# Patient Record
Sex: MALE | Race: WHITE | NOT HISPANIC OR LATINO | Employment: OTHER | URBAN - METROPOLITAN AREA
[De-identification: names, ages, dates, MRNs, and addresses within clinical notes are randomized per-mention and may not be internally consistent; named-entity substitution may affect disease eponyms.]

---

## 2017-08-08 ENCOUNTER — PRE VISIT (OUTPATIENT)
Dept: NEUROLOGY | Facility: CLINIC | Age: 62
End: 2017-08-08

## 2017-08-08 NOTE — TELEPHONE ENCOUNTER
1.  Date/reason for appt:8/10/17, Parkinson's   2.  Referring provider: ISABEL POTTS  3.  Call to patient (Yes / No - short description): No, referred   4.  Previous care at / records requested from:   [facility]- faxed cover sheet.

## 2017-08-09 ASSESSMENT — ENCOUNTER SYMPTOMS
NERVOUS/ANXIOUS: 1
EYE IRRITATION: 0
EYE PAIN: 0
INSOMNIA: 1
MYALGIAS: 1
NUMBNESS: 0
WEIGHT GAIN: 0
FEVER: 0
DIZZINESS: 0
ALTERED TEMPERATURE REGULATION: 1
MUSCLE WEAKNESS: 0
CHILLS: 0
DISTURBANCES IN COORDINATION: 0
DECREASED CONCENTRATION: 1
TINGLING: 0
NECK PAIN: 0
FATIGUE: 1
BACK PAIN: 0
SPEECH CHANGE: 0
LOSS OF CONSCIOUSNESS: 0
HEADACHES: 0
POLYPHAGIA: 0
EYE WATERING: 0
WEIGHT LOSS: 0
TREMORS: 1
POLYDIPSIA: 0
DECREASED APPETITE: 0
ARTHRALGIAS: 1
NIGHT SWEATS: 1
INCREASED ENERGY: 1
DEPRESSION: 0
MUSCLE CRAMPS: 1
PANIC: 1
SEIZURES: 0
STIFFNESS: 1
HALLUCINATIONS: 0
PARALYSIS: 0
MEMORY LOSS: 0
WEAKNESS: 0
JOINT SWELLING: 0
DOUBLE VISION: 0

## 2017-08-09 NOTE — TELEPHONE ENCOUNTER
Additional records received from [facility].   Included  Office notes: 6/22/17, 1/9/17, 6/29/16, 12/28/15  Radiology reports: MRI brain 12/21/15- [facility]

## 2017-08-09 NOTE — TELEPHONE ENCOUNTER
Records received from [facility].   Included  Office notes: 7/28/17  Other: referral     Missing/needed records: additional records from [facility]- request was faxed

## 2017-08-10 ENCOUNTER — OFFICE VISIT (OUTPATIENT)
Dept: NEUROLOGY | Facility: CLINIC | Age: 62
End: 2017-08-10

## 2017-08-10 VITALS
DIASTOLIC BLOOD PRESSURE: 75 MMHG | HEART RATE: 66 BPM | WEIGHT: 190.4 LBS | HEIGHT: 69 IN | BODY MASS INDEX: 28.2 KG/M2 | SYSTOLIC BLOOD PRESSURE: 123 MMHG

## 2017-08-10 DIAGNOSIS — G20.A1 PARALYSIS AGITANS (H): Primary | ICD-10-CM

## 2017-08-10 RX ORDER — THYROID 60 MG/1
60 TABLET ORAL EVERY MORNING
COMMUNITY
Start: 2017-06-07

## 2017-08-10 RX ORDER — CITALOPRAM HYDROBROMIDE 10 MG/1
10 TABLET ORAL EVERY MORNING
COMMUNITY
Start: 2017-06-02 | End: 2020-01-13

## 2017-08-10 RX ORDER — CLONAZEPAM 0.5 MG/1
1 TABLET ORAL AT BEDTIME
COMMUNITY
Start: 2017-06-02 | End: 2019-05-15

## 2017-08-10 RX ORDER — RESVER/WINE/BFL/GRPSD/PC/C/POM 200MG-60MG
CAPSULE ORAL
COMMUNITY
Start: 2011-12-19 | End: 2021-09-10

## 2017-08-10 RX ORDER — CALCIUM CARBONATE 260MG(650)
400 TABLET,CHEWABLE ORAL EVERY MORNING
COMMUNITY
End: 2018-01-05

## 2017-08-10 RX ORDER — CARBIDOPA AND LEVODOPA 25; 100 MG/1; MG/1
TABLET, EXTENDED RELEASE ORAL
COMMUNITY
Start: 2016-01-15 | End: 2017-09-27

## 2017-08-10 RX ORDER — CARBIDOPA AND LEVODOPA 25; 100 MG/1; MG/1
TABLET ORAL 3 TIMES DAILY
COMMUNITY
Start: 2016-01-15 | End: 2017-09-27

## 2017-08-10 ASSESSMENT — PAIN SCALES - GENERAL: PAINLEVEL: NO PAIN (0)

## 2017-08-10 NOTE — TELEPHONE ENCOUNTER
Radiology reports received from [facility].   MRI brain 12/21/15  MRI cervical 10/19/09
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
## 2017-08-10 NOTE — PATIENT INSTRUCTIONS
1. Start taking the clonazepam at night again.  2. If that doesn't work for your abnormal behavior during sleep, discuss changing your antidepressant with your primary neurologist.    3. We will have our research coordinator get in touch with you regarding current studies.  4. We will have you attend our DBS class    5. If we go forward, we will likely work to arrange an evaluation by the neuropsychologist, videotaped testing, and MRI

## 2017-08-10 NOTE — NURSING NOTE
Chief Complaint   Patient presents with     Consult     P NEW - MOVEMENT DISORDER     Kenia Fitch MA

## 2017-08-10 NOTE — MR AVS SNAPSHOT
After Visit Summary   8/10/2017    Kwame Lin    MRN: [id number]           Patient Information     Date Of Birth          1955        Visit Information        Provider Department      8/10/2017 9:00 AM Navneet Olivares MD [facility]        Care Instructions    1. Start taking the clonazepam at night again.  2. If that doesn't work for your abnormal behavior during sleep, discuss changing your antidepressant with your primary neurologist.    3. We will have our research coordinator get in touch with you regarding current studies.  4. We will have you attend our DBS class    5. If we go forward, we will likely work to arrange an evaluation by the neuropsychologist, videotaped testing, and MRI          Follow-ups after your visit        Who to contact     Please call your clinic at [phone] to:    Ask questions about your health    Make or cancel appointments    Discuss your medicines    Learn about your test results    Speak to your doctor   If you have compliments or concerns about an experience at your clinic, or if you wish to file a complaint, please contact [facility] Patient Relations at [phone] or email us at [email or web address]         Additional Information About Your Visit        MyChart Information     [employer] gives you secure access to your electronic health record. If you see a primary care provider, you can also send messages to your care team and make appointments. If you have questions, please call your primary care clinic.  If you do not have a primary care provider, please call [phone] and they will assist you.      [employer] is an electronic gateway that provides easy, online access to your medical records. With [employer], you can request a clinic appointment, read your test results, renew a prescription or communicate with your care team.     To access your existing account, please contact your [facility]  "[place] [facility] or call [phone] for assistance.        Care EveryWhere ID     This is your Care EveryWhere ID. This could be used by other organizations to access your [place] medical records  [id number]        Your Vitals Were     Pulse Height BMI (Body Mass Index)             66 1.753 m (5' 9\") 28.12 kg/m2          Blood Pressure from Last 3 Encounters:   08/10/17 123/75    Weight from Last 3 Encounters:   08/10/17 86.4 kg (190 lb 6.4 oz)              Today, you had the following     No orders found for display       Primary Care Provider Office Phone # Fax #    Silvina Armenta [phone]       [facility] [address] [address]        Equal Access to Services     KARISSA CASON : Hadii jr calhoun hadasho Soomaali, waaxda luqadaha, qaybta kaalmada adeegyada, waxevelyn quezada haycarroll dias . So [facility] [phone].    ATENCIÓN: Si habla español, tiene a mcrae disposición servicios gratuitos de asistencia lingüística. Jake al [phone].    We comply with applicable federal civil rights laws and [place] laws. We do not discriminate on the basis of race, color, national origin, age, disability sex, sexual orientation or gender identity.            Thank you!     Thank you for choosing [facility]  for your care. Our goal is always to provide you with excellent care. Hearing back from our patients is one way we can continue to improve our services. Please take a few minutes to complete the written survey that you may receive in the mail after your visit with us. Thank you!             Your Updated Medication List - Protect others around you: Learn how to safely use, store and throw away your medicines at www.disposemymeds.org.          This list is accurate as of: 8/10/17 10:58 AM.  Always use your most recent med list.                   Brand Name Dispense Instructions for use Diagnosis    Acidophilus/Goat Milk Caps           ARMOUR " THYROID 60 MG tablet   Generic drug:  thyroid      Take 60 mg by mouth every morning        * carbidopa-levodopa  MG per tablet    SINEMET     Take by mouth 3 times daily        * carbidopa-levodopa  MG per CR tablet    SINEMET CR          citalopram 10 MG tablet    celeXA     Take 10 mg by mouth daily        clonazePAM 0.5 MG tablet    klonoPIN     Take 0.25-0.5 mg by mouth 2 times daily        coenzyme Q-10 10 MG Caps      Take 10 mg by mouth        FISH OIL PO      Take 1,000 mg by mouth daily        Magnesium Citrate 100 MG Tabs      Take 400 mg by mouth        Red Wine Extract Caps           * Notice:  This list has 2 medication(s) that are the same as other medications prescribed for you. Read the directions carefully, and ask your doctor or other care provider to review them with you.

## 2017-08-14 ENCOUNTER — TELEPHONE (OUTPATIENT)
Dept: NEUROSURGERY | Facility: CLINIC | Age: 62
End: 2017-08-14

## 2017-08-14 NOTE — TELEPHONE ENCOUNTER
Per Dr. Yuan, spoke with pt about our DBS class. Pt is interested in attending our October session. I will send a letter with the pertinent information. No further questions at this time.

## 2017-08-15 ENCOUNTER — TELEPHONE (OUTPATIENT)
Dept: NEUROLOGY | Facility: CLINIC | Age: 62
End: 2017-08-15

## 2017-08-15 NOTE — TELEPHONE ENCOUNTER
I spoke with patient and wife Pat today about research opportunities in PD. I discussed PEDAL, 7T, OR study, and Clinical Core and stated there are others as well.  Pt and wife were eager for information and I will send them an email summary. Pt and wife inquired about when they can expect a workup schedule as well as if there an can be an exception made to get them into the September DBS class--I will follow up with Alis Olson and get back to them. Pt's wife also wondering what type of material DBS lead is made of--I will follow up with device rep and email them. I will send them my contact information for any further research questions.

## 2017-08-18 ENCOUNTER — TELEPHONE (OUTPATIENT)
Dept: NEUROSURGERY | Facility: CLINIC | Age: 62
End: 2017-08-18

## 2017-08-18 NOTE — TELEPHONE ENCOUNTER
Spoke with pt about the 4 DBS workup appointments. I will try to schedule the appointments over 2 separate days; pt does not need the appt dates to be back to back, as [place]. I will have some possible dates by mid-week and will f/u with him at that time. No further questions.

## 2017-08-22 ENCOUNTER — TELEPHONE (OUTPATIENT)
Dept: NEUROSURGERY | Facility: CLINIC | Age: 62
End: 2017-08-22

## 2017-08-22 DIAGNOSIS — G20.A1 PARKINSON'S DISEASE (H): Primary | ICD-10-CM

## 2017-08-22 NOTE — TELEPHONE ENCOUNTER
Left  for pt letting him know I'd like to discuss DBS workup appt possibilities. Requested that he call me at earliest convenience. Left direct phone number.

## 2017-09-22 ASSESSMENT — MOVEMENT DISORDERS SOCIETY - UNIFIED PARKINSONS DISEASE RATING SCALE (MDS-UPDRS)
FREEZING: NORMAL: NOT AT ALL (NO PROBLEMS).
SPEECH: NORMAL: NOT AT ALL (NO PROBLEMS).
GETTING_OUT_OF_BED_CAR_DEEP_CHAIR: NORMAL: NOT AT ALL (NO PROBLEMS).
TREMOR: MODERATE: SHAKING OR TREMOR CAUSES PROBLEMS WITH MANY OF MY DAILY ACTIVITES.
SALIVA_AND_DROOLING: NORMAL: NOT AT ALL (NO PROBLEMS).
WALKING_AND_BALANCE: NORMAL: NOT AT ALL (NO PROBLEMS).
HOBBIES_AND_OTHER_ACTIVITIES: SLIGHT: I AM A BIT SLOW BUT DO THESE ACTIVITIES EASILY.
EATING_TASKS: MILD: I AM SLOW WITH MY EATING AND HAVE OCCASIONAL FOOD SPILLS.  I MAY NEED HELP WITH A FEW TASKS SUCH AS CUTTING MEAT.
HANDWRITING: SEVERE: MOST OR ALL WORDS CANNOT BE READ.
HYGIENE: NORMAL: NOT AT ALL (NO PROBLEMS).
CHEWING_AND_SWALLOWING: NORMAL: NO PROBLEMS.
TURNING_IN_BED: NORMAL: NOT AT ALL (NO PROBLEMS).
TOTAL_SCORE: 11
DRESSING: SLIGHT: I AM SLOW BUT I DO NOT NEED HELP.

## 2017-09-27 ENCOUNTER — OFFICE VISIT (OUTPATIENT)
Dept: NEUROLOGY | Facility: CLINIC | Age: 62
End: 2017-09-27

## 2017-09-27 VITALS
BODY MASS INDEX: 27.55 KG/M2 | WEIGHT: 186 LBS | DIASTOLIC BLOOD PRESSURE: 67 MMHG | HEART RATE: 87 BPM | SYSTOLIC BLOOD PRESSURE: 121 MMHG | HEIGHT: 69 IN

## 2017-09-27 DIAGNOSIS — G20.A1 PARKINSON'S DISEASE (H): Primary | ICD-10-CM

## 2017-09-27 RX ORDER — CARBIDOPA AND LEVODOPA 50; 200 MG/1; MG/1
2 TABLET, EXTENDED RELEASE ORAL AT BEDTIME
COMMUNITY
Start: 2017-09-27 | End: 2018-05-18

## 2017-09-27 RX ORDER — CARBIDOPA AND LEVODOPA 25; 100 MG/1; MG/1
3 TABLET ORAL AT BEDTIME
Qty: 90 TABLET | COMMUNITY
Start: 2017-09-27 | End: 2019-10-17

## 2017-09-27 ASSESSMENT — PAIN SCALES - GENERAL: PAINLEVEL: MILD PAIN (3)

## 2017-09-27 NOTE — PATIENT INSTRUCTIONS
September 27, 2017    Dear Javier Kwame Lin,    Thank you for coming today.  During your visit, we have discussed the following:       __  You have completed the ON/OFF Motor Assessment.    __  Continue with your DBS workup appointments.  __  Once you're done with your workup, we'll discuss you at the DBS Consensus meeting & will let you know the decision.  __  You may contact us with any question.     You may return sooner as needed.      For questions, you may send us a [employer] message or call [phone]    Fax number: [phone]    АННА Flores, CNP  [facility]

## 2017-09-27 NOTE — PROGRESS NOTES
"PATIENT: Kwame Lin    : 1955    SULEMA: 2017    REASON FOR VISIT:  Pre-DBS ON/OFF motor symptom evaluation.      HPI: Mr. Kwame Lin is a 62 year old right - handed  male who came to the [facility] neurology clinic accompanied by his wife, , for ON/OFF motor evaluation as part of Deep Brain Stimulation surgery work-up for management of Parkinson's disease.     Today, he reports that his tremor is much worse than before.  He didn't think that Sinemet was helping him.  But now, he realizes that it's doing something.    He was diagnosed with Idiopathic Parkinson's disease around  -  at [facility].  He was seen by a general neurologist as well as by Dr. Easton.  His symptom started in  when flying back from [place].  He had nausea & felt sick & was told that it was \"aerotoxic syndrome.\"  He reports symptoms resolved & came back a year after when pt & wife went on a strict diet.  He lost 25 lbs in 6 weeks.  During that time, his tremor came back.         His goal for DBS include \"to reduce tremors;\" \"to have a normal life like eating, writing, buttoning shirt, not be afraid to go out in public, being able to hold my grandchildren without shaking, going to a movie & not worry about shaking & disrupting other, being able to sit where I want & not in the back. . . .\"  \"I used to be an outgoing person, but now I've become introvert because of tremors.\"    He has attended DBS workshops.  He also knows a few people who have DBS with good motor benefit.  He reports a couple of people he talked to had stroke post DBS, but reported that they would do it again.  He has tried various alternative therapies & wants to go forward with DBS as nothing he has tried has given him the benefit he's looking for.     He knows that DBS is not a cure.  He hopes to get his \"life back.\"  He verbalized about 2% potential side effect of speech problems & stroke.     Current antiparkinsonian " "medication:   Medication Sig     carbidopa-levodopa (SINEMET)  MG 1.5 every 4 hours 3 x daily around 6:30 am, 10:30 am, & 3:30 pm.     carbidopa-levodopa (SINEMET CR)  MG per CR  Take 2 tablets by mouth At Bedtime       Last dose of antiparkinsonian medication was taken: Yesterday, 9/26 at 3 pm.    In clinic, OFF motor exam was completed and patient took Sinemet 25/100 mg 2 tabs at 7:30 am.  After 80 minutes, ON motor exam was completed.    Physical Exam:    Vital Signs:  Blood pressure 121/67, pulse 87, height 1.753 m (5' 9\"), weight 84.4 kg (186 lb).  Body mass index is 27.47 kg/(m^2).    MDS Part II  -- Total Score: 11       MOTOR TEST: UPDRS Flowsheet was completed in Epic. Exam was videotaped.   Total OFF score = 41  Total ON score = 28    Percentage: 41 - 28 = 13 --> 13 ÷ 41 = 31.7 = 32% motor improvement.      ASSESSMENT/PLAN:    Parkinson's Disease: Mr. Lin is a 62 year old right - handed male with about 9 year history of Idiopathic Parkinson's disease who came to the clinic for ON/OFF motor evaluation as part of his Deep Brain Stimulation surgery work-up.    __  Pt had good knowledge about DBS.   We briefly discussed DBS risks, & benefits; the possibility of lead misplacement; appropriate DBS candidates.  All questions were answered.    __  He was informed that we'll contact him after the DBS team meets & discusses his work-up. He understands the plan.    The total time spent with the patient in ON/OFF motor evaluation & DBS surgery was 120 minutes and greater than 50% of the time was spent in counseling & coordination of care.    АННА Flores, CNP   [facility]    "

## 2017-09-27 NOTE — MR AVS SNAPSHOT
After Visit Summary   9/27/2017    Kwame Lin    MRN: [id number]           Patient Information     Date Of Birth          1955        Visit Information        Provider Department      9/27/2017 7:00 AM Navjot Juarez APRN CNP [facility] Neurology        Care Instructions    September 27, 2017    Dear Mr. Kwame Lin,    Thank you for coming today.  During your visit, we have discussed the following:       __  You have completed the ON/OFF Motor Assessment.    __  Continue with your DBS workup appointments.  __  Once you're done with your workup, we'll discuss you at the DBS Consensus meeting & will let you know the decision.  __  You may contact us with any question.     You may return sooner as needed.      For questions, you may send us a [email or web address] message or call [phone]    Fax number: [phone]    АННА Flores CNP  [facility]            Follow-ups after your visit        Your next 10 appointments already scheduled     Sep 27, 2017 10:00 AM CDT   MR BRAIN W/O & W CONTRAST with [id number]   [facility]    [facility] [place] [address]   [phone]           Take your medicines as usual, unless your doctor tells you not to. Bring a list of your current medicines to your exam (including vitamins, minerals and over-the-counter drugs).  You will be given intravenous contrast for this exam. To prepare:   The day before your exam, drink extra fluids at least six 8-ounce glasses (unless your doctor tells you to restrict your fluids).   Have a blood test (creatinine test) within 30 days of your exam. Go to your clinic or Diagnostic Imaging Department for this test.  The MRI machine uses a strong magnet. Please wear clothes without metal (snaps, zippers). A sweatsuit works well, or we may give you a hospital gown.  Please remove any body piercings and hair extensions before you arrive. You will also remove  watches, jewelry, hairpins, wallets, dentures, partial dental plates and hearing aids. You may wear contact lenses, and you may be able to wear your rings. We have a safe place to keep your personal items, but it is safer to leave them at home.   **IMPORTANT** THE INSTRUCTIONS BELOW ARE ONLY FOR THOSE PATIENTS WHO HAVE BEEN TOLD THEY WILL RECEIVE SEDATION OR GENERAL ANESTHESIA DURING THEIR MRI PROCEDURE:  IF YOU WILL RECEIVE SEDATION (take medicine to help you relax during your exam):   You must get the medicine from your doctor before you arrive. Bring the medicine to the exam. Do not take it at home.   Arrive one hour early. Bring someone who can take you home after the test. Your medicine will make you sleepy. After the exam, you may not drive, take a bus or take a taxi by yourself.   No eating 8 hours before your exam. You may have clear liquids up until 4 hours before your exam. (Clear liquids include water, clear tea, black coffee and fruit juice without pulp.)  IF YOU WILL RECEIVE ANESTHESIA (be asleep for your exam):   Arrive 1 1/2 hours early. Bring someone who can take you home after the test. You may not drive, take a bus or take a taxi by yourself.   No eating 8 hours before your exam. You may have clear liquids up until 4 hours before your exam. (Clear liquids include water, clear tea, black coffee and fruit juice without pulp.)  Please call the Imaging Department at your exam site with any questions.            Sep 28, 2017  8:00 AM CDT   (Arrive by 7:45 AM)   Deep Brain Stimulation with Vanessa Lanier, PhD [facility] Neuro[facility]ology ([facility])    [address] [place] [id number]   [phone]            Sep 28, 2017 12:00 PM CDT   (Arrive by 11:45 AM)   Deep Brain Stimulation with Arpan Huynh MD   [facility] Neurosurgery ([facility])    [address] [place] [id number]  "  [phone]              Who to contact     Please call your clinic at [phone] to:    Ask questions about your health    Make or cancel appointments    Discuss your medicines    Learn about your test results    Speak to your doctor   If you have compliments or concerns about an experience at your clinic, or if you wish to file a complaint, please contact [facility] Patient Relations at [phone] or email us at [email or web address]         Additional Information About Your Visit        [employer] Information     [employer] gives you secure access to your electronic health record. If you see a primary care provider, you can also send messages to your care team and make appointments. If you have questions, please call your primary care clinic.  If you do not have a primary care provider, please call [phone] and they will assist you.      [employer] is an electronic gateway that provides easy, online access to your medical records. With [employer], you can request a clinic appointment, read your test results, renew a prescription or communicate with your care team.     To access your existing account, please contact your [facility] Clinic or call [phone] for assistance.        Care EveryWhere ID     This is your Care EveryWhere ID. This could be used by other organizations to access your [facility] medical records  [id number]        Your Vitals Were     Pulse Height BMI (Body Mass Index)             87 1.753 m (5' 9\") 27.47 kg/m2          Blood Pressure from Last 3 Encounters:   09/27/17 121/67   08/10/17 123/75    Weight from Last 3 Encounters:   09/27/17 84.4 kg (186 lb)   08/10/17 86.4 kg (190 lb 6.4 oz)              Today, you had the following     No orders found for display         Today's Medication Changes          These changes are accurate as of: 9/27/17  8:54 AM.  If you have any questions, ask your nurse or doctor.               These " medicines have changed or have updated prescriptions.        Dose/Directions    * carbidopa-levodopa  MG per tablet   Commonly known as:  SINEMET   This may have changed:    - how to take this  - when to take this  - additional instructions   Changed by:  Navjot Juarez APRN CNP        1.5 every 4 hours 3 x daily aroudnd 6:30 am, 10:30 am, & 3:30 pm.   Quantity:  90 tablet   Refills:  0       * carbidopa-levodopa  MG per CR tablet   Commonly known as:  SINEMET CR   This may have changed:  Another medication with the same name was changed. Make sure you understand how and when to take each.   Changed by:  Navjot Juarez APRN CNP        Dose:  2 tablet   Take 2 tablets by mouth At Bedtime   Refills:  0       * Notice:  This list has 2 medication(s) that are the same as other medications prescribed for you. Read the directions carefully, and ask your doctor or other care provider to review them with you.             Primary Care Provider Office Phone # Fax #    Silvina Armenta [phone]       [facility] [address] [facility] [address]        Equal Access to Services     [facility]: Hadii jr calhoun hadeugeneo Sotsering, waaxda luqadaha, qaybta kaalmada adeegyada, bebe meza. So [facility] [phone].    ATENCIÓN: Si habla español, tiene a mcrae disposición servicios gratuitos de asistencia lingüística. [facility] [phone].    We comply with applicable federal civil rights laws and [place] laws. We do not discriminate on the basis of race, color, national origin, age, disability sex, sexual orientation or gender identity.            Thank you!     Thank you for choosing [facility] NEUROLOGY  for your care. Our goal is always to provide you with excellent care. Hearing back from our patients is one way we can continue to improve our services. Please take a few minutes to complete the written survey that you may receive in the mail  after your visit with us. Thank you!             Your Updated Medication List - Protect others around you: Learn how to safely use, store and throw away your medicines at www.disposemymeds.org.          This list is accurate as of: 9/27/17  8:54 AM.  Always use your most recent med list.                   Brand Name Dispense Instructions for use Diagnosis    Acidophilus/Goat Milk Caps           ARMOUR THYROID 60 MG tablet   Generic drug:  thyroid      Take 60 mg by mouth every morning        * carbidopa-levodopa  MG per tablet    SINEMET    90 tablet    1.5 every 4 hours 3 x daily aroudnd 6:30 am, 10:30 am, & 3:30 pm.        * carbidopa-levodopa  MG per CR tablet    SINEMET CR     Take 2 tablets by mouth At Bedtime        citalopram 10 MG tablet    celeXA     Take 10 mg by mouth daily        clonazePAM 0.5 MG tablet    klonoPIN     Take 0.25-0.5 mg by mouth 2 times daily        coenzyme Q-10 10 MG Caps      Take 10 mg by mouth        FISH OIL PO      Take 1,000 mg by mouth daily        Magnesium Citrate 100 MG Tabs      Take 400 mg by mouth        Red Wine Extract Caps           * Notice:  This list has 2 medication(s) that are the same as other medications prescribed for you. Read the directions carefully, and ask your doctor or other care provider to review them with you.

## 2017-09-28 ENCOUNTER — OFFICE VISIT (OUTPATIENT)
Dept: NEUROPSYCHOLOGY | Facility: CLINIC | Age: 62
End: 2017-09-28

## 2017-09-28 ENCOUNTER — OFFICE VISIT (OUTPATIENT)
Dept: NEUROSURGERY | Facility: CLINIC | Age: 62
End: 2017-09-28

## 2017-09-28 VITALS
HEIGHT: 69 IN | BODY MASS INDEX: 27.88 KG/M2 | TEMPERATURE: 97.8 F | WEIGHT: 188.2 LBS | HEART RATE: 68 BPM | OXYGEN SATURATION: 97 % | SYSTOLIC BLOOD PRESSURE: 120 MMHG | DIASTOLIC BLOOD PRESSURE: 70 MMHG | RESPIRATION RATE: 20 BRPM

## 2017-09-28 DIAGNOSIS — G20.A1 PARKINSON'S DISEASE (H): Primary | ICD-10-CM

## 2017-09-28 DIAGNOSIS — F09 MENTAL DISORDER DUE TO GENERAL MEDICAL CONDITION: ICD-10-CM

## 2017-09-28 ASSESSMENT — PAIN SCALES - GENERAL: PAINLEVEL: MILD PAIN (3)

## 2017-09-28 NOTE — PROGRESS NOTES
"HISTORY AND PHYSICAL EXAM    Chief Complaint   Patient presents with     Consult     DBS; Parkinson's Disease       HISTORY OF PRESENT ILLNESS  We saw Mr. Kwame Lin today in Neurosurgery Clinic. He is a 62 year old right handed male with a history of Parkinson's Disease that was diagnosed around 2009.  He has followed by Dr. Caldwell at the [facility]. Records indicate and patient confirms that his symptoms began back in 2007 when he was flying back from [place].  He had nausea and felt sick, and he was told that it was \"aerotoxic syndrome\".  His symptoms reportedly resolved and then returned about one year later when he and his wife went on a strict diet at which time he lost 25 lbs in six weeks.  During this weight loss period, his tremors came back.  His main symptom is right-sided tremors, that is now starting to progress to his left side. He takes sinemet.  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  His grandchildren do not want to hold his hand because of his tremor.  His goal for DBS surgery is to control his tremor and \"have improvement in his quality of life\".        Past Medical History:   Diagnosis Date     Anxiety      Hypothyroid      Parkinson disease (H)      RBD (REM behavioral disorder)        Past Surgical History:   Procedure Laterality Date     epidydmal mass removal, benign         Family History   Problem Relation Age of Onset     Lung Cancer Father      Parkinsonism Cousin      paternal side     Parkinsonism Maternal Aunt        Social History     Social History     Marital status:      Spouse name: N/A     Number of children: N/A     Years of education: N/A     Occupational History     Small business start consultant      Social History Main Topics     Smoking status: Never Smoker     Smokeless tobacco: " Never Used     Alcohol use No     Drug use: No     Sexual activity: Yes     Partners: Female     Other Topics Concern     Not on file     Social History Narrative    Previous worked as [employer].    Now consulting part-time.    , lives with wife.    Non-smoker. Occasional drink.          Allergies   Allergen Reactions     Bactrim Hives     Codeine Nausea and Vomiting     Morphine      Penicillins Hives     Seasonal Allergies Itching     Sulfa Drugs      Toradol Nausea and Vomiting       Current Outpatient Prescriptions   Medication     carbidopa-levodopa (SINEMET)  MG per tablet     carbidopa-levodopa (SINEMET CR)  MG per CR tablet     thyroid (ARMOUR THYROID) 60 MG tablet     citalopram (CELEXA) 10 MG tablet     clonazePAM (KLONOPIN) 0.5 MG tablet     coenzyme Q-10 10 MG CAPS     Magnesium Citrate 100 MG TABS     Misc Natural Products (RED WINE EXTRACT) CAPS     Omega-3 Fatty Acids (FISH OIL PO)     Probiotic Product (ACIDOPHILUS/GOAT MILK) CAPS     No current facility-administered medications for this visit.        REVIEW OF SYSTEMS:  General: POSITIVE for difficulty sleeping and recent weight loss. Negative for chills/sweats/fever, headache, or recent fatigue.  Eyes: Negative for blurred vision, crossed eyes, double vision, recent eye infections, vision flashes, or vision halos.  Ears/Nose/Mouth/Throat: Negative for bleeding gums, difficulty swallowing, earache, ear discharge, hearing loss, hoarseness, nosebleeds, tinnitus, or sinus problems.  Respiratory:Negative for chronic cough, coughing blood, night sweats, shortness of breath, Tuberculosis, or wheezing.  Cardiovascular: Negative for chest pain, dyspnea at night, heart murmur, palpitations, pacemaker, pacemaker, poor circulation, swollen legs/feet, or varicose veins.  Gastrointestinal: Negative for melena, hematochezia, chronic diarrhea, heartburn, Hepatitis A/B/C, increasing constipation, Liver  "Disease, nausea, or vomiting.   Genitourinary: Negative for Urinary retention, genital discharge, urinary incontinence, prostate problems, urgency, or UTI.   Neurological: Negative for syncope, headaches, numbness of arms/legs, tingling in hands/arms/legs, memory problems, or seizures.  Psychological: POSITIVE for anxiety. Negative for depression, panic attacks, or restlessness.  Skin: Negative for chronic skin itching, color changes in hand/feet when cold, poor scarring, non-healing ulcers, skin rashes/hives, unusual moles.  Musculoskeletal: POSITIVE for swelling in knees. Negative for arthritis, joint swelling in hands/wrists/hips, muscle tenderness in arms/legs, or osteoporosis.  Endocrine: POSITIVE for loss of libido and hypothyroid. Negative for excessive thirst/hunger, intolerance for warm rooms, multiple broken bones, rapid weight gain/loss, or galactorrhea.  Hematologic/Lymphatic: Negative for easy skin bruising, significant fatigue, prolonged bleeding, tender glands/lymph nodes.  Allergies: Negative for asthma or hay fever.      PHYSICAL EXAM  /70  Pulse 68  Temp 97.8  F (36.6  C)  Resp 20  Ht 1.753 m (5' 9\")  Wt 85.4 kg (188 lb 3.2 oz)  SpO2 97%  BMI 27.79 kg/m2    General: Awake, alert, oriented. Well nourished, well developed, no acute distress.  HEENT: Head normocephalic, atraumatic. No carotid bruit. Neck supple. Good range of motion. No palpable thyroid mass.  Heart: Regular rhythm and rate. No murmurs.  Lungs: Clear to auscultation and percussion bilaterally. No ronchi, rales or wheeze.  Abdomen: Soft, non-tender, non-distended. No hepatosplenomegaly.  Extremity: Warm with no clubbing or cyanosis. No lower extremity edema.    Neurological:  Awake, alert and oriented to date, time, place and person. Speech fluent.   Pupils equal, round, reactive to light.  Extraocular movement intact.  Visual fields are full on confrontation.  Hearing is grossly normal to finger rub.   Facial sensation " intact.  Face symmetric.  Tongue midline.  Uvula elevates equally.    Motor: full strength throughout.  Sensation: intact to light touch and pinpoint.  Deep tendon reflexes: Trace throughout. Negative for clonus. Negative for Elizabeth's sign. No dysmetria.      MRI brain without/with contrast - 9/27/2017  Small areas of susceptibility within the right jean, left GP and within subcortical white matter, likely represent microhemorrhages.  Chronic lacunar infarct in left jean.  Ventricles appear to be of normal size with no enlargement.  Minimal atrophy.      ASSESSMENT   62 year old right handed male with a history of Parkinson's Disease.  Right sided tremor.    Patient is being evaluated for possible DBS candidacy.  His goal for the surgery is to improve his tremor on the right side.  We did discuss briefly the findings on the MRI and discussed risks.    During today's visit, we discussed both phase I and II of DBS surgery. We discussed that during Phase I, we would place the DBS electrode on the left side under MAC and microelectrode recording. He would then return one week later for the phase II with placement of the DBS generator/battery over the left chest wall under general anesthesia. If he is a unilateral candidate or wait and see strategy candidate, then he will undergo another evaluation/discussion prior to proceeding to the right side implantation.  If he is a bilateral candidate in a staged fashion, he would return three weeks later for the phase I and II combined for the placement of the DBS electrode on the right side under MAC and microelectrode recording followed by connection to the previously implanted generator/battery.      Risks, benefits, alternative therapies were discussed with the patient, including but not limited to infection and bleeding (intracranial), injury to the brain, stroke, death, hardware failure and possible need for more surgeries. Surgical procedure was discussed in detail. I  also discussed possible use of BRAD for neuronavigation. All questions were answered, and he expressed understanding. A full history and physical exam was performed during today's visit. His case will be discussed at the Movement Disorder Consensus Group Meeting to determine whether he is a good candidate for DBS surgery.    We also discussed different options that area available in terms of the device, advantages and disadvantages of each devices, and MRI compatibility and its consequences currently.    Medtronic - approved for full body MRI.  [employer] - newer device, segmented and directional electrode, not yet MRI approved.  [employer] - clinical trial protocol only.      PLAN:  1. We will discuss his case at the Movement Disorder Consensus Group Meeting, and we will contact him regarding his DBS candidacy.     I, Max Ware, am serving as a scribe to document services personally performed by Arpan Huynh MD, PhD, based upon my observations and the provider's statements to me. All documentation has been reviewed and edited by the aforementioned doctor prior to being entered into the official medical record.    I, Arpan Huynh, attest that above named individual is acting in scribe capacity, has observed my performance of the services and has documented them in accordance with my direction. The documentation recorded by the scribe accurately reflects the service I personally performed and the decisions made by me. The document was also partially recorded by me and the entire document was edited by me as well.       Greater than 50% of the encounter was spent discussing the therapeutic/surgical options, expected outcomes and device choices.  The visit lasted 65 minutes.

## 2017-09-28 NOTE — MR AVS SNAPSHOT
After Visit Summary   9/28/2017    Kwame Lin    MRN: [id number]           Patient Information     Date Of Birth          1955        Visit Information        Provider Department      9/28/2017 8:00 AM Vanessa Lanier, PhD [facility] Neuropsychology        Today's Diagnoses     Parkinson's disease (H)    -  1    Mental disorder due to general medical condition           Follow-ups after your visit        Who to contact     Please call your clinic at [phone] to:    Ask questions about your health    Make or cancel appointments    Discuss your medicines    Learn about your test results    Speak to your doctor   If you have compliments or concerns about an experience at your clinic, or if you wish to file a complaint, please contact [facility] Patient Relations at [phone] or email us at [email or web address]         Additional Information About Your Visit        MyChart Information     [employer] gives you secure access to your electronic health record. If you see a primary care provider, you can also send messages to your care team and make appointments. If you have questions, please call your primary care clinic.  If you do not have a primary care provider, please call [phone] and they will assist you.      [employer] is an electronic gateway that provides easy, online access to your medical records. With [employer], you can request a clinic appointment, read your test results, renew a prescription or communicate with your care team.     To access your existing account, please contact your [facility] or call [phone] for assistance.        Care EveryWhere ID     This is your Care EveryWhere ID. This could be used by other organizations to access your [facility] medical records  [id number]         Blood Pressure from Last 3 Encounters:   09/28/17 120/70   09/27/17 121/67   08/10/17 123/75    Weight from Last 3  Encounters:   09/28/17 85.4 kg (188 lb 3.2 oz)   09/27/17 84.4 kg (186 lb)   08/10/17 86.4 kg (190 lb 6.4 oz)              We Performed the Following     [id number] TESTING, PER HR/PSYCHOLOGIST     NEUROPSYCH TESTING BY [facility]        Primary Care Provider Office Phone # Fax #    Silvina Armenta [phone]       [facility] [address] [address]        Equal Access to Services     KARISSA CASON : Hadii aad ku hadasho Soomaali, waaxda luqadaha, qaybta kaalmada adeegyada, waxay idiin hayaan ademichelle dias . So [facility] [phone].    ATENCIÓN: Si habla español, tiene a mcrae disposición servicios gratuitos de asistencia lingüística. [facility] [phone].    We comply with applicable federal civil rights laws and [place] laws. We do not discriminate on the basis of race, color, national origin, age, disability, sex, sexual orientation, or gender identity.            Thank you!     Thank you for choosing [facility] NEUROPSYCHOLOGY  for your care. Our goal is always to provide you with excellent care. Hearing back from our patients is one way we can continue to improve our services. Please take a few minutes to complete the written survey that you may receive in the mail after your visit with us. Thank you!             Your Updated Medication List - Protect others around you: Learn how to safely use, store and throw away your medicines at www.disposemymeds.org.          This list is accurate as of: 9/28/17 11:59 PM.  Always use your most recent med list.                   Brand Name Dispense Instructions for use Diagnosis    Acidophilus/Goat Milk Caps           ARMOUR THYROID 60 MG tablet   Generic drug:  thyroid      Take 60 mg by mouth every morning        * carbidopa-levodopa  MG per tablet    SINEMET    90 tablet    1.5 every 4 hours 3 x daily aroudnd 6:30 am, 10:30 am, & 3:30 pm.        * carbidopa-levodopa  MG per CR tablet    SINEMET CR      Take 2 tablets by mouth At Bedtime        citalopram 10 MG tablet    celeXA     Take 10 mg by mouth daily        clonazePAM 0.5 MG tablet    klonoPIN     Take 0.25-0.5 mg by mouth 2 times daily        coenzyme Q-10 10 MG Caps      Take 10 mg by mouth        FISH OIL PO      Take 1,000 mg by mouth daily        Magnesium Citrate 100 MG Tabs      Take 400 mg by mouth        Red Wine Extract Caps           * Notice:  This list has 2 medication(s) that are the same as other medications prescribed for you. Read the directions carefully, and ask your doctor or other care provider to review them with you.

## 2017-09-28 NOTE — NURSING NOTE
The patient was seen for neuropsychological evaluation at the request of Dr. Navneet Olivares for the purpose of diagnostic clarification and treatment planning.  2 hours of face to face testing were provided by this writer.  Please see Dr. Vanessa Lanier's report for a full interpretation of the findings.

## 2017-09-28 NOTE — MR AVS SNAPSHOT
"              After Visit Summary   9/28/2017    Kwame Lin    MRN: [id number]           Patient Information     Date Of Birth          1955        Visit Information        Provider Department      9/28/2017 12:00 PM Arpan Huynh MD [facility] Neurosurgery        Today's Diagnoses     Parkinson's disease (H)    -  1       Follow-ups after your visit        Who to contact     Please call your clinic at [phone] to:    Ask questions about your health    Make or cancel appointments    Discuss your medicines    Learn about your test results    Speak to your doctor   If you have compliments or concerns about an experience at your clinic, or if you wish to file a complaint, please contact [facility] Patient Relations at [phone] or email us at [email or web address]         Additional Information About Your Visit        MyChart Information     [employer] gives you secure access to your electronic health record. If you see a primary care provider, you can also send messages to your care team and make appointments. If you have questions, please call your primary care clinic.  If you do not have a primary care provider, please call [phone] and they will assist you.      [employer] is an electronic gateway that provides easy, online access to your medical records. With [employer], you can request a clinic appointment, read your test results, renew a prescription or communicate with your care team.     To access your existing account, please contact your [facility] or call [phone] for assistance.        Care EveryWhere ID     This is your Care EveryWhere ID. This could be used by other organizations to access your [facility] medical records  [id number]        Your Vitals Were     Pulse Temperature Respirations Height Pulse Oximetry BMI (Body Mass Index)    68 97.8  F (36.6  C) 20 1.753 m (5' 9\") 97% 27.79 kg/m2       Blood Pressure " from Last 3 Encounters:   09/28/17 120/70   09/27/17 121/67   08/10/17 123/75    Weight from Last 3 Encounters:   09/28/17 85.4 kg (188 lb 3.2 oz)   09/27/17 84.4 kg (186 lb)   08/10/17 86.4 kg (190 lb 6.4 oz)              Today, you had the following     No orders found for display       Primary Care Provider Office Phone # Fax #    Silvina Armenta [phone]       [facility] [address] [address]        Equal Access to Services     [facility]: Hadii aad lj hadeugeneo Sotsering, waaxda luqadaha, qaybta kaalmada ademichelleyada, bebe dias . So [facility] [place] [facility] [phone].    ATENCIÓN: Si habla español, tiene a mcrae disposición servicios gratuitos de asistencia lingüística. [facility] [phone].    We comply with applicable federal civil rights laws and [place] laws. We do not discriminate on the basis of race, color, national origin, age, disability, sex, sexual orientation, or gender identity.            Thank you!     Thank you for choosing [facility]  for your care. Our goal is always to provide you with excellent care. Hearing back from our patients is one way we can continue to improve our services. Please take a few minutes to complete the written survey that you may receive in the mail after your visit with us. Thank you!             Your Updated Medication List - Protect others around you: Learn how to safely use, store and throw away your medicines at www.disposemymeds.org.          This list is accurate as of: 9/28/17 11:59 PM.  Always use your most recent med list.                   Brand Name Dispense Instructions for use Diagnosis    Acidophilus/Goat Milk Caps           ARMOUR THYROID 60 MG tablet   Generic drug:  thyroid      Take 60 mg by mouth every morning        * carbidopa-levodopa  MG per tablet    SINEMET    90 tablet    1.5 every 4 hours 3 x daily aroudnd 6:30 am, 10:30 am, & 3:30 pm.        *  carbidopa-levodopa  MG per CR tablet    SINEMET CR     Take 2 tablets by mouth At Bedtime        citalopram 10 MG tablet    celeXA     Take 10 mg by mouth daily        clonazePAM 0.5 MG tablet    klonoPIN     Take 0.25-0.5 mg by mouth 2 times daily        coenzyme Q-10 10 MG Caps      Take 10 mg by mouth        FISH OIL PO      Take 1,000 mg by mouth daily        Magnesium Citrate 100 MG Tabs      Take 400 mg by mouth        Red Wine Extract Caps           * Notice:  This list has 2 medication(s) that are the same as other medications prescribed for you. Read the directions carefully, and ask your doctor or other care provider to review them with you.

## 2017-09-28 NOTE — LETTER
"9/28/2017       RE: Kwame Lin  [address] [place] [address]     Dear Colleague,    Thank you for referring your patient, Kwame Lin, to the [facility] NEUROSURGERY at [facility]. Please see a copy of my visit note below.    HISTORY AND PHYSICAL EXAM    Chief Complaint   Patient presents with     Consult     DBS; Parkinson's Disease       HISTORY OF PRESENT ILLNESS  We saw Mr. Kwame Lin today in Neurosurgery Clinic. He is a 62 year old right handed male with a history of Parkinson's Disease that was diagnosed around 2009.  He has followed by Dr. Caldwell at the [facility]. Records indicate and patient confirms that his symptoms began back in 2007 when he was flying back from [place].  He had nausea and felt sick, and he was told that it was \"aerotoxic syndrome\".  His symptoms reportedly resolved and then returned about one year later when he and his wife went on a strict diet at which time he lost 25 lbs in six weeks.  During this weight loss period, his tremors came back.  His main symptom is right-sided tremors, that is now starting to progress to his left side. He takes sinemet.  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  His grandchildren do not want to hold his hand because of his tremor.  His goal for DBS surgery is to control his tremor and \"have improvement in his quality of life\".        Past Medical History:   Diagnosis Date     Anxiety      Hypothyroid      Parkinson disease (H)      RBD (REM behavioral disorder)        Past Surgical History:   Procedure Laterality Date     epidydmal mass removal, benign         Family History   Problem Relation Age of Onset     Lung Cancer Father      Parkinsonism Cousin      paternal side     Parkinsonism Maternal Aunt        Social " History     Social History     Marital status:      Spouse name: N/A     Number of children: N/A     Years of education: N/A     Occupational History     Small business start consultant      Social History Main Topics     Smoking status: Never Smoker     Smokeless tobacco: Never Used     Alcohol use No     Drug use: No     Sexual activity: Yes     Partners: Female     Other Topics Concern     Not on file     Social History Narrative    Previous worked as [email or web address].    Now consulting part-time.    , lives with wife.    Non-smoker. Occasional drink.          Allergies   Allergen Reactions     Bactrim Hives     Codeine Nausea and Vomiting     Morphine      Penicillins Hives     Seasonal Allergies Itching     Sulfa Drugs      Toradol Nausea and Vomiting       Current Outpatient Prescriptions   Medication     carbidopa-levodopa (SINEMET)  MG per tablet     carbidopa-levodopa (SINEMET CR)  MG per CR tablet     thyroid (ARMOUR THYROID) 60 MG tablet     citalopram (CELEXA) 10 MG tablet     clonazePAM (KLONOPIN) 0.5 MG tablet     coenzyme Q-10 10 MG CAPS     Magnesium Citrate 100 MG TABS     Misc Natural Products (RED WINE EXTRACT) CAPS     Omega-3 Fatty Acids (FISH OIL PO)     Probiotic Product (ACIDOPHILUS/GOAT MILK) CAPS     No current facility-administered medications for this visit.        REVIEW OF SYSTEMS:  General: POSITIVE for difficulty sleeping and recent weight loss. Negative for chills/sweats/fever, headache, or recent fatigue.  Eyes: Negative for blurred vision, crossed eyes, double vision, recent eye infections, vision flashes, or vision halos.  Ears/Nose/Mouth/Throat: Negative for bleeding gums, difficulty swallowing, earache, ear discharge, hearing loss, hoarseness, nosebleeds, tinnitus, or sinus problems.  Respiratory:Negative for chronic cough, coughing blood, night sweats, shortness of breath, Tuberculosis, or wheezing.  Cardiovascular:  "Negative for chest pain, dyspnea at night, heart murmur, palpitations, pacemaker, pacemaker, poor circulation, swollen legs/feet, or varicose veins.  Gastrointestinal: Negative for melena, hematochezia, chronic diarrhea, heartburn, Hepatitis A/B/C, increasing constipation, Liver Disease, nausea, or vomiting.   Genitourinary: Negative for Urinary retention, genital discharge, urinary incontinence, prostate problems, urgency, or UTI.   Neurological: Negative for syncope, headaches, numbness of arms/legs, tingling in hands/arms/legs, memory problems, or seizures.  Psychological: POSITIVE for anxiety. Negative for depression, panic attacks, or restlessness.  Skin: Negative for chronic skin itching, color changes in hand/feet when cold, poor scarring, non-healing ulcers, skin rashes/hives, unusual moles.  Musculoskeletal: POSITIVE for swelling in knees. Negative for arthritis, joint swelling in hands/wrists/hips, muscle tenderness in arms/legs, or osteoporosis.  Endocrine: POSITIVE for loss of libido and hypothyroid. Negative for excessive thirst/hunger, intolerance for warm rooms, multiple broken bones, rapid weight gain/loss, or galactorrhea.  Hematologic/Lymphatic: Negative for easy skin bruising, significant fatigue, prolonged bleeding, tender glands/lymph nodes.  Allergies: Negative for asthma or hay fever.      PHYSICAL EXAM  /70  Pulse 68  Temp 97.8  F (36.6  C)  Resp 20  Ht 1.753 m (5' 9\")  Wt 85.4 kg (188 lb 3.2 oz)  SpO2 97%  BMI 27.79 kg/m2    General: Awake, alert, oriented. Well nourished, well developed, no acute distress.  HEENT: Head normocephalic, atraumatic. No carotid bruit. Neck supple. Good range of motion. No palpable thyroid mass.  Heart: Regular rhythm and rate. No murmurs.  Lungs: Clear to auscultation and percussion bilaterally. No ronchi, rales or wheeze.  Abdomen: Soft, non-tender, non-distended. No hepatosplenomegaly.  Extremity: Warm with no clubbing or cyanosis. No lower " extremity edema.    Neurological:  Awake, alert and oriented to date, time, place and person. Speech fluent.   Pupils equal, round, reactive to light.  Extraocular movement intact.  Visual fields are full on confrontation.  Hearing is grossly normal to finger rub.   Facial sensation intact.  Face symmetric.  Tongue midline.  Uvula elevates equally.    Motor: full strength throughout.  Sensation: intact to light touch and pinpoint.  Deep tendon reflexes: Trace throughout. Negative for clonus. Negative for Elizabeth's sign. No dysmetria.      MRI brain without/with contrast - 9/27/2017  Small areas of susceptibility within the right jean, left GP and within subcortical white matter, likely represent microhemorrhages.  Chronic lacunar infarct in left jean.  Ventricles appear to be of normal size with no enlargement.  Minimal atrophy.      ASSESSMENT   62 year old right handed male with a history of Parkinson's Disease.  Right sided tremor.    Patient is being evaluated for possible DBS candidacy.  His goal for the surgery is to improve his tremor on the right side.  We did discuss briefly the findings on the MRI and discussed risks.    During today's visit, we discussed both phase I and II of DBS surgery. We discussed that during Phase I, we would place the DBS electrode on the left side under MAC and microelectrode recording. He would then return one week later for the phase II with placement of the DBS generator/battery over the left chest wall under general anesthesia. If he is a unilateral candidate or wait and see strategy candidate, then he will undergo another evaluation/discussion prior to proceeding to the right side implantation.  If  he is a bilateral candidate in a staged fashion, he would return three weeks later for the phase I and II combined for the placement of the DBS electrode on the right side under MAC and microelectrode recording followed by connection to the previously implanted generator/battery.       Risks, benefits, alternative therapies were discussed with the patient, including but not limited to infection and bleeding (intracranial), injury to the brain, stroke, death, hardware failure and possible need for more surgeries. Surgical procedure was discussed in detail. I also discussed possible use of BRAD for neuronavigation. All questions were answered, and he expressed understanding. A full history and physical exam was performed during today's visit. His case will be discussed at the Movement Disorder Consensus Group Meeting to determine whether he is a good candidate for DBS surgery.    We also discussed different options that area available in terms of the device, advantages and disadvantages of each devices, and MRI compatibility and its consequences currently.    Medtronic - approved for full body MRI.  A[employer]ott - newer device, segmented and directional electrode, not yet MRI approved.  MedLink - clinical trial protocol only.      PLAN:  1. We will discuss his case at the Movement Disorder Consensus Group Meeting, and we will contact him regarding his DBS candidacy.     I, Max Ware, am serving as a scribe to document services personally performed by Arpan Huynh MD, PhD, based upon my observations and the provider's statements to me. All documentation has been reviewed and edited by the aforementioned doctor prior to being entered into the official medical record.    I, Arpan Huynh, attest that above named individual is acting in scribe capacity, has observed my performance of the services and has documented them in accordance with my direction. The documentation recorded by the scribe accurately reflects the service I personally performed and the decisions made by me. The document was also partially recorded by me and the entire document was edited by me as well.       Greater than 50% of the encounter was spent discussing the therapeutic/surgical options, expected outcomes and  device choices.  The visit lasted 65 minutes.    Again, thank you for allowing me to participate in the care of your patient.      Sincerely,    Arpan Huynh MD

## 2017-09-28 NOTE — NURSING NOTE
Chief Complaint   Patient presents with     Consult     [facility]- DBS CONSULTATION-RUBEN Davis, RICARDO

## 2017-10-04 ENCOUNTER — TELEPHONE (OUTPATIENT)
Dept: NEUROLOGY | Facility: CLINIC | Age: 62
End: 2017-10-04

## 2017-10-04 NOTE — TELEPHONE ENCOUNTER
"                                                      Deep Brain Stimulation Surgery Surgical Candidacy Form    Referring Provider: Dr. Sybil Easton   Patient Information: Lives in [place]  Name: Kwame Lin  YOB: 1955  Age: 62 year old  Height: 5'9\"  Weight: 188 lbs  BMI: 27.85  Blood Pressure: 120/70  Diagnosis: Parkinson's disease  Year of Onset of Symptoms: 2007 Flying back from [place].  Year of Diagnosis: 4007-4555.  Handedness: Right.  Side of Onset: Right upper extremity.   Disease Features: Tremor   Surgery Goals: Decrease tremor.  His goal for DBS include \"to reduce tremors;\" \"to have a normal life like eating, writing, buttoning shirt, not be afraid to go out in public, being able to hold my grandchildren without shaking, going to a movie & not worry about shaking & disrupting other, being able to sit where I want & not in the back. . . .\"  \"I used to be an outgoing person, but now I've become introvert because of tremors.\"   Medications: As of 10/5/17  Current Outpatient Prescriptions   Medication Sig Dispense Refill     carbidopa-levodopa (SINEMET)  MG per tablet 1.5 every 4 hours 3 x daily aroudnd 6:30 am, 10:30 am, & 3:30 pm. 90 tablet      carbidopa-levodopa (SINEMET CR)  MG per CR tablet Take 2 tablets by mouth At Bedtime       thyroid (ARMOUR THYROID) 60 MG tablet Take 60 mg by mouth every morning       citalopram (CELEXA) 10 MG tablet Take 10 mg by mouth daily       clonazePAM (KLONOPIN) 0.5 MG tablet Take 0.25-0.5 mg by mouth 2 times daily       coenzyme Q-10 10 MG CAPS Take 10 mg by mouth       Magnesium Citrate 100 MG TABS Take 400 mg by mouth       Misc Natural Products (RED WINE EXTRACT) CAPS        Omega-3 Fatty Acids (FISH OIL PO) Take 1,000 mg by mouth daily       Probiotic Product (ACIDOPHILUS/GOAT MILK) CAPS         Motor Assessment:9/27/17  ON: 28  OFF: 41  % Change: 31.71%   Neuropsychological Evaluation:9/28/17    Cognitive Issues: Performance falls " "within normal limits across cognitive domains, generally in the above average range. The findings do not reflect dementia at this time.     Psychiatric Issues: He denied any history of significant psychiatric illness. He recently had three sessions of \"brain mapping,\" apparently with a psychotherapist, to try to identify areas of his brain where trauma might cause his PD symptoms in his arm; he underwent what he called \"near/far\" vision training which was apparently a variant of EMDR. He has had some anxiety associated with his PD, which he has learned to manage well with meditation and Qi Gong, and he used these techniques during his MRI. He does not appear to be experiencing significant depressed mood. He denied excessive use of alcohol, and he denied gambling or other compulsive behaviors.    Depression: No depression.    Cognitive Function: No signs or symptoms of cognitive dysfunction.    Psychosis: No hallucinations.   MRI Date: 9/27/17    Impression:   Findings: Susceptibility weighted images do not demonstrate the normal  swallow tail appearance of the substantia nigra.  There are small foci  of susceptibility within the right jean, left globus pallidus and  within the subcortical white matter of the posterior left frontal  lobe, which likely represent chronic microhemorrhages. There is no  mass effect, midline shift or extra-axial fluid collection. No  restricted diffusion to suggest acute infarction. The ventricles do  not appear enlarged. Gray-white matter differentiation of the cerebral  hemispheres appears normal. Flow voids within the major intracranial  vessels are present. There is no definite intra or extra-axial mass.  T2 hyperintense focus in the left jean which could represent a chronic  lacunar type infarct..     Mild scattered mucosal thickening within the paranasal sinuses, most  pronounced in the ethmoid air cells. Mastoid air cells are relatively  clear. Visualized portions of the orbits are " grossly unremarkable.       Impression:  1. Absence of the swallow tail appearance of the substantia nigra is  supportive of a diagnosis of Parkinson's disease.  2. No abnormal intracranial enhancement.     PMH: -  Past Medical History:   Diagnosis Date     Anxiety      Hypothyroid      Parkinson disease (H)      RBD (REM behavioral disorder)      Past Surgical History:   Procedure Laterality Date     epidydmal mass removal, benign         Soc Hx:  reports that he has never smoked. He has never used smokeless tobacco. He reports that he does not drink alcohol or use illicit drugs.    Family Hx of Movement Disorders:   Family History   Problem Relation Age of Onset     Lung Cancer Father      Parkinsonism Cousin      paternal side     Parkinsonism Maternal Aunt        Consult Dr. Olivares and Dr. Yuan   Patient discussed at conference on: 10/5/17, 3/8/18     DBS Meeting Notes/Further Work-up Needed: -  Results from consensus meetin. Staged bilateral - LSTN first   2. Stephenson unless want to do Intrepid - Larger IPG - MRI not compatible, want to wait for Medtronic or use 2 SCs?         Spoke with patient on phone. He is interested in research so I told him we would be getting in touch with him in regard to that.  One issue: he notes he has been having knee problems and thinks he may need an MRI so is somewhat worried about having a device which is not MRI compatible. I had not known about this prior to consensus meeting so we may have to reassess device choice.     Do you think Dr. Huynh wants to weigh in on device choice also given Medtronic device may have limited supply currently? I also do not know if [employer] device is MRI compatible as this may influence decision.     Ian Yuan MD    3/8/18 Fatmata Quintanilla, DORINDA     1. Candidate for 2nd side RSTN  2. [employer]

## 2017-10-09 ENCOUNTER — TELEPHONE (OUTPATIENT)
Dept: NEUROSURGERY | Facility: CLINIC | Age: 62
End: 2017-10-09

## 2017-10-09 NOTE — TELEPHONE ENCOUNTER
Received VM from pt wanting to know if he is a candidate for DBS surgery. Will route to DORINDA Quintanilla, as she calls pt to discuss consensus results.

## 2017-10-10 ENCOUNTER — TELEPHONE (OUTPATIENT)
Dept: NEUROLOGY | Facility: CLINIC | Age: 62
End: 2017-10-10

## 2017-10-10 NOTE — TELEPHONE ENCOUNTER
Results from consensus meetin. Staged bilateral - LSTN first   2. Stephenson unless want to do Intrepid - Larger IPG - MRI not compatible, want to wait for Medtronic or use 2 SCs?       Spoke with patient on phone. He is interested in research so I told him we would be getting in touch with him in regard to that.  One issue: he notes he has been having knee problems and thinks he may need an MRI so is somewhat worried about having a device which is not MRI compatible. I had not known about this prior to consensus meeting so we may have to reassess device choice.    Do you think Dr. Huynh wants to weigh in on device choice also given Medtronic device may have limited supply currently? I also do not know if [employer] device is MRI compatible as this may influence decision.    Ian Yuan MD  Movement Disorders Fellow

## 2017-10-12 PROBLEM — K59.00 CONSTIPATION: Status: ACTIVE | Noted: 2017-10-12

## 2017-10-12 PROBLEM — G47.52 RBD (REM BEHAVIORAL DISORDER): Status: ACTIVE | Noted: 2017-10-12

## 2017-10-12 PROBLEM — F41.9 ANXIETY DISORDER: Status: ACTIVE | Noted: 2017-06-02

## 2017-10-12 PROBLEM — R45.89 DEPRESSED MOOD: Status: ACTIVE | Noted: 2017-10-12

## 2017-10-12 PROBLEM — R43.0 ANOSMIA: Status: ACTIVE | Noted: 2017-10-12
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
## 2017-10-16 ENCOUNTER — MYC MEDICAL ADVICE (OUTPATIENT)
Dept: PHYSICAL MEDICINE AND REHAB | Facility: CLINIC | Age: 62
End: 2017-10-16

## 2017-10-18 NOTE — PROGRESS NOTES
WAIS-IV    Raw              Age Scaled   Similarities  29     11    Comprehension       29    12   Letter Num. Seq.  21    11   Digit Span  26      9   Matrix Reasoning  18    11     WIDE RANGE ACHIEVEMENT TEST - 4    Standard  %tile               Grade      Score  Rank                Equiv  Reading   113      81            >12.9      WECHSLER MEMORY SCALE - REVISED    Raw Score        MAS        Information & Orientation        14   Logical Memory  Immed.   28   11    Logical Memory  30 min.   24   12      SINGH VERBAL LEARNING TEST - REVISED  Form 5                                              Raw                T                                        Trial 1               8   Trial 2               9   Trial 3     12   Total 1-3     29   55   Learning  4   Delay     11   58   Percent Retained     92   51   True Positives     12   Discrimination Index     12   56   False Positives  0     BRIEF VISUAL MEMORY TEST - REVISED  Form 1                                              Raw                T                                        Trial 1               5   Trial 2               6   Trial 3  7   Total 1-3     18   43   Learning  2   41   Delay  6   39   Percent Retained     86      >16 (%ile)  True Positives  6   Discrimination Index  6      >16 (%ile)  False Positives  0     BOSTON NAMING TEST   Raw Score            Score (Correct+Stim cue)  58      MAS  11   # correct Stimulus Cue:    0    # correct Phonemic Cue:    2       D-KEFS VERBAL FLUENCY  Alternate    Raw              Age Scaled   Letter Fluency  53     15    Category Fluency       45    14    Switching Fluency    Total Correct  17   15    Switching Acc.  17   16           CLOCK DRAWING  Command  3/3    Copy   3 /3       TRAIL MAKING TEST         Seconds         Errors           MAS                 A    23    0   13  .  B    71    0   10  .    STROOP                    Raw   +  Cristian =     Total     MAS        Word   100  +  - =    100      9    Color  72  +  - =   72   10    C-W  43  +  - =   43   12      WISCONSIN CARD SORTING TEST - 64 items  # Categories    5             >16     %ile  # persev err.         5          T       64          Conc lev resp.     57         T        71          FMS                      0          BARRIENTOS JUDGMENT OF LINE ORIENTATION  Raw         28  MAS       13       DEMENTIA RATING SCALE - 2    Raw MAS Raw     MAS   Attention  36   11   Concept  36      9   Init/Psv  37   11  Memory  25    13    Construct    6   10  Total     140/144   11     Schulte Depression Inventory - 2:  12     Schulte Anxiety Inventory:  10    [facility] = [place] Older Adult Normative Study Age & Ed. Adj. Scaled Score

## 2017-11-03 ENCOUNTER — TELEPHONE (OUTPATIENT)
Dept: NEUROSURGERY | Facility: CLINIC | Age: 62
End: 2017-11-03

## 2017-11-03 NOTE — TELEPHONE ENCOUNTER
On 11/2/17 Pt called to let us know that he thinks he would like to go with the [employer] device when he has his DBS surgery. He said he does not want to commit to the 5 year [place] study, though he said he is very interested in having the most advanced technology. He agreed to have me reach out to the [employer]  to get in touch with him, as I think the technology will be available outside of the study in the next few months. Pt is agreeable to this plan. I spoke with the , Cristel Yuan and she will be discussing the available systems with him, along with Dr. Borges.

## 2017-11-08 DIAGNOSIS — G20.A1 PARKINSON'S DISEASE (H): Primary | ICD-10-CM

## 2017-11-14 ENCOUNTER — TELEPHONE (OUTPATIENT)
Dept: NEUROSURGERY | Facility: CLINIC | Age: 62
End: 2017-11-14

## 2017-11-14 NOTE — TELEPHONE ENCOUNTER
Left VM re getting the PAC appt scheduled prior to December so everything is in place for surgery once the [employer] device is approved. Left my direct number and requested pt call at his earliest convenience.

## 2017-11-15 DIAGNOSIS — G20.A1 PARKINSON'S DISEASE (H): Primary | ICD-10-CM

## 2017-11-16 ENCOUNTER — TELEPHONE (OUTPATIENT)
Dept: NEUROLOGY | Facility: CLINIC | Age: 62
End: 2017-11-16

## 2017-11-16 NOTE — TELEPHONE ENCOUNTER
I called patient to follow up regarding his interested in the sleep quality study (Dr. Flannery's) and Clinical Core study. Patient has not had a chance to look over documents yet, so we discussed the studies, and he said he will look over the forms in preparation of our next phone call on Monday afternoon. I also let him know that we don't have any updates regarding the timeline for [place] approval and we are still looking at mid-Dec to mid-Jan for approval, with a few days of lead time before surgery once approval is here. I let him know that we will keep him informed of any updates to this schedule. Patient agreeable to this plan.

## 2017-11-20 NOTE — TELEPHONE ENCOUNTER
Left voicemail for patient to inquire about his decision in participating in clinical core and beddit sleep quality study. Will re-contact tomorrow.

## 2017-11-27 ENCOUNTER — TELEPHONE (OUTPATIENT)
Dept: NEUROLOGY | Facility: CLINIC | Age: 62
End: 2017-11-27

## 2017-11-27 ENCOUNTER — TELEPHONE (OUTPATIENT)
Dept: NEUROSURGERY | Facility: CLINIC | Age: 62
End: 2017-11-27

## 2017-11-27 NOTE — TELEPHONE ENCOUNTER
I spoke with pt to let him know that the [employer] DBS device may get approval as early as Friday. We will have more information from our contact and [employer] on Wednesday. If the approval comes through, possible surgery dates are December 5 or December 7. Both dates will work for pt. He is having his 7T MRI and PAC on 11/29. I will check with our contact who runs the prior authorizations to make sure we have insurance approval.     I let pt know that [employer] will likely issue a press release once the pt has surgery. He will not be identified by name, but it is possible that someone reading the press release may be able to identify him by putting certain facts together. Pt said he understands this and does not express any concerns.     I will f/u with pt after our Wednesday call with [employer]. No further questions at this time.

## 2017-11-27 NOTE — TELEPHONE ENCOUNTER
I spoke with patient following him hearing the news that his surgical date may be as soon as next week. He is optimistic and looking forward to the surgery. I let him know with this new timeline that it would not be possible to enroll in the clinical core study, however, he still has the opportunity to do the Beddit sleep quality study. He agreed to participate in the sleep quality study, and I let him know that Dr. Flannery will meet him during or after his PAC appointments on Wednesday 11/29 to sign consents and hand out the device. He will be collecting sleep data for three days and answering a few questionnaires at home before the surgery and will repeat this after his DBS has been optimized. Patient agrees to this plan and has no further questions.

## 2017-11-29 ENCOUNTER — OFFICE VISIT (OUTPATIENT)
Dept: SURGERY | Facility: CLINIC | Age: 62
End: 2017-11-29

## 2017-11-29 ENCOUNTER — ANESTHESIA EVENT (OUTPATIENT)
Dept: SURGERY | Facility: CLINIC | Age: 62
End: 2017-11-29

## 2017-11-29 ENCOUNTER — ALLIED HEALTH/NURSE VISIT (OUTPATIENT)
Dept: SURGERY | Facility: CLINIC | Age: 62
End: 2017-11-29

## 2017-11-29 VITALS
SYSTOLIC BLOOD PRESSURE: 132 MMHG | DIASTOLIC BLOOD PRESSURE: 81 MMHG | RESPIRATION RATE: 18 BRPM | TEMPERATURE: 97.9 F | HEART RATE: 86 BPM | WEIGHT: 195 LBS | OXYGEN SATURATION: 95 % | BODY MASS INDEX: 28.88 KG/M2 | HEIGHT: 69 IN

## 2017-11-29 DIAGNOSIS — Z01.818 PREOP GENERAL PHYSICAL EXAM: ICD-10-CM

## 2017-11-29 DIAGNOSIS — Z01.818 PREOP GENERAL PHYSICAL EXAM: Primary | ICD-10-CM

## 2017-11-29 LAB
ANION GAP SERPL CALCULATED.3IONS-SCNC: 6 MMOL/L (ref 3–14)
BUN SERPL-MCNC: 20 MG/DL (ref 7–30)
CALCIUM SERPL-MCNC: 8.9 MG/DL (ref 8.5–10.1)
CHLORIDE SERPL-SCNC: 105 MMOL/L (ref 94–109)
CO2 SERPL-SCNC: 27 MMOL/L (ref 20–32)
CREAT SERPL-MCNC: 0.85 MG/DL (ref 0.66–1.25)
ERYTHROCYTE [DISTWIDTH] IN BLOOD BY AUTOMATED COUNT: 13.9 % (ref 10–15)
GFR SERPL CREATININE-BSD FRML MDRD: >90 ML/MIN/1.7M2
GLUCOSE SERPL-MCNC: 111 MG/DL (ref 70–99)
HCT VFR BLD AUTO: 36.7 % (ref 40–53)
HGB BLD-MCNC: 12.3 G/DL (ref 13.3–17.7)
INR PPP: 0.96 (ref 0.86–1.14)
MCH RBC QN AUTO: 31.5 PG (ref 26.5–33)
MCHC RBC AUTO-ENTMCNC: 33.5 G/DL (ref 31.5–36.5)
MCV RBC AUTO: 94 FL (ref 78–100)
PLATELET # BLD AUTO: 182 10E9/L (ref 150–450)
POTASSIUM SERPL-SCNC: 3.9 MMOL/L (ref 3.4–5.3)
RBC # BLD AUTO: 3.91 10E12/L (ref 4.4–5.9)
SODIUM SERPL-SCNC: 138 MMOL/L (ref 133–144)
WBC # BLD AUTO: 5.5 10E9/L (ref 4–11)

## 2017-11-29 RX ORDER — MULTIPLE VITAMINS W/ MINERALS TAB 9MG-400MCG
1 TAB ORAL EVERY MORNING
COMMUNITY

## 2017-11-29 RX ORDER — ACETAMINOPHEN 325 MG/1
325-650 TABLET ORAL
COMMUNITY

## 2017-11-29 NOTE — ANESTHESIA PREPROCEDURE EVALUATION
Anesthesia Evaluation     . Pt has had prior anesthetic. Type: General and MAC    History of anesthetic complications   - PONV  dexamethose used with good results      ROS/MED HX    ENT/Pulmonary:       Neurologic:     (+)Parkinson's disease features: bilateral tremors, worse on right,     Cardiovascular:  - neg cardiovascular ROS   (+) ----. : . . . :. . No previous cardiac testing       METS/Exercise Tolerance:  >4 METS   Hematologic:         Musculoskeletal:   (+) , , other musculoskeletal- right knee pain      GI/Hepatic:  - neg GI/hepatic ROS       Renal/Genitourinary:  - ROS Renal section negative       Endo:     (+) thyroid problem hypothyroidism, .      Psychiatric:     (+) psychiatric history anxiety and depression      Infectious Disease:  - neg infectious disease ROS       Malignancy:      - no malignancy   Other:    (+) No chance of pregnancy C-spine cleared: N/A, no H/O Chronic Pain,no other significant disability                    Physical Exam  Normal systems: cardiovascular, pulmonary and dental    Airway   Mallampati: II  TM distance: >3 FB  Neck ROM: full    Dental     Cardiovascular   Rhythm and rate: regular and normal      Pulmonary    breath sounds clear to auscultation    Other findings:   Results for ELBATONYWILFRED (MRN [id number]) as of 11/30/2017 14:08    11/29/2017 16:40  Sodium: 138  Potassium: 3.9  Chloride: 105  Carbon Dioxide: 27  Urea Nitrogen: 20  Creatinine: 0.85  GFR Estimate: >90  GFR Estimate If Black: >90  Calcium: 8.9  Anion Gap: 6  Glucose: 111 (H)  WBC: 5.5  Hemoglobin: 12.3 (L)  Hematocrit: 36.7 (L)  Platelet Count: 182  RBC Count: 3.91 (L)  MCV: 94  MCH: 31.5  MCHC: 33.5  RDW: 13.9    INR: 0.96    ABO: O  RH(D): Pos  Antibody Screen: Neg  Test Valid Only At: [facility]  Specimen Expires: 12/02/2017           PAC Discussion and Assessment    ASA Classification: 3  Case is suitable for: [place]  Anesthetic techniques and relevant risks discussed: GA  Invasive  monitoring and risk discussed: Yes  Types:   Possibility and Risk of blood transfusion discussed: Yes  NPO instructions given:   Additional anesthetic preparation and risks discussed:   Needs early admission to pre-op area:   Other:     PAC Resident/NP Anesthesia Assessment:  Kwame Lin is a 63 yo male to be scheduled for DBS, dates to be determined, by Dr. Oliveira. PAC referral by Dr. Oliveira for assessment and optimization of anesthesia. Previous anesthesia with PONV, records scanned in where he had no PONV, he did receive dexamethasone those times.    1) Cardiac: No known cardiac diagnosis or symptoms.  2) Pulmonary: Never smoked, denies pulmonary symptoms.  3) Neuro: Parkinson's disease with bilateral tremors, worse on right. Also has REM behavior disorder with history of vivid dream and violent behaviors.  4) Endo: hypothyroidism, daily armour    METS >4, complains of right knee pain.          Pt also evaluated by Dr. Pelayo. Please see recommendations below. Labs drawn today.  I spent 20 minutes face to face with pt, assessing, examining, and educating        Reviewed and Signed by PAC Mid-Level Provider/Resident  Mid-Level Provider/Resident: Sheba Garg, АННА  Date: 11/28/2017  Time: 1530    Attending Anesthesiologist Anesthesia Assessment:  STAFF:  Very pleasant 62 y.o. man with advanced Parkinson's disease for DBS by Dr. OLIVEIRA  using REGIONAL + MAC anesthesia.   History summarized above.  Parkinson's x 8 years, severe tremor of right hand and arm.   Schedule depends on getting approval from [employer] for new device.  Instructions given and questions answered.   Final plans by anesthesiology team on DOS.   ---rcp      Reviewed and Signed by PAC Anesthesiologist  Anesthesiologist: leonela  Date: 11/29/2017  Time:   Pass/Fail: Pass  Disposition:     PAC Pharmacist Assessment:        Pharmacist:   Date:   Time:                           .

## 2017-11-29 NOTE — MR AVS SNAPSHOT
After Visit Summary   2017    Kwame Lin    MRN: [id number]           Patient Information     Date Of Birth          1955        Visit Information        Provider Department      2017 4:00 PM Rn, [facility]        Care Instructions    Preparing for Your Surgery      Name:  Kwame Lin   MRN:  [id number]   :  1955   Today's Date:  2017     Arriving for surgery:  Surgery date:  TBD - A nurse will call you to confirm surgery date, time, & location.  Arrival time:  TBD    Please come to:       [facility], 3rd floor, [address]    [address] [place] [address]    -   parking is available in front of the hospital from 5:15 am to 8:00 pm    -  Stop at the Information Desk in the lobby    -   Inform the information person that you are here for surgery. An escort to 3c will be provided. If you would not like an escort, please proceed to 3C on the 3rd floor. [phone]     What can I eat or drink?  -  You may have solid food or milk products until 8 hours prior to your surgery.  -  You may have water, apple juice or 7up/Sprite until 2 hours prior to your surgery.    Which medicines can I take?        -  Avoid fish oil, vitamins/supplements until after surgery.    -  Do NOT take these medications in the morning, the day of surgery: Parkinson's regimen medications.    -  Please take these medications the day of surgery: all other regular morning medications.    How do I prepare myself?  -  Take two showers: one the night before surgery; and one the morning of surgery.         Use Scrubcare or Hibiclens to wash from neck down.  You may use your own shampoo and conditioner. No other hair products.   -  Do NOT use lotion, powder, deodorant, or antiperspirant the day of your surgery.  -  Do NOT wear any makeup, fingernail polish or jewelry.  -  Do not bring your own medications to the hospital,  except for inhalers and eye drops.  -  Bring your ID and insurance card.    Questions or Concerns:  If you have questions or concerns, please call the  Preoperative Assessment Center, Monday-Friday 7AM-7PM:  [phone].          Follow-ups after your visit        Your next 10 appointments already scheduled     Nov 29, 2017  4:00 PM CST   (Arrive by 3:45 PM)   PAC RN ASSESSMENT with  Pac Rn   [facility] ([facility])    [address] [place] [address]  4th [facility] [id number] [phone]            Nov 29, 2017  4:30 PM CST   (Arrive by 4:15 PM)   PAC Anesthesia Consult with  Pac Anesthesiologist   [facility] ([facility])    [address] [place] [id number] [phone]            Nov 29, 2017  4:45 PM CST   LAB with  LAB   [facility] ([facility])    [address] [place] [id number] [phone]           Please do not eat 10-12 hours before your appointment if you are coming in fasting for labs on lipids, cholesterol, or glucose (sugar). This does not apply to pregnant women. Water, hot tea and black coffee (with nothing added) are okay. Do not drink other fluids, diet soda or chew gum.              Future tests that were ordered for you today     Open Future Orders        Priority Expected Expires Ordered    ABO/Rh type and screen Routine 11/29/2017 12/29/2017 11/29/2017    Basic metabolic panel Routine 11/29/2017 12/29/2017 11/29/2017    CBC with platelets Routine 11/29/2017 12/29/2017 11/29/2017    INR Routine 11/29/2017 12/29/2017 11/29/2017            Who to contact     Please call your clinic at [phone] to:    Ask questions about your health    Make or cancel appointments    Discuss your medicines    Learn about your test results    Speak to your doctor   If you have compliments or concerns about an  experience at your clinic, or if you wish to file a complaint, please contact [facility] Patient Relations at [phone] or email us at [email or web address]         Additional Information About Your Visit        "[employer]hart Information     "[employer]hart gives you secure access to your electronic health record. If you see a primary care provider, you can also send messages to your care team and make appointments. If you have questions, please call your primary care clinic.  If you do not have a primary care provider, please call [phone] and they will assist you.      [employer] is an electronic gateway that provides easy, online access to your medical records. With [employer], you can request a clinic appointment, read your test results, renew a prescription or communicate with your care team.     To access your existing account, please contact your [facility] or call [phone] for assistance.        Care EveryWhere ID     This is your Care EveryWhere ID. This could be used by other organizations to access your [facility] medical records  [id number]         Blood Pressure from Last 3 Encounters:   11/29/17 132/81   09/28/17 120/70   09/27/17 121/67    Weight from Last 3 Encounters:   11/29/17 88.5 kg (195 lb)   09/28/17 85.4 kg (188 lb 3.2 oz)   09/27/17 84.4 kg (186 lb)              Today, you had the following     No orders found for display       Primary Care Provider Office Phone # Fax #    Silvina Armenta [phone]       [facility] [address] [facility] [address]        Equal Access to Services     KARISSA CASON : Hadii aad ku hadasho Soomaali, waaxda luqadaha, qaybta kaalmada adriana, bebe meza. So [facility] [phone].    ATENCIÓN: Si habla español, tiene a mcrae disposición servicios gratuitos de asistencia lingüística. Llame al [phone].    We comply with applicable  federal civil rights laws and [place] laws. We do not discriminate on the basis of race, color, national origin, age, disability, sex, sexual orientation, or gender identity.            Thank you!     Thank you for choosing [facility]  for your care. Our goal is always to provide you with excellent care. Hearing back from our patients is one way we can continue to improve our services. Please take a few minutes to complete the written survey that you may receive in the mail after your visit with us. Thank you!             Your Updated Medication List - Protect others around you: Learn how to safely use, store and throw away your medicines at www.disposemymeds.org.          This list is accurate as of: 11/29/17  3:51 PM.  Always use your most recent med list.                   Brand Name Dispense Instructions for use Diagnosis    Acidophilus/Goat Milk Caps      At Bedtime        ARMOUR THYROID 60 MG tablet   Generic drug:  thyroid      Take 60 mg by mouth every morning        * carbidopa-levodopa  MG per tablet    SINEMET    90 tablet    1.5 every 4 hours 3 x daily aroudnd 6:30 am, 11:00 am, & 4:00 pm.        * carbidopa-levodopa  MG per CR tablet    SINEMET CR     Take 2 tablets by mouth At Bedtime        citalopram 10 MG tablet    celeXA     Take 10 mg by mouth every morning        clonazePAM 0.5 MG tablet    klonoPIN     Take 0.5 mg by mouth At Bedtime        Coenzyme Q10 300 MG Caps      Take 300 mg by mouth every morning        diclofenac 1 % Gel topical gel    VOLTAREN     Apply qid prn pain        FISH OIL PO      Take 1,000 mg by mouth every morning        Magnesium Citrate 100 MG Tabs      Take 400 mg by mouth every morning        Multi-vitamin Tabs tablet      Take 1 tablet by mouth every morning        Red Wine Extract Caps      Take by mouth daily (with lunch)        TYLENOL 325 MG tablet   Generic drug:  acetaminophen      Take 325-650 mg by mouth nightly as  needed for mild pain        * Notice:  This list has 2 medication(s) that are the same as other medications prescribed for you. Read the directions carefully, and ask your doctor or other care provider to review them with you.

## 2017-11-29 NOTE — PATIENT INSTRUCTIONS
Preparing for Your Surgery      Name:  Kwame Lin   MRN:  [id number]   :  1955   Today's Date:  2017     Arriving for surgery:  Surgery date:  TBD - A nurse will call you to confirm surgery date, time, & location.  Arrival time:  TBD    Please come to:       [facility], 3rd floor, [address]    [address] [place]  [address]       parking is available in front of the hospital from 5:15 am to 8:00 pm    -  Stop at the Information Desk in the lobby    -   Inform the information person that you are here for surgery. An escort to 3c will be provided. If you would not like an escort, please proceed to 3C on the 3rd floor. [phone]     What can I eat or drink?  -  You may have solid food or milk products until 8 hours prior to your surgery.  -  You may have water, apple juice or 7up/Sprite until 2 hours prior to your surgery.    Which medicines can I take?        -  Avoid fish oil, vitamins/supplements until after surgery.    -  Do NOT take these medications in the morning, the day of surgery: Parkinson's regimen medications.    -  Please take these medications the day of surgery: all other regular morning medications.    How do I prepare myself?  -  Take two showers: one the night before surgery; and one the morning of surgery.         Use Scrubcare or Hibiclens to wash from neck down.  You may use your own shampoo and conditioner. No other hair products.   -  Do NOT use lotion, powder, deodorant, or antiperspirant the day of your surgery.  -  Do NOT wear any makeup, fingernail polish or jewelry.  -  Do not bring your own medications to the hospital, except for inhalers and eye drops.  -  Bring your ID and insurance card.    Questions or Concerns:  If you have questions or concerns, please call the  Preoperative Assessment Center, Monday-Friday 7AM-7PM:  [phone].

## 2017-11-29 NOTE — H&P
Pre-Operative H & P     CC:  Preoperative exam to assess for increased cardiopulmonary risk while undergoing surgery and anesthesia.    Date of Encounter: 11/29/2017  Primary Care Physician:  Silvina Armenta  Kwame Lin is a 63 yo male to be scheduled for DBS, dates to be determined, by Dr. Huynh at [facility].     History is obtained from the patient.   Parkinson s disease with bilateral tremors, worse on right. Evaluated by Dr. Huynh and above procedure planned.        Past Medical History  Past Medical History:   Diagnosis Date     Anosmia 10/12/2017     Anxiety      Depressed mood 10/12/2017     Hypothyroid      Parkinson disease (H)      PONV (postoperative nausea and vomiting)      RBD (REM behavioral disorder) 10/12/2017       Past Surgical History  Past Surgical History:   Procedure Laterality Date     ARTHROSCOPY KNEE Right     twice     ARTHROSCOPY KNEE Right     left     epidydmal mass removal, benign         Hx of Blood transfusions/reactions: none     Hx of abnormal bleeding or anti-platelet use: none    Menstrual history: No LMP for male patient.: n    Steroid use in the last year: none    Personal or FH with difficulty with Anesthesia:  PONV      Prior to Admission Medications  Current Outpatient Prescriptions   Medication Sig Dispense Refill     Coenzyme Q10 300 MG CAPS Take 300 mg by mouth every morning       multivitamin, therapeutic with minerals (MULTI-VITAMIN) TABS tablet Take 1 tablet by mouth every morning       diclofenac (VOLTAREN) 1 % GEL topical gel Apply qid prn pain       acetaminophen (TYLENOL) 325 MG tablet Take 325-650 mg by mouth nightly as needed for mild pain       carbidopa-levodopa (SINEMET)  MG per tablet 1.5 every 4 hours 3 x daily aroudnd 6:30 am, 11:00 am, & 4:00 pm. 90 tablet      carbidopa-levodopa (SINEMET CR)  MG per CR tablet Take 2 tablets by mouth At Bedtime       thyroid (ARMOUR THYROID) 60 MG tablet  Take 60 mg by mouth every morning       citalopram (CELEXA) 10 MG tablet Take 10 mg by mouth every morning        clonazePAM (KLONOPIN) 0.5 MG tablet Take 0.5 mg by mouth At Bedtime        Magnesium Citrate 100 MG TABS Take 400 mg by mouth every morning        Misc Natural Products (RED WINE EXTRACT) CAPS Take by mouth daily (with lunch)        Omega-3 Fatty Acids (FISH OIL PO) Take 1,000 mg by mouth every morning        Probiotic Product (ACIDOPHILUS/GOAT MILK) CAPS At Bedtime          Allergies  Allergies   Allergen Reactions     Bactrim Hives     Codeine Nausea and Vomiting     Ketorolac Nausea and Vomiting     Morphine      Nalbuphine      Other reaction(s): Unknown Reaction     Penicillins Hives     Seasonal Allergies Itching     Sulfa Drugs      Toradol Nausea and Vomiting       Social History  Social History     Social History     Marital status:      Spouse name: N/A     Number of children: N/A     Years of education: N/A     Occupational History     Small business start consultant      Social History Main Topics     Smoking status: Never Smoker     Smokeless tobacco: Never Used     Alcohol use Yes      Comment: social     Drug use: No     Sexual activity: Yes     Partners: Female     Other Topics Concern     Not on file     Social History Narrative    Previous worked as [employer].    Now consulting part-time.    , lives with wife.    Non-smoker. Occasional drink.        Past surgical history that includes epidydmal mass removal, benign.        Social History:    Previous worked as [employer].    Now consulting part-time.    , lives with wife.    Non-smoker. Occasional drink.         family history includes Lung Cancer in his father; Parkinsonism in his cousin and maternal aunt.            Family History  Family History   Problem Relation Age of Onset     Lung Cancer Father      Parkinsonism Cousin      " paternal side     Parkinsonism Maternal Aunt            Anesthesia Evaluation     . Pt has had prior anesthetic. Type: General and MAC           ROS/MED HX    ENT/Pulmonary:       Neurologic:     (+)Parkinson's disease features: bilateral tremors, worse on right,     Cardiovascular:  - neg cardiovascular ROS   (+) ----. : . . . :. . No previous cardiac testing       METS/Exercise Tolerance:  >4 METS   Hematologic:         Musculoskeletal:   (+) , , other musculoskeletal- right knee pain      GI/Hepatic:  - neg GI/hepatic ROS       Renal/Genitourinary:  - ROS Renal section negative       Endo:     (+) thyroid problem hypothyroidism, .      Psychiatric:     (+) psychiatric history anxiety and depression      Infectious Disease:  - neg infectious disease ROS       Malignancy:      - no malignancy   Other:    (+) No chance of pregnancy C-spine cleared: N/A, no H/O Chronic Pain,no other significant disability              Physical Exam  Normal systems: cardiovascular, pulmonary and dental    Airway   Mallampati: II  TM distance: >3 FB  Neck ROM: full    Dental   Normal    Cardiovascular   Rhythm and rate: regular and normal      Pulmonary    breath sounds clear to auscultation             The complete review of systems is negative other than noted in the HPI or here.   Temp: 97.9  F (36.6  C) Temp src: Oral BP: 132/81 Pulse: 86   Resp: 18 SpO2: 95 %         195 lbs 0 oz  5' 9\"   Body mass index is 28.8 kg/(m^2).         Physical Exam  Constitutional: Awake, alert, cooperative, no apparent distress, and appears stated age.  Eyes: Pupils equal, round and reactive to light, extra ocular muscles intact, sclera clear, conjunctiva normal.  HENT: Normocephalic, oral pharynx with moist mucus membranes, good dentition. No goiter appreciated.   Respiratory: Clear to auscultation bilaterally, no crackles or wheezing.  Cardiovascular: Regular rate and rhythm, normal S1 and S2, and no murmur noted.  Carotids +2, no bruits. No edema. " Palpable pulses to radial  DP and PT arteries.   GI: Normal bowel sounds, soft, non-distended, non-tender, no masses palpated, no hepatosplenomegaly.    Lymph/Hematologic: No cervical lymphadenopathy and no supraclavicular lymphadenopathy.  Genitourinary:  deferred  Skin: Warm and dry.  No rashes at anticipated surgical site.   Musculoskeletal: Full ROM of neck. There is no redness, warmth, or swelling of the joints. Gross motor strength is normal, significant tremor on right, minimal on left  Neurologic: Awake, alert, oriented to name, place and time. Cranial nerves II-XII are grossly intact. Gait is normal.   Neuropsychiatric: Calm, cooperative. Normal affect.     Labs: (personally reviewed)  Results for WILFRED MCHUGH (MRN [id number]) as of 11/30/2017 14:08   Ref. Range 11/29/2017 16:40   Sodium Latest Ref Range: 133 - 144 mmol/L 138   Potassium Latest Ref Range: 3.4 - 5.3 mmol/L 3.9   Chloride Latest Ref Range: 94 - 109 mmol/L 105   Carbon Dioxide Latest Ref Range: 20 - 32 mmol/L 27   Urea Nitrogen Latest Ref Range: 7 - 30 mg/dL 20   Creatinine Latest Ref Range: 0.66 - 1.25 mg/dL 0.85   GFR Estimate Latest Ref Range: >60 mL/min/1.7m2 >90   GFR Estimate If Black Latest Ref Range: >60 mL/min/1.7m2 >90   Calcium Latest Ref Range: 8.5 - 10.1 mg/dL 8.9   Anion Gap Latest Ref Range: 3 - 14 mmol/L 6   Glucose Latest Ref Range: 70 - 99 mg/dL 111 (H)   WBC Latest Ref Range: 4.0 - 11.0 10e9/L 5.5   Hemoglobin Latest Ref Range: 13.3 - 17.7 g/dL 12.3 (L)   Hematocrit Latest Ref Range: 40.0 - 53.0 % 36.7 (L)   Platelet Count Latest Ref Range: 150 - 450 10e9/L 182   RBC Count Latest Ref Range: 4.4 - 5.9 10e12/L 3.91 (L)   MCV Latest Ref Range: 78 - 100 fl 94   MCH Latest Ref Range: 26.5 - 33.0 pg 31.5   MCHC Latest Ref Range: 31.5 - 36.5 g/dL 33.5   RDW Latest Ref Range: 10.0 - 15.0 % 13.9   INR Latest Ref Range: 0.86 - 1.14  0.96   ABO Unknown O   RH(D) Unknown Pos   Antibody Screen Unknown Neg   Test Valid Only At Latest  Units:     [facility]   Specimen Expires Unknown 12/02/2017     EKG: Personally reviewed but formal cardiology read pending: not indicated      ASSESSMENT and PLAN  Kwame Lin is a 63 yo male to be scheduled for DBS, dates to be determined, by Dr. Huynh. He has the following specific operative considerations:   - RCRI : Low serious cardiac risks.  0.4% risk of major adverse cardiac event.   - Anesthesia considerations:  Refer to PAC assessment in anesthesia records  - VTE risk: 1.8%  - DIPIKA # of risks 2/8 = low risk  - Post-op delirium risk: high risk due to REM sleep disorder and age  - Risk of PONV score = 2.  If > 2, anti-emetic intervention recommended.      Previous anesthesia with PONV, records scanned in where he had no PONV, he did receive dexamethasone those times.  1) Cardiac: No known cardiac diagnosis or symptoms.  2) Pulmonary: Never smoked, denies pulmonary symptoms.  3) Neuro: Parkinson s disease with bilateral tremors, worse on right. Also has REM behavior disorder with history of vivid dream and violent behaviors.  4) Endo: hypothyroidism, daily armour    METS >4, complains of right knee pain.       Pt optimized for surgery. AVS with information on surgery time/arrival time, meds and NPO status given by nursing staff      Patient was discussed with Dr Pelayo.    АННА Malave CNS  Preoperative Assessment Center  [facility]  Clinic and Surgery Center  Phone: [phone]  Fax: [phone]

## 2017-11-30 ENCOUNTER — TRANSFERRED RECORDS (OUTPATIENT)
Dept: HEALTH INFORMATION MANAGEMENT | Facility: CLINIC | Age: 62
End: 2017-11-30

## 2017-12-01 ENCOUNTER — TELEPHONE (OUTPATIENT)
Dept: NEUROSURGERY | Facility: CLINIC | Age: 62
End: 2017-12-01

## 2017-12-01 NOTE — TELEPHONE ENCOUNTER
I spoke with Kwame about the FDA approval for the [employer] DBS device. Per our contacts at [facility], the approval may come on Monday. Because they cannot ship product for 2 days following the approval, per FDA rules, our earliest possible surgery date is now 12/7. We may not know until the approval status until the end of the day on Monday or Tuesday morning. Our next alternative date is 12/14. Pt understands the uncertainty and said that he is OK with the flux right now. I will continue to keep him updated as I learn more about the approval. He is in agreement with plan.

## 2017-12-04 ENCOUNTER — TELEPHONE (OUTPATIENT)
Dept: NEUROLOGY | Facility: CLINIC | Age: 62
End: 2017-12-04

## 2017-12-04 NOTE — TELEPHONE ENCOUNTER
I returned Kwame's call following a message left on Cristel Yuan's voicemail today; patient was inquiring about updates regarding surgery date/FDA approval. I spoke with patient and let him know that we do not have FDA approval yet, and that our potential dates are still 12/7 and 12/14, however, if we don't get approval by Tuesday 10am, 12/7 will be eliminated as a date. Patient was OK with this plan and I let him know I will call with updates as soon as they come in.    Patient also asked me if there were any studies going on involving MRI's as he has had a knee workup and is planning to visit an orthopedic surgeon on 12/18 (depending on how he's doing after surgery). I reminded him that once his DBS is in place, it would not be safe to have an MRI, but that MRI-approval may come, in time. (This was also discussed previously at the in-person discussion with Dr. Borges.)  I reiterated that if he feels he needs an MRI, for safety's sake it would need to be done before surgery. He asked if there were any studies in orthopedics going on where he could get an MRI on short notice--I said I didn't think so and I double-checked studyfinder and let him know that there are none. I told him that he could call the orthopedic surgeons office and explain the situation and see what their advice would be regarding the ineligibility for MRI and surgery. He said that his orthopedic surgeon already said that a CT scan would suffice. He said he would instead call his primary physician and see if he could get an MRI on short notice before surgery as they were the ones to refer him initially.  I let him know that we could set up a time to talk with Dr. Borges by phone to discuss this more and he politely declined. Patient ended conversation eager for news of FDA approval and DBS surgery.

## 2017-12-05 ENCOUNTER — TELEPHONE (OUTPATIENT)
Dept: NEUROLOGY | Facility: CLINIC | Age: 62
End: 2017-12-05

## 2017-12-05 NOTE — TELEPHONE ENCOUNTER
I called Kwame to let him know that FDA approval for [place] has not come in yet so we will not be scheduling surgery for this Thursday. The most likely date now at this point is next Thursday, 12/14.     He updated me about his knee and said that his primary physician was able to schedule him for a left knee MRI on Thursday evening, so this schedule worked out well for him. He said he has a message pending at his primary's office to see if he can also get his right knee imaged as well on the same day.     Kwame would like to know that if his surgery is on Thursday 12/14/17, when would his IPG be placed (he has blocked off Thursday 12/21 if possible), and is wondering when his initial programming appointment would be (I told him that it is approximately 4 weeks after surgery but that we would get back to him with a concrete date as soon as possible).      Plan is to call patient back tomorrow with updates on FDA approval and possible appointment times.

## 2017-12-07 DIAGNOSIS — G20.A1 PARKINSON'S DISEASE (H): Primary | ICD-10-CM

## 2017-12-11 ENCOUNTER — TELEPHONE (OUTPATIENT)
Dept: NEUROSURGERY | Facility: CLINIC | Age: 62
End: 2017-12-11

## 2017-12-11 NOTE — TELEPHONE ENCOUNTER
I let pt know that the [employer] device has been FDA approved and his surgery is on the schedule for 12/14/17. I will send the preop packet via [facility] and pt will call me tomorrow when he receives it so we can discuss the contents. All questions answered for now. Pt is welcome to call with any further questions between now and the date of surgery.

## 2017-12-12 ENCOUNTER — CARE COORDINATION (OUTPATIENT)
Dept: NEUROSURGERY | Facility: CLINIC | Age: 62
End: 2017-12-12

## 2017-12-12 NOTE — PROGRESS NOTES
Telephone Pre-op Teaching            Pre-op folder with specific written instructions    DBS lead placement 12/14/17 at 7:30 a.m. And battery placement 12/22/17 at 2:10 p.m.  Dr. Arpan Huynh    Discussed via telephone pre-op routine and requirements to include:  surgical procedure, post-op recovery and expectations, need for H&P/PAC (completed), NPO prior to OR, pre-op antibacterial showers, pain control and importance of follow-up visits.  Surgery scheduling will coordinate OR time/date and update patient as appropriate.  3C will call with more instructions 24-48 hour pre-op.   Ample time was provided for patient questions and in-depth discussion of topics of heightened interest.  Pt received antibacterial soap solution at his PAC; discussed specific instructions for use.  Patient verbalized understanding of instructions.  Approximately 10 minutes spent on the telephone with patient discussing and reviewing.  Patient provided triage contact number for questions or concerns that may arise prior to surgery.       Pt understands he will hold his PD medications for the lead placement surgery and last dose should be at about 9:00 pm the evening before. Pt indicates he is not going to take his extended release medication the evening before b/c he doesn't think it really helps him. For the battery placement procedure, pt does NOT need to hold his PD meds and may take them as usual.     No further questions at this time. Pt welcome to call with any questions or concerns.

## 2017-12-14 ENCOUNTER — ANESTHESIA (OUTPATIENT)
Dept: SURGERY | Facility: CLINIC | Age: 62
DRG: 027 | End: 2017-12-14
Payer: COMMERCIAL

## 2017-12-14 ENCOUNTER — APPOINTMENT (OUTPATIENT)
Dept: GENERAL RADIOLOGY | Facility: CLINIC | Age: 62
DRG: 027 | End: 2017-12-14
Attending: NEUROLOGICAL SURGERY
Payer: COMMERCIAL

## 2017-12-14 ENCOUNTER — ANESTHESIA EVENT (OUTPATIENT)
Dept: SURGERY | Facility: CLINIC | Age: 62
DRG: 027 | End: 2017-12-14
Payer: COMMERCIAL

## 2017-12-14 ENCOUNTER — HOSPITAL ENCOUNTER (INPATIENT)
Facility: CLINIC | Age: 62
LOS: 1 days | Discharge: HOME | DRG: 027 | End: 2017-12-15
Attending: NEUROLOGICAL SURGERY | Admitting: NEUROLOGICAL SURGERY
Payer: COMMERCIAL

## 2017-12-14 ENCOUNTER — HOSPITAL ENCOUNTER (OUTPATIENT)
Dept: CT IMAGING | Facility: CLINIC | Age: 62
DRG: 027 | End: 2017-12-14
Attending: NEUROLOGICAL SURGERY | Admitting: NEUROLOGICAL SURGERY
Payer: COMMERCIAL

## 2017-12-14 DIAGNOSIS — G20.A1 PARKINSON'S DISEASE (H): ICD-10-CM

## 2017-12-14 DIAGNOSIS — G20.C PARKINSONIAN TREMOR (H): Primary | ICD-10-CM

## 2017-12-14 LAB
APTT PPP: 27 SEC (ref 22–37)
GLUCOSE BLDC GLUCOMTR-MCNC: 101 MG/DL (ref 70–99)
MRSA DNA SPEC QL NAA+PROBE: NEGATIVE
SPECIMEN SOURCE: NORMAL

## 2017-12-14 PROCEDURE — 8E09XBF COMPUTER ASSISTED PROCEDURE OF HEAD AND NECK REGION, WITH FLUOROSCOPY: ICD-10-PCS | Performed by: NEUROLOGICAL SURGERY

## 2017-12-14 PROCEDURE — 87640 STAPH A DNA AMP PROBE: CPT | Performed by: NEUROLOGICAL SURGERY

## 2017-12-14 PROCEDURE — 87641 MR-STAPH DNA AMP PROBE: CPT | Performed by: NEUROLOGICAL SURGERY

## 2017-12-14 PROCEDURE — 25000125 ZZHC RX 250: Performed by: STUDENT IN AN ORGANIZED HEALTH CARE EDUCATION/TRAINING PROGRAM

## 2017-12-14 PROCEDURE — 25000125 ZZHC RX 250: Performed by: NEUROLOGICAL SURGERY

## 2017-12-14 PROCEDURE — C1778 LEAD, NEUROSTIMULATOR: HCPCS | Performed by: NEUROLOGICAL SURGERY

## 2017-12-14 PROCEDURE — 25000132 ZZH RX MED GY IP 250 OP 250 PS 637: Performed by: NEUROLOGICAL SURGERY

## 2017-12-14 PROCEDURE — 27210995 ZZH RX 272: Performed by: NEUROLOGICAL SURGERY

## 2017-12-14 PROCEDURE — 25000128 H RX IP 250 OP 636: Performed by: NURSE ANESTHETIST, CERTIFIED REGISTERED

## 2017-12-14 PROCEDURE — 27810169 ZZH OR IMPLANT GENERAL: Performed by: NEUROLOGICAL SURGERY

## 2017-12-14 PROCEDURE — 25000128 H RX IP 250 OP 636: Performed by: ANESTHESIOLOGY

## 2017-12-14 PROCEDURE — 37000008 ZZH ANESTHESIA TECHNICAL FEE, 1ST 30 MIN: Performed by: NEUROLOGICAL SURGERY

## 2017-12-14 PROCEDURE — 85730 THROMBOPLASTIN TIME PARTIAL: CPT | Performed by: NEUROLOGICAL SURGERY

## 2017-12-14 PROCEDURE — 4B00XVZ MEASUREMENT OF CENTRAL NERVOUS STIMULATOR, EXTERNAL APPROACH: ICD-10-PCS | Performed by: NEUROLOGICAL SURGERY

## 2017-12-14 PROCEDURE — 25000125 ZZHC RX 250: Performed by: NURSE ANESTHETIST, CERTIFIED REGISTERED

## 2017-12-14 PROCEDURE — 20000004 ZZH R&B ICU UMMC

## 2017-12-14 PROCEDURE — 25000128 H RX IP 250 OP 636: Performed by: NEUROLOGICAL SURGERY

## 2017-12-14 PROCEDURE — 25000132 ZZH RX MED GY IP 250 OP 250 PS 637: Performed by: STUDENT IN AN ORGANIZED HEALTH CARE EDUCATION/TRAINING PROGRAM

## 2017-12-14 PROCEDURE — 40000170 ZZH STATISTIC PRE-PROCEDURE ASSESSMENT II: Performed by: NEUROLOGICAL SURGERY

## 2017-12-14 PROCEDURE — 25000128 H RX IP 250 OP 636: Performed by: STUDENT IN AN ORGANIZED HEALTH CARE EDUCATION/TRAINING PROGRAM

## 2017-12-14 PROCEDURE — 00000146 ZZHCL STATISTIC GLUCOSE BY METER IP

## 2017-12-14 PROCEDURE — 40000277 XR SURGERY CARM FLUORO LESS THAN 5 MIN W STILLS: Mod: TC

## 2017-12-14 PROCEDURE — 00H03MZ INSERTION OF NEUROSTIMULATOR LEAD INTO BRAIN, PERCUTANEOUS APPROACH: ICD-10-PCS | Performed by: NEUROLOGICAL SURGERY

## 2017-12-14 PROCEDURE — 27210794 ZZH OR GENERAL SUPPLY STERILE: Performed by: NEUROLOGICAL SURGERY

## 2017-12-14 PROCEDURE — 71000014 ZZH RECOVERY PHASE 1 LEVEL 2 FIRST HR: Performed by: NEUROLOGICAL SURGERY

## 2017-12-14 PROCEDURE — 36000076 ZZH SURGERY LEVEL 6 EA 15 ADDTL MIN - UMMC: Performed by: NEUROLOGICAL SURGERY

## 2017-12-14 PROCEDURE — 70450 CT HEAD/BRAIN W/O DYE: CPT

## 2017-12-14 PROCEDURE — 8E09XBG COMPUTER ASSISTED PROCEDURE OF HEAD AND NECK REGION, WITH COMPUTERIZED TOMOGRAPHY: ICD-10-PCS | Performed by: NEUROLOGICAL SURGERY

## 2017-12-14 PROCEDURE — 37000009 ZZH ANESTHESIA TECHNICAL FEE, EACH ADDTL 15 MIN: Performed by: NEUROLOGICAL SURGERY

## 2017-12-14 PROCEDURE — 36000074 ZZH SURGERY LEVEL 6 1ST 30 MIN - UMMC: Performed by: NEUROLOGICAL SURGERY

## 2017-12-14 DEVICE — IMPLANTABLE DEVICE: Type: IMPLANTABLE DEVICE | Site: CRANIAL | Status: FUNCTIONAL

## 2017-12-14 RX ORDER — PROPOFOL 10 MG/ML
INJECTION, EMULSION INTRAVENOUS PRN
Status: DISCONTINUED | OUTPATIENT
Start: 2017-12-14 | End: 2017-12-14

## 2017-12-14 RX ORDER — LIDOCAINE HYDROCHLORIDE 20 MG/ML
INJECTION, SOLUTION INFILTRATION; PERINEURAL PRN
Status: DISCONTINUED | OUTPATIENT
Start: 2017-12-14 | End: 2017-12-14

## 2017-12-14 RX ORDER — DEXAMETHASONE SODIUM PHOSPHATE 4 MG/ML
INJECTION, SOLUTION INTRA-ARTICULAR; INTRALESIONAL; INTRAMUSCULAR; INTRAVENOUS; SOFT TISSUE PRN
Status: DISCONTINUED | OUTPATIENT
Start: 2017-12-14 | End: 2017-12-14

## 2017-12-14 RX ORDER — ONDANSETRON 2 MG/ML
4-8 INJECTION INTRAMUSCULAR; INTRAVENOUS EVERY 6 HOURS PRN
Status: DISCONTINUED | OUTPATIENT
Start: 2017-12-14 | End: 2017-12-15 | Stop reason: HOSPADM

## 2017-12-14 RX ORDER — MAGNESIUM OXIDE 400 MG/1
400 TABLET ORAL EVERY MORNING
Status: DISCONTINUED | OUTPATIENT
Start: 2017-12-15 | End: 2017-12-15 | Stop reason: HOSPADM

## 2017-12-14 RX ORDER — CARBIDOPA AND LEVODOPA 50; 200 MG/1; MG/1
2 TABLET, EXTENDED RELEASE ORAL AT BEDTIME
Status: DISCONTINUED | OUTPATIENT
Start: 2017-12-14 | End: 2017-12-15 | Stop reason: HOSPADM

## 2017-12-14 RX ORDER — OXYCODONE HYDROCHLORIDE 5 MG/1
5-10 TABLET ORAL
Status: DISCONTINUED | OUTPATIENT
Start: 2017-12-14 | End: 2017-12-15 | Stop reason: HOSPADM

## 2017-12-14 RX ORDER — AMOXICILLIN 250 MG
2 CAPSULE ORAL 2 TIMES DAILY
Status: DISCONTINUED | OUTPATIENT
Start: 2017-12-14 | End: 2017-12-15 | Stop reason: HOSPADM

## 2017-12-14 RX ORDER — HYDRALAZINE HYDROCHLORIDE 20 MG/ML
2.5-5 INJECTION INTRAMUSCULAR; INTRAVENOUS EVERY 10 MIN PRN
Status: DISCONTINUED | OUTPATIENT
Start: 2017-12-14 | End: 2017-12-14 | Stop reason: HOSPADM

## 2017-12-14 RX ORDER — SODIUM CHLORIDE, SODIUM LACTATE, POTASSIUM CHLORIDE, CALCIUM CHLORIDE 600; 310; 30; 20 MG/100ML; MG/100ML; MG/100ML; MG/100ML
INJECTION, SOLUTION INTRAVENOUS CONTINUOUS
Status: DISCONTINUED | OUTPATIENT
Start: 2017-12-14 | End: 2017-12-14 | Stop reason: HOSPADM

## 2017-12-14 RX ORDER — LIDOCAINE 40 MG/G
CREAM TOPICAL
Status: DISCONTINUED | OUTPATIENT
Start: 2017-12-14 | End: 2017-12-15 | Stop reason: HOSPADM

## 2017-12-14 RX ORDER — SODIUM CHLORIDE 9 MG/ML
INJECTION, SOLUTION INTRAVENOUS CONTINUOUS
Status: ACTIVE | OUTPATIENT
Start: 2017-12-14 | End: 2017-12-14

## 2017-12-14 RX ORDER — ACETAMINOPHEN 325 MG/1
650 TABLET ORAL EVERY 4 HOURS PRN
Status: DISCONTINUED | OUTPATIENT
Start: 2017-12-17 | End: 2017-12-15 | Stop reason: HOSPADM

## 2017-12-14 RX ORDER — CLINDAMYCIN PHOSPHATE 900 MG/50ML
900 INJECTION, SOLUTION INTRAVENOUS
Status: COMPLETED | OUTPATIENT
Start: 2017-12-14 | End: 2017-12-14

## 2017-12-14 RX ORDER — BACITRACIN 500 [USP'U]/G
OINTMENT OPHTHALMIC PRN
Status: DISCONTINUED | OUTPATIENT
Start: 2017-12-14 | End: 2017-12-14 | Stop reason: HOSPADM

## 2017-12-14 RX ORDER — CARBIDOPA AND LEVODOPA 25; 100 MG/1; MG/1
1 TABLET ORAL 3 TIMES DAILY
Status: DISCONTINUED | OUTPATIENT
Start: 2017-12-14 | End: 2017-12-15 | Stop reason: HOSPADM

## 2017-12-14 RX ORDER — FENTANYL CITRATE 50 UG/ML
25-50 INJECTION, SOLUTION INTRAMUSCULAR; INTRAVENOUS
Status: DISCONTINUED | OUTPATIENT
Start: 2017-12-14 | End: 2017-12-14 | Stop reason: HOSPADM

## 2017-12-14 RX ORDER — THYROID 60 MG/1
60 TABLET ORAL EVERY MORNING
Status: DISCONTINUED | OUTPATIENT
Start: 2017-12-15 | End: 2017-12-15 | Stop reason: HOSPADM

## 2017-12-14 RX ORDER — ONDANSETRON 2 MG/ML
4 INJECTION INTRAMUSCULAR; INTRAVENOUS EVERY 30 MIN PRN
Status: DISCONTINUED | OUTPATIENT
Start: 2017-12-14 | End: 2017-12-14 | Stop reason: HOSPADM

## 2017-12-14 RX ORDER — CITALOPRAM HYDROBROMIDE 10 MG/1
10 TABLET ORAL EVERY MORNING
Status: DISCONTINUED | OUTPATIENT
Start: 2017-12-15 | End: 2017-12-15 | Stop reason: HOSPADM

## 2017-12-14 RX ORDER — POLYETHYLENE GLYCOL 3350 17 G/17G
17 POWDER, FOR SOLUTION ORAL 2 TIMES DAILY
Status: DISCONTINUED | OUTPATIENT
Start: 2017-12-14 | End: 2017-12-15 | Stop reason: HOSPADM

## 2017-12-14 RX ORDER — LABETALOL HYDROCHLORIDE 5 MG/ML
5 INJECTION, SOLUTION INTRAVENOUS EVERY 10 MIN PRN
Status: DISCONTINUED | OUTPATIENT
Start: 2017-12-14 | End: 2017-12-14 | Stop reason: HOSPADM

## 2017-12-14 RX ORDER — HYDRALAZINE HYDROCHLORIDE 20 MG/ML
10-20 INJECTION INTRAMUSCULAR; INTRAVENOUS EVERY 30 MIN PRN
Status: DISCONTINUED | OUTPATIENT
Start: 2017-12-14 | End: 2017-12-15 | Stop reason: HOSPADM

## 2017-12-14 RX ORDER — ONDANSETRON 2 MG/ML
INJECTION INTRAMUSCULAR; INTRAVENOUS PRN
Status: DISCONTINUED | OUTPATIENT
Start: 2017-12-14 | End: 2017-12-14

## 2017-12-14 RX ORDER — CLONAZEPAM 0.5 MG/1
0.5 TABLET ORAL AT BEDTIME
Status: DISCONTINUED | OUTPATIENT
Start: 2017-12-14 | End: 2017-12-15 | Stop reason: HOSPADM

## 2017-12-14 RX ORDER — NALOXONE HYDROCHLORIDE 0.4 MG/ML
.1-.4 INJECTION, SOLUTION INTRAMUSCULAR; INTRAVENOUS; SUBCUTANEOUS
Status: DISCONTINUED | OUTPATIENT
Start: 2017-12-14 | End: 2017-12-15 | Stop reason: HOSPADM

## 2017-12-14 RX ORDER — FENTANYL CITRATE 50 UG/ML
INJECTION, SOLUTION INTRAMUSCULAR; INTRAVENOUS PRN
Status: DISCONTINUED | OUTPATIENT
Start: 2017-12-14 | End: 2017-12-14

## 2017-12-14 RX ORDER — CLINDAMYCIN PHOSPHATE 900 MG/50ML
900 INJECTION, SOLUTION INTRAVENOUS EVERY 8 HOURS
Status: DISCONTINUED | OUTPATIENT
Start: 2017-12-14 | End: 2017-12-15 | Stop reason: HOSPADM

## 2017-12-14 RX ORDER — ACETAMINOPHEN 325 MG/1
975 TABLET ORAL EVERY 8 HOURS
Status: DISCONTINUED | OUTPATIENT
Start: 2017-12-14 | End: 2017-12-15 | Stop reason: HOSPADM

## 2017-12-14 RX ORDER — LABETALOL HYDROCHLORIDE 5 MG/ML
10-40 INJECTION, SOLUTION INTRAVENOUS EVERY 10 MIN PRN
Status: DISCONTINUED | OUTPATIENT
Start: 2017-12-14 | End: 2017-12-15 | Stop reason: HOSPADM

## 2017-12-14 RX ORDER — ONDANSETRON 4 MG/1
4-8 TABLET, ORALLY DISINTEGRATING ORAL EVERY 6 HOURS PRN
Status: DISCONTINUED | OUTPATIENT
Start: 2017-12-14 | End: 2017-12-15 | Stop reason: HOSPADM

## 2017-12-14 RX ORDER — MULTIPLE VITAMINS W/ MINERALS TAB 9MG-400MCG
1 TAB ORAL EVERY MORNING
Status: DISCONTINUED | OUTPATIENT
Start: 2017-12-15 | End: 2017-12-15 | Stop reason: HOSPADM

## 2017-12-14 RX ORDER — NALOXONE HYDROCHLORIDE 0.4 MG/ML
.1-.4 INJECTION, SOLUTION INTRAMUSCULAR; INTRAVENOUS; SUBCUTANEOUS
Status: DISCONTINUED | OUTPATIENT
Start: 2017-12-14 | End: 2017-12-14 | Stop reason: HOSPADM

## 2017-12-14 RX ORDER — CARBIDOPA AND LEVODOPA 25; 100 MG/1; MG/1
1.5 TABLET ORAL 3 TIMES DAILY
Status: DISCONTINUED | OUTPATIENT
Start: 2017-12-14 | End: 2017-12-14

## 2017-12-14 RX ORDER — LIDOCAINE 40 MG/G
CREAM TOPICAL
Status: DISCONTINUED | OUTPATIENT
Start: 2017-12-14 | End: 2017-12-14 | Stop reason: HOSPADM

## 2017-12-14 RX ORDER — FENTANYL CITRATE 50 UG/ML
25-50 INJECTION, SOLUTION INTRAMUSCULAR; INTRAVENOUS EVERY 5 MIN PRN
Status: DISCONTINUED | OUTPATIENT
Start: 2017-12-14 | End: 2017-12-14 | Stop reason: HOSPADM

## 2017-12-14 RX ORDER — CLINDAMYCIN PHOSPHATE 900 MG/50ML
900 INJECTION, SOLUTION INTRAVENOUS SEE ADMIN INSTRUCTIONS
Status: DISCONTINUED | OUTPATIENT
Start: 2017-12-14 | End: 2017-12-14 | Stop reason: HOSPADM

## 2017-12-14 RX ORDER — LACTOBACILLUS RHAMNOSUS GG 10B CELL
1 CAPSULE ORAL AT BEDTIME
Status: DISCONTINUED | OUTPATIENT
Start: 2017-12-14 | End: 2017-12-15 | Stop reason: HOSPADM

## 2017-12-14 RX ORDER — UBIDECARENONE 100 MG
300 CAPSULE ORAL EVERY MORNING
Status: DISCONTINUED | OUTPATIENT
Start: 2017-12-15 | End: 2017-12-15 | Stop reason: HOSPADM

## 2017-12-14 RX ORDER — ONDANSETRON 4 MG/1
4 TABLET, ORALLY DISINTEGRATING ORAL EVERY 30 MIN PRN
Status: DISCONTINUED | OUTPATIENT
Start: 2017-12-14 | End: 2017-12-14 | Stop reason: HOSPADM

## 2017-12-14 RX ADMIN — PROPOFOL 20 MG: 10 INJECTION, EMULSION INTRAVENOUS at 09:22

## 2017-12-14 RX ADMIN — ACETAMINOPHEN 975 MG: 325 TABLET, FILM COATED ORAL at 18:11

## 2017-12-14 RX ADMIN — Medication 1 CAPSULE: at 21:53

## 2017-12-14 RX ADMIN — CARBIDOPA AND LEVODOPA 2 TABLET: 50; 200 TABLET, EXTENDED RELEASE ORAL at 21:53

## 2017-12-14 RX ADMIN — PROPOFOL 30 MG: 10 INJECTION, EMULSION INTRAVENOUS at 08:23

## 2017-12-14 RX ADMIN — CLINDAMYCIN PHOSPHATE 900 MG: 18 INJECTION, SOLUTION INTRAVENOUS at 08:51

## 2017-12-14 RX ADMIN — PROPOFOL 30 MG: 10 INJECTION, EMULSION INTRAVENOUS at 08:27

## 2017-12-14 RX ADMIN — SODIUM CHLORIDE: 9 INJECTION, SOLUTION INTRAVENOUS at 13:55

## 2017-12-14 RX ADMIN — FENTANYL CITRATE 25 MCG: 50 INJECTION, SOLUTION INTRAMUSCULAR; INTRAVENOUS at 12:48

## 2017-12-14 RX ADMIN — PROPOFOL 30 MG: 10 INJECTION, EMULSION INTRAVENOUS at 08:25

## 2017-12-14 RX ADMIN — DEXMEDETOMIDINE HYDROCHLORIDE 0.7 MCG/KG/HR: 100 INJECTION, SOLUTION INTRAVENOUS at 08:21

## 2017-12-14 RX ADMIN — CLONAZEPAM 0.5 MG: 0.5 TABLET ORAL at 21:53

## 2017-12-14 RX ADMIN — CLINDAMYCIN PHOSPHATE 900 MG: 18 INJECTION, SOLUTION INTRAVENOUS at 18:30

## 2017-12-14 RX ADMIN — PROPOFOL 30 MG: 10 INJECTION, EMULSION INTRAVENOUS at 07:45

## 2017-12-14 RX ADMIN — CARBIDOPA AND LEVODOPA 1 HALF-TAB: 25; 100 TABLET ORAL at 21:53

## 2017-12-14 RX ADMIN — FENTANYL CITRATE 25 MCG: 50 INJECTION, SOLUTION INTRAMUSCULAR; INTRAVENOUS at 12:57

## 2017-12-14 RX ADMIN — PROPOFOL 40 MG: 10 INJECTION, EMULSION INTRAVENOUS at 08:21

## 2017-12-14 RX ADMIN — SODIUM CHLORIDE, POTASSIUM CHLORIDE, SODIUM LACTATE AND CALCIUM CHLORIDE: 600; 310; 30; 20 INJECTION, SOLUTION INTRAVENOUS at 12:24

## 2017-12-14 RX ADMIN — DEXAMETHASONE SODIUM PHOSPHATE 6 MG: 4 INJECTION, SOLUTION INTRA-ARTICULAR; INTRALESIONAL; INTRAMUSCULAR; INTRAVENOUS; SOFT TISSUE at 09:55

## 2017-12-14 RX ADMIN — ONDANSETRON 4 MG: 2 INJECTION INTRAMUSCULAR; INTRAVENOUS at 13:11

## 2017-12-14 RX ADMIN — PROPOFOL 40 MG: 10 INJECTION, EMULSION INTRAVENOUS at 08:29

## 2017-12-14 RX ADMIN — LIDOCAINE HYDROCHLORIDE 60 MG: 20 INJECTION, SOLUTION INFILTRATION; PERINEURAL at 07:37

## 2017-12-14 RX ADMIN — LIDOCAINE HYDROCHLORIDE 40 MG: 20 INJECTION, SOLUTION INFILTRATION; PERINEURAL at 08:27

## 2017-12-14 RX ADMIN — PROPOFOL 30 MG: 10 INJECTION, EMULSION INTRAVENOUS at 12:24

## 2017-12-14 RX ADMIN — PROPOFOL 50 MG: 10 INJECTION, EMULSION INTRAVENOUS at 07:40

## 2017-12-14 RX ADMIN — SODIUM CHLORIDE, POTASSIUM CHLORIDE, SODIUM LACTATE AND CALCIUM CHLORIDE: 600; 310; 30; 20 INJECTION, SOLUTION INTRAVENOUS at 07:28

## 2017-12-14 RX ADMIN — PROPOFOL 10 MG: 10 INJECTION, EMULSION INTRAVENOUS at 07:48

## 2017-12-14 RX ADMIN — PROPOFOL 20 MG: 10 INJECTION, EMULSION INTRAVENOUS at 07:52

## 2017-12-14 RX ADMIN — CARBIDOPA AND LEVODOPA 1 TABLET: 25; 100 TABLET ORAL at 19:51

## 2017-12-14 RX ADMIN — ONDANSETRON 4 MG: 2 INJECTION INTRAMUSCULAR; INTRAVENOUS at 10:42

## 2017-12-14 ASSESSMENT — VISUAL ACUITY
OU: NORMAL ACUITY

## 2017-12-14 NOTE — ANESTHESIA PREPROCEDURE EVALUATION
Anesthesia Evaluation     . Pt has had prior anesthetic.     History of anesthetic complications   - PONV        ROS/MED HX    ENT/Pulmonary:  - neg pulmonary ROS     Neurologic:     (+)Parkinson's disease     Cardiovascular:     (+) Dyslipidemia, ----. : . . . :. .      (-) hypertension   METS/Exercise Tolerance:     Hematologic:  - neg hematologic  ROS       Musculoskeletal:   (+) arthritis, , , -       GI/Hepatic:  - neg GI/hepatic ROS       Renal/Genitourinary:  - ROS Renal section negative       Endo:     (+) thyroid problem .      Psychiatric:     (+) psychiatric history anxiety      Infectious Disease:  - neg infectious disease ROS       Malignancy:         Other:                     Physical Exam      Airway   Mallampati: II  TM distance: >3 FB  Neck ROM: full    Dental     Cardiovascular   Rhythm and rate: regular and normal      Pulmonary    breath sounds clear to auscultation                    Anesthesia Plan      History & Physical Review      ASA Status:  3 .    NPO Status:  > 8 hours    Plan for MAC with Intravenous induction. Maintenance will be Balanced.  Reason for MAC:  Deep or markedly invasive procedure (G8)  PONV prophylaxis:  Ondansetron (or other 5HT-3), Dexamethasone or Solumedrol, Scopolamine patch and Other (See comment)  Additional equipment: 2nd IV      Postoperative Care  Postoperative pain management:  IV analgesics, Oral pain medications and Multi-modal analgesia.      Consents  Anesthetic plan, risks, benefits and alternatives discussed with:  Patient..                          .

## 2017-12-14 NOTE — OR NURSING
"Dr. Huynh at bedside twice during PACU stay. No immediate concerns. Assessed tremors. Improvement noted. Wife at bedside. Left head: small \"bump\" from underlying wire. Dr. Huynh aware, assessed. No issues noted.  "

## 2017-12-14 NOTE — IP AVS SNAPSHOT
[address] [address]    Phone:  [phone]                                       After Visit Summary   12/14/2017    Kwame Lin    MRN: [id number]           After Visit Summary Signature Page     I have received my discharge instructions, and my questions have been answered. I have discussed any challenges I see with this plan with the nurse or doctor.    ..........................................................................................................................................  Patient/Patient Representative Signature      ..........................................................................................................................................  Patient Representative Print Name and Relationship to Patient    ..................................................               ................................................  Date                                            Time    ..........................................................................................................................................  Reviewed by Signature/Title    ...................................................              ..............................................  Date                                                            Time

## 2017-12-14 NOTE — IP AVS SNAPSHOT
MRN:[id number]                      After Visit Summary   2017    Kwame Lin    MRN: [id number]           Thank you!     Thank you for choosing [facility] for your care. Our goal is always to provide you with excellent care. Hearing back from our patients is one way we can continue to improve our services. Please take a few minutes to complete the written survey that you may receive in the mail after you visit with us. Thank you!        Patient Information     Date Of Birth          1955        Designated Caregiver       Most Recent Value    Caregiver    Will someone help with your care after discharge? no      About your hospital stay     You were admitted on:  2017 You last received care in the:  [address] [facility]    You were discharged on:  December 15, 2017        Reason for your hospital stay       You were hospitalized for the treatment of Parkinson's Disease. You underwent placement of a Left Deep Brain Stimulator (Phase I) involving placement of the Generator in the STN. The operation was performed by Dr. Arpan Huynh on 2017.                  Who to Call     For medical emergencies, please call 911.  For non-urgent questions about your medical care, please call your primary care provider or clinic, [phone]  For questions related to your surgery, please call your surgery clinic        Attending Provider     Provider Arpan Rosen MD Neurosurgery       Primary Care Provider Office Phone # Fax #    Silvina Armenta [phone]       When to contact your care team       Call if you have any of the followin. Temperature greater than 101.5 F.   2. Any redness, swelling or discharge from the wound.   3. Any new weakness, numbness or altered mental status.  4. Worsening pain that is not improving with the pain medications you were prescribed.     Call [phone] or after 5:00 pm or on weekends call  [phone] and ask for the neurosurgery resident on call. Thank You.                  After Care Instructions     Activity       Your activity upon discharge:   - You may gradually increase your activity beginning with short and frequent walks.   - Please avoid any strenuous activity, including lifting > 10 lbs for the first 2 weeks following your operation.  - Please DO NOT drive while using narcotic analgesics (Oxycodone)  - Please DO NOT submerge your incisions beneath water in a bath tub, hot tub, or pool for 4-6 weeks following your operation.            Diet       Follow this diet upon discharge: Orders Placed This Encounter      Regular Diet Adult            Discharge Instructions       - Please DO NOT resume taking Omega 3 Fatty Acids until AFTER your Phase II surgery.            Wound care and dressings       Instructions to care for your wound at home:  - You may remove your surgical dressing on post-operative day #2 (12/16/2017).   - After the surgical dressing is removed, keep your incision open to air.   - You may shower on post-operative day #3 (12/17/2017).   - After your incision gets wet, pat your incision dry. Do not vigorously rub your incision.  - Do not apply any creams, gels, lotions, or ointments to your incision.  - Do not submerge your incision in water for 4-6 weeks post-operatively.  - Monitor your incision for signs of infection including redness, swelling, drainage or warmth at the surgical site.                  Follow-up Appointments     Follow Up and recommended labs and tests       - You are scheduled to undergo Phase II of the placement of the Deep Brain Stimulator by Dr. Arpan Huynh on 12/22/2017.                  Your next 10 appointments already scheduled     Dec 22, 2017   Procedure with Arpan Huynh MD   [facility], [place], Same Day Surgery (--)    [address]  [place] [address] [phone]              Pending Results     No orders found for last 3 day(s).             Statement of Approval     Ordered          12/15/17 0927  I have reviewed and agree with all the recommendations and orders detailed in this document.  EFFECTIVE NOW     Approved and electronically signed by:  Anna Ocampo APRN CNP             Admission Information     Date & Time Provider Department Dept. Phone    12/14/2017 Arpan Huynh MD [address] [facility] [phone]      Your Vitals Were     Blood Pressure Pulse Temperature Respirations Weight Pulse Oximetry    129/102 88 97.4  F (36.3  C) (Axillary) 16 85.7 kg (188 lb 15 oz) 98%    BMI (Body Mass Index)                   27.9 kg/m2           MyChart Information     [facility] gives you secure access to your electronic health record. If you see a primary care provider, you can also send messages to your care team and make appointments. If you have questions, please call your primary care clinic.  If you do not have a primary care provider, please call [phone] and they will assist you.        Care EveryWhere ID     This is your Care EveryWhere ID. This could be used by other organizations to access your [facility] medical records  [id number]        Equal Access to Services     KARISSA CASON : Hadvj Salas, lakia banegas, bebe moreno. So [facility] [phone].    ATENCIÓN: Si habla español, tiene a mcrae disposición servicios gratuitos de asistencia lingüística. Llame al [phone].    We comply with applicable federal civil rights laws and [place] laws. We do not discriminate on the basis of race, color, national origin, age, disability, sex, sexual orientation, or gender identity.               Review of your medicines      START taking        Dose / Directions    oxyCODONE IR 5 MG tablet   Commonly known as:  ROXICODONE   Used for:  Parkinsonian tremor (H)        Dose:  5-10 mg   Take 1-2 tablets (5-10 mg) by mouth every 3 hours as needed for  moderate to severe pain   Quantity:  60 tablet   Refills:  0       senna-docusate 8.6-50 MG per tablet   Commonly known as:  SENOKOT-S;PERICOLACE   Used for:  Parkinsonian tremor (H)        Dose:  2 tablet   Take 2 tablets by mouth 2 times daily   Quantity:  100 tablet   Refills:  0         CONTINUE these medicines which have NOT CHANGED        Dose / Directions    Acidophilus/Goat Milk Caps        At Bedtime   Refills:  0       ARMOUR THYROID 60 MG tablet   Generic drug:  thyroid        Dose:  60 mg   Take 60 mg by mouth every morning   Refills:  0       * carbidopa-levodopa  MG per tablet   Commonly known as:  SINEMET        1.5 every 4 hours 3 x daily aroudnd 6:30 am, 11:00 am, & 4:00 pm.   Quantity:  90 tablet   Refills:  0       * carbidopa-levodopa  MG per CR tablet   Commonly known as:  SINEMET CR        Dose:  2 tablet   Take 2 tablets by mouth At Bedtime   Refills:  0       citalopram 10 MG tablet   Commonly known as:  celeXA        Dose:  10 mg   Take 10 mg by mouth every morning   Refills:  0       clonazePAM 0.5 MG tablet   Commonly known as:  klonoPIN        Dose:  0.5 mg   Take 0.5 mg by mouth At Bedtime   Refills:  0       Coenzyme Q10 300 MG Caps        Dose:  300 mg   Take 300 mg by mouth every morning   Refills:  0       diclofenac 1 % Gel topical gel   Commonly known as:  VOLTAREN        Apply qid prn pain   Refills:  0       Magnesium Citrate 100 MG Tabs        Dose:  400 mg   Take 400 mg by mouth every morning   Refills:  0       Multi-vitamin Tabs tablet        Dose:  1 tablet   Take 1 tablet by mouth every morning   Refills:  0       Red Wine Extract Caps        Take by mouth daily (with lunch)   Refills:  0       TYLENOL 325 MG tablet   Generic drug:  acetaminophen        Dose:  325-650 mg   Take 325-650 mg by mouth nightly as needed for mild pain   Refills:  0       * Notice:  This list has 2 medication(s) that are the same as other medications prescribed for you. Read the  directions carefully, and ask your doctor or other care provider to review them with you.      STOP taking     FISH OIL PO                Where to get your medicines      Some of these will need a paper prescription and others can be bought over the counter. Ask your nurse if you have questions.     Bring a paper prescription for each of these medications     oxyCODONE IR 5 MG tablet    senna-docusate 8.6-50 MG per tablet                Protect others around you: Learn how to safely use, store and throw away your medicines at www.disposemymeds.org.             Medication List: This is a list of all your medications and when to take them. Check marks below indicate your daily home schedule. Keep this list as a reference.      Medications           Morning Afternoon Evening Bedtime As Needed    Acidophilus/Goat Milk Caps   At Bedtime   Next Dose Due:  Resume home regimen                                   ARMOUR THYROID 60 MG tablet   Take 60 mg by mouth every morning   Last time this was given:  60 mg on 12/15/2017  7:38 AM   Generic drug:  thyroid   Next Dose Due:  12/16/2017 in morning                                   * carbidopa-levodopa  MG per tablet   Commonly known as:  SINEMET   1.5 every 4 hours 3 x daily aroudnd 6:30 am, 11:00 am, & 4:00 pm.   Last time this was given:  1 tablet, 1 half-tab on 12/15/2017  5:47 AM                                         * carbidopa-levodopa  MG per CR tablet   Commonly known as:  SINEMET CR   Take 2 tablets by mouth At Bedtime   Last time this was given:  2 tablets on 12/14/2017  9:53 PM   Next Dose Due:  12/15/2017 at bedtime                                   citalopram 10 MG tablet   Commonly known as:  celeXA   Take 10 mg by mouth every morning   Last time this was given:  10 mg on 12/15/2017  7:37 AM   Next Dose Due:  12/16/2017 in morning                                   clonazePAM 0.5 MG tablet   Commonly known as:  klonoPIN   Take 0.5 mg by mouth At  Bedtime   Last time this was given:  0.5 mg on 12/14/2017  9:53 PM   Next Dose Due:  12/15/2017 at bedtime                                   Coenzyme Q10 300 MG Caps   Take 300 mg by mouth every morning   Last time this was given:  300 mg on 12/15/2017  7:37 AM   Next Dose Due:  12/16/2017 in morning                                   diclofenac 1 % Gel topical gel   Commonly known as:  VOLTAREN   Apply qid prn pain                                   Magnesium Citrate 100 MG Tabs   Take 400 mg by mouth every morning   Next Dose Due:  12/16/2017 in morning                                   Multi-vitamin Tabs tablet   Take 1 tablet by mouth every morning   Last time this was given:  1 tablet on 12/15/2017  7:37 AM   Next Dose Due:  12/16/2017 in morning                                   oxyCODONE IR 5 MG tablet   Commonly known as:  ROXICODONE   Take 1-2 tablets (5-10 mg) by mouth every 3 hours as needed for moderate to severe pain                                   Red Wine Extract Caps   Take by mouth daily (with lunch)   Next Dose Due:  12/16/2017 with lunch                                   senna-docusate 8.6-50 MG per tablet   Commonly known as:  SENOKOT-S;PERICOLACE   Take 2 tablets by mouth 2 times daily   Last time this was given:  2 tablets on 12/15/2017  7:36 AM   Next Dose Due:  12/15/2017 at bedtime                                      TYLENOL 325 MG tablet   Take 325-650 mg by mouth nightly as needed for mild pain   Last time this was given:  975 mg on 12/15/2017  1:16 AM   Generic drug:  acetaminophen                                   * Notice:  This list has 2 medication(s) that are the same as other medications prescribed for you. Read the directions carefully, and ask your doctor or other care provider to review them with you.

## 2017-12-14 NOTE — ANESTHESIA CARE TRANSFER NOTE
Patient: Kwame Lin    Procedure(s):  Stealth Assisted Left Deep Brain Stimulator Placement, Phase I, Placement Of Left Side Deep Brain Stimulator Electrode, Target Left Subthalamic Nucleus With Microelectrode Recording - Wound Class: I-Clean    Diagnosis: Parkinson's Disease  Diagnosis Additional Information: No value filed.    Anesthesia Type:   MAC     Note:  Airway :Face Mask  Patient transferred to:PACU  Comments: Pt alert, breathing spontaneously on 2L O2 via NC. VSS. Report shared with RN.Handoff Report: Identifed the Patient, Identified the Reponsible Provider, Reviewed the pertinent medical history, Discussed the surgical course, Reviewed Intra-OP anesthesia mangement and issues during anesthesia, Set expectations for post-procedure period and Allowed opportunity for questions and acknowledgement of understanding      Vitals: (Last set prior to Anesthesia Care Transfer)    CRNA VITALS  12/14/2017 1253 - 12/14/2017 1325      12/14/2017             Pulse: 60    Ht Rate: 59    Temp 2: (!)  32.3  C (90.1  F)    SpO2: 99 %                Electronically Signed By: АННА Parish CRNA  December 14, 2017  1:25 PM

## 2017-12-14 NOTE — PLAN OF CARE
Problem: Patient Care Overview  Goal: Plan of Care/Patient Progress Review  D/A/I. Pt admitted to ICU s/p DBS insertion. Neuro's intact except for right arm tremor, slight tremor in left hand and right foot. Tolerating clear liquids. Denies pain. Satting 99% on 2L NC. Vitals stable.   P. Hourly neuro's overnight, plan to d/c tomorrow.

## 2017-12-14 NOTE — ANESTHESIA POSTPROCEDURE EVALUATION
Patient: Kwame Lin    Procedure(s):  Stealth Assisted Left Deep Brain Stimulator Placement, Phase I, Placement Of Left Side Deep Brain Stimulator Electrode, Target Left Subthalamic Nucleus With Microelectrode Recording - Wound Class: I-Clean    Diagnosis:Parkinson's Disease  Diagnosis Additional Information: No value filed.    Anesthesia Type:  MAC    Note:  Anesthesia Post Evaluation    Patient location during evaluation: PACU  Patient participation: Able to fully participate in evaluation  Level of consciousness: awake  Pain management: adequate  Airway patency: patent  Cardiovascular status: acceptable  Respiratory status: acceptable  Hydration status: acceptable  PONV: none     Anesthetic complications: None          Last vitals:  Vitals:    12/14/17 1330 12/14/17 1345 12/14/17 1400   BP:      Pulse:      Resp: 16 14 20   Temp: 36.6  C (97.8  F)     SpO2:            Electronically Signed By: Adrian Ochoa MD  December 14, 2017  2:24 PM

## 2017-12-14 NOTE — OR NURSING
Patient spouse sent back patient home medications to PACU. Ordered for floor. Labeled and sent patient medication bag to 4A Room 11. Patient transported with DORINDA Rodriguez ICU Float.

## 2017-12-14 NOTE — BRIEF OP NOTE
[facility], [place]    Brief Operative Note    Pre-operative diagnosis: Parkinson's Disease  Post-operative diagnosis Parkinson's Disease  Procedure: Procedure(s):  Stealth Assisted Left Deep Brain Stimulator Placement, Phase I, Placement Of Left Side Deep Brain Stimulator Electrode, Target Left Subthalamic Nucleus With Microelectrode Recording - Wound Class: I-Clean  Surgeon: Surgeon(s) and Role:     * Arpan Huynh MD - Primary     * Darrian Hopkins MD - Resident - Assisting     * Clarita Borges MD - Assisting  Anesthesia: Combined MAC with Local   Estimated blood loss: 20cc  Drains: None  Specimens: None  Findings:   None.  Complications: None.  Implants:   1. Vobi 45cm Lead Kit Model# [id number] S/N [id number]  2. Vobi SureTek Model# DB-4600-C Lot# [id number]

## 2017-12-15 ENCOUNTER — APPOINTMENT (OUTPATIENT)
Dept: CT IMAGING | Facility: CLINIC | Age: 62
DRG: 027 | End: 2017-12-15
Attending: STUDENT IN AN ORGANIZED HEALTH CARE EDUCATION/TRAINING PROGRAM
Payer: COMMERCIAL

## 2017-12-15 VITALS
DIASTOLIC BLOOD PRESSURE: 102 MMHG | OXYGEN SATURATION: 98 % | BODY MASS INDEX: 27.9 KG/M2 | TEMPERATURE: 97.4 F | RESPIRATION RATE: 16 BRPM | HEART RATE: 88 BPM | SYSTOLIC BLOOD PRESSURE: 129 MMHG | WEIGHT: 188.93 LBS

## 2017-12-15 LAB
ANION GAP SERPL CALCULATED.3IONS-SCNC: 7 MMOL/L (ref 3–14)
BUN SERPL-MCNC: 20 MG/DL (ref 7–30)
CALCIUM SERPL-MCNC: 8.4 MG/DL (ref 8.5–10.1)
CHLORIDE SERPL-SCNC: 107 MMOL/L (ref 94–109)
CO2 SERPL-SCNC: 26 MMOL/L (ref 20–32)
CREAT SERPL-MCNC: 0.76 MG/DL (ref 0.66–1.25)
ERYTHROCYTE [DISTWIDTH] IN BLOOD BY AUTOMATED COUNT: 14 % (ref 10–15)
GFR SERPL CREATININE-BSD FRML MDRD: >90 ML/MIN/1.7M2
GLUCOSE SERPL-MCNC: 101 MG/DL (ref 70–99)
HCT VFR BLD AUTO: 35.2 % (ref 40–53)
HGB BLD-MCNC: 11.6 G/DL (ref 13.3–17.7)
MCH RBC QN AUTO: 30.7 PG (ref 26.5–33)
MCHC RBC AUTO-ENTMCNC: 33 G/DL (ref 31.5–36.5)
MCV RBC AUTO: 93 FL (ref 78–100)
PLATELET # BLD AUTO: 161 10E9/L (ref 150–450)
POTASSIUM SERPL-SCNC: 3.8 MMOL/L (ref 3.4–5.3)
RBC # BLD AUTO: 3.78 10E12/L (ref 4.4–5.9)
SODIUM SERPL-SCNC: 140 MMOL/L (ref 133–144)
WBC # BLD AUTO: 8.2 10E9/L (ref 4–11)

## 2017-12-15 PROCEDURE — 25000132 ZZH RX MED GY IP 250 OP 250 PS 637: Performed by: STUDENT IN AN ORGANIZED HEALTH CARE EDUCATION/TRAINING PROGRAM

## 2017-12-15 PROCEDURE — 25000132 ZZH RX MED GY IP 250 OP 250 PS 637: Performed by: NURSE PRACTITIONER

## 2017-12-15 PROCEDURE — 80048 BASIC METABOLIC PNL TOTAL CA: CPT | Performed by: STUDENT IN AN ORGANIZED HEALTH CARE EDUCATION/TRAINING PROGRAM

## 2017-12-15 PROCEDURE — 25000132 ZZH RX MED GY IP 250 OP 250 PS 637: Performed by: NEUROLOGICAL SURGERY

## 2017-12-15 PROCEDURE — 70450 CT HEAD/BRAIN W/O DYE: CPT

## 2017-12-15 PROCEDURE — 25000125 ZZHC RX 250: Performed by: STUDENT IN AN ORGANIZED HEALTH CARE EDUCATION/TRAINING PROGRAM

## 2017-12-15 PROCEDURE — 85027 COMPLETE CBC AUTOMATED: CPT | Performed by: STUDENT IN AN ORGANIZED HEALTH CARE EDUCATION/TRAINING PROGRAM

## 2017-12-15 RX ORDER — AMOXICILLIN 250 MG
2 CAPSULE ORAL 2 TIMES DAILY
Qty: 100 TABLET | Refills: 0 | Status: SHIPPED | OUTPATIENT
Start: 2017-12-15 | End: 2018-01-05

## 2017-12-15 RX ORDER — OXYCODONE HYDROCHLORIDE 5 MG/1
5-10 TABLET ORAL
Qty: 60 TABLET | Refills: 0 | Status: SHIPPED | OUTPATIENT
Start: 2017-12-15 | End: 2017-12-15

## 2017-12-15 RX ORDER — OXYCODONE HYDROCHLORIDE 5 MG/1
5 TABLET ORAL
Qty: 30 TABLET | Refills: 0 | Status: SHIPPED | OUTPATIENT
Start: 2017-12-15 | End: 2018-01-05

## 2017-12-15 RX ADMIN — MULTIPLE VITAMINS W/ MINERALS TAB 1 TABLET: TAB at 07:37

## 2017-12-15 RX ADMIN — CARBIDOPA AND LEVODOPA 1 HALF-TAB: 25; 100 TABLET ORAL at 05:47

## 2017-12-15 RX ADMIN — CLINDAMYCIN HYDROCHLORIDE 450 MG: 150 CAPSULE ORAL at 12:07

## 2017-12-15 RX ADMIN — POLYETHYLENE GLYCOL 3350 17 G: 17 POWDER, FOR SOLUTION ORAL at 07:37

## 2017-12-15 RX ADMIN — CITALOPRAM HYDROBROMIDE 10 MG: 10 TABLET ORAL at 07:37

## 2017-12-15 RX ADMIN — THYROID, PORCINE 60 MG: 60 TABLET ORAL at 07:38

## 2017-12-15 RX ADMIN — CLINDAMYCIN PHOSPHATE 900 MG: 18 INJECTION, SOLUTION INTRAVENOUS at 01:16

## 2017-12-15 RX ADMIN — MAGNESIUM OXIDE TAB 400 MG (241.3 MG ELEMENTAL MG) 400 MG: 400 (241.3 MG) TAB at 07:36

## 2017-12-15 RX ADMIN — CARBIDOPA AND LEVODOPA 1 TABLET: 25; 100 TABLET ORAL at 05:47

## 2017-12-15 RX ADMIN — CARBIDOPA AND LEVODOPA 1 TABLET: 25; 100 TABLET ORAL at 11:14

## 2017-12-15 RX ADMIN — SENNOSIDES AND DOCUSATE SODIUM 2 TABLET: 8.6; 5 TABLET ORAL at 07:36

## 2017-12-15 RX ADMIN — CARBIDOPA AND LEVODOPA 1 HALF-TAB: 25; 100 TABLET ORAL at 11:14

## 2017-12-15 RX ADMIN — Medication 300 MG: at 07:37

## 2017-12-15 RX ADMIN — ACETAMINOPHEN 975 MG: 325 TABLET, FILM COATED ORAL at 11:19

## 2017-12-15 RX ADMIN — ACETAMINOPHEN 975 MG: 325 TABLET, FILM COATED ORAL at 01:16

## 2017-12-15 ASSESSMENT — VISUAL ACUITY
OU: NORMAL ACUITY

## 2017-12-15 ASSESSMENT — ACTIVITIES OF DAILY LIVING (ADL)
TOILETING: 0-->INDEPENDENT
BATHING: 0-->INDEPENDENT
COGNITION: 0 - NO COGNITION ISSUES REPORTED
TRANSFERRING: 0-->INDEPENDENT
SWALLOWING: 0-->SWALLOWS FOODS/LIQUIDS WITHOUT DIFFICULTY
AMBULATION: 0-->INDEPENDENT
RETIRED_COMMUNICATION: 0-->UNDERSTANDS/COMMUNICATES WITHOUT DIFFICULTY
DRESS: 0-->INDEPENDENT
FALL_HISTORY_WITHIN_LAST_SIX_MONTHS: NO
RETIRED_EATING: 0-->INDEPENDENT

## 2017-12-15 NOTE — DISCHARGE SUMMARY
Writer reviewed AVS, discharge medications, when to call the doctor, follow up appointments, and new/changed medications with pt and family. Last dose of clindamycin given prior to d/c. Pt discharged safely to lobby via wheelchair with 4A RN.

## 2017-12-15 NOTE — PLAN OF CARE
Problem: Patient Care Overview  Goal: Plan of Care/Patient Progress Review  Outcome: Improving  Mr. Lni is A&O x 4; moving all his extremities equally, tremors noted in BUE, following commands and answering questions appropriately.  He reports no pain but Tylenol given as scheduled.  SR on monitor 60s-70s with no ectopy.  BP stable.  On room air.  Lungs clear to auscultation.  Tolerating regular diet, eating and drinking without difficulty.  Bowel sounds active.  Fluids capped.  Voiding without difficulty, no issues noted.  Ambulating in room and around unit x 1 early this morning.  Patient states his gait feels the same as before surgery if not better.  Incision to left side of head is approximated and intact.  Primapore to bilateral forehead is intact with slight strike-through drainage noted.  Plan for discharge today.

## 2017-12-15 NOTE — DISCHARGE SUMMARY
[facility] Discharge Summary and Instructions    Kwame Lin MRN# [id number]   Age: 62 year old YOB: 1955     Date of Admission:  12/14/2017  Date of Discharge::  12/15/2017  Admitting Physician:  Arpan Huynh MD  Discharge Physician:  Arpan Huynh MD          Admission Diagnoses:   Parkinson's Disease  Parkinson disease (H)          Discharge Diagnosis:   Parkinson's Disease  Parkinson disease (H)          Procedures:   12/14/2017 - Placement of Left Deep Brain Stimulator - Phase 1 - Generator Placement in the STN ([facility])           Brief History of Illness:   Mr. Lin is a very pleasant 62 year old male whose past medical history is significant for Hypothyroidism, Anxiety and Depression and Parkinson's Disease, which was initially diagnosed in 2009. He was seen in the Neurosurgery Clinic by Dr. Arpan Mosley on 9/28/2017 for Consultation for DBS Placement. The patient's primary symptoms involve a tremor in the Right Hand, however, he has begun to observe tremors which involve his left side. He is currently on Carbidopa-Levodopa. In the past, he has tried chiropractic adjustments, acupuncture, acu-polarity, Qi Qong, and Meditation. The patient's tremor has progressed to the point in which it is interfering with his quality of life.  The patient was discussed at the Movement Disorder Consensus Group and he was deemed an appropriate candidate to undergo placement of DBS. The patient was agreeable and consented to the procedure.            Hospital Course:   The patient was admitted to the hospital on 12/14/2017 and underwent the above named operation. There were no estrella-operative complications. Following his operation, the patient was transferred to the Neuroscience ICU for routine post-operative cares. On post-operative day #1, the patient was evaluated with a CT Head for routine surveillance. The imaging demonstrated post-operative changes, a small amount  of left frontal pneumocephalus, and was negative for intracranial hemorrhage. The patient received 3 doses of estrella-operative antibiotics, per protocol, for infection prophylaxis. On post-operative day #1, the patient had met all discharge goals and was deemed medically and neurologically stable for discharge home. He has been instructed to remain off of Omega 3 Fatty Acid supplementation until after his Phase II operation.     Examination:   BP (!) 129/102  Pulse 88  Temp 97.4  F (36.3  C) (Axillary)  Resp 16  Wt 85.7 kg (188 lb 15 oz)  SpO2 98%  BMI 27.9 kg/m2  General: Sitting up in bed; In No Apparent Distress; Appears Well-Nourished and Well-Groomed  Mental Status: Alert; Oriented to Person, Place, Time and Situation  Cranial Nerves: CN II-XII Grossly Intact Bilaterally  Motor: Bilateral Upper Extremity Tremor (R>L); 5/5 in BUE/BLE  Sensory: Intact to LT in BUE/BLE  Incision: Bifrontal Pin Sites are Clean, Dry and Open to Air; Left Cranial Incision is Clean, Dry and Open to Air with Glue Intact          Discharge Medications:     Current Discharge Medication List      START taking these medications    Details   oxyCODONE IR (ROXICODONE) 5 MG tablet Take 1-2 tablets (5-10 mg) by mouth every 3 hours as needed for moderate to severe pain  Qty: 60 tablet, Refills: 0    Comments: Indication: Moderate to Severe Post-Operative Pain  Maximum Dosage 8 Tablets in 24 Hours  Associated Diagnoses: Parkinsonian tremor (H)      senna-docusate (SENOKOT-S;PERICOLACE) 8.6-50 MG per tablet Take 2 tablets by mouth 2 times daily  Qty: 100 tablet, Refills: 0    Comments: Indication: Prevention of Constipation with Concurrent use of Opioid Analgesics  Associated Diagnoses: Parkinsonian tremor (H)         CONTINUE these medications which have NOT CHANGED    Details   acetaminophen (TYLENOL) 325 MG tablet Take 325-650 mg by mouth nightly as needed for mild pain      carbidopa-levodopa (SINEMET)  MG per tablet 1.5 every 4  hours 3 x daily aroudnd 6:30 am, 11:00 am, & 4:00 pm.  Qty: 90 tablet      thyroid (ARMOUR THYROID) 60 MG tablet Take 60 mg by mouth every morning      citalopram (CELEXA) 10 MG tablet Take 10 mg by mouth every morning       Probiotic Product (ACIDOPHILUS/GOAT MILK) CAPS At Bedtime       Coenzyme Q10 300 MG CAPS Take 300 mg by mouth every morning      multivitamin, therapeutic with minerals (MULTI-VITAMIN) TABS tablet Take 1 tablet by mouth every morning      diclofenac (VOLTAREN) 1 % GEL topical gel Apply qid prn pain      carbidopa-levodopa (SINEMET CR)  MG per CR tablet Take 2 tablets by mouth At Bedtime      clonazePAM (KLONOPIN) 0.5 MG tablet Take 0.5 mg by mouth At Bedtime       Magnesium Citrate 100 MG TABS Take 400 mg by mouth every morning       Misc Natural Products (RED WINE EXTRACT) CAPS Take by mouth daily (with lunch)          STOP taking these medications       Omega-3 Fatty Acids (FISH OIL PO) Comments:   Reason for Stopping:                       Discharge Instructions and Follow-Up:   Discharge diet: Regular   Discharge activity: You may advance activity as tolerated. No strenuous exercise or heay lifting greater than 10 lbs for 2 weeks or until seen and cleared in clinic.     Discharge follow-up: Please return to [facility] on 12/22/2017 for Phase II of DBS Placement with Dr. Arpan Huynh.      Wound care: Ok to shower,however no scrubbing of the wound and no soaking of the wound, meaning no bathtubs or swimming pools. Pat dry only. Leave wound open to air.       Please call if you have:  1. increased pain, redness, drainage, swelling at your incision  2. fevers > 101.5 F degrees  3. with any questions or concerns.  You may reach the Neurosurgery clinic at [phone] during regular work hours. ER at [phone].    and ask for the Neurosurgery Resident on call at [phone], during off hours or weekends.         Discharge Disposition:   Discharged to  home        Anna Ocampo, ACNP-BC, CCRN, CNRN  Department of Neurosurgery  Pager: [phone]

## 2017-12-15 NOTE — PLAN OF CARE
Problem: Craniotomy/Craniectomy/Cranioplasty (Adult)  Goal: Signs and Symptoms of Listed Potential Problems Will be Absent, Minimized or Managed (Craniotomy/Craniectomy/Cranioplasty)  Signs and symptoms of listed potential problems will be absent, minimized or managed by discharge/transition of care (reference Craniotomy/Craniectomy/Cranioplasty (Adult) CPG).   Outcome: Improving  Mr. Lin is oriented x 4; neurologically intact except for tremors in his BUE.  Incision to head is approximated and intact, open to air.

## 2017-12-17 NOTE — OP NOTE
"PATIENT NAME: KWAME MCHUGH  YOB: 1955  MRN:   [id number]  ACCOUNT:  [id number]      DATE OF PROCEDURE:  12/14/2017    PREOPERATIVE DIAGNOSIS:   1. Parkinson's disease    POSTOPERATIVE DIAGNOSIS:   1. Parkinson's disease    OPERATIVE PROCEDURE:   1. Placement of CRW stereotactic head frame.   2. Stereotactic neuronavigation planning CT of the head.   3. Stereotactic neuronavigation using EnteGreat system for surgical planning, targeting and approach: target is the left subthalamic nucleus (STN).   4. Left side deep brain stimulator placement, Phase I, Placement of left side deep brain stimulator electrode, target left subthalamic nucleus (STN).   5. Use of intraoperative microelectrode recording.   6. Use of intraoperative fluoroscopy.     ATTENDING SURGEON:  Arpan Huynh MD, PhD.    :  Darrian Hopkins MD.    ANESTHESIA:  Monitored anesthesia care and local anesthetic.     ESTIMATED BLOOD LOSS: 5 mL.     COMPLICATIONS: None.    FINDINGS:  Final electrode location, after X-Y stage shift of 0.5 mm posterior and 1.5 mm medial, posterior Ryan Gun position, 0.5 mm above target.    IMPLANTS:   1.  Cyvera 45cm Lead Kit Model# [id number] S/N [id number]  2.  Cyvera SureTek Model# DB-4600-C Lot# [id number]    INDICATIONS FOR PROCEDURE:  Mr. Kwame Mchugh is a 62 year old right handed male with a history of Parkinson's Disease that was diagnosed around 2009.  He has followed by Dr. Caldwell at the [facility]. Records indicate and patient confirms that his symptoms began back in 2007 when he was flying back from [place].  He had nausea and felt sick, and he was told that it was \"aerotoxic syndrome\".  His symptoms reportedly resolved and then returned about one year later when he and his wife went on a strict diet at which time he lost 25 lbs in six weeks.  During this weight loss period, his tremors came back.  His main symptom is right-sided tremors, that is now starting " "to progress to his left side. He takes sinemet.  He has tried various treatments, including chiropractic treatments, acupuncture, acupolarity, Qi Gong, meditation and various tests.  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  His grandchildren do not want to hold his hand because of his tremor.  His goal for DBS surgery is to control his tremor and \"have improvement in his quality of life\".     The plan was left sided DBS, target left subthalamic nucleus, for controlling his right side symptoms, with the generator being placed over the left chest wall. We discussed both phase I and II of DBS surgery. We discussed that during Phase I, we would place the DBS electrode on the left side under MAC and microelectrode recording. He would then return one week later for phase II with placement of the DBS generator/battery over the left chest wall under general anesthesia. Each surgical step was discussed in detail and questions were answered. Risks, benefits, alternative therapies were discussed with the patient, including but not limited to infection and bleeding. Surgical procedure was discussed in detail. All questions were answered, and he expressed understanding. He elected to go forward with the proposed surgery.    DESCRIPTION OF PROCEDURE:     CRW head frame placement and stereotactic neuronavigation planning.     The patient was brought to the operating room and placed in a supine position. A brief timeout was performed for the placement of a CRW head frame. He was given sedation to make him comfortable, and the intended pin sites, 2 in the front and 2 in the back of the head, were cleaned and injected with 24 mL of 2% lidocaine with 1:100,000 epinephrine. The CRW stereotactic head frame was placed onto his head with the 4 pin sites for fixation. Care was taken to ensure that the fiducial box fit over the patient's head without any interference. The patient tolerated the " procedure well and sedation was lightened.     After the CRW stereotactic head frame was placed, the patient was taken directly to the CT scanner, at which time CT of the head, stereotactic protocol, was obtained. Subsequently, the patient was taken back up to the operating room, where the patient was placed supine on the operative bed with the CRW head frame affixed to the bed as well. Patient was in a slight sitting up position with the neck in a neutral position. The bed was adjusted to a beach chair position (head up, legs down, reverse trendelenburg). Patient s comfort was confirmed. All pressure points were well padded. Care was taken to make sure that the neck was in neutral position and that the McIntire head ji device had room for manipulation in case more flexion or extension is needed throughout the case.    At this time, attention was turned to the neuronavigation imaging that was obtained. The Stealth neuronavigation device was loaded with the CT head that was just obtained. The device also had an MRI of the head, stereotactic protocol, that was obtained prior to the surgery. His 7T MRI of the brain was also available as a reference. The CT head was merged with the previously obtained MRI of the brain. The merge demonstrated good coherence/registration. Then, using this merged image, neuronavigation was used to stereotactically target the left subthalamic nucleus (STN). The technique used was AC-PC line localization technique to target the STN using the stereotactic coordinates, and the XYZ as well as a ring and arc angles for the CRW head frame were obtained. The entry into the skull, as well as the trajectory of the electrode were checked with the probe's eye view to avoid any sulci, ventricle or vascular structures. The 7T MRI was co-registered and the target was visualized. The target of the STN was adjusted so that the posterior lateral ventral aspect of the STN would be the ultimate  target.    The stereotactic coordinates were then transferred to the [facility] stereotactic targeting apparatus as well as the phantom base. The coordinates were confirmed and double checked for accuracy. The accuracy was also confirmed using the phantom base. The coordinates were X = -13.3, Y = -8.4, Z = +9.5, ring angle = 68.6 degrees anterior and arc angle = 18.3 degrees to the left.     At this time, attention was turned to the patient. Using a hair clipper, his hair over the left frontal area was clipped using a surgical clipper. A semicircular incision was planned on the left side and this was marked. Then, the surgical area was prepped and draped in routine surgical fashion. Patient was also made comfortable.    Time out was then performed confirming the patient's identity, procedure to be performed, side and site of the surgery, imaging corresponding with the patient and the administration of preoperative prophylactic antibiotic.    Left-sided electrode placement with microelectrode recording: target left STN    The [facility] targeting apparatus was attached to the head frame on top of the sterile drapes and the entry point was marked on the scalp on the left side. The C-shaped incision and the area posterior designated for the pocket were injected with 18 mL of 2% Lidocaine with 1:100,000 epinephrine. A #10 blade was used to incise the incision down to the pericranium. Hemostasis was obtained using the monopolar and bipolar cautery. A thin layer of pericranium tissue was left behind on the skull and the skin flap was reflected posteriorly. Using blunt dissection, a pocket was created posteriorly and towards the left parietal area for the tail of the electrode/connector placement and for future tunneling. Hemostasis was obtained. The targeting apparatus was again positioned and the entry point into the skull was marked. The pericranial tissue was dissected using monopolar cautery. An [employer] drill was used to make a bur  hole at the entry point as well as using a Kerrison punch to widen the opening. The bony edges were waxed and the underlying dura was coagulated with the bipolar cautery. The electrode fixation cover was affixed to the skull using two new screws. Care was taken to overlap the pericranium over the edge of the cover to provide a smooth tissue transition. The electrode  was attached to the targeting apparatus and we verified the entry into the dura. It appeared that all positions of the Ryan Danny electrode ji were accessible without any interference from the bone edges. Using a Bovie cautery and a #4 Penfield instrument, an opening was made in the dura as well as a small corticectomy. We verified there was no active bleeding at this point.     Electrode guide tubes were inserted in the center and posterior Ryan Danny positions and were inserted into the brain and advanced slowly until they were well above the target. No abnormal resistance was noted. DuraSeal was used to seal the entry point to the dura and minimize cerebrospinal fluid leak and air entry. The recording microelectrodes were then placed into the guide tubes and connected for intraoperative recording. The tip of the electrodes were now 15 mm above the target. The patient was awake at this time for serial neurologic exams as we advanced the electrode slowly towards the target. The microdrive was used to advance the electrodes slowly and allow for microelectrode recording data to be collected for mapping of the tract. Thalamic activity was identifiable as well as entry into and exit out of the STN. We noted the entry into and exit out of STN. Microelectrode recording did not demonstrate passage through the internal capsule in either position.  Microstimulation yielded no paresthesias and no internal capsular effects. Two additional electrode guide tubes were inserted into the anterior and lateral Yran Danny positions to define the edge of the STN. A  recording microelectrode was placed and advanced using the microdrive. The internal capsule was encountered and microstimulation produced paresthesias.    In order to obtain stronger STN activity, the stage was moved posteriorly 0.5mm and medially 1.5mm. An electrode guide tube was inserted into the center Ryan Danny position.  DuraSeal was used to seal the entry point to the dura and minimize cerebrospinal fluid leak and air entry.  The recording microelectrode was then placed into the guide tubes and connected for intraoperative recording.  Thalamic activity was identifiable as well as entry into and exit out of the STN. A stronger STN recording was noted with this pass. The height was 0.5mm above target. The recording microelectrode was removed and the permanent DBS electrode was inserted. The electrode was tested and there was benefit, however not satisfactory.    Another electrode guide tube was inserted into the posterior Ryan Danny position.  The permanent DBS electrode was removed from the center Ryan Danny position and placed in the posterior Ryan Danny position. The permanent DBS electrode was driven to the same depth as in the central position and the electrode was tested. Again there was benefit, however not satisfactory.  The permanent DBS electrode as well as the guide tubes were removed.    The stage was moved 1.5mm posterior. A guide tube was inserted into the central Ryan Danny position.  DuraSeal was used to seal the entry point to the dura and minimize cerebrospinal fluid leak and air entry. The permanent DBS electrode was then inserted into the guide tube and driven to 0.5mm above the target, as before, and the electrode was tested.  There was satisfactory benefit in this position.  No paresthesia and no internal capsular effects were observed up to 4 Volts at various settings.     With the satisfactory testing of the electrode position and the stimulation parameters, the electrode was secured at this  location. The patient was again made comfortable. The guide tube was removed. Duraseal was again used to seal any opening. The area was generously irrigated. Fluoroscopy was brought into the field to test that the electrode did not move with each manipulation. The C-clamp was applied to hold the electrode. Then, the electrode stylet was removed. The electrode was then removed from the electrode ji. The cap cover was finally placed and secured in place. Fluoroscopy confirmed that the tip of the electrode did not change in position with each manipulation.    The connecting portion of the electrode was covered with a protective covering and a dead-end connector, and this apparatus was inserted into the subgaleal space/pocket towards the left parietal area that was created at the beginning of the case. The excess wire was also buried posteriorly in the previously created pocket. Fluoroscopy confirmed that the tip did not move. The wound was then generously irrigated.    We began closing the wound. The galeal layer was reapproximated using 3-0 Vicryl sutures and the skin was reapproximated using 4-0 Monocryl in a running fashion. The wound was cleaned and dried and ChloraPrep was applied. Dermabond was placed over the closure.     Removal of the head frame and end of procedure.    The stereotactic targeting apparatus was removed. The sterile drapes were then removed and the patient was taken out of the CRW head frame. The 4 pin sites were clean and dry with no active bleeding noted. Antibiotic ointment was placed over the pin sites. The patient was then further awakened and taken to the recovery room in stable condition.     The sponge, needle and instrument counts were correct x2 at the end of the procedure. There were no complications.    Dr. Huynh was present and scrubbed throughout the duration of the procedure.      SASKIA HUYNH MD      As prepared by HARRIETT HONG MD      NEUROSURGERY ATTENDING ATTESTATION: I,  Arpan Huynh M.D., Ph.D., Neurosurgery Attending, was present and scrubbed for the entire case and performed the key and critical portions of the case.

## 2017-12-18 ENCOUNTER — CARE COORDINATION (OUTPATIENT)
Dept: NEUROSURGERY | Facility: CLINIC | Age: 62
End: 2017-12-18

## 2017-12-18 NOTE — PROGRESS NOTES
Neurosurgery Discharge Coordination Note     Attending physician: Dr. Huynh  Surgery performed: DBS lead placement - Left  Date of Discharge: 12/15.2017  Discharge to: Home     Current status: Patient states he  feels very good. He is tired and is making sure he rests. Denies pain. Denies redness, swelling, increased tenderness, drainage, incision opening or elevated temp. Reports Incision CDI without signs of infection.  Denies current bowel or bladder issues.    Discharge instructions and medications reviewed with patient.  Follow up appointments/imaging/tests needed: DBS battery placement on 12/22/17 at 2:10 p.m.     RN triage/on call number given: [phone]/ [phone]

## 2017-12-21 ENCOUNTER — TELEPHONE (OUTPATIENT)
Dept: NEUROLOGY | Facility: CLINIC | Age: 62
End: 2017-12-21

## 2017-12-21 DIAGNOSIS — G20.A1 PARALYSIS AGITANS (H): Primary | ICD-10-CM

## 2017-12-21 NOTE — TELEPHONE ENCOUNTER
Patient coming for initial programming on 1/12 at 9:00, CT scheduled for 8:20 check in at 8:05.    Left voice mail with complete instructions on the two appointments and also that I would be sending a letter with the details as well. Asked him to call back with any conflicts.

## 2017-12-22 ENCOUNTER — ANESTHESIA EVENT (OUTPATIENT)
Dept: SURGERY | Facility: CLINIC | Age: 62
End: 2017-12-22
Payer: COMMERCIAL

## 2017-12-22 ENCOUNTER — ANESTHESIA (OUTPATIENT)
Dept: SURGERY | Facility: CLINIC | Age: 62
End: 2017-12-22
Payer: COMMERCIAL

## 2017-12-22 ENCOUNTER — HOSPITAL ENCOUNTER (OUTPATIENT)
Facility: CLINIC | Age: 62
Discharge: HOME | End: 2017-12-22
Attending: NEUROLOGICAL SURGERY | Admitting: NEUROLOGICAL SURGERY
Payer: COMMERCIAL

## 2017-12-22 VITALS
OXYGEN SATURATION: 99 % | HEIGHT: 69 IN | SYSTOLIC BLOOD PRESSURE: 138 MMHG | BODY MASS INDEX: 28.57 KG/M2 | RESPIRATION RATE: 18 BRPM | WEIGHT: 192.9 LBS | HEART RATE: 97 BPM | DIASTOLIC BLOOD PRESSURE: 75 MMHG | TEMPERATURE: 98.4 F

## 2017-12-22 DIAGNOSIS — Z96.89 S/P DEEP BRAIN STIMULATOR PLACEMENT: Primary | ICD-10-CM

## 2017-12-22 LAB
ABO + RH BLD: NORMAL
ABO + RH BLD: NORMAL
BLD GP AB SCN SERPL QL: NORMAL
BLOOD BANK CMNT PATIENT-IMP: NORMAL
GLUCOSE BLDC GLUCOMTR-MCNC: 100 MG/DL (ref 70–99)
SPECIMEN EXP DATE BLD: NORMAL

## 2017-12-22 PROCEDURE — 71000027 ZZH RECOVERY PHASE 2 EACH 15 MINS: Performed by: NEUROLOGICAL SURGERY

## 2017-12-22 PROCEDURE — 71000014 ZZH RECOVERY PHASE 1 LEVEL 2 FIRST HR: Performed by: NEUROLOGICAL SURGERY

## 2017-12-22 PROCEDURE — 25000566 ZZH SEVOFLURANE, EA 15 MIN: Performed by: NEUROLOGICAL SURGERY

## 2017-12-22 PROCEDURE — 82962 GLUCOSE BLOOD TEST: CPT

## 2017-12-22 PROCEDURE — 25000125 ZZHC RX 250: Performed by: NEUROLOGICAL SURGERY

## 2017-12-22 PROCEDURE — 25000125 ZZHC RX 250: Performed by: NURSE ANESTHETIST, CERTIFIED REGISTERED

## 2017-12-22 PROCEDURE — 36000059 ZZH SURGERY LEVEL 3 EA 15 ADDTL MIN UMMC: Performed by: NEUROLOGICAL SURGERY

## 2017-12-22 PROCEDURE — 27210995 ZZH RX 272: Performed by: NEUROLOGICAL SURGERY

## 2017-12-22 PROCEDURE — 25000128 H RX IP 250 OP 636: Performed by: NURSE ANESTHETIST, CERTIFIED REGISTERED

## 2017-12-22 PROCEDURE — 86900 BLOOD TYPING SEROLOGIC ABO: CPT | Performed by: ANESTHESIOLOGY

## 2017-12-22 PROCEDURE — 40000170 ZZH STATISTIC PRE-PROCEDURE ASSESSMENT II: Performed by: NEUROLOGICAL SURGERY

## 2017-12-22 PROCEDURE — 25000132 ZZH RX MED GY IP 250 OP 250 PS 637: Performed by: ANESTHESIOLOGY

## 2017-12-22 PROCEDURE — 27211024 ZZHC OR SUPPLY OTHER OPNP: Performed by: NEUROLOGICAL SURGERY

## 2017-12-22 PROCEDURE — 37000009 ZZH ANESTHESIA TECHNICAL FEE, EACH ADDTL 15 MIN: Performed by: NEUROLOGICAL SURGERY

## 2017-12-22 PROCEDURE — 27810169 ZZH OR IMPLANT GENERAL: Performed by: NEUROLOGICAL SURGERY

## 2017-12-22 PROCEDURE — C9399 UNCLASSIFIED DRUGS OR BIOLOG: HCPCS | Performed by: NURSE ANESTHETIST, CERTIFIED REGISTERED

## 2017-12-22 PROCEDURE — 37000008 ZZH ANESTHESIA TECHNICAL FEE, 1ST 30 MIN: Performed by: NEUROLOGICAL SURGERY

## 2017-12-22 PROCEDURE — 25000128 H RX IP 250 OP 636: Performed by: NEUROLOGICAL SURGERY

## 2017-12-22 PROCEDURE — 36000057 ZZH SURGERY LEVEL 3 1ST 30 MIN - UMMC: Performed by: NEUROLOGICAL SURGERY

## 2017-12-22 PROCEDURE — 27210794 ZZH OR GENERAL SUPPLY STERILE: Performed by: NEUROLOGICAL SURGERY

## 2017-12-22 DEVICE — IMPLANTABLE DEVICE: Type: IMPLANTABLE DEVICE | Site: CHEST  WALL | Status: FUNCTIONAL

## 2017-12-22 RX ORDER — CLINDAMYCIN HCL 300 MG
300 CAPSULE ORAL 4 TIMES DAILY
Qty: 28 CAPSULE | Refills: 0 | Status: SHIPPED | OUTPATIENT
Start: 2017-12-22 | End: 2017-12-29

## 2017-12-22 RX ORDER — LIDOCAINE 40 MG/G
CREAM TOPICAL
Status: DISCONTINUED | OUTPATIENT
Start: 2017-12-22 | End: 2017-12-22 | Stop reason: HOSPADM

## 2017-12-22 RX ORDER — ACETAMINOPHEN 325 MG/1
975 TABLET ORAL ONCE
Status: COMPLETED | OUTPATIENT
Start: 2017-12-22 | End: 2017-12-22

## 2017-12-22 RX ORDER — PROPOFOL 10 MG/ML
INJECTION, EMULSION INTRAVENOUS PRN
Status: DISCONTINUED | OUTPATIENT
Start: 2017-12-22 | End: 2017-12-22

## 2017-12-22 RX ORDER — LIDOCAINE HYDROCHLORIDE 20 MG/ML
INJECTION, SOLUTION INFILTRATION; PERINEURAL PRN
Status: DISCONTINUED | OUTPATIENT
Start: 2017-12-22 | End: 2017-12-22

## 2017-12-22 RX ORDER — NALOXONE HYDROCHLORIDE 0.4 MG/ML
.1-.4 INJECTION, SOLUTION INTRAMUSCULAR; INTRAVENOUS; SUBCUTANEOUS
Status: DISCONTINUED | OUTPATIENT
Start: 2017-12-22 | End: 2017-12-22 | Stop reason: HOSPADM

## 2017-12-22 RX ORDER — CLINDAMYCIN PHOSPHATE 900 MG/50ML
900 INJECTION, SOLUTION INTRAVENOUS SEE ADMIN INSTRUCTIONS
Status: DISCONTINUED | OUTPATIENT
Start: 2017-12-22 | End: 2017-12-22 | Stop reason: HOSPADM

## 2017-12-22 RX ORDER — ONDANSETRON 4 MG/1
4 TABLET, ORALLY DISINTEGRATING ORAL EVERY 30 MIN PRN
Status: DISCONTINUED | OUTPATIENT
Start: 2017-12-22 | End: 2017-12-22 | Stop reason: HOSPADM

## 2017-12-22 RX ORDER — SODIUM CHLORIDE, SODIUM LACTATE, POTASSIUM CHLORIDE, CALCIUM CHLORIDE 600; 310; 30; 20 MG/100ML; MG/100ML; MG/100ML; MG/100ML
INJECTION, SOLUTION INTRAVENOUS CONTINUOUS
Status: DISCONTINUED | OUTPATIENT
Start: 2017-12-22 | End: 2017-12-22 | Stop reason: HOSPADM

## 2017-12-22 RX ORDER — ONDANSETRON 2 MG/ML
INJECTION INTRAMUSCULAR; INTRAVENOUS PRN
Status: DISCONTINUED | OUTPATIENT
Start: 2017-12-22 | End: 2017-12-22

## 2017-12-22 RX ORDER — DEXAMETHASONE SODIUM PHOSPHATE 4 MG/ML
INJECTION, SOLUTION INTRA-ARTICULAR; INTRALESIONAL; INTRAMUSCULAR; INTRAVENOUS; SOFT TISSUE PRN
Status: DISCONTINUED | OUTPATIENT
Start: 2017-12-22 | End: 2017-12-22

## 2017-12-22 RX ORDER — CLINDAMYCIN PHOSPHATE 900 MG/50ML
900 INJECTION, SOLUTION INTRAVENOUS
Status: COMPLETED | OUTPATIENT
Start: 2017-12-22 | End: 2017-12-22

## 2017-12-22 RX ORDER — FENTANYL CITRATE 50 UG/ML
INJECTION, SOLUTION INTRAMUSCULAR; INTRAVENOUS PRN
Status: DISCONTINUED | OUTPATIENT
Start: 2017-12-22 | End: 2017-12-22

## 2017-12-22 RX ORDER — FENTANYL CITRATE 50 UG/ML
25-50 INJECTION, SOLUTION INTRAMUSCULAR; INTRAVENOUS EVERY 5 MIN PRN
Status: DISCONTINUED | OUTPATIENT
Start: 2017-12-22 | End: 2017-12-22 | Stop reason: HOSPADM

## 2017-12-22 RX ORDER — EPHEDRINE SULFATE 50 MG/ML
INJECTION, SOLUTION INTRAMUSCULAR; INTRAVENOUS; SUBCUTANEOUS PRN
Status: DISCONTINUED | OUTPATIENT
Start: 2017-12-22 | End: 2017-12-22

## 2017-12-22 RX ORDER — SODIUM CHLORIDE 9 MG/ML
INJECTION, SOLUTION INTRAVENOUS CONTINUOUS PRN
Status: DISCONTINUED | OUTPATIENT
Start: 2017-12-22 | End: 2017-12-22

## 2017-12-22 RX ORDER — FENTANYL CITRATE 50 UG/ML
25-50 INJECTION, SOLUTION INTRAMUSCULAR; INTRAVENOUS
Status: DISCONTINUED | OUTPATIENT
Start: 2017-12-22 | End: 2017-12-22 | Stop reason: HOSPADM

## 2017-12-22 RX ORDER — ONDANSETRON 2 MG/ML
4 INJECTION INTRAMUSCULAR; INTRAVENOUS EVERY 30 MIN PRN
Status: DISCONTINUED | OUTPATIENT
Start: 2017-12-22 | End: 2017-12-22 | Stop reason: HOSPADM

## 2017-12-22 RX ORDER — CARBIDOPA AND LEVODOPA 25; 100 MG/1; MG/1
1.5 TABLET ORAL ONCE
Status: COMPLETED | OUTPATIENT
Start: 2017-12-22 | End: 2017-12-22

## 2017-12-22 RX ADMIN — CLINDAMYCIN PHOSPHATE 900 MG: 18 INJECTION, SOLUTION INTRAVENOUS at 15:10

## 2017-12-22 RX ADMIN — SUGAMMADEX 180 MG: 100 INJECTION, SOLUTION INTRAVENOUS at 16:31

## 2017-12-22 RX ADMIN — ROCURONIUM BROMIDE 50 MG: 10 INJECTION INTRAVENOUS at 14:58

## 2017-12-22 RX ADMIN — FENTANYL CITRATE 50 MCG: 50 INJECTION, SOLUTION INTRAMUSCULAR; INTRAVENOUS at 15:36

## 2017-12-22 RX ADMIN — FENTANYL CITRATE 100 MCG: 50 INJECTION, SOLUTION INTRAMUSCULAR; INTRAVENOUS at 14:58

## 2017-12-22 RX ADMIN — LIDOCAINE HYDROCHLORIDE 100 MG: 20 INJECTION, SOLUTION INFILTRATION; PERINEURAL at 14:58

## 2017-12-22 RX ADMIN — PROPOFOL 150 MG: 10 INJECTION, EMULSION INTRAVENOUS at 14:58

## 2017-12-22 RX ADMIN — CARBIDOPA AND LEVODOPA 1.5 TABLET: 25; 100 TABLET ORAL at 14:08

## 2017-12-22 RX ADMIN — DEXAMETHASONE SODIUM PHOSPHATE 6 MG: 4 INJECTION, SOLUTION INTRA-ARTICULAR; INTRALESIONAL; INTRAMUSCULAR; INTRAVENOUS; SOFT TISSUE at 15:23

## 2017-12-22 RX ADMIN — Medication 5 MG: at 15:59

## 2017-12-22 RX ADMIN — Medication 5 MG: at 16:02

## 2017-12-22 RX ADMIN — FENTANYL CITRATE 50 MCG: 50 INJECTION, SOLUTION INTRAMUSCULAR; INTRAVENOUS at 15:52

## 2017-12-22 RX ADMIN — Medication 5 MG: at 15:26

## 2017-12-22 RX ADMIN — ONDANSETRON 4 MG: 2 INJECTION INTRAMUSCULAR; INTRAVENOUS at 16:14

## 2017-12-22 RX ADMIN — ACETAMINOPHEN 975 MG: 325 TABLET, FILM COATED ORAL at 14:08

## 2017-12-22 RX ADMIN — SODIUM CHLORIDE: 9 INJECTION, SOLUTION INTRAVENOUS at 14:45

## 2017-12-22 RX ADMIN — Medication 5 MG: at 16:05

## 2017-12-22 ASSESSMENT — COPD QUESTIONNAIRES: COPD: 0

## 2017-12-22 ASSESSMENT — VISUAL ACUITY: OU: NORMAL ACUITY

## 2017-12-22 ASSESSMENT — LIFESTYLE VARIABLES: TOBACCO_USE: 0

## 2017-12-22 NOTE — IP AVS SNAPSHOT
MRN:[id number]                      After Visit Summary   12/22/2017    Kwame Lin    MRN: [id number]           Thank you!     Thank you for choosing [facility] for your care. Our goal is always to provide you with excellent care. Hearing back from our patients is one way we can continue to improve our services. Please take a few minutes to complete the written survey that you may receive in the mail after you visit with us. Thank you!        Patient Information     Date Of Birth          1955        About your hospital stay     You were admitted on:  December 22, 2017 You last received care in the:  Same Day Surgery [facility]    You were discharged on:  December 22, 2017        Reason for your hospital stay       Phase II Deep Brain Stimulatory Battery Placement                  Who to Call     For medical emergencies, please call 911.  For non-urgent questions about your medical care, please call your primary care provider or clinic, [phone]  For questions related to your surgery, please call your surgery clinic        Attending Provider     Provider Arpan Rosen MD Neurosurgery       Primary Care Provider Office Phone # Fax #    Silvina Armenta [phone]      After Care Instructions     Activity       Be careful and ask for assistance when walking or going up and down stairs. Avoid any activities that could result in trauma to the surgical wound. Do not drivewhile using narcotics or other sedating medications, such as sleep aids, muscle relaxants, etc.            Diet       No diet restrictions                  Follow-up Appointments     Adult [facility] Follow-up and recommended labs and tests       You underwent surgery place a  deep brain stimulator battery placement by Arpan Huynh MD, PhD      - Your sutures are absorbable.     - You will have follow up scheduled with the physician assistants and/or nurse practitioners in our  clinic 2 weeks after your surgery.  If you live far away, you may see your primary care doctor for a wound check at 2 weeks.     - If you have not heard from our clinic about your follow up visit by 3-4 days following your discharge, please call our clinic at [phone] to schedule an appointment with the Neurosurgery teams.     After discharge, your activity restrictions are:   -We encourage short frequent walks, increasing as tolerated.  - No driving until you are seen in clinic and cleared by your neurosurgeon.  If you have had a seizure, you may not drive for at least 3 months according to [place] law.    - No strenuous activity.  - No lifting more than 10 pounds until you are seen in clinic (a gallon of milk weighs approximately 8 pounds)    Wound care  - You are ok to shower, but do not soak your incisions. Pat them dry if they get damp.   - Avoid coloring your hair or permanent styling until cleared by your surgeon  - No baths, hot tubs or pools for 4-6 weeks after surgery.       Call if you have any of the followin. Temperature greater than 101.5 F.   2. Any redness, swelling or discharge from the wound.   3. Any new weakness, numbness or altered mental status.  4. Worsening pain that is not improving with the pain medications you were prescribed.     Call [phone] or after 5:00 pm or on weekends call [phone] and ask for the neurosurgery resident on call. Thank You.     Appointments on [facility] and/or [facility] (with [facility] or [facility] provider or service). Call [phone] if you haven't heard regarding these appointments within 7 days of discharge.                  Your next 10 appointments already scheduled     2018  1:00 PM CST   (Arrive by 12:45 PM)   Return Visit with Coty Topete PA-C   [facility] Neurosurgery ([facility])    [address] [address] [phone]            2018  8:20 AM CST   CT  HEAD W/O CONTRAST with UCCT2   [facility] CT ([facility] [place])    [address] [id number] [phone]           Please bring any scans or X-rays taken at other hospitals, if similar tests were done. Also bring a list of your medicines, including vitamins, minerals and over-the-counter drugs. It is safest to leave personal items at home.  Be sure to tell your doctor:   If you have any allergies.   If there s any chance you are pregnant.   If you are breastfeeding.   If you have any special needs.  You do not need to do anything special to prepare.  Please wear loose clothing, such as a sweat suit or jogging clothes. Avoid snaps, zippers and other metal. We may ask you to undress and put on a hospital gown.            Jan 12, 2018  9:00 AM CST   (Arrive by 8:45 AM)   Return Movement Disorder with Clarita Borges MD   [facility] Neurology ([facility])    [address] [place] [id number] [phone]              Further instructions from your care team       [facility] [place]  Same-Day Surgery   Adult Discharge Orders & Instructions     For 24 hours after surgery    1. Get plenty of rest.  A responsible adult must stay with you for at least 24 hours after you leave the hospital.   2. Do not drive or use heavy equipment.  If you have weakness or tingling, don't drive or use heavy equipment until this feeling goes away.  3. Do not drink alcohol.  4. Avoid strenuous or risky activities.  Ask for help when climbing stairs.   5. You may feel lightheaded.  IF so, sit for a few minutes before standing.  Have someone help you get up.   6. If you have nausea (feel sick to your stomach): Drink only clear liquids such as apple juice, ginger ale, broth or 7-Up.  Rest may also help.  Be sure to drink enough fluids.  Move to a regular diet as you feel able.  7. You may  "have a slight fever. Call the doctor if your fever is over 100.5  F (37.7 C) (taken under the tongue) or lasts longer than 24 hours.  8. You may have a dry mouth, a sore throat, muscle aches or trouble sleeping.  These should go away after 24 hours.  9. Do not make important or legal decisions.   Call your doctor for any of the followin.  Signs of infection (fever, growing tenderness at the surgery site, a large amount of drainage or bleeding, severe pain, foul-smelling drainage, redness, swelling).    2. It has been over 8 to 10 hours since surgery and you are still not able to urinate (pass water).    3.  Headache for over 24 hours.      To contact a doctor, call Dr. Huynh's office @ [phone]  or:        [phone] and ask for the resident on call for Neurosurgery  (answered 24 hours a day)      Emergency Department:    [facility]: [phone]       (TTY for hearing impaired: [phone])        Pending Results     No orders found from 2017 to 2017.            Admission Information     Date & Time Provider Department Dept. Phone    2017 Arpan Huynh MD Same Day Surgery [facility] [phone]      Your Vitals Were     Blood Pressure Temperature Respirations Height Weight Pulse Oximetry    114/70 98.3  F (36.8  C) (Oral) 14 1.753 m (5' 9\") 87.5 kg (192 lb 14.4 oz) 98%    BMI (Body Mass Index)                   28.49 kg/m2           [facility] Information     [employer] gives you secure access to your electronic health record. If you see a primary care provider, you can also send messages to your care team and make appointments. If you have questions, please call your primary care clinic.  If you do not have a primary care provider, please call [phone] and they will assist you.        Care EveryWhere ID     This is your Care EveryWhere ID. This could be used by other organizations to access your [facility] medical records  [id number]        Equal Access to " Services     [facility]: Hadii aad ku hadeugenemartha Sotsering, waaxda luqadaha, qaybta kaalmada adeeggreer, bebe dias . So [facility] [phone].    ATENCIÓN: Si habla faye, tiene a mcrae disposición servicios gratuitos de asistencia lingüística. Llame al [phone].    We comply with applicable federal civil rights laws and [place] laws. We do not discriminate on the basis of race, color, national origin, age, disability, sex, sexual orientation, or gender identity.               Review of your medicines      START taking        Dose / Directions    clindamycin 300 MG capsule   Commonly known as:  CLEOCIN   Used for:  S/P deep brain stimulator placement        Dose:  300 mg   Take 1 capsule (300 mg) by mouth 4 times daily for 7 days   Quantity:  28 capsule   Refills:  0         CONTINUE these medicines which have NOT CHANGED        Dose / Directions    Acidophilus/Goat Milk Caps        At Bedtime   Refills:  0       ARMOUR THYROID 60 MG tablet   Generic drug:  thyroid        Dose:  60 mg   Take 60 mg by mouth every morning   Refills:  0       * carbidopa-levodopa  MG per tablet   Commonly known as:  SINEMET        1.5 every 4 hours 3 x daily aroudnd 6:30 am, 11:00 am, & 4:00 pm.   Quantity:  90 tablet   Refills:  0       * carbidopa-levodopa  MG per CR tablet   Commonly known as:  SINEMET CR        Dose:  2 tablet   Take 2 tablets by mouth At Bedtime   Refills:  0       citalopram 10 MG tablet   Commonly known as:  celeXA        Dose:  10 mg   Take 10 mg by mouth every morning   Refills:  0       clonazePAM 0.5 MG tablet   Commonly known as:  klonoPIN        Dose:  0.5 mg   Take 0.5 mg by mouth At Bedtime   Refills:  0       Coenzyme Q10 300 MG Caps        Dose:  300 mg   Take 300 mg by mouth every morning   Refills:  0       diclofenac 1 % Gel topical gel   Commonly known as:  VOLTAREN        Apply qid prn pain   Refills:  0       Magnesium Citrate 100 MG Tabs        Dose:   400 mg   Take 400 mg by mouth every morning   Refills:  0       Multi-vitamin Tabs tablet        Dose:  1 tablet   Take 1 tablet by mouth every morning   Refills:  0       oxyCODONE IR 5 MG tablet   Commonly known as:  ROXICODONE   Used for:  Parkinsonian tremor (H)        Dose:  5 mg   Take 1 tablet (5 mg) by mouth every 3 hours as needed for moderate to severe pain   Quantity:  30 tablet   Refills:  0       Red Wine Extract Caps        Take by mouth daily (with lunch)   Refills:  0       senna-docusate 8.6-50 MG per tablet   Commonly known as:  SENOKOT-S;PERICOLACE   Used for:  Parkinsonian tremor (H)        Dose:  2 tablet   Take 2 tablets by mouth 2 times daily   Quantity:  100 tablet   Refills:  0       TYLENOL 325 MG tablet   Generic drug:  acetaminophen        Dose:  325-650 mg   Take 325-650 mg by mouth nightly as needed for mild pain   Refills:  0       * Notice:  This list has 2 medication(s) that are the same as other medications prescribed for you. Read the directions carefully, and ask your doctor or other care provider to review them with you.         Where to get your medicines      These medications were sent to [facility] [address] [address]     Phone:  [phone]     clindamycin 300 MG capsule               ANTIBIOTIC INSTRUCTION     You've Been Prescribed an Antibiotic - Now What?  Your healthcare team thinks that you or your loved one might have an infection. Some infections can be treated with antibiotics, which are powerful, life-saving drugs. Like all medications, antibiotics have side effects and should only be used when necessary. There are some important things you should know about your antibiotic treatment.      Your healthcare team may run tests before you start taking an antibiotic.    Your team may take samples (e.g., from your blood, urine or other areas) to run tests to look for bacteria. These test  can be important to determine if you need an antibiotic at all and, if you do, which antibiotic will work best.      Within a few days, your healthcare team might change or even stop your antibiotic.    Your team may start you on an antibiotic while they are working to find out what is making you sick.    Your team might change your antibiotic because test results show that a different antibiotic would be better to treat your infection.    In some cases, once your team has more information, they learn that you do not need an antibiotic at all. They may find out that you don't have an infection, or that the antibiotic you're taking won't work against your infection. For example, an infection caused by a virus can't be treated with antibiotics. Staying on an antibiotic when you don't need it is more likely to be harmful than helpful.      You may experience side effects from your antibiotic.    Like all medications, antibiotics have side effects. Some of these can be serious.    Let you healthcare team know if you have any known allergies when you are admitted to the hospital.    One significant side effect of nearly all antibiotics is the risk of severe and sometimes deadly diarrhea caused by Clostridium difficile (C. Difficile). This occurs when a person takes antibiotics because some good germs are destroyed. Antibiotic use allows C. diificile to take over, putting patients at high risk for this serious infection.    As a patient or caregiver, it is important to understand your or your loved one's antibiotic treatment. It is especially important for caregivers to speak up when patients can't speak for themselves. Here are some important questions to ask your healthcare team.    What infection is this antibiotic treating and how do you know I have that infection?    What side effects might occur from this antibiotic?    How long will I need to take this antibiotic?    Is it safe to take this antibiotic with other  medications or supplements (e.g., vitamins) that I am taking?     Are there any special directions I need to know about taking this antibiotic? For example, should I take it with food?    How will I be monitored to know whether my infection is responding to the antibiotic?    What tests may help to make sure the right antibiotic is prescribed for me?      Information provided by:  www.cdc.gov/getsmart  U.S. Department of Health and Human Services  Centers for disease Control and Prevention  National Center for Emerging and Zoonotic Infectious Diseases  Division of Healthcare Quality Promotion         Protect others around you: Learn how to safely use, store and throw away your medicines at www.disposemymeds.org.             Medication List: This is a list of all your medications and when to take them. Check marks below indicate your daily home schedule. Keep this list as a reference.      Medications           Morning Afternoon Evening Bedtime As Needed    Acidophilus/Goat Milk Caps   At Bedtime                                ARMOUR THYROID 60 MG tablet   Take 60 mg by mouth every morning   Generic drug:  thyroid                                * carbidopa-levodopa  MG per tablet   Commonly known as:  SINEMET   1.5 every 4 hours 3 x daily aroudnd 6:30 am, 11:00 am, & 4:00 pm.   Last time this was given:  1.5 tablets on 12/22/2017  2:08 PM                                * carbidopa-levodopa  MG per CR tablet   Commonly known as:  SINEMET CR   Take 2 tablets by mouth At Bedtime                                citalopram 10 MG tablet   Commonly known as:  celeXA   Take 10 mg by mouth every morning                                clindamycin 300 MG capsule   Commonly known as:  CLEOCIN   Take 1 capsule (300 mg) by mouth 4 times daily for 7 days                                clonazePAM 0.5 MG tablet   Commonly known as:  klonoPIN   Take 0.5 mg by mouth At Bedtime                                Coenzyme Q10 300  MG Caps   Take 300 mg by mouth every morning                                diclofenac 1 % Gel topical gel   Commonly known as:  VOLTAREN   Apply qid prn pain                                Magnesium Citrate 100 MG Tabs   Take 400 mg by mouth every morning                                Multi-vitamin Tabs tablet   Take 1 tablet by mouth every morning                                oxyCODONE IR 5 MG tablet   Commonly known as:  ROXICODONE   Take 1 tablet (5 mg) by mouth every 3 hours as needed for moderate to severe pain                                Red Wine Extract Caps   Take by mouth daily (with lunch)                                senna-docusate 8.6-50 MG per tablet   Commonly known as:  SENOKOT-S;PERICOLACE   Take 2 tablets by mouth 2 times daily                                TYLENOL 325 MG tablet   Take 325-650 mg by mouth nightly as needed for mild pain   Last time this was given:  975 mg on 12/22/2017  2:08 PM   Generic drug:  acetaminophen                                * Notice:  This list has 2 medication(s) that are the same as other medications prescribed for you. Read the directions carefully, and ask your doctor or other care provider to review them with you.

## 2017-12-22 NOTE — ANESTHESIA PREPROCEDURE EVALUATION
Kwame Lin is a 62 year old male with a PMH of  Parkinson's Disease who is scheduled for Procedure(s):  Deep Brain Stimulator Placement, Phase II, Placement Of Deep Brain Stimulator Generator/Battery Over The Left Chest Wall - Wound Class: I-Clean    NPO Status: Adequate.  > 6 hours solids, > 2 hours clear liquids.       Past Surgical History:   Procedure Laterality Date     ARTHROSCOPY KNEE Right     twice     ARTHROSCOPY KNEE Right     left     epidydmal mass removal, benign       OPTICAL TRACKING SYSTEM INSERTION DEEP BRAIN STIMULATION Left 12/14/2017    Procedure: OPTICAL TRACKING SYSTEM INSERTION DEEP BRAIN STIMULATION;  Stealth Assisted Left Deep Brain Stimulator Placement, Phase I, Placement Of Left Side Deep Brain Stimulator Electrode, Target Left Subthalamic Nucleus With Microelectrode Recording;  Surgeon: Arpan Huynh MD;  Location: [facility] OR       Anesthesia Evaluation     . Pt has had prior anesthetic. Type: General    History of anesthetic complications   - PONV        ROS/MED HX    ENT/Pulmonary:      (-) tobacco use, asthma and COPD   Neurologic:     (+)Parkinson's disease    (-) CVA, TIA and Neuropathy   Cardiovascular:     (+) Dyslipidemia, ----. : . . . :. .      (-) hypertension, CAD, irregular heartbeat/palpitations and stent   METS/Exercise Tolerance:     Hematologic:        (-) anemia   Musculoskeletal:         GI/Hepatic:        (-) GERD and liver disease   Renal/Genitourinary:      (-) renal disease   Endo:     (+) thyroid problem hypothyroidism, .   (-) Type I DM and Type II DM   Psychiatric:         Infectious Disease:  - neg infectious disease ROS       Malignancy:         Other:                     Physical Exam  Normal systems: cardiovascular, pulmonary and dental    Airway   Mallampati: III  TM distance: >3 FB  Neck ROM: full    Dental     Cardiovascular   Rhythm and rate: regular and normal      Pulmonary    breath sounds clear to auscultation                    Anesthesia  Plan      History & Physical Review  History and physical reviewed and following examination; no interval change.    ASA Status:  2 .        Plan for General and ETT with Intravenous induction. Maintenance will be Balanced.    PONV prophylaxis:  Ondansetron (or other 5HT-3), Other (See comment) and Dexamethasone or Solumedrol (Propofol infusion)       Postoperative Care      Consents  Anesthetic plan, risks, benefits and alternatives discussed with:  Patient..        Hao Mcduffie MD  1:38 PM December 22, 2017         Procedure:  Procedure(s):  Deep Brain Stimulator Placement, Phase II, Placement Of Deep Brain Stimulator Generator/Battery Over The Left Chest Wall - Wound Class: I-Clean    Patient Active Problem List   Diagnosis     Hyperlipemia     Anxiety disorder     Hypothyroidism     Parkinson disease (H)     Parkinsonian tremor (H)     Restless leg syndrome     Anosmia     Constipation     Depressed mood     RBD (REM behavioral disorder)       Past Medical History:   Diagnosis Date     Anosmia 10/12/2017     Anxiety      Depressed mood 10/12/2017     Hypothyroid      Parkinson disease (H)      PONV (postoperative nausea and vomiting)      RBD (REM behavioral disorder) 10/12/2017       Past Surgical History:   Procedure Laterality Date     ARTHROSCOPY KNEE Right     twice     ARTHROSCOPY KNEE Right     left     epidydmal mass removal, benign       OPTICAL TRACKING SYSTEM INSERTION DEEP BRAIN STIMULATION Left 12/14/2017    Procedure: OPTICAL TRACKING SYSTEM INSERTION DEEP BRAIN STIMULATION;  Stealth Assisted Left Deep Brain Stimulator Placement, Phase I, Placement Of Left Side Deep Brain Stimulator Electrode, Target Left Subthalamic Nucleus With Microelectrode Recording;  Surgeon: Arpan Huynh MD;  Location: [facility] OR         No current facility-administered medications on file prior to encounter.   Current Outpatient Prescriptions on File Prior to Encounter:  Coenzyme Q10 300 MG CAPS Take 300 mg by  mouth every morning   multivitamin, therapeutic with minerals (MULTI-VITAMIN) TABS tablet Take 1 tablet by mouth every morning   diclofenac (VOLTAREN) 1 % GEL topical gel Apply qid prn pain   acetaminophen (TYLENOL) 325 MG tablet Take 325-650 mg by mouth nightly as needed for mild pain   carbidopa-levodopa (SINEMET)  MG per tablet 1.5 every 4 hours 3 x daily aroudnd 6:30 am, 11:00 am, & 4:00 pm.   carbidopa-levodopa (SINEMET CR)  MG per CR tablet Take 2 tablets by mouth At Bedtime   thyroid (ARMOUR THYROID) 60 MG tablet Take 60 mg by mouth every morning   citalopram (CELEXA) 10 MG tablet Take 10 mg by mouth every morning    clonazePAM (KLONOPIN) 0.5 MG tablet Take 0.5 mg by mouth At Bedtime    Magnesium Citrate 100 MG TABS Take 400 mg by mouth every morning    Misc Natural Products (RED WINE EXTRACT) CAPS Take by mouth daily (with lunch)    Probiotic Product (ACIDOPHILUS/GOAT MILK) CAPS At Bedtime        Allergies   Allergen Reactions     Bactrim Hives     Codeine Nausea and Vomiting     Ketorolac Nausea and Vomiting     Morphine      Nalbuphine      Other reaction(s): Unknown Reaction     Penicillins Hives     Seasonal Allergies Itching     Sulfa Drugs      Toradol Nausea and Vomiting       Recent Labs   Lab Test  12/15/17   0630   HGB  11.6*     Recent Labs   Lab Test  12/15/17   0630   POTASSIUM  3.8     Recent Labs   Lab Test  12/15/17   0630   PLT  161     Recent Labs   Lab Test  11/29/17   1640   INR  0.96       No results found for this or any previous visit (from the past 4320 hour(s)).      Attending Anesthesiologist    Daniel Bernstein MD    *[id number]              .

## 2017-12-22 NOTE — OR NURSING
Dr. Vale (anesthesia) at bedside in PACU, no orders received regarding slightly elevated heart rate (low 100s). States he will enter sign out and patient may transfer to phase II.

## 2017-12-22 NOTE — DISCHARGE INSTRUCTIONS
[facility]  Same-Day Surgery   Adult Discharge Orders & Instructions     For 24 hours after surgery    1. Get plenty of rest.  A responsible adult must stay with you for at least 24 hours after you leave the hospital.   2. Do not drive or use heavy equipment.  If you have weakness or tingling, don't drive or use heavy equipment until this feeling goes away.  3. Do not drink alcohol.  4. Avoid strenuous or risky activities.  Ask for help when climbing stairs.   5. You may feel lightheaded.  IF so, sit for a few minutes before standing.  Have someone help you get up.   6. If you have nausea (feel sick to your stomach): Drink only clear liquids such as apple juice, ginger ale, broth or 7-Up.  Rest may also help.  Be sure to drink enough fluids.  Move to a regular diet as you feel able.  7. You may have a slight fever. Call the doctor if your fever is over 100.5  F (37.7 C) (taken under the tongue) or lasts longer than 24 hours.  8. You may have a dry mouth, a sore throat, muscle aches or trouble sleeping.  These should go away after 24 hours.  9. Do not make important or legal decisions.   Call your doctor for any of the followin.  Signs of infection (fever, growing tenderness at the surgery site, a large amount of drainage or bleeding, severe pain, foul-smelling drainage, redness, swelling).    2. It has been over 8 to 10 hours since surgery and you are still not able to urinate (pass water).    3.  Headache for over 24 hours.      To contact a doctor, call Dr. Huynh's office @ [phone]  or:        [phone] and ask for the resident on call for Neurosurgery  (answered 24 hours a day)      Emergency Department:    [facility]: [phone]       (TTY for hearing impaired: [phone])

## 2017-12-22 NOTE — ANESTHESIA POSTPROCEDURE EVALUATION
Patient: Kwame Lin    Procedure(s):  Deep Brain Stimulator Placement, Phase II, Placement Of Deep Brain Stimulator Generator/Battery Over The Left Chest Wall - Wound Class: I-Clean    Diagnosis:Parkinson's Disease  Diagnosis Additional Information: No value filed.    Anesthesia Type:  General, ETT    Note:  Anesthesia Post Evaluation    Patient location during evaluation: PACU  Patient participation: Able to fully participate in evaluation  Level of consciousness: awake and alert  Pain management: adequate  Airway patency: patent  Cardiovascular status: acceptable  Respiratory status: acceptable  Hydration status: acceptable  PONV: none     Anesthetic complications: None          Last vitals:  Vitals:    12/22/17 1645 12/22/17 1700 12/22/17 1715   BP: 132/78 128/72 190/72   Resp: 14 16 14   Temp: 36.8  C (98.2  F)  36.8  C (98.3  F)   SpO2: 100% 96% 96%         Electronically Signed By: Amparo Vale MD  December 22, 2017  5:25 PM

## 2017-12-22 NOTE — BRIEF OP NOTE
[facility], [place]    Brief Operative Note    Pre-operative diagnosis: Parkinson's Disease  Post-operative diagnosis Parkinson's Disease  Procedure: Procedure(s):  Deep Brain Stimulator Placement, Phase II, Placement Of Deep Brain Stimulator Generator/Battery Over The Left Chest Wall - Wound Class: I-Clean  Surgeon: Surgeon(s) and Role:     * Arpan Huynh MD - Primary     * Jose Maria Dillon MD - Resident - Assisting  Anesthesia: General   Estimated blood loss: 3 cc  Drains: None  Specimens: * No specimens in log *  Findings:   None.  Complications: None.  Implants: [employer] Vercise Implantable Pulse Generator Kit & 55c m8 contact extension Model DB-1110-c [id number] & Model [id number], respectively     May discharge home  Clindamycin for postop abx .    Dictation #[id number]

## 2017-12-22 NOTE — IP AVS SNAPSHOT
Same Day Surgery [address] [address]    Phone:  [phone]                                       After Visit Summary   12/22/2017    Kwame Lin    MRN: [id number]           After Visit Summary Signature Page     I have received my discharge instructions, and my questions have been answered. I have discussed any challenges I see with this plan with the nurse or doctor.    ..........................................................................................................................................  Patient/Patient Representative Signature      ..........................................................................................................................................  Patient Representative Print Name and Relationship to Patient    ..................................................               ................................................  Date                                            Time    ..........................................................................................................................................  Reviewed by Signature/Title    ...................................................              ..............................................  Date                                                            Time

## 2017-12-22 NOTE — ANESTHESIA CARE TRANSFER NOTE
Patient: Kwame Lin    Procedure(s):  Deep Brain Stimulator Placement, Phase II, Placement Of Deep Brain Stimulator Generator/Battery Over The Left Chest Wall - Wound Class: I-Clean    Diagnosis: Parkinson's Disease  Diagnosis Additional Information: No value filed.    Anesthesia Type:   General, ETT     Note:  Airway :Face Mask  Patient transferred to:PACU  Handoff Report: Identifed the Patient, Identified the Reponsible Provider, Reviewed the pertinent medical history, Discussed the surgical course, Reviewed Intra-OP anesthesia mangement and issues during anesthesia, Set expectations for post-procedure period and Allowed opportunity for questions and acknowledgement of understanding      Vitals: (Last set prior to Anesthesia Care Transfer)    CRNA VITALS  12/22/2017 1613 - 12/22/2017 1649      12/22/2017             EKG: Sinus rhythm                Electronically Signed By: АННА Calderón CRNA  December 22, 2017  4:49 PM

## 2017-12-22 NOTE — PROGRESS NOTES
SPIRITUAL HEALTH SERVICES  [facility] ([place] 3C   PRE-SURGERY VISIT    Had pre-surgery visit with pt and family.  Provided spiritual support, prayer.   Abraham Harvey M.Div (Bill)., [facility]  Staff   Pager [phone]

## 2017-12-23 NOTE — OR NURSING
Patient wife and son at bedside. Very supportive family. Spouse concerned about tachycardia/elevated HR and slight elevation in BP. Patient reported feeling quite well. Up to BR. Voided without issue. Denies pain and/or nausea. Does not feel lightheaded and/or weak. Able to communicate needs well and eager to DC to home. Will follow up with neurology clinic if questions and/or concerns arise.

## 2017-12-26 ENCOUNTER — CARE COORDINATION (OUTPATIENT)
Dept: NEUROSURGERY | Facility: CLINIC | Age: 62
End: 2017-12-26

## 2017-12-26 NOTE — PROGRESS NOTES
Neurosurgery Discharge Coordination Note     Attending physician: Dr. Huynh  Surgery performed: DBS battery placement and connection to DBS lead  Date of Discharge: 12/22/17  Discharge to: Home     Current status: Patient states he  feels very good and every day is a little better than the day before. Pain is minimal and tolerable. Denies redness, swelling, increased tenderness, drainage, incision opening or elevated temp. Reports Incision CDI without signs of infection.  Denies current bowel or bladder issues.    Discharge instructions and medications reviewed with patient.  Follow up appointments/imaging/tests needed: 2 week post op with NILDA Topete on 1/5/18 at 1:00 pm.     RN triage/on call number given: [phone]/ [phone]

## 2018-01-02 ENCOUNTER — TELEPHONE (OUTPATIENT)
Dept: NEUROSURGERY | Facility: CLINIC | Age: 63
End: 2018-01-02

## 2018-01-02 ASSESSMENT — ENCOUNTER SYMPTOMS
MUSCLE CRAMPS: 0
STIFFNESS: 1
MYALGIAS: 0
JOINT SWELLING: 1
ARTHRALGIAS: 1
MUSCLE WEAKNESS: 0
NECK PAIN: 0
BACK PAIN: 0

## 2018-01-02 ASSESSMENT — MOVEMENT DISORDERS SOCIETY - UNIFIED PARKINSONS DISEASE RATING SCALE (MDS-UPDRS)
HYGIENE: SLIGHT:  I AM SLOW BUT I DO NOT NEED ANY HELP.
GETTING_OUT_OF_BED_CAR_DEEP_CHAIR: NORMAL: NOT AT ALL (NO PROBLEMS).
HOBBIES_AND_OTHER_ACTIVITIES: SLIGHT: I AM A BIT SLOW BUT DO THESE ACTIVITIES EASILY.
SPEECH: NORMAL: NOT AT ALL (NO PROBLEMS).
WALKING_AND_BALANCE: NORMAL: NOT AT ALL (NO PROBLEMS).
CHEWING_AND_SWALLOWING: NORMAL: NO PROBLEMS.
SALIVA_AND_DROOLING: NORMAL: NOT AT ALL (NO PROBLEMS).
FREEZING: NORMAL: NOT AT ALL (NO PROBLEMS).
TREMOR: MODERATE: SHAKING OR TREMOR CAUSES PROBLEMS WITH MANY OF MY DAILY ACTIVITES.
DRESSING: SLIGHT: I AM SLOW BUT I DO NOT NEED HELP.
TOTAL_SCORE: 11
TURNING_IN_BED: SLIGHT: I HAVE A BIT OF TROUBLE TURNING, BUT I DO NOT NEED ANY HELP.
HANDWRITING: MODERATE: MANY WORDS ARE UNCLEAR AND DIFFICULT TO READ.
EATING_TASKS: SLIGHT: I AM SLOW, BUT I DO NOT NEED ANY HELP HANDLING MY FOOD AND HAVE NOT HAD FOOD SPILLS WHILE EATING.

## 2018-01-02 NOTE — TELEPHONE ENCOUNTER
Prior to DBS surgery, pt had been participating in PT for his knee/hamstring. He would like to resume PT but wanted to make sure it was OK in the context of recent DBS lead and battery placement surgeries. PT consists of pt laying supine and using resistance bands to strengthen the knee and hamstring. No weights are involved. I thought this would be OK and Dr. Huynh confirmed as much. Pt OK to resume PT. No further questions.

## 2018-01-05 ENCOUNTER — OFFICE VISIT (OUTPATIENT)
Dept: NEUROSURGERY | Facility: CLINIC | Age: 63
End: 2018-01-05
Payer: COMMERCIAL

## 2018-01-05 VITALS
RESPIRATION RATE: 24 BRPM | BODY MASS INDEX: 28.42 KG/M2 | OXYGEN SATURATION: 97 % | WEIGHT: 198.5 LBS | TEMPERATURE: 97.6 F | HEART RATE: 85 BPM | SYSTOLIC BLOOD PRESSURE: 113 MMHG | DIASTOLIC BLOOD PRESSURE: 68 MMHG | HEIGHT: 70 IN

## 2018-01-05 DIAGNOSIS — G20.A1 PARKINSON'S DISEASE (H): ICD-10-CM

## 2018-01-05 DIAGNOSIS — Z98.890 POST-OPERATIVE STATE: Primary | ICD-10-CM

## 2018-01-05 ASSESSMENT — PAIN SCALES - GENERAL: PAINLEVEL: NO PAIN (0)

## 2018-01-05 NOTE — PROGRESS NOTES
"  Neurosurgery clinic post op wound check  Date of visit: 1/5/2018      Procedure: 12/22/2017 TEE Dillon   DIAGNOSIS:  Parkinson's disease.       PROCEDURE:  Face to left-sided battery generator replacement.     IMPLANTS:  [employer] versus implantable pulse generator kit N55 CMA contacts extension, serial # is [id number] and serial #[id number], respectively.      Procedure 12/14/2017 TEE Hopkins               DIAGNOSIS:   1. Parkinson's disease  2. S/p left Deep Brain Stimulator Placement      OPERATIVE PROCEDURE:   1. Placement of CRW stereotactic head frame.   2. Stereotactic neuronavigation planning CT of the head.   3. Stereotactic neuronavigation using [employer] system for surgical planning, targeting and approach: target is the left subthalamic nucleus (STN).   4. Left side deep brain stimulator placement, Phase I, Placement of left side deep brain stimulator electrode, target left subthalamic nucleus (STN).   5. Use of intraoperative microelectrode recording.   6. Use of intraoperative fluoroscopy.       FINDINGS: SNT microelectrode recording at final position with improved tremor control when stimulated.      IMPLANTS:   1. Vinsula 45cm Lead Kit Model# [id number] S/N [id number]  2. Vinsula SureTek Model# DB-4600-C Lot# [id number]     OPERATIVE INDICATIONS: Mr. Kwame Lin is a 62 year old right handed male with a history of Parkinson's Disease that was diagnosed around 2009.  He has followed by Dr. Caldwell at the [facility]. Records indicate and patient confirms that his symptoms began back in 2007 when he was flying back from [place].  He had nausea and felt sick, and he was told that it was \"aerotoxic syndrome\".  His symptoms reportedly resolved and then returned about one year later when he and his wife went on a strict diet at which time he lost 25 lbs in six weeks.  During this weight loss period, his tremors came back.  His main symptom is right-sided tremors, that is now starting " "to progress to his left side. He takes sinemet.  He has tried various treatments, including chiropractic treatments, acupuncture, acupolarity, Qi Gong, meditation and various tests.  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  His grandchildren do not want to hold his hand because of his tremor.  His goal for DBS surgery is to control his tremor and \"have improvement in his quality of life\".       The plan was left sided DBS, target left subthalamic nucleus, for controlling his right side symptoms, with the generator being placed over the left chest wall. We discussed both phase I and II of DBS surgery. We discussed that during Phase I, we would place the DBS electrode on the left side under MAC and microelectrode recording. He would then return one week later for phase II with placement of the DBS generator/battery over the left chest wall under general anesthesia. Each surgical step was discussed in detail and questions were answered. Risks, benefits, alternative therapies were discussed with the patient, including but not limited to infection and bleeding. Surgical procedure was discussed in detail. All questions were answered, and he expressed understanding. He elected to go forward with the proposed surgery    HPI  He is now 2 weeks post op.     Since discharge he has done well, minimal pain, he begins programming next week. He is waiting to hear from Dr. Huynh's office regarding right-sided placement.  He presents for routine wound check and suture/staple removal.    Patient Supplied Answers To the  Pain Questionnaire  [facility] Pain -  Patient Entered Questionnaire/Answers 1/2/2018   What number best describes your pain right now:  0 = No pain  to  10 = Worst pain imaginable 1   How would you describe the pain? dull, aching   Which of the following worsen your pain? walking, exercise   Which of the following improve or reduce your pain?  standing   What number best " describes your average pain for the past week:  0 = No pain  to  10 = Worst pain imaginable 0   What number best describes your LOWEST pain in past 24 hours:  0 = No pain  to  10 = Worst pain imaginable 0   What number best describes your WORST pain in past 24 hours:  0 = No pain  to  10 = Worst pain imaginable 3   When is your pain worst? Night   What non-medicine treatments have you already had for your pain? physical therapy, relaxation training, exercise   Have you tried treating your pain with medication?  Yes   Are you currently taking medications for your pain? No           Current Outpatient Prescriptions:      Coenzyme Q10 300 MG CAPS, Take 300 mg by mouth every morning, Disp: , Rfl:      multivitamin, therapeutic with minerals (MULTI-VITAMIN) TABS tablet, Take 1 tablet by mouth every morning, Disp: , Rfl:      diclofenac (VOLTAREN) 1 % GEL topical gel, Apply qid prn pain, Disp: , Rfl:      acetaminophen (TYLENOL) 325 MG tablet, Take 325-650 mg by mouth nightly as needed for mild pain, Disp: , Rfl:      carbidopa-levodopa (SINEMET)  MG per tablet, 1.5 every 4 hours 3 x daily aroudnd 6:30 am, 11:00 am, & 4:00 pm., Disp: 90 tablet, Rfl:      carbidopa-levodopa (SINEMET CR)  MG per CR tablet, Take 2 tablets by mouth At Bedtime, Disp: , Rfl:      thyroid (ARMOUR THYROID) 60 MG tablet, Take 60 mg by mouth every morning , Disp: , Rfl:      citalopram (CELEXA) 10 MG tablet, Take 10 mg by mouth every morning , Disp: , Rfl:      clonazePAM (KLONOPIN) 0.5 MG tablet, Take 0.5 mg by mouth At Bedtime , Disp: , Rfl:      Misc Natural Products (RED WINE EXTRACT) CAPS, Take by mouth daily (with lunch) , Disp: , Rfl:      Probiotic Product (ACIDOPHILUS/GOAT MILK) CAPS, At Bedtime , Disp: , Rfl:   Allergies   Allergen Reactions     Bactrim Hives     Codeine Nausea and Vomiting     Ketorolac Nausea and Vomiting     Morphine      Nalbuphine      Other reaction(s): Unknown Reaction     Penicillins Hives     Seasonal  "Allergies Itching     Sulfa Drugs      Toradol Nausea and Vomiting     PMH, SOC HIST, FAM HIST, PROBLEM LIST:  All reviewed in EPIC.    OBJECTIVE:  /68  Pulse 85  Temp 97.6  F (36.4  C) (Oral)  Resp 24  Ht 1.765 m (5' 9.5\")  Wt 90 kg (198 lb 8 oz)  SpO2 97%  BMI 28.89 kg/m2    Imaging:  None new.    EXAM:  Well developed well nourished male found seated comfortably in exam chair.  No apparent distress. He is accompanied by a female spouse.  A&O X3.  Mood and affect WNL. Language and fund of knowledge intact.  Is able to sit and rise independently.   He has nicely healing incisions.  I prepped the wounds with chloroprep and cleanly removed Monocryl without difficulty and applied a light coating of bacitracin.    Assessment/Plan:  1. Post-operative state    2. Parkinson's disease (H)      Kwame Lin is doing well 2 weeks after his DBS placement.  The wounds are healthy without sign of infection. He is due to be activated next week. He does not have the right-sided procedure scheduled as yet and is waiting to hear from Dr. Huynh's office when that will be. He is in touch with Alis Olson regarding that question.    I placed a light coating of bacitracin on the incisions today, he can wash that off as of tomorrow. I answered all their questions. He knows he can call if anything else comes up.     We appreciate the opportunity to be of service in the care of this pleasant patient.  Please do call if there is anything more we can do    Coty Topete PA-C  [facility]  Department of Neurosurgery  Phone: [phone]  Fax: [phone]    This note was generated using voice recognition software. While edited for content some inaccurate phrasing may be found.  "

## 2018-01-05 NOTE — MR AVS SNAPSHOT
After Visit Summary   1/5/2018    Kwame Lin    MRN: [id number]           Patient Information     Date Of Birth          1955        Visit Information        Provider Department      1/5/2018 1:00 PM Coty Topete PA-C [facility] Neurosurgery        Today's Diagnoses     Post-operative state    -  1    Parkinson's disease (H)           Follow-ups after your visit        Your next 10 appointments already scheduled     Jan 12, 2018  8:20 AM CST   CT HEAD W/O CONTRAST with UCCT2   [facility] CT ([facility])    [address] [place] [address] [phone]           Please bring any scans or X-rays taken at other hospitals, if similar tests were done. Also bring a list of your medicines, including vitamins, minerals and over-the-counter drugs. It is safest to leave personal items at home.  Be sure to tell your doctor:   If you have any allergies.   If there s any chance you are pregnant.   If you are breastfeeding.   If you have any special needs.  You do not need to do anything special to prepare.  Please wear loose clothing, such as a sweat suit or jogging clothes. Avoid snaps, zippers and other metal. We may ask you to undress and put on a hospital gown.            Jan 12, 2018  9:00 AM CST   (Arrive by 8:45 AM)   Return Movement Disorder with Clarita Borges MD   [facility] Neurology ([facility])    [address] [place] [address] [phone]              Who to contact     Please call your clinic at [phone] to:    Ask questions about your health    Make or cancel appointments    Discuss your medicines    Learn about your test results    Speak to your doctor   If you have compliments or concerns about an experience at your clinic, or if you wish to file a complaint, please contact [facility] Patient Relations at [phone] or email us  "at [email or web address]         Additional Information About Your Visit        [employer] [facility] Information     [employer] gives you secure access to your electronic health record. If you see a primary care provider, you can also send messages to your care team and make appointments. If you have questions, please call your primary care clinic.  If you do not have a primary care provider, please call [phone] and they will assist you.      [employer] is an electronic gateway that provides easy, online access to your medical records. With [employer], you can request a clinic appointment, read your test results, renew a prescription or communicate with your care team.     To access your existing account, please contact your [facility] or call [phone] for assistance.        Care EveryWhere ID     This is your Care EveryWhere ID. This could be used by other organizations to access your [facility] medical records  [id number]        Your Vitals Were     Pulse Temperature Respirations Height Pulse Oximetry BMI (Body Mass Index)    85 97.6  F (36.4  C) (Oral) 24 1.765 m (5' 9.5\") 97% 28.89 kg/m2       Blood Pressure from Last 3 Encounters:   01/05/18 113/68   12/22/17 138/75   12/15/17 (!) 129/102    Weight from Last 3 Encounters:   01/05/18 90 kg (198 lb 8 oz)   12/22/17 87.5 kg (192 lb 14.4 oz)   12/15/17 85.7 kg (188 lb 15 oz)              Today, you had the following     No orders found for display       Primary Care Provider Office Phone # Fax #    Silvina Armenta [phone]       [facility] [address] [facility] [address]        Equal Access to Services     RIK CASON : Hadvj Salas, lakia banegas, cat wright, bebe meza. So [facility] [phone].    ATENCIÓN: Si habla español, tiene a mcrae disposición servicios gratuitos de asistencia lingüística. Llame al [phone].    We " comply with applicable federal civil rights laws and [place] laws. We do not discriminate on the basis of race, color, national origin, age, disability, sex, sexual orientation, or gender identity.            Thank you!     Thank you for choosing [facility]  for your care. Our goal is always to provide you with excellent care. Hearing back from our patients is one way we can continue to improve our services. Please take a few minutes to complete the written survey that you may receive in the mail after your visit with us. Thank you!             Your Updated Medication List - Protect others around you: Learn how to safely use, store and throw away your medicines at www.disposemymeds.org.          This list is accurate as of: 1/5/18  3:31 PM.  Always use your most recent med list.                   Brand Name Dispense Instructions for use Diagnosis    Acidophilus/Goat Milk Caps      At Bedtime        ARMOUR THYROID 60 MG tablet   Generic drug:  thyroid      Take 60 mg by mouth every morning        * carbidopa-levodopa  MG per tablet    SINEMET    90 tablet    1.5 every 4 hours 3 x daily aroudnd 6:30 am, 11:00 am, & 4:00 pm.        * carbidopa-levodopa  MG per CR tablet    SINEMET CR     Take 2 tablets by mouth At Bedtime        citalopram 10 MG tablet    celeXA     Take 10 mg by mouth every morning        clonazePAM 0.5 MG tablet    klonoPIN     Take 0.5 mg by mouth At Bedtime        Coenzyme Q10 300 MG Caps      Take 300 mg by mouth every morning        diclofenac 1 % Gel topical gel    VOLTAREN     Apply qid prn pain        Multi-vitamin Tabs tablet      Take 1 tablet by mouth every morning        Red Wine Extract Caps      Take by mouth daily (with lunch)        TYLENOL 325 MG tablet   Generic drug:  acetaminophen      Take 325-650 mg by mouth nightly as needed for mild pain        * Notice:  This list has 2 medication(s) that are the same as other medications prescribed for you. Read the  directions carefully, and ask your doctor or other care provider to review them with you.

## 2018-01-05 NOTE — LETTER
"1/5/2018     RE: Kwame Lin  [address] [place] [address]     Dear Colleague,    Thank you for referring your patient, Kwame Lin, to the [facility] NEUROSURGERY at [facility]. Please see a copy of my visit note below.      Neurosurgery clinic post op wound check  Date of visit: 1/5/2018      Procedure: 12/22/2017 TEE Dillon   DIAGNOSIS:  Parkinson's disease.       PROCEDURE:  Face to left-sided battery generator replacement.     IMPLANTS:  [employer] Pro[employer] versus implantable pulse generator kit N55 CMA contacts extension, serial # is [id number] and serial #[id number], respectively.      Procedure 12/14/2017 TEE Raod               DIAGNOSIS:   1. Parkinson's disease  2. S/p left Deep Brain Stimulator Placement      OPERATIVE PROCEDURE:   1. Placement of CRW stereotactic head frame.   2. Stereotactic neuronavigation planning CT of the head.   3. Stereotactic neuronavigation using [employer] system for surgical planning, targeting and approach: target is the left subthalamic nucleus (STN).   4. Left side deep brain stimulator placement, Phase I, Placement of left side deep brain stimulator electrode, target left subthalamic nucleus (STN).   5. Use of intraoperative microelectrode recording.   6. Use of intraoperative fluoroscopy.       FINDINGS: SNT microelectrode recording at final position with improved tremor control when stimulated.      IMPLANTS:   1. [employer] 45cm Lead Kit Model# LW-[id number] S/N [id number]  2. [employer] Pro[employer]ra SureTek Model# DB-4600-C Lot# [id number]     OPERATIVE INDICATIONS: Mr. Kwame Lin is a 62 year old right handed male with a history of Parkinson's Disease that was diagnosed around 2009.  He has followed by Dr. Caldwell at the [facility]. Records indicate and patient confirms that his symptoms began back in 2007 when he was flying back from [place].  He had nausea and felt sick, and he was told that it was \"aerotoxic syndrome\". " " His symptoms reportedly resolved and then returned about one year later when he and his wife went on a strict diet at which time he lost 25 lbs in six weeks.  During this weight loss period, his tremors came back.  His main symptom is right-sided tremors, that is now starting to progress to his left side. He takes sinemet.  He has tried various treatments, including chiropractic treatments, acupuncture, acupolarity, Qi Gong, meditation and various tests.  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  His grandchildren do not want to hold his hand because of his tremor.  His goal for DBS surgery is to control his tremor and \"have improvement in his quality of life\".       The plan was left sided DBS, target left subthalamic nucleus, for controlling his right side symptoms, with the generator being placed over the left chest wall. We discussed both phase I and II of DBS surgery. We discussed that during Phase I, we would place the DBS electrode on the left side under MAC and microelectrode recording. He would then return one week later for phase II with placement of the DBS generator/battery over the left chest wall under general anesthesia. Each surgical step was discussed in detail and questions were answered. Risks, benefits, alternative therapies were discussed with the patient, including but not limited to infection and bleeding. Surgical procedure was discussed in detail. All questions were answered, and he expressed understanding. He elected to go forward with the proposed surgery    HPI  He is now 2 weeks post op.     Since discharge he has done well, minimal pain, he begins programming next week. He is waiting to hear from Dr. Huynh's office regarding right-sided placement.  He presents for routine wound check and suture/staple removal.    Patient Supplied Answers To the [facility] Pain Questionnaire  [facility] Pain -  Patient Entered Questionnaire/Answers 1/2/2018   What number best " describes your pain right now:  0 = No pain  to  10 = Worst pain imaginable 1   How would you describe the pain? dull, aching   Which of the following worsen your pain? walking, exercise   Which of the following improve or reduce your pain?  standing   What number best describes your average pain for the past week:  0 = No pain  to  10 = Worst pain imaginable 0   What number best describes your LOWEST pain in past 24 hours:  0 = No pain  to  10 = Worst pain imaginable 0   What number best describes your WORST pain in past 24 hours:  0 = No pain  to  10 = Worst pain imaginable 3   When is your pain worst? Night   What non-medicine treatments have you already had for your pain? physical therapy, relaxation training, exercise   Have you tried treating your pain with medication?  Yes   Are you currently taking medications for your pain? No           Current Outpatient Prescriptions:      Coenzyme Q10 300 MG CAPS, Take 300 mg by mouth every morning, Disp: , Rfl:      multivitamin, therapeutic with minerals (MULTI-VITAMIN) TABS tablet, Take 1 tablet by mouth every morning, Disp: , Rfl:      diclofenac (VOLTAREN) 1 % GEL topical gel, Apply qid prn pain, Disp: , Rfl:      acetaminophen (TYLENOL) 325 MG tablet, Take 325-650 mg by mouth nightly as needed for mild pain, Disp: , Rfl:      carbidopa-levodopa (SINEMET)  MG per tablet, 1.5 every 4 hours 3 x daily aroudnd 6:30 am, 11:00 am, & 4:00 pm., Disp: 90 tablet, Rfl:      carbidopa-levodopa (SINEMET CR)  MG per CR tablet, Take 2 tablets by mouth At Bedtime, Disp: , Rfl:      thyroid (ARMOUR THYROID) 60 MG tablet, Take 60 mg by mouth every morning , Disp: , Rfl:      citalopram (CELEXA) 10 MG tablet, Take 10 mg by mouth every morning , Disp: , Rfl:      clonazePAM (KLONOPIN) 0.5 MG tablet, Take 0.5 mg by mouth At Bedtime , Disp: , Rfl:      Misc Natural Products (RED WINE EXTRACT) CAPS, Take by mouth daily (with lunch) , Disp: , Rfl:      Probiotic Product  "(ACIDOPHILUS/GOAT MILK) CAPS, At Bedtime , Disp: , Rfl:   Allergies   Allergen Reactions     Bactrim Hives     Codeine Nausea and Vomiting     Ketorolac Nausea and Vomiting     Morphine      Nalbuphine      Other reaction(s): Unknown Reaction     Penicillins Hives     Seasonal Allergies Itching     Sulfa Drugs      Toradol Nausea and Vomiting     PMH, SOC HIST, FAM HIST, PROBLEM LIST:  All reviewed in EPIC.    OBJECTIVE:  /68  Pulse 85  Temp 97.6  F (36.4  C) (Oral)  Resp 24  Ht 1.765 m (5' 9.5\")  Wt 90 kg (198 lb 8 oz)  SpO2 97%  BMI 28.89 kg/m2    Imaging:  None new.    EXAM:  Well developed well nourished male found seated comfortably in exam chair.  No apparent distress. He is accompanied by a female spouse.  A&O X3.  Mood and affect WNL. Language and fund of knowledge intact.  Is able to sit and rise independently.   He has nicely healing incisions.  I prepped the wounds with chloroprep and cleanly removed Monocryl without difficulty and applied a light coating of bacitracin.    Assessment/Plan:  1. Post-operative state    2. Parkinson's disease (H)      Kwame Lin is doing well 2 weeks after his DBS placement.  The wounds are healthy without sign of infection. He is due to be activated next week. He does not have the right-sided procedure scheduled as yet and is waiting to hear from Dr. Huynh's office when that will be. He is in touch with Alis Olson regarding that question.    I placed a light coating of bacitracin on the incisions today, he can wash that off as of tomorrow. I answered all their questions. He knows he can call if anything else comes up.     We appreciate the opportunity to be of service in the care of this pleasant patient.  Please do call if there is anything more we can do    Coty Topete PA-C  [facility]  Department of Neurosurgery  Phone: [phone]  Fax: [phone]    This note was generated using voice recognition software. While edited for content " some inaccurate phrasing may be found.

## 2018-01-05 NOTE — NURSING NOTE
Chief Complaint   Patient presents with     RECHECK     UMP- 2 WEEKS POST-OP AFTER DBS AND BATTERY PLACEMENT     Gonzalo Davis, CMA

## 2018-01-05 NOTE — PROGRESS NOTES
NEUROSURGERY SHUNT CHECK  DATE OF VISIT 01/05/2018      PROCEDURE: 12/22/2017      ATTENDING SURGEON:  Arpan Huynh MD       RESIDENT SURGEON:  Jose Maria Dillon MD       ANESTHESIA:  General endotracheal anesthesia.       ESTIMATED BLOOD LOSS:  3 mL.       DRAINS:  None.       SPECIMENS:  None.       COMPLICATIONS:  None.       IMPLANTS:  [employer] versus implantable pulse generator kit N55 CMA contacts extension, serial # is [id number] and serial #[id number], respectivelyLAST SETTINGS:             DATE:12/22/2017     PREOPERATIVE DIAGNOSIS:  Parkinson's disease.       POSTOPERATIVE DIAGNOSIS:  Parkinson's disease.       PROCEDURE:  Face to left-sided battery generator replacement.       ATTENDING SURGEON:  Arpan Huynh MD       RESIDENT SURGEON:  Jose Maria Dillon MD       ANESTHESIA:  General endotracheal anesthesia.       ESTIMATED BLOOD LOSS:  3 mL.       DRAINS:  None.            CC:    HPI:       EXAM:  INTERROGATED AT:    RE-SET TO:           IMPRESSION/PLAN  Shunt dependent HCP.  Doing well.  Now SP MRI for *** and needs valves to be reset.    They are reset as above.  Wound looks great.  May swim or immerse wound when all scabbing has disappeared.  Continue activity restrictions until 1 month post op.  Follow up NS PRN.    Coty Topete PA-C  [facility]  Department of Neurosurgery  Phone: [phone]  Fax: [phone]    This note was generated using voice recognition software. While edited for content some inaccurate phrasing may be found.

## 2018-01-12 ENCOUNTER — RADIANT APPOINTMENT (OUTPATIENT)
Dept: CT IMAGING | Facility: CLINIC | Age: 63
End: 2018-01-12
Attending: PSYCHIATRY & NEUROLOGY
Payer: COMMERCIAL

## 2018-01-12 ENCOUNTER — OFFICE VISIT (OUTPATIENT)
Dept: NEUROLOGY | Facility: CLINIC | Age: 63
End: 2018-01-12
Payer: COMMERCIAL

## 2018-01-12 VITALS
HEIGHT: 70 IN | WEIGHT: 200 LBS | OXYGEN SATURATION: 99 % | HEART RATE: 71 BPM | SYSTOLIC BLOOD PRESSURE: 126 MMHG | DIASTOLIC BLOOD PRESSURE: 69 MMHG | BODY MASS INDEX: 28.63 KG/M2

## 2018-01-12 DIAGNOSIS — G20.A1 PARALYSIS AGITANS (H): ICD-10-CM

## 2018-01-12 DIAGNOSIS — Z79.899 MEDICATION MANAGEMENT: ICD-10-CM

## 2018-01-12 DIAGNOSIS — G20.A1 PARKINSON DISEASE (H): Primary | ICD-10-CM

## 2018-01-12 ASSESSMENT — UNIFIED PARKINSONS DISEASE RATING SCALE (UPDRS)
AMPLITUDE_RLE: SLIGHT: < 1 CM IN MAXIMAL AMPLITUDE.
TOTAL_SCORE_LEFT: 10
POSTURAL_STABILITY: NORMAL:  RECOVERS WITH ONE OR TWO STEPS.
ARISING_CHAIR: SLIGHT: ARISING IS SLOWER THAN NORMAL, OR MAY NEED MORE THAN ONE ATTEMPT, OR MAY NEED TO MOVE FORWARD IN THE CHAIR TO ARISE.  NO NEED TO USE THE ARMS OF THE CHAIR.
AXIAL_SCORE: 16
POSTURAL_STABILITY: NORMAL:  RECOVERS WITH ONE OR TWO STEPS.
AMPLITUDE_LLE: SLIGHT: < 1 CM IN MAXIMAL AMPLITUDE.
HANDMOVEMENTS_LEFT: NORMAL
TOTAL_SCORE: 12
AMPLITUDE_LUE: SLIGHT: < 1 CM IN MAXIMAL AMPLITUDE.
AMPLITUDE_RUE: SLIGHT: < 1 CM IN MAXIMAL AMPLITUDE.
RIGIDITY_RUE: MILD: RIGIDITY DETECTED WITHOUT THE ACTIVATION MANEUVER.  FULL RANGE OF MOTION IS EASILY ACHIEVED.
TOETAPPING_RIGHT: MILD: ANY OF THE FOLLOWING: A) 3 TO 5 INTERRUPTIONS DURING TAPPING B) MILD SLOWING C) THE AMPLITUDE DECREMENTS MIDWAY IN THE 10-MOVEMENT SEQUENCE
SPEECH: SLIGHT: LOSS OF MODULATION, DICTION OR VOLUME, BUT STILL ALL WORDS EASY TO UNDERSTAND.
RIGIDITY_NECK: SLIGHT: RIGIDITY ONLY DETECTED WITH ACTIVATION MANEUVER.
FREEZING_GAIT: NORMAL
AMPLITUDE_RLE: MILD > 1 CM BUT < 3 CM IN MAXIMAL AMPLITUDE.
TOTAL_SCORE_LEFT: 13
PRONATION_SUPINATION_RIGHT: SLIGHT: ANY OF THE FOLLOWING: A) THE REGULAR RHYTHM IS BROKEN WITH ONE WITH ONE OR TWO INTERRUPTIONS OR HESITATIONS OF THE MOVEMENT B) SLIGHT SLOWING C) THE AMPLITUDE DECREMENTS NEAR THE END OF THE 10 MOVEMENTS.
AMPLITUDE_LUE: MILD > 1 CM BUT < 3 CM IN MAXIMAL AMPLITUDE.
SPONTANEITY_OF_MOVEMENT: 1: SLIGHT: SLIGHT GLOBAL SLOWNESS AND POVERTY OF SPONTANEOUS MOVEMENTS.
TOTAL_SCORE: 32
AMPLITUDE_RUE: MODERATE: 3 - 10 CM IN MAXIMAL AMPLITUDE.
LEG_AGILITY_LEFT: SLIGHT: ANY OF THE FOLLOWING: A) THE REGULAR RHYTHM IS BROKEN WITH ONE WITH ONE OR TWO INTERRUPTIONS OR HESITATIONS OF THE MOVEMENT B) SLIGHT SLOWING C) THE AMPLITUDE DECREMENTS NEAR THE END OF THE 10 MOVEMENTS.
PRONATION_SUPINATION_LEFT: NORMAL
PARKINSONS_MEDS: OFF
GAIT: SLIGHT: INDEPENDENT WALKING WITH MINOR GAIT IMPAIRMENT.
RIGIDITY_RLE: SLIGHT: RIGIDITY ONLY DETECTED WITH ACTIVATION MANEUVER.
RIGIDITY_RLE: MILD: RIGIDITY DETECTED WITHOUT THE ACTIVATION MANEUVER.  FULL RANGE OF MOTION IS EASILY ACHIEVED.
ARISING_CHAIR: SLIGHT: ARISING IS SLOWER THAN NORMAL, OR MAY NEED MORE THAN ONE ATTEMPT, OR MAY NEED TO MOVE FORWARD IN THE CHAIR TO ARISE.  NO NEED TO USE THE ARMS OF THE CHAIR.
CONSTANCY_TREMOR_ATREST: MODERATE: TREMOR AT REST IS PRESENT 51-75% OF THE ENTIRE EXAMINATION PERIOD.
CONSTANCY_TREMOR_ATREST: SEVERE: TREMOR AT REST IS PRESENT > 75% OF THE ENTIRE EXAMINATION PERIOD.
LEG_AGILITY_RIGHT: MODERATE: ANY OF THE FOLLOWING:  A) MORE THAN 5 INTERRUPTIONS  OR AT LEAST ONE LONGER ARREST (FREEZE) IN ONGOING MOVEMENT  B) MODERATE SLOWING C) THE AMPLITUDE DECREMENTS STARTING AFTER THE FIRST MOVEMENT.
SPEECH: SLIGHT: LOSS OF MODULATION, DICTION OR VOLUME, BUT STILL ALL WORDS EASY TO UNDERSTAND.
RIGIDITY_RUE: SLIGHT: RIGIDITY ONLY DETECTED WITH ACTIVATION MANEUVER.
PRONATION_SUPINATION_LEFT: MILD: ANY OF THE FOLLOWING: A) 3 TO 5 INTERRUPTIONS DURING TAPPING B) MILD SLOWING C) THE AMPLITUDE DECREMENTS MIDWAY IN THE 10-MOVEMENT SEQUENCE
FACIAL_EXPRESSION: MILD: IN ADDITION TO DECREASED EYE-BLINK FREQUENCY, MASKED FACIES PRESENT IN THE LOWER FACE AS WELL, NAMELY FEWER MOVEMENTS AROUND THE MOUTH, SUCH AS LESS SPONTANEOUS SMILING, BUT LIPS NOT PARTED.
FACIAL_EXPRESSION: SLIGHT: MINIMAL MASKED FACIES MANIFESTED ONLY BY DECREASED FREQUENCY OF BLINKING.
AXIAL_SCORE: 10
TOTAL_SCORE: 26
POSTURE: 1 SLIGHT.  NOT QUITE ERECT BUT COULD BE NORMAL FOR OLDER PERSONS.
AMPLITUDE_LIP_JAW: NORMAL: NO TREMOR.
RIGIDITY_LLE: SLIGHT: RIGIDITY ONLY DETECTED WITH ACTIVATION MANEUVER.
PARKINSONS_MEDS: ON
FINGER_TAPPING_LEFT: SLIGHT: ANY OF THE FOLLOWING: A) THE REGULAR RHYTHM IS BROKEN WITH ONE WITH ONE OR TWO INTERRUPTIONS OR HESITATIONS OF THE MOVEMENT B) SLIGHT SLOWING C) THE AMPLITUDE DECREMENTS NEAR THE END OF THE 10 MOVEMENTS.
TOETAPPING_RIGHT: MILD: ANY OF THE FOLLOWING: A) 3 TO 5 INTERRUPTIONS DURING TAPPING B) MILD SLOWING C) THE AMPLITUDE DECREMENTS MIDWAY IN THE 10-MOVEMENT SEQUENCE
FREEZING_GAIT: NORMAL
SPONTANEITY_OF_MOVEMENT: 2: MILD: MILD GLOBAL SLOWNESS AND POVERTY OF SPONTANEOUS MOVEMENTS.
AMPLITUDE_LIP_JAW: MILD: > 1 BUT < 2 CM IN MAXIMAL AMPLITUDE.
GAIT: SLIGHT: INDEPENDENT WALKING WITH MINOR GAIT IMPAIRMENT.
TOETAPPING_LEFT: SLIGHT: ANY OF THE FOLLOWING: A) THE REGULAR RHYTHM IS BROKEN WITH ONE WITH ONE OR TWO INTERRUPTIONS OR HESITATIONS OF THE MOVEMENT B) SLIGHT SLOWING C) THE AMPLITUDE DECREMENTS NEAR THE END OF THE 10 MOVEMENTS.
RIGIDITY_LUE: SLIGHT: RIGIDITY ONLY DETECTED WITH ACTIVATION MANEUVER.
RIGIDITY_LLE: MILD: RIGIDITY DETECTED WITHOUT THE ACTIVATION MANEUVER.  FULL RANGE OF MOTION IS EASILY ACHIEVED.
TOETAPPING_LEFT: MILD: ANY OF THE FOLLOWING: A) 3 TO 5 INTERRUPTIONS DURING TAPPING B) MILD SLOWING C) THE AMPLITUDE DECREMENTS MIDWAY IN THE 10-MOVEMENT SEQUENCE
RIGIDITY_NECK: SLIGHT: RIGIDITY ONLY DETECTED WITH ACTIVATION MANEUVER.
PRONATION_SUPINATION_RIGHT: MODERATE: ANY OF THE FOLLOWING:  A) MORE THAN 5 INTERRUPTIONS  OR AT LEAST ONE LONGER ARREST (FREEZE) IN ONGOING MOVEMENT  B) MODERATE SLOWING C) THE AMPLITUDE DECREMENTS STARTING AFTER THE FIRST MOVEMENT.
AMPLITUDE_LLE: NORMAL: NO TREMOR.
HANDMOVEMENTS_LEFT: SLIGHT: ANY OF THE FOLLOWING: A) THE REGULAR RHYTHM IS BROKEN WITH ONE WITH ONE OR TWO INTERRUPTIONS OR HESITATIONS OF THE MOVEMENT B) SLIGHT SLOWING C) THE AMPLITUDE DECREMENTS NEAR THE END OF THE 10 MOVEMENTS.
FINGER_TAPPING_RIGHT: MODERATE: ANY OF THE FOLLOWING:  A) MORE THAN 5 INTERRUPTIONS OR AT LEAST ONE LONGER ARREST (FREEZE) IN ONGOING MOVEMENT  B) MODERATE SLOWING C) THE AMPLITUDE DECREMENTS STARTING AFTER THE FIRST MOVEMENT.
LEG_AGILITY_LEFT: NORMAL
HANDMOVEMENTS_RIGHT: MILD: ANY OF THE FOLLOWING: A) 3 TO 5 INTERRUPTIONS DURING TAPPING B) MILD SLOWING C) THE AMPLITUDE DECREMENTS MIDWAY IN THE 10-MOVEMENT SEQUENCE
FINGER_TAPPING_RIGHT: MODERATE: ANY OF THE FOLLOWING:  A) MORE THAN 5 INTERRUPTIONS OR AT LEAST ONE LONGER ARREST (FREEZE) IN ONGOING MOVEMENT  B) MODERATE SLOWING C) THE AMPLITUDE DECREMENTS STARTING AFTER THE FIRST MOVEMENT.
HANDMOVEMENTS_RIGHT: SLIGHT: ANY OF THE FOLLOWING: A) THE REGULAR RHYTHM IS BROKEN WITH ONE WITH ONE OR TWO INTERRUPTIONS OR HESITATIONS OF THE MOVEMENT B) SLIGHT SLOWING C) THE AMPLITUDE DECREMENTS NEAR THE END OF THE 10 MOVEMENTS.
FINGER_TAPPING_LEFT: SLIGHT: ANY OF THE FOLLOWING: A) THE REGULAR RHYTHM IS BROKEN WITH ONE WITH ONE OR TWO INTERRUPTIONS OR HESITATIONS OF THE MOVEMENT B) SLIGHT SLOWING C) THE AMPLITUDE DECREMENTS NEAR THE END OF THE 10 MOVEMENTS.
POSTURE: 2 MILD.  DEFINITE FLEXION, SCOLIOSIS OR LEANING OT ONE SIDE, BUT CAN CORRECT POSTURE TO NORMAL WHEN ASKED.
TOTAL_SCORE: 55
LEG_AGILITY_RIGHT: NORMAL
RIGIDITY_LUE: MILD: RIGIDITY DETECTED WITHOUT THE ACTIVATION MANEUVER.  FULL RANGE OF MOTION IS EASILY ACHIEVED.

## 2018-01-12 ASSESSMENT — PAIN SCALES - GENERAL: PAINLEVEL: NO PAIN (0)

## 2018-01-12 NOTE — MR AVS SNAPSHOT
"              After Visit Summary   1/12/2018    Kwame Lin    MRN: [id number]           Patient Information     Date Of Birth          1955        Visit Information        Provider Department      1/12/2018 9:00 AM Clarita Borges MD [facility] Neurology        Care Instructions    1. Initial programming of your DBS went very well today. I set up 3 programs, and am sending you home on program 1, \"New Day\". You are set at 2.5 mA. We have room to increase to 3.0mA as needed.    I also set up 2 additional programs that will be good options if despite increasing the settings on the 1st program we don't get full control of your tremor.    2. Medication plan:    -Decrease your 2nd and 3rd dose of Sinemet IR to 1 tab per dose, as below  -Decrease your bedtime Sinemet CR 50/200 to 1 tab        PD Medications                   6:30AM 11AM 4PM 8PM 10:30PM    Sinemet IR 25/100                                                1.5 1 1      Sinemet CR 50/200                             1               3. If you continue have some tremor on the right body and have not developed dyskinesias, then please send a dbTwang message to let me know how much tremor you are having (how severe and how often) and I will instruct you how to increase your DBS settings (likely by 1-2 clicks at a time).    4. I want to see you back on Jan 24th at 12PM    5. DBS Education: You have an [employer] Implantable Pulse Generator (IPG) which is often referred to as the battery. We will be checking the status of your IPG at every visit and review your charging history to help you set up the ideal charging schedule.       Safety: Contraindications with DBS include MRI, ultrasonic teeth cleaning, welding, high voltage equipment, and diathermy. If there is a medical emergency you should shut off your DBS system so that you can obtain an EKG. At the airport security you should request to be patted down and tell them you have a pacemaker " (if they ask further questions you can explain that it is a brain pacemaker).  Also, if you have any outpatient or inpatient procedures, such as surgery, the DBS system will need to be shut off beforehand. Your surgeon will also need to use bipolar cautery. If they have questions they should call [employer]    6. Charging - please charge on Mondays and Thursdays for 45 minutes. We will reassess your charging needs at your next visit.                 Follow-ups after your visit        Follow-up notes from your care team     Return in about 12 days (around 1/24/2018), or at 12PM with Dr. Borges (per Katerina).      Your next 10 appointments already scheduled     Jan 24, 2018 12:00 PM CST   (Arrive by 11:45 AM)   Return Movement Disorder with Clarita Borges MD   [facility] Neurology ([facility] [place])    [address] [place] [address] [phone]              Who to contact     Please call your clinic at [phone] to:    Ask questions about your health    Make or cancel appointments    Discuss your medicines    Learn about your test results    Speak to your doctor   If you have compliments or concerns about an experience at your clinic, or if you wish to file a complaint, please contact [facility] Patient Relations at [phone] or email us at [email or web address]         Additional Information About Your Visit        ReVerahart Information     [employer] gives you secure access to your electronic health record. If you see a primary care provider, you can also send messages to your care team and make appointments. If you have questions, please call your primary care clinic.  If you do not have a primary care provider, please call [phone] and they will assist you.      [employer] is an electronic gateway that provides easy, online access to your medical records. With [employer], you can request a clinic appointment,  "read your test results, renew a prescription or communicate with your care team.     To access your existing account, please contact your [facility] or call [phone] for assistance.        Care EveryWhere ID     This is your Care EveryWhere ID. This could be used by other organizations to access your [facility] medical records  [id number]        Your Vitals Were     Pulse Height Pulse Oximetry BMI (Body Mass Index)          71 1.765 m (5' 9.5\") 99% 29.11 kg/m2         Blood Pressure from Last 3 Encounters:   01/12/18 126/69   01/05/18 113/68   12/22/17 138/75    Weight from Last 3 Encounters:   01/12/18 90.7 kg (200 lb)   01/05/18 90 kg (198 lb 8 oz)   12/22/17 87.5 kg (192 lb 14.4 oz)              Today, you had the following     No orders found for display       Primary Care Provider Office Phone # Fax #    Silvina Armenta [phone]       [facility] [address] [facility] [address]        Equal Access to Services     [facility]: Hadii aad ku hadasho Soomaali, waaxda luqadaha, qaybta kaalmada adeegyada, bebe dias . So [facility] [phone].    ATENCIÓN: Si habla español, tiene a mcrae disposición servicios gratuitos de asistencia lingüística. [facility] [phone].    We comply with applicable federal civil rights laws and [place] laws. We do not discriminate on the basis of race, color, national origin, age, disability, sex, sexual orientation, or gender identity.            Thank you!     Thank you for choosing [facility] NEUROLOGY  for your care. Our goal is always to provide you with excellent care. Hearing back from our patients is one way we can continue to improve our services. Please take a few minutes to complete the written survey that you may receive in the mail after your visit with us. Thank you!             Your Updated Medication List - Protect others around you: Learn how to safely use, store " and throw away your medicines at www.disposemymeds.org.          This list is accurate as of: 1/12/18  2:10 PM.  Always use your most recent med list.                   Brand Name Dispense Instructions for use Diagnosis    Acidophilus/Goat Milk Caps      At Bedtime        ARMOUR THYROID 60 MG tablet   Generic drug:  thyroid      Take 60 mg by mouth every morning        * carbidopa-levodopa  MG per tablet    SINEMET    90 tablet    1.5 every 4 hours 3 x daily aroudnd 6:30 am, 11:00 am, & 4:00 pm.        * carbidopa-levodopa  MG per CR tablet    SINEMET CR     Take 2 tablets by mouth At Bedtime        citalopram 10 MG tablet    celeXA     Take 10 mg by mouth every morning        clonazePAM 0.5 MG tablet    klonoPIN     Take 0.5 mg by mouth At Bedtime        Coenzyme Q10 300 MG Caps      Take 300 mg by mouth every morning        diclofenac 1 % Gel topical gel    VOLTAREN     Apply qid prn pain        Multi-vitamin Tabs tablet      Take 1 tablet by mouth every morning        Red Wine Extract Caps      Take by mouth daily (with lunch)        TYLENOL 325 MG tablet   Generic drug:  acetaminophen      Take 325-650 mg by mouth nightly as needed for mild pain        * Notice:  This list has 2 medication(s) that are the same as other medications prescribed for you. Read the directions carefully, and ask your doctor or other care provider to review them with you.

## 2018-01-12 NOTE — NURSING NOTE
Chief Complaint   Patient presents with     RECHECK     P RETURN - MOVEMENT DISORDER     Kenia Fitch MA

## 2018-01-12 NOTE — PATIENT INSTRUCTIONS
"1. Initial programming of your DBS went very well today. I set up 3 programs, and am sending you home on program 1, \"New Day\". You are set at 2.5 mA. We have room to increase to 3.0mA as needed.    I also set up 2 additional programs that will be good options if despite increasing the settings on the 1st program we don't get full control of your tremor.    2. Medication plan:    -Decrease your 2nd and 3rd dose of Sinemet IR to 1 tab per dose, as below  -Decrease your bedtime Sinemet CR 50/200 to 1 tab        PD Medications                   6:30AM 11AM 4PM 8PM 10:30PM    Sinemet IR 25/100                                                1.5 1 1      Sinemet CR 50/200                             1               3. If you continue have some tremor on the right body and have not developed dyskinesias, then please send a [employer] message to let me know how much tremor you are having (how severe and how often) and I will instruct you how to increase your DBS settings (likely by 1-2 clicks at a time).    4. I want to see you back on Jan 24th at 12PM    5. DBS Education: You have an [employer] Implantable Pulse Generator (IPG) which is often referred to as the battery. We will be checking the status of your IPG at every visit and review your charging history to help you set up the ideal charging schedule.       Safety: Contraindications with DBS include MRI, ultrasonic teeth cleaning, welding, high voltage equipment, and diathermy. If there is a medical emergency you should shut off your DBS system so that you can obtain an EKG. At the airport security you should request to be patted down and tell them you have a pacemaker (if they ask further questions you can explain that it is a brain pacemaker).  Also, if you have any outpatient or inpatient procedures, such as surgery, the DBS system will need to be shut off beforehand. Your surgeon will also need to use bipolar cautery. If they have questions they should call " [employer]    6. Charging - please charge on Mondays and Thursdays for 45 minutes. We will reassess your charging needs at your next visit.

## 2018-01-12 NOTE — LETTER
2018       RE: Kwame Lin  [address] [place] [address]     Dear Colleague,    Thank you for referring your patient, Kwame Lin, to the [facility] NEUROLOGY at [facility]. Please see a copy of my visit note below.    In error    Department of Neurology  Movement Disorders Division   DBS Initial Programming Note    Patient: Kwame Lin   MRN: [id number]   : 1955   Date of Visit: 2018     Diagnosis: Parkinson disease    DBS Target(s): Left STN  Date(s) of DBS Lead Placement: 17  Date(s) of IPG Placement:17    Chief Complaint:  Kwame Lin is a 62 year old right-handed man with tremor predominant Parkinson disease with symptom onset in  with right hand tremor, who is now recently status post left STN DBS.  He returns to clinic today for initial DBS programming.      Interval History:  Since his last visit he underwent. he tolerated the surgery without complications.    Lesion effect: He had significant reduction in tremor in recovery for a couple of hours, then it slowly came back to full force in about 1 day.  He noticed significant improvement in right leg pain and cramping for about 1 week.     Review of Goals for DBS:  1. Tremor reduction  2. Reduce leg pain and rigidity  3. Face/jaw tremor (but knows this may not be possible with unilateral stimulation)      Wearing off: YES  Off time: tremor essentially 100% of the time. At night it is the worst and interferes with his ability to fall asleep (goes to bed 10:30PM and tremor will keep him up until midnight). It can help with the transition to take an extra dose of Sinemet IR at 8PM.   Whe he has tried to take 2 tabs Sinemet IR 25/100 per dose he gets a sense that it is a bit too much (perhaps wooziness), although his tremor is more controlled.       His history is also significant for dream enactment behavior, anxiety anosmia x 20 years, constipation, and depression. He  "also has chronic mild orthostasis with one episode of syncope related to carbidopa/levodopa in the past.     Review of Symptoms:  Other than that mentioned above, the remainder of 12 systems reviewed were negative.      Medications:  Current Outpatient Prescriptions   Medication Sig Dispense Refill     Coenzyme Q10 300 MG CAPS Take 300 mg by mouth every morning       multivitamin, therapeutic with minerals (MULTI-VITAMIN) TABS tablet Take 1 tablet by mouth every morning       diclofenac (VOLTAREN) 1 % GEL topical gel Apply qid prn pain       acetaminophen (TYLENOL) 325 MG tablet Take 325-650 mg by mouth nightly as needed for mild pain       carbidopa-levodopa (SINEMET)  MG per tablet 1.5 every 4 hours 3 x daily aroudnd 6:30 am, 11:00 am, & 4:00 pm. 90 tablet      carbidopa-levodopa (SINEMET CR)  MG per CR tablet Take 2 tablets by mouth At Bedtime       thyroid (ARMOUR THYROID) 60 MG tablet Take 60 mg by mouth every morning        citalopram (CELEXA) 10 MG tablet Take 10 mg by mouth every morning        clonazePAM (KLONOPIN) 0.5 MG tablet Take 0.5 mg by mouth At Bedtime        Misc Natural Products (RED WINE EXTRACT) CAPS Take by mouth daily (with lunch)        Probiotic Product (ACIDOPHILUS/GOAT MILK) CAPS At Bedtime            PD Medications                   6:30AM 11AM 4PM 8PM 10:30PM    Sinemet IR 25/100                                                1.5 1.5 1.5 1.5 (50% of days)     Sinemet CR 50/200                             2        Clonazepam                                              Allergies: is allergic to bactrim; codeine; ketorolac; morphine; nalbuphine; penicillins; seasonal allergies; sulfa drugs; and toradol.    Physical Exam:  The patient's  height is 1.765 m (5' 9.5\") and weight is 90.7 kg (200 lb). His blood pressure is 126/69 and his pulse is 71. His oxygen saturation is 99%.      Incisions: Well-healed.     Neurological Examination: He is alert and oriented and has fluent speech " "without dysarthria and is able to provide an interval medical history  Extraocular movements are full. He has normal smooth pursuits and saccades. His face is symmetric with equal activation.     UPDRS Values 1/12/2018 1/12/2018   Medication Off On   R Brain DBS: None None   L Brain DBS: On On   Speech 1 1   Facial Expression 2 1   Rigidity Neck 1 1   Rigidity RUE 2 1   Rigidity LUE 2 1   Rigidity RLE 2 1   Rigidity LLE 2 1   Finger Taps R 3 3   Finger Taps L 1 1   Hand Mvt R 2 1   Hand Mvt L 1 0   Pron-/Supinate R 3 1   Pron-/Supinate L 0 2   Toe Tap R 2 2   Toe Tap L 1 2   Leg Agility R 3 0   Leg Agility L 1 0   Arise From Chair 1 1   Gait 1 1   Gait Freezing 0 0   Postural Stability 0 0   Global Spont Mvt 2 1   Postural Tremor Rue 4 1   Postural Tremor LUE 2 2   Kinetic Tremor RUE 0 0   Kinetic Tremor LUE 0 0   Rest Tremor RUE 3 1   Rest Tremor LUE 2 1   Rest Tremor RLE 2 1   Rest Tremor LLE 1 0   Rest Tremor Lip/Jaw 2 0   Rest Tremor Constancy 4 3   Total Right 26 12   Total Left 13 10   Axial Total 16 10   Total 55 32       Procedure: DBS Programming    Lead(s):    Left Right   DBS Target STN          DBS Lead Type [employer] - 8 contact lead      Lead Implant Date  12/14/17           IPG(s):   1 2   IPG [employer]          IPG Implant Date 12/22/17          Location Left chest          Battery (V) 3.79V         Full Impedence/Currents Check: Normal    Initial Settings: IPG was interrogated and found to be OFF.        Left Hemisphere Monopolar Review    Contacts Amplitude   (mA) PW   (usec) Rate  (Hz) Assessment Adverse Effects   Case+, 1- 1.0 60 130 Tremor gr 4; Rigidity gr 2 (baseline)     1.5   Transient reduction tremor to 3; Rigidity to 1.5     2.0   Inc tremor gr 4; Inc Rigidity gr 2.5     2.5   Tremor gr 4 Feels \"uncomfortable\"; speech a bit more effortful    2.8    Tightening R arm; speech more effortful    1.9    Speech still feels a hint abnormal    Case+, 2- 1.0 60 130 " Tremor dec gr 2 (3 with talking); Rig 1.5      1.5-2.0 60 130 Tremor gr 3     2.5 60 130 Decr tremor gr 2; Dec Rig gr 1     3.0   Tremor gr 2     3.3    Incr tremor gr 4 Incr tremor, speech more effortful    2.5 60 130 Tremor gr 2-3 No SE    2.5 60 170 No better: Tremor gr 2-3    Case+, 3- 1.0 60 130 Dec tremor gr 3      2.0 60 130 Tremor gr 3      2.5 60 130 Dec tremor gr 1-1.5; Dec rigid arm (1), leg (1.5)     3.0   Tremor gr 1.5 Delayed sensation of heat    3.3-3.5    Felt hot and sweaty    2.4-2.8   Tremor 1-1.5; gait slight inc arm swing, no tremor. No SE    2.0 90 130  Tremor 1-hand;2-foot; Rigidity arm 1;leg 2     2.5 90 130 Tremor 1-hand;2-foot; Gait same as 2.4-2.8V;PW60 Gait same as 2.4-2.8V;PW60   Case+, 4- 1.2 60 130 Tremor 3.5; dec rigidity: arm-1;leg-2      2.0 60 130 Tremor 3.5      2.5 60 130 Tremor 3 (arm & leg)     3.0 60 130 Tremor 3 Leg starting to cramp (may be delayed loss of benefit from C3)    4.3 60 130 Tremor - 2 (arm & leg) Tightness shoulder/arm (transient)    5.0 60 130 Dec tremor: 1-hand;2-leg      4.0 60 130 Tremor - 1 (delayed effect); gait: no tremor, no arm swing    Case+, 5- 1.5 60 130 Tremor - 3; rigidity: 1-arm;2-leg      3.0 60 130 Tremor: 3-arm;2-foot; rigidity: 1-arm;2-leg          3.5 60 130 decr tremor: 1-arm;2-foot; rigidity: 1-arm;2-leg     4.0 60 130  tremor: 3 (later 1); 1-foot     4.5 60 130 Tremor: 4-hand (later 0); 0-foot     5.0 60 130 Tremor: 1(hand & foot)-increases with talking Leg cramping    6.0 60 130 Tremor - 3     6.5 60 130 Tremor: 1-2; rigidity: 1-arm; 1.5-leg Increased rigidity (subj.)    4.0 60 130 Tremor-1; decr calf cramping; no tremor with gait    Case+, 6- 1.5 60 130 Tremor: 4-arm;2-leg       3.0 60 130 Tremor: 2-3-arm;1.5-2-leg; rigidity: 1-arm; 1.5-leg      4.0 60 130 Tremor: 1;rigidity: 1.5-arm;2-leg     5.0 60 130 Tremor: 1-arm/leg; rigidity:1-arm     6.5 60 130  No SE    7.0 60 130 Tremor 0.5     4.0 60 130 Tremor: 0.5-1; gait - no tremor; slt  inc arm swing    Case+, 7- 2.0-3.0 60 130 Tremor: 3-arm; 2-leg; rigidity: 1.5-arm; 2-leg       4.0 60 130 Tremor: 3-arm; 1-2-leg      5.0 60 130 Tremor: 1-2 arm/leg; rigidity: 1-arm;2-leg     6.0-7.0 60 130  Increased tremor; subj. Increase leg rigidity    4.0 60 130 Tremor: 2.5-arm;1.5-foot; gait-still hand tremor    Case+, 8- 2.0 60 130 Tremor: 3-arm;1.5-leg       4.0 60 130 Tremor: 1-3-arm; 1.5-leg; rigidity:1-arm; 0-leg     5.0 60 130 Tremor: 3-arm; 1-leg     6.0 60 130 Tremor: 0-arm;1-leg; rigidity: 1-arm;0-leg     7.0 60 130 Tremor: 1 hand/leg     9.0 60 130 Increased tremor No other side effects    4.0 60 130 Slight hand tremor       Programming Comments:   C3 was the best contact in the STN for tremor control, but the tremor did not completely go away.    Several more dorsal contacts also resulted in tremor control (and better in the foot perhaps than the ventral contacts) and also reduced rigidity as well. They had very high side effect thresholds as well.     He was initially set at C+;3-, 2.5mA, PW 60, R 130 and instructed to take Sinemet IR 25/100 1.5 tabs.    After medications: He felt pretty good. His tremor was significantly improved and his walking was easier. He did not develop any dyskinesias. He did feel quite tired and fatigued. He felt almost like he had a bit too much medicine.     Final Settings:  Left Brain New Day NewDay2.0 NewDay3.0 Program D   Contacts C+;3- C+;3-(60);5-(40) C+;3-(35);6-(65)     Amplitude (mA)  2.5 4.2 6.4     Pulse Width ( s) 60 60 60     Frequency (Hz) 130 130 130     Patient Control (Min/Max) 0/3.0 0/5.0 0/7.0  /      Final Program selected: New Day        Data Reviewed:     Imaging/Lead Locations:  I personally reviewed the post-operative head CT for lead locations and merged it to the pre-operative brain MRI on the [facility]. The lead is in excellent location. Based on lead location contact(s) 2 and 3 on the left side are expected to be the best  "contacts.    Detailed lead locations are:    Left STN Lat AP Z   C1 -11.11 -3.49 -6.81   C2 -11.70 -2.75 -5.05   C3 -12.28 -2.00 -3.29   C4 -12.87 -1.25 -1.53   C5 -13.45 -0.51 0.23   C6 -14.04 0.24 1.99   C7 -14.62 0.99 3.75   C8 -15.21 1.74 5.54   @z=-4 -12.05 -2.30               Impression:  Kwame Lin is a 62 year old right-handed man with tremor predominant Parkinson disease with symptom onset in 2007 with right hand tremor, who is now recently status post left STN DBS with a [employer] Vercise 8-contact lead. Today initial programming of the device went very well.     Recommendations:   1. Initial programming of your DBS went very well today. I set up 3 programs, and am sending you home on program 1, \"New Day\". You are set at 2.5 mA. We have room to increase to 3.0mA as needed.    I also set up 2 additional programs that will be good options if despite increasing the settings on the 1st program we don't get full control of your tremor.    2. Medication plan:    -Decrease your 2nd and 3rd dose of Sinemet IR to 1 tab per dose, as below  -Decrease your bedtime Sinemet CR 50/200 to 1 tab        PD Medications                   6:30AM 11AM 4PM 8PM 10:30PM    Sinemet IR 25/100                                                1.5 1 1      Sinemet CR 50/200                             1               3. If you continue have some tremor on the right body and have not developed dyskinesias, then please send a [employer] message to let me know how much tremor you are having (how severe and how often) and I will instruct you how to increase your DBS settings (likely by 1-2 clicks at a time).    4. I want to see you back on Jan 24th at 12PM    5. DBS Education: You have an [employer] Implantable Pulse Generator (IPG) which is often referred to as the battery. We will be checking the status of your IPG at every visit and review your charging history to help you set up the ideal charging schedule.       Safety: " Contraindications with DBS include MRI, ultrasonic teeth cleaning, welding, high voltage equipment, and diathermy. If there is a medical emergency you should shut off your DBS system so that you can obtain an EKG. At the airport security you should request to be patted down and tell them you have a pacemaker (if they ask further questions you can explain that it is a brain pacemaker).  Also, if you have any outpatient or inpatient procedures, such as surgery, the DBS system will need to be shut off beforehand. Your surgeon will also need to use bipolar cautery. If they have questions they should call [employer]    6. Charging - please charge on  and  for 45 minutes. We will reassess your charging needs at your next visit.    Time spent DBS programmin minutes    Additional face-to-face time spent in counseling and coordination of care related to medication plan, expectations, plan of care, and teaching on how to use the patient remote control: 30 minutes.    The [employer] representative provide separate teaching on the recharging procedure for the device.      Again, thank you for allowing me to participate in the care of your patient.      Sincerely,    Clarita Borges MD

## 2018-01-23 ASSESSMENT — MOVEMENT DISORDERS SOCIETY - UNIFIED PARKINSONS DISEASE RATING SCALE (MDS-UPDRS)
HOBBIES_AND_OTHER_ACTIVITIES: SLIGHT: I AM A BIT SLOW BUT DO THESE ACTIVITIES EASILY.
HYGIENE: NORMAL: NOT AT ALL (NO PROBLEMS).
TURNING_IN_BED: NORMAL: NOT AT ALL (NO PROBLEMS).
WALKING_AND_BALANCE: SLIGHT: I AM SLIGHTLY SLOW OR MAY DRAG A LEG.  I NEVER USE A WALKING AID.
TOTAL_SCORE: 5
GETTING_OUT_OF_BED_CAR_DEEP_CHAIR: NORMAL: NOT AT ALL (NO PROBLEMS).
CHEWING_AND_SWALLOWING: NORMAL: NO PROBLEMS.
SPEECH: NORMAL: NOT AT ALL (NO PROBLEMS).
EATING_TASKS: SLIGHT: I AM SLOW, BUT I DO NOT NEED ANY HELP HANDLING MY FOOD AND HAVE NOT HAD FOOD SPILLS WHILE EATING.
FREEZING: NORMAL: NOT AT ALL (NO PROBLEMS).
TREMOR: SLIGHT: SHAKING OR TREMOR OCCURS BUT DOES NOT CAUSE PROBLEMS WITH ANY ACTIVITIES.
SALIVA_AND_DROOLING: NORMAL: NOT AT ALL (NO PROBLEMS).
HANDWRITING: SLIGHT: MY WRITING IS SLOW, CLUMSY OR UNEVEN, BUT ALL WORDS ARE CLEAR.
DRESSING: NORMAL: NOT AT ALL (NO PROBLEMS).

## 2018-01-23 ASSESSMENT — ENCOUNTER SYMPTOMS
EYE REDNESS: 0
DOUBLE VISION: 0
INSOMNIA: 1
EYE WATERING: 0
PANIC: 0
DEPRESSION: 0
DECREASED CONCENTRATION: 0
EYE PAIN: 0
NERVOUS/ANXIOUS: 0
EYE IRRITATION: 0

## 2018-01-24 ENCOUNTER — OFFICE VISIT (OUTPATIENT)
Dept: NEUROLOGY | Facility: CLINIC | Age: 63
End: 2018-01-24
Payer: COMMERCIAL

## 2018-01-24 VITALS
DIASTOLIC BLOOD PRESSURE: 63 MMHG | SYSTOLIC BLOOD PRESSURE: 121 MMHG | HEIGHT: 70 IN | BODY MASS INDEX: 29.26 KG/M2 | HEART RATE: 77 BPM | WEIGHT: 204.4 LBS

## 2018-01-24 DIAGNOSIS — G47.52 REM SLEEP BEHAVIOR DISORDER: ICD-10-CM

## 2018-01-24 DIAGNOSIS — G20.A1 PARKINSON DISEASE (H): Primary | ICD-10-CM

## 2018-01-24 ASSESSMENT — UNIFIED PARKINSONS DISEASE RATING SCALE (UPDRS)
GAIT: SLIGHT: INDEPENDENT WALKING WITH MINOR GAIT IMPAIRMENT.
AMPLITUDE_LIP_JAW: NORMAL: NO TREMOR.
RIGIDITY_LLE: NORMAL
SPEECH: SLIGHT: LOSS OF MODULATION, DICTION OR VOLUME, BUT STILL ALL WORDS EASY TO UNDERSTAND.
FINGER_TAPPING_LEFT: SLIGHT: ANY OF THE FOLLOWING: A) THE REGULAR RHYTHM IS BROKEN WITH ONE WITH ONE OR TWO INTERRUPTIONS OR HESITATIONS OF THE MOVEMENT B) SLIGHT SLOWING C) THE AMPLITUDE DECREMENTS NEAR THE END OF THE 10 MOVEMENTS.
PRONATION_SUPINATION_LEFT: SLIGHT: ANY OF THE FOLLOWING: A) THE REGULAR RHYTHM IS BROKEN WITH ONE WITH ONE OR TWO INTERRUPTIONS OR HESITATIONS OF THE MOVEMENT B) SLIGHT SLOWING C) THE AMPLITUDE DECREMENTS NEAR THE END OF THE 10 MOVEMENTS.
CONSTANCY_TREMOR_ATREST: SEVERE: TREMOR AT REST IS PRESENT > 75% OF THE ENTIRE EXAMINATION PERIOD.
LEG_AGILITY_LEFT: NORMAL
SPONTANEITY_OF_MOVEMENT: 0: NORMAL.  NO PROBLEMS.
PARKINSONS_MEDS: ON
AMPLITUDE_LUE: MILD > 1 CM BUT < 3 CM IN MAXIMAL AMPLITUDE.
POSTURAL_STABILITY: NORMAL:  RECOVERS WITH ONE OR TWO STEPS.
HANDMOVEMENTS_LEFT: SLIGHT: ANY OF THE FOLLOWING: A) THE REGULAR RHYTHM IS BROKEN WITH ONE WITH ONE OR TWO INTERRUPTIONS OR HESITATIONS OF THE MOVEMENT B) SLIGHT SLOWING C) THE AMPLITUDE DECREMENTS NEAR THE END OF THE 10 MOVEMENTS.
AMPLITUDE_LLE: MILD > 1 CM BUT < 3 CM IN MAXIMAL AMPLITUDE.
LEG_AGILITY_RIGHT: NORMAL
RIGIDITY_RUE: NORMAL
RIGIDITY_LUE: SLIGHT: RIGIDITY ONLY DETECTED WITH ACTIVATION MANEUVER.
TOETAPPING_LEFT: SLIGHT: ANY OF THE FOLLOWING: A) THE REGULAR RHYTHM IS BROKEN WITH ONE WITH ONE OR TWO INTERRUPTIONS OR HESITATIONS OF THE MOVEMENT B) SLIGHT SLOWING C) THE AMPLITUDE DECREMENTS NEAR THE END OF THE 10 MOVEMENTS.
TOTAL_SCORE: 3
TOETAPPING_RIGHT: SLIGHT: ANY OF THE FOLLOWING: A) THE REGULAR RHYTHM IS BROKEN WITH ONE WITH ONE OR TWO INTERRUPTIONS OR HESITATIONS OF THE MOVEMENT B) SLIGHT SLOWING C) THE AMPLITUDE DECREMENTS NEAR THE END OF THE 10 MOVEMENTS.
AMPLITUDE_RUE: SLIGHT: < 1 CM IN MAXIMAL AMPLITUDE.
AMPLITUDE_RLE: SLIGHT: < 1 CM IN MAXIMAL AMPLITUDE.
FINGER_TAPPING_RIGHT: NORMAL
RIGIDITY_NECK: SLIGHT: RIGIDITY ONLY DETECTED WITH ACTIVATION MANEUVER.
FREEZING_GAIT: NORMAL
RIGIDITY_RLE: NORMAL
TOTAL_SCORE_LEFT: 10
ARISING_CHAIR: NORMAL: ABLE TO ARISE QUICKLY WITHOUT HESITATION.
AXIAL_SCORE: 9
FACIAL_EXPRESSION: SLIGHT: MINIMAL MASKED FACIES MANIFESTED ONLY BY DECREASED FREQUENCY OF BLINKING.
PRONATION_SUPINATION_RIGHT: NORMAL
TOTAL_SCORE: 22
HANDMOVEMENTS_RIGHT: NORMAL
POSTURE: 1 SLIGHT.  NOT QUITE ERECT BUT COULD BE NORMAL FOR OLDER PERSONS.

## 2018-01-24 ASSESSMENT — PAIN SCALES - GENERAL: PAINLEVEL: NO PAIN (0)

## 2018-01-24 NOTE — PROGRESS NOTES
Department of Neurology  Movement Disorders Division   DBS Follow-up Note    Patient: Kwame Lin  MRN: [id number]   : 1955   Date of Visit: 2018    Diagnosis: Parkinson disease                                              DBS Target(s): Left STN  Date(s) of DBS Lead Placement: 17  Date(s) of IPG Placement:17    Chief Complaint:  Kwame Lin is a 62 year old right-handed man with tremor predominant Parkinson disease with symptom onset in  with right hand tremor, who is   status post left STN DBS. He returns for his first follow-up since initial programming.     Interval History:  After initial programming, 18, he reported significant improvement in tremor and right leg stiffness. He then had abnormal movements of the right arm that only happened when he tried to move it (not do what he wanted it to). This was intermittent, 3-4 times per hour and large amplitude. Resolved after three days. He denies dyskinesias since that time. No balance or speech changes.    Since that time he has had some mild re-emergence of the right hand tremor although he is still quite happy with it. He has also had some worsening stiffness of the right leg. This can make it difficult for him to fall asleep at night. Wife reports he had RBD prior to surgery, which then stopped after surgery, but is re-emerging again. He jumped out of bed recently. He is also having sudden full body jerks at night.    He had some confusion about what his nightly CR dose should be. He is taking 1 tab of 25/100 CR Sinemet.    Parkinson Disease Motor Symptom Review:    Motor fluctuations:     Dyskinesia:  None aside from transient ?kinesigenic dyskinesias   Wearing off:  Every 3 hours  Freezing of gait: none  Dystonia: No longer have any cramping of the foot.  Tremor: He estimates that his tremors have gone from a 10 to a 0.5  Rigidity: some worsening right leg stiffness  Bradykinesia: moving faster than prior     Review of  Systems:  Other than that noted at the end of this note, the remainder of 12 systems reviewed were negative.    Answers for HPI/ROS submitted by the patient on 1/23/2018   General Symptoms: No  Skin Symptoms: No  HENT Symptoms: No  EYE SYMPTOMS: Yes  HEART SYMPTOMS: No  LUNG SYMPTOMS: No  INTESTINAL SYMPTOMS: No  URINARY SYMPTOMS: No  REPRODUCTIVE SYMPTOMS: No  SKELETAL SYMPTOMS: No  BLOOD SYMPTOMS: No  NERVOUS SYSTEM SYMPTOMS: No  MENTAL HEALTH SYMPTOMS: Yes  Eye pain: No  Vision loss: No  Dry eyes: Yes  Watery eyes: No  Eye bulging: No  Double vision: No  Flashing of lights: No  Spots: No  Floaters: Yes  Redness: No  Crossed eyes: No  Tunnel Vision: No  Yellowing of eyes: No  Eye irritation: No  Nervous or Anxious: No  Depression: No  Trouble sleeping: Yes  Trouble thinking or concentrating: No  Mood changes: No  Panic attacks: No        Medications:  Current Outpatient Prescriptions   Medication Sig Dispense Refill     Coenzyme Q10 300 MG CAPS Take 300 mg by mouth every morning       multivitamin, therapeutic with minerals (MULTI-VITAMIN) TABS tablet Take 1 tablet by mouth every morning       diclofenac (VOLTAREN) 1 % GEL topical gel Apply qid prn pain       acetaminophen (TYLENOL) 325 MG tablet Take 325-650 mg by mouth nightly as needed for mild pain       carbidopa-levodopa (SINEMET)  MG per tablet 1.5 every 4 hours 3 x daily aroudnd 6:30 am, 11:00 am, & 4:00 pm. 90 tablet      carbidopa-levodopa (SINEMET CR)  MG per CR tablet Take 2 tablets by mouth At Bedtime       thyroid (ARMOUR THYROID) 60 MG tablet Take 60 mg by mouth every morning        citalopram (CELEXA) 10 MG tablet Take 10 mg by mouth every morning        clonazePAM (KLONOPIN) 0.5 MG tablet Take 0.5 mg by mouth At Bedtime        Misc Natural Products (RED WINE EXTRACT) CAPS Take by mouth daily (with lunch)        Probiotic Product (ACIDOPHILUS/GOAT MILK) CAPS At Bedtime              PD Medications                   6:30AM 11AM 4PM 8PM  10:30PM     Sinemet IR 25/100                                                1.5 1 1        Sinemet CR 25/100                                 1         Clonazepam      0.5mg                         1                           Allergies: is allergic to bactrim; codeine; ketorolac; morphine; nalbuphine; penicillins; seasonal allergies; sulfa drugs; and toradol.    Past Medical History:  Past Medical History:   Diagnosis Date     Anosmia 10/12/2017     Anxiety      Depressed mood 10/12/2017     Hypothyroid      Parkinson disease (H)      PONV (postoperative nausea and vomiting)      RBD (REM behavioral disorder) 10/12/2017       Past Surgical History:  Past Surgical History:   Procedure Laterality Date     ARTHROSCOPY KNEE Right     twice     ARTHROSCOPY KNEE Right     left     epidydmal mass removal, benign       IMPLANT DEEP BRAIN STIMULATION GENERATOR / BATTERY Left 12/22/2017    Procedure: IMPLANT DEEP BRAIN STIMULATION GENERATOR / BATTERY;  Deep Brain Stimulator Placement, Phase II, Placement Of Deep Brain Stimulator Generator/Battery Over The Left Chest Wall;  Surgeon: Arpan Huynh MD;  Location: [facility] OR     OPTICAL TRACKING SYSTEM INSERTION DEEP BRAIN STIMULATION Left 12/14/2017    Procedure: OPTICAL TRACKING SYSTEM INSERTION DEEP BRAIN STIMULATION;  Stealth Assisted Left Deep Brain Stimulator Placement, Phase I, Placement Of Left Side Deep Brain Stimulator Electrode, Target Left Subthalamic Nucleus With Microelectrode Recording;  Surgeon: Arpan Huynh MD;  Location: [facility] OR       Social History:  Social History     Social History     Marital status:      Spouse name: N/A     Number of children: N/A     Years of education: N/A     Occupational History     Small business start consultant      Social History Main Topics     Smoking status: Never Smoker     Smokeless tobacco: Never Used     Alcohol use 0.6 - 1.2 oz/week     1 - 2 Cans of beer per week      Comment: MONTHLY     Drug use: No  "    Sexual activity: Yes     Partners: Female     Other Topics Concern     Not on file     Social History Narrative    Previous worked as [employer].    Now consulting part-time.    , lives with wife.    Non-smoker. Occasional drink.        Past surgical history that includes epidydmal mass removal, benign.        Social History:    Previous worked as [employer].    Now consulting part-time.    , lives with wife.    Non-smoker. Occasional drink.         family history includes Lung Cancer in his father; Parkinsonism in his cousin and maternal aunt.            Family History:  Family History   Problem Relation Age of Onset     Lung Cancer Father      Parkinsonism Cousin      paternal side     Parkinsonism Maternal Aunt        Physical Exam:  The patient's  height is 1.765 m (5' 9.5\") and weight is 92.7 kg (204 lb 6.4 oz). His blood pressure is 121/63 and his pulse is 77.      Incisions: CDI  Neurological Examination:   He is alert and oriented and has fluent speech without dysarthria and is able to provide an interval medical history  Extraocular movements are full. He has normal smooth pursuits and saccades. His face is symmetric with equal activation.     Last medication dose: 2 hours prior  UPDRS Values 1/24/2018   Medication On   R Brain DBS: None   L Brain DBS: On   Speech 1   Facial Expression 1   Rigidity Neck 1   Rigidity RUE 0   Rigidity LUE 1   Rigidity RLE 0   Rigidity LLE 0   Finger Taps R 0   Finger Taps L 1   Hand Mvt R 0   Hand Mvt L 1   Pron-/Supinate R 0   Pron-/Supinate L 1   Toe Tap R 1   Toe Tap L 1   Leg Agility R 0   Leg Agility L 0   Arise From Chair 0   Gait 1   Gait Freezing 0   Postural Stability 0   Global Spont Mvt 0   Postural Tremor Rue 0   Postural Tremor LUE 0   Kinetic Tremor RUE 0   Kinetic Tremor LUE 1   Rest Tremor RUE 1   Rest Tremor LUE 2   Rest Tremor RLE 1   Rest Tremor LLE 2 " "  Rest Tremor Lip/Jaw 0   Rest Tremor Constancy 4   Total Right 3   Total Left 10   Axial Total 9   Total 22     Procedure: DBS Programming     Lead(s):     Left Right   DBS Target STN         DBS Lead Type [employer] - 8 contact lead      Lead Implant Date  12/14/17          IPG(s):    1 2   IPG [employer]         IPG Implant Date 12/22/17         Location Left chest         Battery (V) 4.0V          Full Impedence/Currents Check: Normal    Initial Settings:  Left Brain New Day Program B Program C Program D   Contacts C+;3-         Amplitude (mA) 2.5         Pulse Width ( s) 60         Frequency (Hz) 130             Final Settings:  Left Brain New Day Program B Program C Program D   Contacts  C+;3-         Amplitude (mA) 3.0          Pulse Width ( s) 60         Frequency (Hz) 130         Patient Control (min/max) 0/3.0  /   /   /      Final Program selected:    Increased by 0.5mA with further reduction in tremor.       Impression: Kwame Lin is a 62 year old right-handed man with tremor predominant Parkinson disease with symptom onset in 2007 with right hand tremor, who is status post left STN DBS. He has had significant improvement in tremor, right leg cramping, and overall wellbeing with DBS. However, as compared to right after his initial programming he has had mild increase in tremor. He had a 3 day period of right arm dyskinesia initially after programming, which resolved on its own. REM sleep behavior disorder is an issue.    Recommendations (as provided to patient):  1. I increased your DBS to 3.0mA today (up from 2.5) to further control your right foot cramping and tremor.  You are still on Program \"New Day\"    If you develop problematic dyskinesias, you can decrease it by 2 clicks to 2.8. However, if the dyskinesias are not too bothersome they may improve on their own after a few days.    2. Jerking at 3AM and acting out your dreams.  Increase your Sinemet CR 25/100 back " to 2 tabs at bedtime.    Otherwise, keep your medications the same.     PD Medications                   6:30AM 11AM 4PM 10:30PM     Sinemet IR 25/100                                                1.5 1 1       Sinemet CR 25/100                               2         Clonazepam      0.5mg                       1            3. If increasing your bedtime Sinemet CR doesn't help your vivid dreams, then I recommend you start melatonin 3mg at bedtime, and increase by 3mg per week until 12mg at bedtime (or to the lowest dose that helps your dreams).    4. There is no reason that you cannot have an injection for your knee. Your brain and incisions have already healed nicely from the surgery.    5. You can charge once weekly, starting next Monday. You will likely need to charge 50-80 per week    6. Follow-up in 2 months.      Time spent with patient: Greater than 50% of this 45 minute visit was spent in counseling and coordination of care related to the above issues.    Additional time spent for separate DBS programmin minutes    Clarita Borges MD    of Neurology

## 2018-01-24 NOTE — LETTER
2018       RE: Kwame Lin  [address] [place] [address]     Dear Colleague,    Thank you for referring your patient, Kwame Lin, to the [facility] NEUROLOGY at [facility]. Please see a copy of my visit note below.    Department of Neurology  Movement Disorders Division   DBS Follow-up Note    Patient: Kwame Lin  MRN: [id number]   : 1955   Date of Visit: 2018    Diagnosis: Parkinson disease                                              DBS Target(s): Left STN  Date(s) of DBS Lead Placement: 17  Date(s) of IPG Placement:17    Chief Complaint:  Kwame Lin is a 62 year old right-handed man with tremor predominant Parkinson disease with symptom onset in  with right hand tremor, who is   status post left STN DBS . He returns for his first follow-up since initial programming.     Interval History:  After initial programming, 18, he reported significant improvement in tremor and right leg stiffness. He then had abnormal movements of the right arm that only happened when he tried to move it (not do what he wanted it to). This was intermittent, 3-4 times per hour and large amplitude. Resolved after three days. He denies dyskinesias since that time. No balance or speech changes.    Since that time he has had some mild re-emergence of the right hand tremor although he is still quite happy with it. He has also had some worsening stiffness of the right leg. This can make it difficult for him to fall asleep at night. Wife reports he had RBD prior to surgery, which then stopped after surgery, but is re-emerging again. He jumped out of bed recently. He is also having sudden full body jerks at night.    He had some confusion about what his nightly CR dose should be. He is taking 1 tab of 25/100 CR Sinemet.    Parkinson Disease Motor Symptom Review:    Motor fluctuations:     Dyskinesia:  None aside from transient ?kinesigenic  dyskinesias   Wearing off:  Every 3 hours  Freezing of gait: none  Dystonia: No longer have any cramping of the foot.  Tremor: He estimates that his tremors have gone from a 10 to a 0.5  Rigidity: some worsening right leg stiffness  Bradykinesia: moving faster than prior     Review of Systems:  Other than that noted at the end of this note, the remainder of 12 systems reviewed were negative.        Medications:  Current Outpatient Prescriptions   Medication Sig Dispense Refill     Coenzyme Q10 300 MG CAPS Take 300 mg by mouth every morning       multivitamin, therapeutic with minerals (MULTI-VITAMIN) TABS tablet Take 1 tablet by mouth every morning       diclofenac (VOLTAREN) 1 % GEL topical gel Apply qid prn pain       acetaminophen (TYLENOL) 325 MG tablet Take 325-650 mg by mouth nightly as needed for mild pain       carbidopa-levodopa (SINEMET)  MG per tablet 1.5 every 4 hours 3 x daily aroudnd 6:30 am, 11:00 am, & 4:00 pm. 90 tablet      carbidopa-levodopa (SINEMET CR)  MG per CR tablet Take 2 tablets by mouth At Bedtime       thyroid (ARMOUR THYROID) 60 MG tablet Take 60 mg by mouth every morning        citalopram (CELEXA) 10 MG tablet Take 10 mg by mouth every morning        clonazePAM (KLONOPIN) 0.5 MG tablet Take 0.5 mg by mouth At Bedtime        Misc Natural Products (RED WINE EXTRACT) CAPS Take by mouth daily (with lunch)        Probiotic Product (ACIDOPHILUS/GOAT MILK) CAPS At Bedtime              PD Medications                   6:30AM 11AM 4PM 8PM 10:30PM     Sinemet IR 25/100                                                1.5 1 1        Sinemet CR 25/100                                 1         Clonazepam        0.5mg                         1                           Allergies: is allergic to bactrim; codeine; ketorolac; morphine; nalbuphine; penicillins; seasonal allergies; sulfa drugs; and toradol.    Past Medical History:  Past Medical History:   Diagnosis Date     Anosmia 10/12/2017      Anxiety      Depressed mood 10/12/2017     Hypothyroid      Parkinson disease (H)      PONV (postoperative nausea and vomiting)      RBD (REM behavioral disorder) 10/12/2017       Past Surgical History:  Past Surgical History:   Procedure Laterality Date     ARTHROSCOPY KNEE Right     twice     ARTHROSCOPY KNEE Right     left     epidydmal mass removal, benign       IMPLANT DEEP BRAIN STIMULATION GENERATOR / BATTERY Left 12/22/2017    Procedure: IMPLANT DEEP BRAIN STIMULATION GENERATOR / BATTERY;  Deep Brain Stimulator Placement, Phase II, Placement Of Deep Brain Stimulator Generator/Battery Over The Left Chest Wall;  Surgeon: Arpan Huynh MD;  Location: [facility] OR     OPTICAL TRACKING SYSTEM INSERTION DEEP BRAIN STIMULATION Left 12/14/2017    Procedure: OPTICAL TRACKING SYSTEM INSERTION DEEP BRAIN STIMULATION;  Stealth Assisted Left Deep Brain Stimulator Placement, Phase I, Placement Of Left Side Deep Brain Stimulator Electrode, Target Left Subthalamic Nucleus With Microelectrode Recording;  Surgeon: Arpan Huynh MD;  Location: [facility] OR       Social History:  Social History     Social History     Marital status:      Spouse name: N/A     Number of children: N/A     Years of education: N/A     Occupational History     Small business start consultant      Social History Main Topics     Smoking status: Never Smoker     Smokeless tobacco: Never Used     Alcohol use 0.6 - 1.2 oz/week     1 - 2 Cans of beer per week      Comment: MONTHLY     Drug use: No     Sexual activity: Yes     Partners: Female     Other Topics Concern     Not on file     Social History Narrative    Previous worked as [employer].    Now consulting part-time.    , lives with wife.    Non-smoker. Occasional drink.        Past surgical history that includes epidydmal mass removal, benign.        Social History:    Previous worked as [employer].    Now consulting part-time.    , lives with wife.    Non-smoker. Occasional drink.         family history includes Lung Cancer in his father; Parkinsonism in his cousin and maternal aunt.            Family History:  Family History   Problem Relation Age of Onset     Lung Cancer Father      Parkinsonism Cousin      paternal side     Parkinsonism Maternal Aunt        Physical Exam:  The patient's  height is 1.765 m (5' 9.5\") and weight is 92.7 kg (204 lb 6.4 oz). His blood pressure is 121/63 and his pulse is 77.      Incisions: CDI  Neurological Examination:   He is alert and oriented and has fluent speech without dysarthria and is able to provide an interval medical history  Extraocular movements are full. He has normal smooth pursuits and saccades. His face is symmetric with equal activation.     Last medication dose: 2 hours prior  UPDRS Values 1/24/2018   Medication On   R Brain DBS: None   L Brain DBS: On   Speech 1   Facial Expression 1   Rigidity Neck 1   Rigidity RUE 0   Rigidity LUE 1   Rigidity RLE 0   Rigidity LLE 0   Finger Taps R 0   Finger Taps L 1   Hand Mvt R 0   Hand Mvt L 1   Pron-/Supinate R 0   Pron-/Supinate L 1   Toe Tap R 1   Toe Tap L 1   Leg Agility R 0   Leg Agility L 0   Arise From Chair 0   Gait 1   Gait Freezing 0   Postural Stability 0   Global Spont Mvt 0   Postural Tremor Rue 0   Postural Tremor LUE 0   Kinetic Tremor RUE 0   Kinetic Tremor LUE 1   Rest Tremor RUE 1   Rest Tremor LUE 2   Rest Tremor RLE 1   Rest Tremor LLE 2   Rest Tremor Lip/Jaw 0   Rest Tremor Constancy 4   Total Right 3   Total Left 10   Axial Total 9   Total 22     Procedure: DBS Programming     Lead(s):     Left Right   DBS Target STN         DBS Lead Type [employer] Vercise - 8 contact lead      Lead Implant Date  12/14/17          IPG(s):    1 2   IPG [employer]ercise         IPG Implant Date 12/22/17         Location Left chest         Battery (V) 4.0V          Full " "Impedence/Currents Check: Normal    Initial Settings:  Left Brain New Day Program B Program C Program D   Contacts C+;3-         Amplitude (mA) 2.5         Pulse Width ( s) 60         Frequency (Hz) 130             Final Settings:  Left Brain New Day Program B Program C Program D   Contacts  C+;3-         Amplitude (mA) 3.0          Pulse Width ( s) 60         Frequency (Hz) 130         Patient Control (min/max) 0/3.0  /   /   /      Final Program selected:    Increased by 0.5mA with further reduction in tremor.       Impression: Kwame Lin is a 62 year old right-handed man with tremor predominant Parkinson disease with symptom onset in 2007 with right hand tremor, who is status post left STN DBS . He has had significant improvement in tremor, right leg cramping, and overall wellbeing with DBS. However, as compared to right after his initial programming he has had mild increase in tremor. He had a 3 day period of right arm dyskinesia initially after programming, which resolved on its own. REM sleep behavior disorder is an issue.    Recommendations (as provided to patient):  1. I increased your DBS to 3.0mA today (up from 2.5) to further control your right foot cramping and tremor.  You are still on Program \"New Day\"    If you develop problematic dyskinesias, you can decrease it by 2 clicks to 2.8. However, if the dyskinesias are not too bothersome they may improve on their own after a few days.    2. Jerking at 3AM and acting out your dreams.  Increase your Sinemet CR 25/100 back to 2 tabs at bedtime.    Otherwise, keep your medications the same.     PD Medications                   6:30AM 11AM 4PM 10:30PM     Sinemet IR 25/100                                                1.5 1 1       Sinemet CR 25/100                               2         Clonazepam      0.5mg                       1            3. If increasing your bedtime Sinemet CR doesn't help your vivid dreams, then I recommend you start melatonin 3mg " at bedtime, and increase by 3mg per week until 12mg at bedtime (or to the lowest dose that helps your dreams).    4. There is no reason that you cannot have an injection for your knee. Your brain and incisions have already healed nicely from the surgery.    5. You can charge once weekly, starting next Monday. You will likely need to charge 50-80 per week    6. Follow-up in 2 months.      Time spent with patient: Greater than 50% of this 45 minute visit was spent in counseling and coordination of care related to the above issues.    Additional time spent for separate DBS programmin minutes      Again, thank you for allowing me to participate in the care of your patient.      Sincerely,    Clarita Borges MD

## 2018-01-24 NOTE — PROGRESS NOTES
Department of Neurology  Movement Disorders Division   DBS Initial Programming Note    Patient: Kwame Lin   MRN: [id number]   : 1955   Date of Visit: 2018     Diagnosis: Parkinson disease    DBS Target(s): Left STN  Date(s) of DBS Lead Placement: 17  Date(s) of IPG Placement:17    Chief Complaint:  Kwame Lin is a 62 year old right-handed man with tremor predominant Parkinson disease with symptom onset in  with right hand tremor, who is now recently status post left STN DBS.  He returns to clinic today for initial DBS programming.      Interval History:  Since his last visit he underwent. he tolerated the surgery without complications.    Lesion effect: He had significant reduction in tremor in recovery for a couple of hours, then it slowly came back to full force in about 1 day.  He noticed significant improvement in right leg pain and cramping for about 1 week.     Review of Goals for DBS:  1. Tremor reduction  2. Reduce leg pain and rigidity  3. Face/jaw tremor (but knows this may not be possible with unilateral stimulation)      Wearing off: YES  Off time: tremor essentially 100% of the time. At night it is the worst and interferes with his ability to fall asleep (goes to bed 10:30PM and tremor will keep him up until midnight). It can help with the transition to take an extra dose of Sinemet IR at 8PM.   Whe he has tried to take 2 tabs Sinemet IR 25/100 per dose he gets a sense that it is a bit too much (perhaps wooziness), although his tremor is more controlled.       His history is also significant for dream enactment behavior, anxiety anosmia x 20 years, constipation, and depression. He also has chronic mild orthostasis with one episode of syncope related to carbidopa/levodopa in the past.     Review of Symptoms:  Other than that mentioned above, the remainder of 12 systems reviewed were negative.      Medications:  Current Outpatient Prescriptions   Medication Sig  "Dispense Refill     Coenzyme Q10 300 MG CAPS Take 300 mg by mouth every morning       multivitamin, therapeutic with minerals (MULTI-VITAMIN) TABS tablet Take 1 tablet by mouth every morning       diclofenac (VOLTAREN) 1 % GEL topical gel Apply qid prn pain       acetaminophen (TYLENOL) 325 MG tablet Take 325-650 mg by mouth nightly as needed for mild pain       carbidopa-levodopa (SINEMET)  MG per tablet 1.5 every 4 hours 3 x daily aroudnd 6:30 am, 11:00 am, & 4:00 pm. 90 tablet      carbidopa-levodopa (SINEMET CR)  MG per CR tablet Take 2 tablets by mouth At Bedtime       thyroid (ARMOUR THYROID) 60 MG tablet Take 60 mg by mouth every morning        citalopram (CELEXA) 10 MG tablet Take 10 mg by mouth every morning        clonazePAM (KLONOPIN) 0.5 MG tablet Take 0.5 mg by mouth At Bedtime        Misc Natural Products (RED WINE EXTRACT) CAPS Take by mouth daily (with lunch)        Probiotic Product (ACIDOPHILUS/GOAT MILK) CAPS At Bedtime            PD Medications                   6:30AM 11AM 4PM 8PM 10:30PM    Sinemet IR 25/100                                                1.5 1.5 1.5 1.5 (50% of days)     Sinemet CR 50/200                             2        Clonazepam                                              Allergies: is allergic to bactrim; codeine; ketorolac; morphine; nalbuphine; penicillins; seasonal allergies; sulfa drugs; and toradol.    Physical Exam:  The patient's  height is 1.765 m (5' 9.5\") and weight is 90.7 kg (200 lb). His blood pressure is 126/69 and his pulse is 71. His oxygen saturation is 99%.      Incisions: Well-healed.     Neurological Examination: He is alert and oriented and has fluent speech without dysarthria and is able to provide an interval medical history  Extraocular movements are full. He has normal smooth pursuits and saccades. His face is symmetric with equal activation.     UPDRS Values 1/12/2018 1/12/2018   Medication Off On   R Brain DBS: None None   L Brain " "DBS: On On   Speech 1 1   Facial Expression 2 1   Rigidity Neck 1 1   Rigidity RUE 2 1   Rigidity LUE 2 1   Rigidity RLE 2 1   Rigidity LLE 2 1   Finger Taps R 3 3   Finger Taps L 1 1   Hand Mvt R 2 1   Hand Mvt L 1 0   Pron-/Supinate R 3 1   Pron-/Supinate L 0 2   Toe Tap R 2 2   Toe Tap L 1 2   Leg Agility R 3 0   Leg Agility L 1 0   Arise From Chair 1 1   Gait 1 1   Gait Freezing 0 0   Postural Stability 0 0   Global Spont Mvt 2 1   Postural Tremor Rue 4 1   Postural Tremor LUE 2 2   Kinetic Tremor RUE 0 0   Kinetic Tremor LUE 0 0   Rest Tremor RUE 3 1   Rest Tremor LUE 2 1   Rest Tremor RLE 2 1   Rest Tremor LLE 1 0   Rest Tremor Lip/Jaw 2 0   Rest Tremor Constancy 4 3   Total Right 26 12   Total Left 13 10   Axial Total 16 10   Total 55 32       Procedure: DBS Programming    Lead(s):    Left Right   DBS Target STN          DBS Lead Type [employer] Vercise - 8 contact lead      Lead Implant Date  12/14/17           IPG(s):   1 2   IPG [employer] Vercise          IPG Implant Date 12/22/17          Location Left chest          Battery (V) 3.79V         Full Impedence/Currents Check: Normal    Initial Settings: IPG was interrogated and found to be OFF.        Left Hemisphere Monopolar Review    Contacts Amplitude   (mA) PW   (usec) Rate  (Hz) Assessment Adverse Effects   Case+, 1- 1.0 60 130 Tremor gr 4; Rigidity gr 2 (baseline)     1.5   Transient reduction tremor to 3; Rigidity to 1.5     2.0   Inc tremor gr 4; Inc Rigidity gr 2.5     2.5   Tremor gr 4 Feels \"uncomfortable\"; speech a bit more effortful    2.8    Tightening R arm; speech more effortful    1.9    Speech still feels a hint abnormal    Case+, 2- 1.0 60 130 Tremor dec gr 2 (3 with talking); Rig 1.5      1.5-2.0 60 130 Tremor gr 3     2.5 60 130 Decr tremor gr 2; Dec Rig gr 1     3.0   Tremor gr 2     3.3    Incr tremor gr 4 Incr tremor, speech more effortful    2.5 60 130 Tremor gr 2-3 No SE    2.5 60 170 No better: Tremor gr 2-3  "   Case+, 3- 1.0 60 130 Dec tremor gr 3      2.0 60 130 Tremor gr 3      2.5 60 130 Dec tremor gr 1-1.5; Dec rigid arm (1), leg (1.5)     3.0   Tremor gr 1.5 Delayed sensation of heat    3.3-3.5    Felt hot and sweaty    2.4-2.8   Tremor 1-1.5; gait slight inc arm swing, no tremor. No SE    2.0 90 130  Tremor 1-hand;2-foot; Rigidity arm 1;leg 2     2.5 90 130 Tremor 1-hand;2-foot; Gait same as 2.4-2.8V;PW60 Gait same as 2.4-2.8V;PW60   Case+, 4- 1.2 60 130 Tremor 3.5; dec rigidity: arm-1;leg-2      2.0 60 130 Tremor 3.5      2.5 60 130 Tremor 3 (arm & leg)     3.0 60 130 Tremor 3 Leg starting to cramp (may be delayed loss of benefit from C3)    4.3 60 130 Tremor - 2 (arm & leg) Tightness shoulder/arm (transient)    5.0 60 130 Dec tremor: 1-hand;2-leg      4.0 60 130 Tremor - 1 (delayed effect); gait: no tremor, no arm swing    Case+, 5- 1.5 60 130 Tremor - 3; rigidity: 1-arm;2-leg      3.0 60 130 Tremor: 3-arm;2-foot; rigidity: 1-arm;2-leg          3.5 60 130 decr tremor: 1-arm;2-foot; rigidity: 1-arm;2-leg     4.0 60 130  tremor: 3 (later 1); 1-foot     4.5 60 130 Tremor: 4-hand (later 0); 0-foot     5.0 60 130 Tremor: 1(hand & foot)-increases with talking Leg cramping    6.0 60 130 Tremor - 3     6.5 60 130 Tremor: 1-2; rigidity: 1-arm; 1.5-leg Increased rigidity (subj.)    4.0 60 130 Tremor-1; decr calf cramping; no tremor with gait    Case+, 6- 1.5 60 130 Tremor: 4-arm;2-leg       3.0 60 130 Tremor: 2-3-arm;1.5-2-leg; rigidity: 1-arm; 1.5-leg      4.0 60 130 Tremor: 1;rigidity: 1.5-arm;2-leg     5.0 60 130 Tremor: 1-arm/leg; rigidity:1-arm     6.5 60 130  No SE    7.0 60 130 Tremor 0.5     4.0 60 130 Tremor: 0.5-1; gait - no tremor; slt inc arm swing    Case+, 7- 2.0-3.0 60 130 Tremor: 3-arm; 2-leg; rigidity: 1.5-arm; 2-leg       4.0 60 130 Tremor: 3-arm; 1-2-leg      5.0 60 130 Tremor: 1-2 arm/leg; rigidity: 1-arm;2-leg     6.0-7.0 60 130  Increased tremor; subj. Increase leg rigidity    4.0 60 130 Tremor:  2.5-arm;1.5-foot; gait-still hand tremor    Case+, 8- 2.0 60 130 Tremor: 3-arm;1.5-leg       4.0 60 130 Tremor: 1-3-arm; 1.5-leg; rigidity:1-arm; 0-leg     5.0 60 130 Tremor: 3-arm; 1-leg     6.0 60 130 Tremor: 0-arm;1-leg; rigidity: 1-arm;0-leg     7.0 60 130 Tremor: 1 hand/leg     9.0 60 130 Increased tremor No other side effects    4.0 60 130 Slight hand tremor       Programming Comments:   C3 was the best contact in the STN for tremor control, but the tremor did not completely go away.    Several more dorsal contacts also resulted in tremor control (and better in the foot perhaps than the ventral contacts) and also reduced rigidity as well. They had very high side effect thresholds as well.     He was initially set at C+;3-, 2.5mA, PW 60, R 130 and instructed to take Sinemet IR 25/100 1.5 tabs.    After medications: He felt pretty good. His tremor was significantly improved and his walking was easier. He did not develop any dyskinesias. He did feel quite tired and fatigued. He felt almost like he had a bit too much medicine.     Final Settings:  Left Brain New Day NewDay2.0 NewDay3.0 Program D   Contacts C+;3- C+;3-(60);5-(40) C+;3-(35);6-(65)     Amplitude (mA)  2.5 4.2 6.4     Pulse Width ( s) 60 60 60     Frequency (Hz) 130 130 130     Patient Control (Min/Max) 0/3.0 0/5.0 0/7.0  /      Final Program selected: New Day        Data Reviewed:     Imaging/Lead Locations:  I personally reviewed the post-operative head CT for lead locations and merged it to the pre-operative brain MRI on the [facility]. The lead is in excellent location. Based on lead location contact(s) 2 and 3 on the left side are expected to be the best contacts.    Detailed lead locations are:    Left STN Lat AP Z   C1 -11.11 -3.49 -6.81   C2 -11.70 -2.75 -5.05   C3 -12.28 -2.00 -3.29   C4 -12.87 -1.25 -1.53   C5 -13.45 -0.51 0.23   C6 -14.04 0.24 1.99   C7 -14.62 0.99 3.75   C8 -15.21 1.74 5.54   @z=-4 -12.05 -2.30          "      Impression:  Kwame Lin is a 62 year old right-handed man with tremor predominant Parkinson disease with symptom onset in 2007 with right hand tremor, who is now recently status post left STN DBS with a [employer]ercise 8-contact lead. Today initial programming of the device went very well.     Recommendations:   1. Initial programming of your DBS went very well today. I set up 3 programs, and am sending you home on program 1, \"New Day\". You are set at 2.5 mA. We have room to increase to 3.0mA as needed.    I also set up 2 additional programs that will be good options if despite increasing the settings on the 1st program we don't get full control of your tremor.    2. Medication plan:    -Decrease your 2nd and 3rd dose of Sinemet IR to 1 tab per dose, as below  -Decrease your bedtime Sinemet CR 50/200 to 1 tab        PD Medications                   6:30AM 11AM 4PM 8PM 10:30PM    Sinemet IR 25/100                                                1.5 1 1      Sinemet CR 50/200                             1               3. If you continue have some tremor on the right body and have not developed dyskinesias, then please send a [employer] message to let me know how much tremor you are having (how severe and how often) and I will instruct you how to increase your DBS settings (likely by 1-2 clicks at a time).    4. I want to see you back on Jan 24th at 12PM    5. DBS Education: You have an [employer] Implantable Pulse Generator (IPG) which is often referred to as the battery. We will be checking the status of your IPG at every visit and review your charging history to help you set up the ideal charging schedule.       Safety: Contraindications with DBS include MRI, ultrasonic teeth cleaning, welding, high voltage equipment, and diathermy. If there is a medical emergency you should shut off your DBS system so that you can obtain an EKG. At the airport security you should request to be patted down " and tell them you have a pacemaker (if they ask further questions you can explain that it is a brain pacemaker).  Also, if you have any outpatient or inpatient procedures, such as surgery, the DBS system will need to be shut off beforehand. Your surgeon will also need to use bipolar cautery. If they have questions they should call [facility]    6. Charging - please charge on  and  for 45 minutes. We will reassess your charging needs at your next visit.    Time spent DBS programmin minutes    Additional face-to-face time spent in counseling and coordination of care related to medication plan, expectations, plan of care, and teaching on how to use the patient remote control: 30 minutes.    The [employer] representative provide separate teaching on the recharging procedure for the device.    Clarita Borges MD    of Neurology           Answers for HPI/ROS submitted by the patient on 2018   General Symptoms: No  Skin Symptoms: No  HENT Symptoms: No  EYE SYMPTOMS: No  HEART SYMPTOMS: No  LUNG SYMPTOMS: No  INTESTINAL SYMPTOMS: No  URINARY SYMPTOMS: No  REPRODUCTIVE SYMPTOMS: No  SKELETAL SYMPTOMS: Yes  BLOOD SYMPTOMS: No  NERVOUS SYSTEM SYMPTOMS: No  MENTAL HEALTH SYMPTOMS: No  Back pain: No  Muscle aches: No  Neck pain: No  Swollen joints: Yes  Joint pain: Yes  Bone pain: No  Muscle cramps: No  Muscle weakness: No  Joint stiffness: Yes  Bone fracture: No

## 2018-01-24 NOTE — MR AVS SNAPSHOT
"              After Visit Summary   1/24/2018    Kwame Lin    MRN: [id number]           Patient Information     Date Of Birth          1955        Visit Information        Provider Department      1/24/2018 12:00 PM Clarita Borges MD [facility] Neu[facility]        Care Instructions    1. I increased your DBS to 3.0mA today (up from 2.5) to further control your right foot cramping and tremor.  You are still on Program \"New Day\"    If you develop problematic dyskinesias, you can decrease it by 2 clicks to 2.8. However, if the dyskinesias are not too bothersome they may improve on their own after a few days.    2. Jerking at 3AM and acting out your dreams.  Increase your Sinemet CR 25/100 back to 2 tabs at bedtime.    Otherwise, keep your medications the same.     PD Medications                   6:30AM 11AM 4PM 10:30PM     Sinemet IR 25/100                                                1.5 1 1       Sinemet CR 25/100                               2         Clonazepam      0.5mg                       1            3. If increasing your bedtime Sinemet CR doesn't help your vivid dreams, then I recommend you start melatonin 3mg at bedtime, and increase by 3mg per week until 12mg at bedtime (or to the lowest dose that helps your dreams).    4. There is no reason that you cannot have an injection for your knee. Your brain and incisions have already healed nicely from the surgery.    5. You can charge once weekly, starting next Monday. You will likely need to charge 50-80 per week    6. Follow-up in 2 months.                   Follow-ups after your visit        Follow-up notes from your care team     Return in about 2 months (around 3/24/2018) for RV - can add to 12PM ELISA slot if needed.      Your next 10 appointments already scheduled     Mar 21, 2018 12:00 PM CDT   (Arrive by 11:45 AM)   Return Movement Disorder with Clarita Borges MD   [facility] Neurology ([facility]) " "   [address] [id number] [phone]              Who to contact     Please call your clinic at [phone] to:    Ask questions about your health    Make or cancel appointments    Discuss your medicines    Learn about your test results    Speak to your doctor   If you have compliments or concerns about an experience at your clinic, or if you wish to file a complaint, please contact [facility] Patient Relations at [phone] or email us at [email or web address]         Additional Information About Your Visit        Intelli[employer] Information     [employer] gives you secure access to your electronic health record. If you see a primary care provider, you can also send messages to your care team and make appointments. If you have questions, please call your primary care clinic.  If you do not have a primary care provider, please call [phone] and they will assist you.      [employer] is an electronic gateway that provides easy, online access to your medical records. With [employer], you can request a clinic appointment, read your test results, renew a prescription or communicate with your care team.     To access your existing account, please contact your [facility] or call [phone] for assistance.        Care EveryWhere ID     This is your Care EveryWhere ID. This could be used by other organizations to access your [facility] medical records  [id number]        Your Vitals Were     Pulse Height BMI (Body Mass Index)             77 1.765 m (5' 9.5\") 29.75 kg/m2          Blood Pressure from Last 3 Encounters:   01/24/18 121/63   01/12/18 126/69   01/05/18 113/68    Weight from Last 3 Encounters:   01/24/18 92.7 kg (204 lb 6.4 oz)   01/12/18 90.7 kg (200 lb)   01/05/18 90 kg (198 lb 8 oz)              Today, you had the following     No orders found for display       Primary Care Provider Office Phone # Fax #    Silvina DOYLE " MD Kathi [phone]       [facility] [address] [facility] [address]        Equal Access to Services     KARISSA CASON : Hadii aad ku hadeugenemartha Trudytsering, wachrisda luqadaha, qatyrata kaalmada adriana, bebe vincein hayaamed dotymichelle [place] lashannanmed meza. So [facility] [phone].    ATENCIÓN: Si habla español, tiene a mcrae disposición servicios gratuitos de asistencia lingüística. Llame al [phone].    We comply with applicable federal civil rights laws and [place] laws. We do not discriminate on the basis of race, color, national origin, age, disability, sex, sexual orientation, or gender identity.            Thank you!     Thank you for choosing [facility] NEUROLOGY  for your care. Our goal is always to provide you with excellent care. Hearing back from our patients is one way we can continue to improve our services. Please take a few minutes to complete the written survey that you may receive in the mail after your visit with us. Thank you!             Your Updated Medication List - Protect others around you: Learn how to safely use, store and throw away your medicines at www.disposemymeds.org.          This list is accurate as of 1/24/18  1:42 PM.  Always use your most recent med list.                   Brand Name Dispense Instructions for use Diagnosis    Acidophilus/Goat Milk Caps      At Bedtime        ARMOUR THYROID 60 MG tablet   Generic drug:  thyroid      Take 60 mg by mouth every morning        * carbidopa-levodopa  MG per tablet    SINEMET    90 tablet    1.5 every 4 hours 3 x daily aroudnd 6:30 am, 11:00 am, & 4:00 pm.        * carbidopa-levodopa  MG per CR tablet    SINEMET CR     Take 2 tablets by mouth At Bedtime        citalopram 10 MG tablet    celeXA     Take 10 mg by mouth every morning        clonazePAM 0.5 MG tablet    klonoPIN     Take 0.5 mg by mouth At Bedtime        Coenzyme Q10 300 MG Caps      Take 300 mg by mouth every morning        diclofenac 1 %  Gel topical gel    VOLTAREN     Apply qid prn pain        Multi-vitamin Tabs tablet      Take 1 tablet by mouth every morning        Red Wine Extract Caps      Take by mouth daily (with lunch)        TYLENOL 325 MG tablet   Generic drug:  acetaminophen      Take 325-650 mg by mouth nightly as needed for mild pain        * Notice:  This list has 2 medication(s) that are the same as other medications prescribed for you. Read the directions carefully, and ask your doctor or other care provider to review them with you.

## 2018-01-24 NOTE — PATIENT INSTRUCTIONS
"1. I increased your DBS to 3.0mA today (up from 2.5) to further control your right foot cramping and tremor.  You are still on Program \"New Day\"    If you develop problematic dyskinesias, you can decrease it by 2 clicks to 2.8. However, if the dyskinesias are not too bothersome they may improve on their own after a few days.    2. Jerking at 3AM and acting out your dreams.  Increase your Sinemet CR 25/100 back to 2 tabs at bedtime.    Otherwise, keep your medications the same.     PD Medications                   6:30AM 11AM 4PM 10:30PM     Sinemet IR 25/100                                                1.5 1 1       Sinemet CR 25/100                               2         Clonazepam      0.5mg                       1            3. If increasing your bedtime Sinemet CR doesn't help your vivid dreams, then I recommend you start melatonin 3mg at bedtime, and increase by 3mg per week until 12mg at bedtime (or to the lowest dose that helps your dreams).    4. There is no reason that you cannot have an injection for your knee. Your brain and incisions have already healed nicely from the surgery.    5. You can charge once weekly, starting next Monday. You will likely need to charge 50-80 per week    6. Follow-up in 2 months.           "

## 2018-02-21 NOTE — PROCEDURES
Intraoperative ALON and test stimulation for DBS placement    Procedure Note    Patient Name: Kwame Lin  MRN: [id number]  Date of Service: 12/14/17    Procedures: 1. Unilateral intraoperative microelectrode recording for guidance of deep brain stimulator lead placement. 2. Unilateral test macrostimulation.    Physician performing procedure: Clarita Borges MD    Surgical Procedure: Unilateral subthalamic nucleus deep brain stimulator placement.    Surgeon: Arpan Huynh MD, PhD    Patient identification:  Kwame Lin is a 62-year-old pia with Parkinson disease with symptom onset in 2007 with right hand tremor, now complicated by motor fluctuations with wearing off and bothersome tremor. He is undergoing left subthalamic nucleus deep brain stimulator placement for treatment of Parkinson disease.    Description of Procedure:  Prior to surgery the patient underwent stereotactic MR scanning for localization of the subthalamic nuclei. The images were imported into the Stealth system for computation and reformatting and trajectory planning.  The target was chosen that corresponded to the dorsal lateral subthalamic nucleus as directly visualized on T2 weighted and susceptibility weighted images. 7T MRI was also obtained with an alpha map of the STN created by the [facility]. The trajectory was planned to the target on contrast enhanced images that avoided the ventricles, sulci, and cortical veins as visualized by the contrast.     The morning of surgery the CRW stereotactic head frame was placed and the patient underwent stereotactic CT scanning with the localizer box in place. The images were then imported into the Stealth system for computational fusion with the previously described MRI scan.     After the surgical procedure was initiated and the bur hole was made by Dr. Arpan Huynh, intraoperative microelectrode recording was performed. The left side was mapped and implanted. Two  simultaneous microelectrode penetrations were made using the Ryan-Gun device. Microelectrode recording was performed using the center (aimed to the anatomic target as determined by Stealth system planning) and 2 mm medial positions of the Ryan-Gun system.    Center track:    The center ALON track revealed 3.7 mm of STN cellular activity with only a few isolated cells of STN cellular activity with the STN start at 6.1 mm above target. Two cells modulated by passive arm movement were observed. The last STN cell was noted at 2.6 mm above target. The background became quieter at 2.6 mm above target. The substantia nigra reticulata was identified at 0.3 mm below target. Thalamic cells were noted prior to entering the STN.      Posterior track:    The simultaneous 2 mm posterior ALON track revealed 4.4 mm of STN cellular activity with the STN start at 4.4 mm above target and the STN base at 0.3 mm above target.  Two cells modulated by passive arm movement were observed. The substantia nigra reticulata was identified at 0.3 mm below target. Thalamic bursting cells were noted prior to entering the STN.     Macrostimulation from the ALON canula:    Test macrostimulation from the ALON canula in the center track was performed at 4 mm above target. At a pulse width 60 microseconds, 130 Hz, 3.0 milliamps he still had tremor and he reported the sensation of chest tightness.      Test macrostimulation from the ALON canula in the posterior track was performed at 3 mm above target. At a pulse width 60 microseconds, 130 Hz, 0.4 milliamps he had transient resolution of tremor. At 0.6 milliamps there again was reduction although not resolution of tremor. At 1.2 mA tremor went away. At 2.4 mA he reported tingling in the neck. At 3.0 mA he had mild thumb extension, tightening around both eyes, and he reported chest tightness.    Based on these findings we assessed that additional ALON was required to clarify our location. We thus proceeded to do  a simultaneous anterior and lateral track with the same Ryan Gun position.    Anterior track:    The anterior ALON track revealed only 2.7 mm of STN cellular activity with the STN start at 6.7 mm above target and the STN base at 4.0 mm above target.  No cells modulated by passive arm and leg movement were observed. The substantia nigra reticulata was identified at 1.5 mm above target. Thalamic cells were noted prior to entering the STN.      Lateral track:    The simultaneous 2 mm lateral ALON track revealed no STN cellular activity.The substantia nigra reticulata was not identified. Only 2 isolated cells were identified on this track, which were presumably thalamic cells.    Macrostimulation from the ALON canula:    Test macrostimulation from the ALON canula was then performed in the anterior track at 5 mm above target. At a pulse width 60 microseconds, 130 Hz, 1.7 milliamps there was no reduction of tremor and no side effects. At 3.8 milliamps he reported chest discomfort and at 4.0 mA he reported anxiety.    Test macrostimulation from the ALON canula in lateral track was next performed at 5 mm above target. At a pulse width 60 microseconds, 130 Hz, 1.7 milliamps he reported chest tightness and had slight impairment of gaze. At 2.2 milliamps he had significant impairment of gaze.    Based on these ALON and test macrostimulation findings the intended target was thought to be slightly posterior and medial from the center track. Thus, a frame shift move 0.5 mm posterior and 1.5 mm medial was made.    0.5 mm posterior, 1.5 mm medial track:    This ALON track revealed 4.78 mm of STN cellular activity with the STN start at 5.78 mm above target. Multiple cells modulated by passive arm and leg movement were observed. The last STN cell was noted at 1.0 mm above target. The substantia nigra reticulata was identified at 0.7 mm below target. Thalamic cells were noted prior to entering the STN.      Macrostimulation from the ALON  canula:    Test macrostimulation from the ALON canula was then performed in this posterior-medial track at 4.5 mm above target. At a pulse width 60 microseconds, 130 Hz, 1.0 milliamps there control of tremor after a delay. At 1.2 milliamps he had decreased rigidity and still no tremor. At 2.9 mA he reported chest discomfort.    The ALON and test macrostimulation from this position (0.5mm posterior, 1.5mm medial from original target) was thought to be reasonable, so the [employer] lead was placed with the tip at 0.5 mm above target such that contacts 1, 2, and part of 3 were in the motor STN.     Test stimulation from the DBS lead:    Test macrostimulation from the lead from the 0.5mm posterior, 1.5mm medial position was then performed with 1 negative, 2 positive, 60 microseconds, 130 Hz, and he had some reduction of tremor at 5.0 mA although still some remaining up to 7.0 mA.  At 2 negative, 3 positive, 60 microseconds, 130 Hz, he had reduction of tremor at 3.4 mA, but it did still remain at 6.4 mA. At 4 negative, 0 positive, 60 microseconds, 130 Hz, he had transient decreased tremor at 2.1 mA, still tremor at 6.3 mA, and sweating.    These test stimulation findings suggested we were more anterior than intended. Thus, the lead was removed and moved 2 mm posterior in the Ryan Gun, and test macrostimulation was repeated with the tip again at 0.5 mm above target. The effective position of this lead was thus 2.5 mm posterior and 1.5 mm medial to the initial target on center ALON. At 1 negative, 2 positive, 60 microseconds, 130 Hz he had complete control of tremor at 2.5 mA, but there was mild tremor return at 5.0mA.      Since no paresthesias were appreciated, the tip was then advanced 1 mm to 0.5 mm below target. Test stimulation at 1 negative, 8 positive, 60 microseconds, 170 Hz revealed paresthesias of the right face 2.9 mA until 4.0 mA. Test stimulation at 2 negative, 8 positive, 60 microseconds, 170 Hz  "revealed decreased tremor at 3.0mA. At 3 negative, 8 positive he \"felt something in [his] chest\" at 4.0 mA.    These side effect thresholds were thought to be reasonable and the lead was fastened into place with the tip at 0.5 mm below target.    The patient tolerated the procedure without complications.     Physician time: A total of 3 hours and 40 minutes was spent to perform the above bilateral intraoperative microelectrode recording and test stimulation.    Clarita Borges MD    of Neurology   Movement Disorders Division                 "

## 2018-03-07 ENCOUNTER — OFFICE VISIT (OUTPATIENT)
Dept: NEUROLOGY | Facility: CLINIC | Age: 63
End: 2018-03-07
Payer: COMMERCIAL

## 2018-03-07 VITALS
HEIGHT: 70 IN | BODY MASS INDEX: 30.18 KG/M2 | SYSTOLIC BLOOD PRESSURE: 111 MMHG | WEIGHT: 210.8 LBS | DIASTOLIC BLOOD PRESSURE: 69 MMHG | HEART RATE: 72 BPM

## 2018-03-07 DIAGNOSIS — G47.52 REM BEHAVIORAL DISORDER: ICD-10-CM

## 2018-03-07 DIAGNOSIS — G20.A1 PARKINSON DISEASE (H): Primary | ICD-10-CM

## 2018-03-07 ASSESSMENT — ENCOUNTER SYMPTOMS
ARTHRALGIAS: 1
MUSCLE CRAMPS: 0
JOINT SWELLING: 0
MYALGIAS: 1
STIFFNESS: 1
MUSCLE WEAKNESS: 0
NECK PAIN: 0
BACK PAIN: 0

## 2018-03-07 ASSESSMENT — UNIFIED PARKINSONS DISEASE RATING SCALE (UPDRS)
GAIT: NORMAL
HANDMOVEMENTS_LEFT: NORMAL
LEG_AGILITY_RIGHT: NORMAL
AMPLITUDE_LIP_JAW: NORMAL: NO TREMOR.
POSTURE: 0 NORMAL, NO PROBLEMS
RIGIDITY_NECK: SLIGHT: RIGIDITY ONLY DETECTED WITH ACTIVATION MANEUVER.
RIGIDITY_LUE: NORMAL
AMPLITUDE_RUE: NORMAL: NO TREMOR.
AMPLITUDE_LUE: MILD > 1 CM BUT < 3 CM IN MAXIMAL AMPLITUDE.
RIGIDITY_LLE: NORMAL
FACIAL_EXPRESSION: NORMAL.
RIGIDITY_RUE: NORMAL
RIGIDITY_RLE: NORMAL
ARISING_CHAIR: NORMAL: ABLE TO ARISE QUICKLY WITHOUT HESITATION.
LEG_AGILITY_LEFT: NORMAL
CONSTANCY_TREMOR_ATREST: MODERATE: TREMOR AT REST IS PRESENT 51-75% OF THE ENTIRE EXAMINATION PERIOD.
AXIAL_SCORE: 1
TOTAL_SCORE_LEFT: 9
PRONATION_SUPINATION_LEFT: SLIGHT: ANY OF THE FOLLOWING: A) THE REGULAR RHYTHM IS BROKEN WITH ONE WITH ONE OR TWO INTERRUPTIONS OR HESITATIONS OF THE MOVEMENT B) SLIGHT SLOWING C) THE AMPLITUDE DECREMENTS NEAR THE END OF THE 10 MOVEMENTS.
AMPLITUDE_LLE: MILD > 1 CM BUT < 3 CM IN MAXIMAL AMPLITUDE.
TOTAL_SCORE: 0
SPEECH: NORMAL
PARKINSONS_MEDS: ON
POSTURAL_STABILITY: NORMAL:  RECOVERS WITH ONE OR TWO STEPS.
PRONATION_SUPINATION_RIGHT: NORMAL
FINGER_TAPPING_RIGHT: NORMAL
FINGER_TAPPING_LEFT: SLIGHT: ANY OF THE FOLLOWING: A) THE REGULAR RHYTHM IS BROKEN WITH ONE WITH ONE OR TWO INTERRUPTIONS OR HESITATIONS OF THE MOVEMENT B) SLIGHT SLOWING C) THE AMPLITUDE DECREMENTS NEAR THE END OF THE 10 MOVEMENTS.
AMPLITUDE_RLE: NORMAL: NO TREMOR.
HANDMOVEMENTS_RIGHT: NORMAL
TOETAPPING_LEFT: NORMAL
TOTAL_SCORE: 13
FREEZING_GAIT: NORMAL
TOETAPPING_RIGHT: NORMAL
SPONTANEITY_OF_MOVEMENT: 0: NORMAL.  NO PROBLEMS.

## 2018-03-07 ASSESSMENT — MOVEMENT DISORDERS SOCIETY - UNIFIED PARKINSONS DISEASE RATING SCALE (MDS-UPDRS)
GETTING_OUT_OF_BED_CAR_DEEP_CHAIR: NORMAL: NOT AT ALL (NO PROBLEMS).
HANDWRITING: MILD: SOME WORDS ARE UNCLEAR AND DIFFICULT TO READ.
HYGIENE: NORMAL: NOT AT ALL (NO PROBLEMS).
SALIVA_AND_DROOLING: NORMAL: NOT AT ALL (NO PROBLEMS).
TURNING_IN_BED: NORMAL: NOT AT ALL (NO PROBLEMS).
FREEZING: NORMAL: NOT AT ALL (NO PROBLEMS).
HOBBIES_AND_OTHER_ACTIVITIES: NORMAL:  NOT AT ALL (NO PROBLEMS).
DRESSING: NORMAL: NOT AT ALL (NO PROBLEMS).
CHEWING_AND_SWALLOWING: NORMAL: NO PROBLEMS.
TREMOR: SLIGHT: SHAKING OR TREMOR OCCURS BUT DOES NOT CAUSE PROBLEMS WITH ANY ACTIVITIES.
WALKING_AND_BALANCE: NORMAL: NOT AT ALL (NO PROBLEMS).
TOTAL_SCORE: 3
EATING_TASKS: NORMAL: NOT AT ALL (NO PROBLEMS).
SPEECH: NORMAL: NOT AT ALL (NO PROBLEMS).

## 2018-03-07 ASSESSMENT — PAIN SCALES - GENERAL: PAINLEVEL: NO PAIN (0)

## 2018-03-07 NOTE — MR AVS SNAPSHOT
"              After Visit Summary   3/7/2018    Kwame Lin    MRN: [id number]           Patient Information     Date Of Birth          1955        Visit Information        Provider Department      3/7/2018 5:00 PM Clarita Borges MD [facility] Neurology        Care Instructions    1. We set up a new Program to see if we can get even further control of tremor and give you more room to increase without causing side effects.    Your new Program is \"C2-3 combo\" (#2). You are set at 3.0 mA You are able to increase to a maximum of 3.5 mA.    Your old Program is still saved as \"New Day\" (#1) if we ever wanted to go back to that.    2. Keep your medications the same.         PD Medications                  7:30 12pm 4pm 10:30pm    Carbidopa/levodopa 25/100 mg 1.5 1 1      Carbidopa/levodopa 25/100 CR       2    Clonazepam 0.5 mg       1    Melatonin 5 mg       1          3. After discussion today, we will go ahead and discuss your case at our DBS meeting tomorrow so that we can move ahead with the second side.          Follow-ups after your visit        Who to contact     Please call your clinic at [phone] to:    Ask questions about your health    Make or cancel appointments    Discuss your medicines    Learn about your test results    Speak to your doctor            Additional Information About Your Visit        [employer] Information     [employer] gives you secure access to your electronic health record. If you see a primary care provider, you can also send messages to your care team and make appointments. If you have questions, please call your primary care clinic.  If you do not have a primary care provider, please call [phone] and they will assist you.      [employer] is an electronic gateway that provides easy, online access to your medical records. With [employer], you can request a clinic appointment, read your test results, renew a prescription or communicate with your care team.     To access your " "existing account, please contact your [facility] or call [phone] for assistance.        Care EveryWhere ID     This is your Care EveryWhere ID. This could be used by other organizations to access your [facility] medical records  [id number]        Your Vitals Were     Pulse Height BMI (Body Mass Index)             72 1.765 m (5' 9.5\") 30.68 kg/m2          Blood Pressure from Last 3 Encounters:   03/07/18 111/69   01/24/18 121/63   01/12/18 126/69    Weight from Last 3 Encounters:   03/07/18 95.6 kg (210 lb 12.8 oz)   01/24/18 92.7 kg (204 lb 6.4 oz)   01/12/18 90.7 kg (200 lb)              Today, you had the following     No orders found for display       Primary Care Provider Office Phone # Fax #    Silvina Armenta [phone]       [facility] [address] [facility] [address]        Equal Access to Services     [facility] HospitalAR[facility] : Hadii aad ku hadasho Soomaali, waaxda luqadaha, qaybta kaalmada adeegyada, waxevelyn idiin hayrayshawnn merary dias . So [facility] [phone].    ATENCIÓN: Si habla español, tiene a mcrae disposición servicios gratuitos de asistencia lingüística. Llame al [phone].    We comply with applicable federal civil rights laws and [place] laws. We do not discriminate on the basis of race, color, national origin, age, disability, sex, sexual orientation, or gender identity.            Thank you!     Thank you for choosing [facility] NEUROLOGY  for your care. Our goal is always to provide you with excellent care. Hearing back from our patients is one way we can continue to improve our services. Please take a few minutes to complete the written survey that you may receive in the mail after your visit with us. Thank you!             Your Updated Medication List - Protect others around you: Learn how to safely use, store and throw away your medicines at www.disposemymeds.org.          This list is accurate as of 3/7/18  7:02 PM.  " Always use your most recent med list.                   Brand Name Dispense Instructions for use Diagnosis    Acidophilus/Goat Milk Caps      At Bedtime        ARMOUR THYROID 60 MG tablet   Generic drug:  thyroid      Take 60 mg by mouth every morning        * carbidopa-levodopa  MG per tablet    SINEMET    90 tablet    1.5 every 4 hours 3 x daily aroudnd 6:30 am, 11:00 am, & 4:00 pm.        * carbidopa-levodopa  MG per CR tablet    SINEMET CR     Take 2 tablets by mouth At Bedtime        citalopram 10 MG tablet    celeXA     Take 10 mg by mouth every morning        clonazePAM 0.5 MG tablet    klonoPIN     Take 0.5 mg by mouth At Bedtime        Coenzyme Q10 300 MG Caps      Take 300 mg by mouth every morning        diclofenac 1 % Gel topical gel    VOLTAREN     Apply qid prn pain        Multi-vitamin Tabs tablet      Take 1 tablet by mouth every morning        Red Wine Extract Caps      Take by mouth daily (with lunch)        TYLENOL 325 MG tablet   Generic drug:  acetaminophen      Take 325-650 mg by mouth nightly as needed for mild pain        * Notice:  This list has 2 medication(s) that are the same as other medications prescribed for you. Read the directions carefully, and ask your doctor or other care provider to review them with you.

## 2018-03-07 NOTE — LETTER
3/7/2018       RE: Kwame Lin  [address] [place] [address]     Dear Colleague,    Thank you for referring your patient, Kwame Lin, to the [facility] NEUROLOGY at [facility]. Please see a copy of my visit note below.    Department of Neurology  Movement Disorders Division   DBS Analysis and Programming    Patient: Kwame Lin   MRN: [id number]   : 1955   Date of Visit: 3/7/2018     Diagnosis: PD  DBS Target(s): Left STN  Date(s) of DBS Lead Placement:   17  Date(s) of IPG Placement: 2017    Chief Complaint:  Mr. 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     Interval History:  Mr. Lin was last seen in clinic 2018 for DBS programming. At that time he left the clinic on a monopolar configuration C+3- at 3.0 mA, 60 ms, and 130 Hz. He was given a range from 0 to 3.0 mA. When he returned home he felt that the stimulation was too intense. He had a headache and his vision was distorted, like he was wearing the wrong glasses. Things seemed closer than they really were. He initially reduced the stimulation to 2.7 mA, but tremor was incompletely controlled. He settled on 2.8 mA and felt more comfortable.    Right hand tremor is very slight now. He notes trace tremor when he is drinking from a glass.     Other benefits from the DBS are:  - Improved handwriting  - Ability to use a computer mouse and type  - Louder and stronger voice  - Feels more confident  - Less stiffness  - Quicker hand movements     He has noticed some mild dyskinesias, characterized as very rare choreiform movements of the right arm and jerks at the right wrist (occuring for a few seconds, less than once per week). These are not bothersome. He has been able to reduce his dopaminergic medications, almost cutting immediate release carbidopa/levodopa in half - from 6 tabs to 3.5 tabs  "per day (see medications charts below). We briefly discussed his RBD - his wife has moved out of the bedroom because movements worsened after surgery. He restarted melatonin recently. He thinks it is beneficial, but he is not completely sure because his wife is no longer sharing a bed with him.     His left side is still affected and tremor is suboptimally controlled by medications. While the tremor is not as bothersome as the tremor was on the right body - he would like to be considered for second side DBS (right STN for left body).       Review of Systems:  Other than that mentioned above, the remainder of 12 systems reviewed were negative.    Medications:  Reviewed and updated in Epic. PD meds outlined below     PD Medications (current)            7:30 12pm 4pm 10:30pm    Carbidopa/levodopa 25/100 mg 1.5 1 1     Carbidopa/levodopa 25/100 CR    2    Clonazepam 0.5 mg    1    Melatonin 5 mg    1     PD Medications    (Before Surgery)             7:30 11am 3pm 8pm 10:30pm    Carbidopa/levodopa 25/100 mg 1.5 1.5 1.5 1.5     Carbidopa/levodopa 25/100 CR     2    Clonazepam 0.5 mg     1    Melatonin 5 mg     1       Allergies: is allergic to bactrim; codeine; ketorolac; morphine; nalbuphine; penicillins; seasonal allergies; sulfa drugs; and toradol.    Past Medical History:   Reviewed and updated. No new medical problems - reviewed the diagnosis of REM behavior disorder, a sleep disorder commonly associated with PD with the patient.          Physical Exam:  The patient's  height is 1.765 m (5' 9.5\") and weight is 95.6 kg (210 lb 12.8 oz). His blood pressure is 111/69 and his pulse is 72.    General: Pleasant and cooperative, no acute distress  HEENT: Atraumatic. Incisions clean, dry intact.   Chest: Incision healing well, clean, dry intact.   Cardiac: RRR  Respiratory: Nonlabored breathing  Extremities: Warm and well perfused, no edema UE and LE    NEUROLOGIC:  MENTAL STATUS: Fully alert, attentive and " oriented.  SPEECH: Normal  FACIAL EXPRESSION: Decreased blink rate, otherwise normal    CRANIAL NERVES: Visual fields intact. EOM full with smooth pursuit. No nystagmus. Facial movements symmetric. Palate elevation symmetric, uvula midline. No dysarthria. Tongue protrusion midline.    MOTOR: See UPDRS below for scoring  TREMOR - Resting and kinetic tremor on left hemibody present most of the visit. RUE resting tremor emerged with reinforcement. Slight kinetic tremor bilateral UE.   AGILITY - Nearly normal movements bilaterally  TONE - Slight axial rigidity, otherwise normal in the extremities     COORDINATION: No dysmetria on FNF  GAIT: Able to rise from chair with arms crossed over chest, no hesitation. Posture is upright. Normal base, normal stride. Arm swing appears normal. Turns well in 1-2 steps. Takes 1-2 steps with pull test.       MDS-UPDRS Part III   0: Normal -- 1: Slight -- 2: Mild -- 3: Moderate -- 4: Severe   UPDRS Values 3/7/2018   Time: 5:56 PM   Medication On   R Brain DBS: None   L Brain DBS: On   Speech 0   Facial Expression 0   Rigidity Neck 1   Rigidity RUE 0   Rigidity LUE 0   Rigidity RLE 0   Rigidity LLE 0   Finger Taps R 0   Finger Taps L 1   Hand Mvt R 0   Hand Mvt L 0   Pron-/Supinate R 0   Pron-/Supinate L 1   Toe Tap R 0   Toe Tap L 0   Leg Agility R 0   Leg Agility L 0   Arise From Chair 0   Gait 0   Gait Freezing 0   Postural Stability 0   Posture 0   Global Spont Mvt 0   Postural Tremor RUE 0   Postural Tremor LUE 2   Kinetic Tremor RUE 0   Kinetic Tremor LUE 1   Rest Tremor RUE 0   Rest Tremor LUE 2   Rest Tremor RLE 0   Rest Tremor LLE 2   Rest Tremor Lip/Jaw 0   Rest Tremor Constancy 3   Total Right 0   Total Left 9   Axial Total 1   Total 13       Procedure: DBS Programming    Lead(s):    Left   DBS Target    STN   DBS Lead Type     [employer] - 8 contact lead   Lead Implant Date   12/14/2017     IPG(s):   1   IPG  [employer]     IPG Implant Date     "12/22/2017   Location    Left Chest   Battery (V)  3.98 (Charging completely)       Full Impedence Check: No problems found    Initial Settings:  Left Brain New Day  (Active) New Day 2.0 New Day 3.0   Contacts  C+3-  C+3-(60);5-(40) C+3-(35);6-(65)    Amplitude (mA)  3.0  4.2  6.4   Pulse Width ( s)  60  60  60   Frequency (Hz)  130  130  130     Final Settings:  Left Brain New Day C2-C3 Combo  (Active)   Contacts C+3-  C+2-(25);3-(75)    Amplitude (mA) 2.8  3.0    Pulse Width ( s) 60  60    Frequency (Hz)  130  136   Patient Control (min/max)) 0 / 3.0 0 / 3.5       Left Hemisphere   Contacts Current  (mA) PW (ms) Rate (Hz) Comments   C+3- 2.8 60 130           C+2-(50);3-(50) 2.8 60 136 No tremor   C+2-(50);3-(50) 3.5 60 136 Increased R thumb tremor   C+2-(50);3-(50) 3.0 60 136 No tremor, gait ok          C+2-(25);3-(75) 3.5 60 136 Subjective inconsistent speech changes   C+2-(25);3-(75) 3.8 60 136 Increased R thumb tremor   C+2-(25);3-(75) 3.0 60 136 Final - \"C2-C3 Combo\"  Range: Max 3.5 mA, Min 0.0 mA     Final setting  C+2-(25);3-(75), 3.0 mA, 60 microseconds, 136 Hz (\"Program C2-C3 Combo\")  Electrode impedances were checked and no issues were found. The data will be scanned.       Impression:    1. Tremor predominant asymmetric Parkinson disease. Suboptimal tremor control on the left hemibody.   2. Status post left STN DBS, excellent results for the right hemibody.   3. Probable REM behavior disorder, with clear dream enactment per patient and wife. It may be that his movements overnight are worse after surgery due to improvement in his mobility. Melatonin may be beneficial.     Kwame Lin is a 62 year old male with tremor predominant PD who returned for DBS programming. He has demonstrated an excellent response to left STN DBS. We made some adjustments to his DBS parameters today, which may allow for fine tuning of his right hemibody tremor. Given that he still has suboptimal control of left sided tremor - " he would likely benefit from right STN DBS.     Recommendations:   - Left STN DBS was reprogrammed to a fractionalized current configuration with cathodes at contacts 2 (25%) and 3 (75%). This pushed his threshold up to 3.8 mA, previously 3.0 to 3.3 when using contact 3 alone. He was sent home on: C+2-(25);3-(75) at 3.0 mA, 60 ms, 136 Hz with a range up to 3.5 mA.   - The original program (New Day) was left in place, should he wish to return to this  - No changes made to his medication schedule - encouraged him to continue taking melatonin 5 mg. May increase up to 9mg for RBD. Discussed seriousness of this sleep disorder (potential for injury).   - Will facilitate his referral for second side DBS (right STN)      Time spent with patient: Greater than 50% of this 40 minute visit was spent in counseling and coordination of care related to the issues detailed above, including medication management and discussion of surgical options for Parkinson disease.    DBS programmin minutes    Coby Kauffman MD  Movement Disorders Fellow    Patient seen and discussed with Dr. Clarita Borges           Again, thank you for allowing me to participate in the care of your patient.      Sincerely,    Clarita Borges MD

## 2018-03-07 NOTE — PROGRESS NOTES
Department of Neurology  Movement Disorders Division   DBS Analysis and Programming    Patient: Kwame Lin   MRN: [id number]   : 1955   Date of Visit: 3/7/2018     Diagnosis: PD  DBS Target(s): Left STN  Date(s) of DBS Lead Placement:  17  Date(s) of IPG Placement: 2017    Chief Complaint:  Mr. 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     Interval History:  Mr. Lin was last seen in clinic 2018 for DBS programming. At that time he left the clinic on a monopolar configuration C+3- at 3.0 mA, 60 ms, and 130 Hz. He was given a range from 0 to 3.0 mA. When he returned home he felt that the stimulation was too intense. He had a headache and his vision was distorted, like he was wearing the wrong glasses. Things seemed closer than they really were. He initially reduced the stimulation to 2.7 mA, but tremor was incompletely controlled. He settled on 2.8 mA and felt more comfortable.    Right hand tremor is very slight now. He notes trace tremor when he is drinking from a glass.     Other benefits from the DBS are:  - Improved handwriting  - Ability to use a computer mouse and type  - Louder and stronger voice  - Feels more confident  - Less stiffness  - Quicker hand movements     He has noticed some mild dyskinesias, characterized as very rare choreiform movements of the right arm and jerks at the right wrist (occuring for a few seconds, less than once per week). These are not bothersome. He has been able to reduce his dopaminergic medications, almost cutting immediate release carbidopa/levodopa in half - from 6 tabs to 3.5 tabs per day (see medications charts below). We briefly discussed his RBD - his wife has moved out of the bedroom because movements worsened after surgery. He restarted melatonin recently. He thinks it is beneficial, but he is not completely sure because his wife is no longer sharing  "a bed with him.     His left side is still affected and tremor is suboptimally controlled by medications. While the tremor is not as bothersome as the tremor was on the right body - he would like to be considered for second side DBS (right STN for left body).       Review of Systems:  Other than that mentioned above, the remainder of 12 systems reviewed were negative.    Medications:  Reviewed and updated in Epic. PD meds outlined below     PD Medications (current)            7:30 12pm 4pm 10:30pm    Carbidopa/levodopa 25/100 mg 1.5 1 1     Carbidopa/levodopa 25/100 CR    2    Clonazepam 0.5 mg    1    Melatonin 5 mg    1     PD Medications    (Before Surgery)             7:30 11am 3pm 8pm 10:30pm    Carbidopa/levodopa 25/100 mg 1.5 1.5 1.5 1.5     Carbidopa/levodopa 25/100 CR     2    Clonazepam 0.5 mg     1    Melatonin 5 mg     1       Allergies: is allergic to bactrim; codeine; ketorolac; morphine; nalbuphine; penicillins; seasonal allergies; sulfa drugs; and toradol.    Past Medical History:   Reviewed and updated. No new medical problems - reviewed the diagnosis of REM behavior disorder, a sleep disorder commonly associated with PD with the patient.          Physical Exam:  The patient's  height is 1.765 m (5' 9.5\") and weight is 95.6 kg (210 lb 12.8 oz). His blood pressure is 111/69 and his pulse is 72.    General: Pleasant and cooperative, no acute distress  HEENT: Atraumatic. Incisions clean, dry intact.   Chest: Incision healing well, clean, dry intact.   Cardiac: RRR  Respiratory: Nonlabored breathing  Extremities: Warm and well perfused, no edema UE and LE    NEUROLOGIC:  MENTAL STATUS: Fully alert, attentive and oriented.  SPEECH: Normal  FACIAL EXPRESSION: Decreased blink rate, otherwise normal    CRANIAL NERVES: Visual fields intact. EOM full with smooth pursuit. No nystagmus. Facial movements symmetric. Palate elevation symmetric, uvula midline. No dysarthria. Tongue protrusion midline.    MOTOR: See " UPDRS below for scoring  TREMOR - Resting and kinetic tremor on left hemibody present most of the visit. RUE resting tremor emerged with reinforcement. Slight kinetic tremor bilateral UE.   AGILITY - Nearly normal movements bilaterally  TONE - Slight axial rigidity, otherwise normal in the extremities     COORDINATION: No dysmetria on FNF  GAIT: Able to rise from chair with arms crossed over chest, no hesitation. Posture is upright. Normal base, normal stride. Arm swing appears normal. Turns well in 1-2 steps. Takes 1-2 steps with pull test.       MDS-UPDRS Part III   0: Normal -- 1: Slight -- 2: Mild -- 3: Moderate -- 4: Severe   UPDRS Values 3/7/2018   Time: 5:56 PM   Medication On   R Brain DBS: None   L Brain DBS: On   Speech 0   Facial Expression 0   Rigidity Neck 1   Rigidity RUE 0   Rigidity LUE 0   Rigidity RLE 0   Rigidity LLE 0   Finger Taps R 0   Finger Taps L 1   Hand Mvt R 0   Hand Mvt L 0   Pron-/Supinate R 0   Pron-/Supinate L 1   Toe Tap R 0   Toe Tap L 0   Leg Agility R 0   Leg Agility L 0   Arise From Chair 0   Gait 0   Gait Freezing 0   Postural Stability 0   Posture 0   Global Spont Mvt 0   Postural Tremor RUE 0   Postural Tremor LUE 2   Kinetic Tremor RUE 0   Kinetic Tremor LUE 1   Rest Tremor RUE 0   Rest Tremor LUE 2   Rest Tremor RLE 0   Rest Tremor LLE 2   Rest Tremor Lip/Jaw 0   Rest Tremor Constancy 3   Total Right 0   Total Left 9   Axial Total 1   Total 13       Procedure: DBS Programming    Lead(s):    Left   DBS Target    STN   DBS Lead Type     [employer] - 8 contact lead   Lead Implant Date   12/14/2017     IPG(s):   1   IPG  [employer]     IPG Implant Date    12/22/2017   Location    Left Chest   Battery (V)  3.98 (Charging completely)       Full Impedence Check: No problems found    Initial Settings:  Left Brain New Day  (Active) New Day 2.0 New Day 3.0   Contacts  C+3-  C+3-(60);5-(40) C+3-(35);6-(65)    Amplitude (mA)  3.0  4.2  6.4   Pulse Width  "( s)  60  60  60   Frequency (Hz)  130  130  130     Final Settings:  Left Brain New Day C2-C3 Combo  (Active)   Contacts C+3-  C+2-(25);3-(75)    Amplitude (mA) 2.8  3.0    Pulse Width ( s) 60  60    Frequency (Hz)  130  136   Patient Control (min/max)) 0 / 3.0 0 / 3.5       Left Hemisphere   Contacts Current  (mA) PW (ms) Rate (Hz) Comments   C+3- 2.8 60 130           C+2-(50);3-(50) 2.8 60 136 No tremor   C+2-(50);3-(50) 3.5 60 136 Increased R thumb tremor   C+2-(50);3-(50) 3.0 60 136 No tremor, gait ok          C+2-(25);3-(75) 3.5 60 136 Subjective inconsistent speech changes   C+2-(25);3-(75) 3.8 60 136 Increased R thumb tremor   C+2-(25);3-(75) 3.0 60 136 Final - \"C2-C3 Combo\"  Range: Max 3.5 mA, Min 0.0 mA     Final setting  C+2-(25);3-(75), 3.0 mA, 60 microseconds, 136 Hz (\"Program C2-C3 Combo\")  Electrode impedances were checked and no issues were found. The data will be scanned.       Impression:    1. Tremor predominant asymmetric Parkinson disease. Suboptimal tremor control on the left hemibody.   2. Status post left STN DBS, excellent results for the right hemibody.   3. Probable REM behavior disorder, with clear dream enactment per patient and wife. It may be that his movements overnight are worse after surgery due to improvement in his mobility. Melatonin may be beneficial.     Kwame Lin is a 62 year old male with tremor predominant PD who returned for DBS programming. He has demonstrated an excellent response to left STN DBS. We made some adjustments to his DBS parameters today, which may allow for fine tuning of his right hemibody tremor. Given that he still has suboptimal control of left sided tremor - he would likely benefit from right STN DBS.     Recommendations:   - Left STN DBS was reprogrammed to a fractionalized current configuration with cathodes at contacts 2 (25%) and 3 (75%). This pushed his threshold up to 3.8 mA, previously 3.0 to 3.3 when using contact 3 alone. He was sent home " on: C+2-(25);3-(75) at 3.0 mA, 60 ms, 136 Hz with a range up to 3.5 mA.   - The original program (New Day) was left in place, should he wish to return to this  - No changes made to his medication schedule - encouraged him to continue taking melatonin 5 mg. May increase up to 9mg for RBD. Discussed seriousness of this sleep disorder (potential for injury).   - Will facilitate his referral for second side DBS (right STN)      Coby Kauffman MD  Movement Disorders Fellow    Patient seen and discussed with Dr. Clarita Borges     Neurology Attending Attestation:     I, Clarita Borges, personally saw this patient with our Movement Disorders Fellow and agree with the fellow's findings and plan of care as documented in the movement disorder fellow's note, with my personal summary below. I personally performed salient aspects of the history and neurological examination.     I personally reviewed the vital signs, medications, and labs. I personally viewed the imaging, and agree with the interpretation documented by the fellow.    I personally performedall procedures.    Time spent with patient: Greater than 50% of this 40 minute visit was spent in counseling and coordination of care related to the issues detailed above, including medication management and discussion of surgical options for Parkinson disease.    DBS programmin minutes    Clarita Borges MD    of Neurology

## 2018-03-07 NOTE — NURSING NOTE
Chief Complaint   Patient presents with     RECHECK     [facility] [email or web address]]- Movement disorder     Zakiya Encarnacion MA

## 2018-03-08 NOTE — PATIENT INSTRUCTIONS
"1. We set up a new Program to see if we can get even further control of tremor and give you more room to increase without causing side effects.    Your new Program is \"C2-3 combo\" (#2). You are set at 3.0 mA You are able to increase to a maximum of 3.5 mA.    Your old Program is still saved as \"New Day\" (#1) if we ever wanted to go back to that.    2. Keep your medications the same.         PD Medications                  7:30 12pm 4pm 10:30pm    Carbidopa/levodopa 25/100 mg 1.5 1 1      Carbidopa/levodopa 25/100 CR       2    Clonazepam 0.5 mg       1    Melatonin 5 mg       1          3. After discussion today, we will go ahead and discuss your case at our DBS meeting tomorrow so that we can move ahead with the second side.  "

## 2018-03-21 DIAGNOSIS — G20.A1 PARKINSON'S DISEASE (H): Primary | ICD-10-CM

## 2018-04-03 ENCOUNTER — DOCUMENTATION ONLY (OUTPATIENT)
Dept: NEUROSURGERY | Facility: CLINIC | Age: 63
End: 2018-04-03

## 2018-04-03 DIAGNOSIS — Z01.818 PREOPERATIVE EVALUATION TO RULE OUT SURGICAL CONTRAINDICATION: Primary | ICD-10-CM

## 2018-05-18 ENCOUNTER — TELEPHONE (OUTPATIENT)
Dept: NEUROSURGERY | Facility: CLINIC | Age: 63
End: 2018-05-18

## 2018-05-18 RX ORDER — CARBIDOPA AND LEVODOPA 25; 100 MG/1; MG/1
1.5 TABLET ORAL EVERY MORNING
COMMUNITY
End: 2018-06-11

## 2018-05-18 NOTE — TELEPHONE ENCOUNTER
Pre-op Teaching            Pre-op folder with specific written instructions    DBS lead placement and connection to existing battery  5/22/18 at 8:00  Dr. Huynh    Discussed pre-op routine and requirements to include:  surgical procedure, post-op recovery and expectations, need for H&P at PCP (pt had H&P last week but we have not received results. Pt indicated he will f/u with provider to request they fax it to us), NPO prior to OR, pre-op antibacterial showers, pain control and importance of follow-up visits.  Surgery scheduling will coordinate OR time/date and update patient as appropriate.  3C will call with more instructions 24-48 hour pre-op.   Ample time was provided for patient questions and in-depth discussion of topics of heightened interest.  Pt will purchase antibacterial soap; he understands instructions for use.  Patient verbalized understanding of instructions.  Approximately 8 minutes spent with patient  discussing and reviewing.

## 2018-05-21 ENCOUNTER — ANESTHESIA EVENT (OUTPATIENT)
Dept: SURGERY | Facility: CLINIC | Age: 63
DRG: 027 | End: 2018-05-21
Payer: COMMERCIAL

## 2018-05-21 NOTE — ANESTHESIA PREPROCEDURE EVALUATION
Anesthesia Evaluation     . Pt has had prior anesthetic. Type: General and MAC    History of anesthetic complications   - PONV        ROS/MED HX    ENT/Pulmonary: Comment: 12/22/17; 1500; Mask Ventilation: Easy; Ease of Intubation: Easy; Airway Size: 7;  Cuffed;  Oral;  Blade Type: Field;  Blade Size: 2;  Place by: salvador;  Insertion Attempts: 1;  Secured at (cm)to lip: 22 cm;  Breath Sounds: Equal, clear and bilateral;  End Tidal CO2: Present;  Dentition: Intact, Unchanged;  Grade View of Cords: 1      Neurologic:     (+)Parkinson's disease     Cardiovascular:     (+) Dyslipidemia, ----. : . . . :. .       METS/Exercise Tolerance:  >4 METS   Hematologic:     (+) Anemia, -      Musculoskeletal:  - neg musculoskeletal ROS       GI/Hepatic:  - neg GI/hepatic ROS       Renal/Genitourinary:  - ROS Renal section negative       Endo:     (+) thyroid problem hypothyroidism, .      Psychiatric:     (+) psychiatric history anxiety and depression      Infectious Disease:  - neg infectious disease ROS       Malignancy:      - no malignancy   Other:    - neg other ROS                 Physical Exam  Normal systems: cardiovascular and pulmonary    Airway   Mallampati: III  TM distance: >3 FB  Neck ROM: full    Dental     Cardiovascular       Pulmonary                     Anesthesia Plan      History & Physical Review  History and physical reviewed and following examination; no interval change.    ASA Status:  2 .    NPO Status:  > 8 hours    Plan for MAC with Intravenous induction. Maintenance will be TIVA.  Reason for MAC:  Deep or markedly invasive procedure (G8)  PONV prophylaxis:  Ondansetron (or other 5HT-3)        62 yo M with tremor predominant parkinsons s/p L DBS placement in 12/2017 now presenting for R DBS placement.  No known cardiopulmonary disease with METS >4.  Procedure to be done under MAC.  PIV x1.        Postoperative Care  Postoperative pain management:  Multi-modal analgesia.      Consents  Anesthetic plan,  risks, benefits and alternatives discussed with:  Patient and Spouse..                          .

## 2018-05-22 ENCOUNTER — HOSPITAL ENCOUNTER (OUTPATIENT)
Dept: CT IMAGING | Facility: CLINIC | Age: 63
DRG: 027 | End: 2018-05-22
Attending: NEUROLOGICAL SURGERY | Admitting: NEUROLOGICAL SURGERY
Payer: COMMERCIAL

## 2018-05-22 ENCOUNTER — HOSPITAL ENCOUNTER (INPATIENT)
Facility: CLINIC | Age: 63
LOS: 1 days | Discharge: HOME | DRG: 027 | End: 2018-05-23
Attending: NEUROLOGICAL SURGERY | Admitting: NEUROLOGICAL SURGERY
Payer: COMMERCIAL

## 2018-05-22 ENCOUNTER — ANESTHESIA (OUTPATIENT)
Dept: SURGERY | Facility: CLINIC | Age: 63
DRG: 027 | End: 2018-05-22
Payer: COMMERCIAL

## 2018-05-22 ENCOUNTER — APPOINTMENT (OUTPATIENT)
Dept: CT IMAGING | Facility: CLINIC | Age: 63
DRG: 027 | End: 2018-05-22
Attending: NEUROLOGICAL SURGERY
Payer: COMMERCIAL

## 2018-05-22 ENCOUNTER — APPOINTMENT (OUTPATIENT)
Dept: GENERAL RADIOLOGY | Facility: CLINIC | Age: 63
DRG: 027 | End: 2018-05-22
Attending: NEUROLOGICAL SURGERY
Payer: COMMERCIAL

## 2018-05-22 DIAGNOSIS — G20.A1 PARKINSON'S DISEASE (H): ICD-10-CM

## 2018-05-22 DIAGNOSIS — G20.A1 PARKINSON DISEASE (H): Primary | ICD-10-CM

## 2018-05-22 PROBLEM — Z96.89 S/P DEEP BRAIN STIMULATOR PLACEMENT: Status: ACTIVE | Noted: 2018-05-22

## 2018-05-22 LAB
APTT PPP: 25 SEC (ref 22–37)
GLUCOSE BLDC GLUCOMTR-MCNC: 101 MG/DL (ref 70–99)
INR PPP: 0.98 (ref 0.86–1.14)

## 2018-05-22 PROCEDURE — 25000128 H RX IP 250 OP 636: Performed by: STUDENT IN AN ORGANIZED HEALTH CARE EDUCATION/TRAINING PROGRAM

## 2018-05-22 PROCEDURE — 25000128 H RX IP 250 OP 636: Performed by: NEUROLOGICAL SURGERY

## 2018-05-22 PROCEDURE — 20000004 ZZH R&B ICU UMMC

## 2018-05-22 PROCEDURE — 8E09XBG COMPUTER ASSISTED PROCEDURE OF HEAD AND NECK REGION, WITH COMPUTERIZED TOMOGRAPHY: ICD-10-PCS | Performed by: NEUROLOGICAL SURGERY

## 2018-05-22 PROCEDURE — 37000008 ZZH ANESTHESIA TECHNICAL FEE, 1ST 30 MIN: Performed by: NEUROLOGICAL SURGERY

## 2018-05-22 PROCEDURE — 85610 PROTHROMBIN TIME: CPT | Performed by: NEUROLOGICAL SURGERY

## 2018-05-22 PROCEDURE — 70450 CT HEAD/BRAIN W/O DYE: CPT

## 2018-05-22 PROCEDURE — 40000986 CT HEAD W/O CONTRAST

## 2018-05-22 PROCEDURE — 37000009 ZZH ANESTHESIA TECHNICAL FEE, EACH ADDTL 15 MIN: Performed by: NEUROLOGICAL SURGERY

## 2018-05-22 PROCEDURE — 25000132 ZZH RX MED GY IP 250 OP 250 PS 637: Performed by: STUDENT IN AN ORGANIZED HEALTH CARE EDUCATION/TRAINING PROGRAM

## 2018-05-22 PROCEDURE — 25000125 ZZHC RX 250: Performed by: NEUROLOGICAL SURGERY

## 2018-05-22 PROCEDURE — 27211024 ZZHC OR SUPPLY OTHER OPNP: Performed by: NEUROLOGICAL SURGERY

## 2018-05-22 PROCEDURE — 40000170 ZZH STATISTIC PRE-PROCEDURE ASSESSMENT II: Performed by: NEUROLOGICAL SURGERY

## 2018-05-22 PROCEDURE — 36415 COLL VENOUS BLD VENIPUNCTURE: CPT | Performed by: NEUROLOGICAL SURGERY

## 2018-05-22 PROCEDURE — 36000076 ZZH SURGERY LEVEL 6 EA 15 ADDTL MIN - UMMC: Performed by: NEUROLOGICAL SURGERY

## 2018-05-22 PROCEDURE — 85730 THROMBOPLASTIN TIME PARTIAL: CPT | Performed by: NEUROLOGICAL SURGERY

## 2018-05-22 PROCEDURE — 00H03MZ INSERTION OF NEUROSTIMULATOR LEAD INTO BRAIN, PERCUTANEOUS APPROACH: ICD-10-PCS | Performed by: NEUROLOGICAL SURGERY

## 2018-05-22 PROCEDURE — 71000016 ZZH RECOVERY PHASE 1 LEVEL 3 FIRST HR: Performed by: NEUROLOGICAL SURGERY

## 2018-05-22 PROCEDURE — 40000277 XR SURGERY CARM FLUORO LESS THAN 5 MIN W STILLS: Mod: TC

## 2018-05-22 PROCEDURE — 36000074 ZZH SURGERY LEVEL 6 1ST 30 MIN - UMMC: Performed by: NEUROLOGICAL SURGERY

## 2018-05-22 PROCEDURE — 27210794 ZZH OR GENERAL SUPPLY STERILE: Performed by: NEUROLOGICAL SURGERY

## 2018-05-22 PROCEDURE — 25000125 ZZHC RX 250: Performed by: STUDENT IN AN ORGANIZED HEALTH CARE EDUCATION/TRAINING PROGRAM

## 2018-05-22 PROCEDURE — 00000146 ZZHCL STATISTIC GLUCOSE BY METER IP

## 2018-05-22 PROCEDURE — 71000017 ZZH RECOVERY PHASE 1 LEVEL 3 EA ADDTL HR: Performed by: NEUROLOGICAL SURGERY

## 2018-05-22 PROCEDURE — 27810169 ZZH OR IMPLANT GENERAL: Performed by: NEUROLOGICAL SURGERY

## 2018-05-22 PROCEDURE — C1778 LEAD, NEUROSTIMULATOR: HCPCS | Performed by: NEUROLOGICAL SURGERY

## 2018-05-22 PROCEDURE — 27210995 ZZH RX 272: Performed by: NEUROLOGICAL SURGERY

## 2018-05-22 DEVICE — IMPLANTABLE DEVICE: Type: IMPLANTABLE DEVICE | Site: SKULL | Status: FUNCTIONAL

## 2018-05-22 RX ORDER — ONDANSETRON 4 MG/1
4-8 TABLET, ORALLY DISINTEGRATING ORAL EVERY 6 HOURS PRN
Status: DISCONTINUED | OUTPATIENT
Start: 2018-05-22 | End: 2018-05-23 | Stop reason: HOSPADM

## 2018-05-22 RX ORDER — SODIUM CHLORIDE, SODIUM LACTATE, POTASSIUM CHLORIDE, CALCIUM CHLORIDE 600; 310; 30; 20 MG/100ML; MG/100ML; MG/100ML; MG/100ML
INJECTION, SOLUTION INTRAVENOUS CONTINUOUS
Status: DISCONTINUED | OUTPATIENT
Start: 2018-05-22 | End: 2018-05-22 | Stop reason: HOSPADM

## 2018-05-22 RX ORDER — AMOXICILLIN 250 MG
1 CAPSULE ORAL 2 TIMES DAILY
Status: DISCONTINUED | OUTPATIENT
Start: 2018-05-22 | End: 2018-05-23 | Stop reason: HOSPADM

## 2018-05-22 RX ORDER — LIDOCAINE 40 MG/G
CREAM TOPICAL
Status: DISCONTINUED | OUTPATIENT
Start: 2018-05-22 | End: 2018-05-22 | Stop reason: HOSPADM

## 2018-05-22 RX ORDER — CLINDAMYCIN PHOSPHATE 900 MG/50ML
900 INJECTION, SOLUTION INTRAVENOUS SEE ADMIN INSTRUCTIONS
Status: DISCONTINUED | OUTPATIENT
Start: 2018-05-22 | End: 2018-05-22 | Stop reason: HOSPADM

## 2018-05-22 RX ORDER — UBIDECARENONE 100 MG
300 CAPSULE ORAL EVERY MORNING
Status: DISCONTINUED | OUTPATIENT
Start: 2018-05-23 | End: 2018-05-23 | Stop reason: HOSPADM

## 2018-05-22 RX ORDER — AMOXICILLIN 250 MG
2 CAPSULE ORAL 2 TIMES DAILY
Status: DISCONTINUED | OUTPATIENT
Start: 2018-05-22 | End: 2018-05-23 | Stop reason: HOSPADM

## 2018-05-22 RX ORDER — CARBIDOPA AND LEVODOPA 25; 100 MG/1; MG/1
1 TABLET, EXTENDED RELEASE ORAL DAILY
Status: DISCONTINUED | OUTPATIENT
Start: 2018-05-22 | End: 2018-05-23 | Stop reason: HOSPADM

## 2018-05-22 RX ORDER — SODIUM CHLORIDE 9 MG/ML
INJECTION, SOLUTION INTRAVENOUS CONTINUOUS PRN
Status: DISCONTINUED | OUTPATIENT
Start: 2018-05-22 | End: 2018-05-22

## 2018-05-22 RX ORDER — POTASSIUM CHLORIDE 1500 MG/1
20-40 TABLET, EXTENDED RELEASE ORAL
Status: DISCONTINUED | OUTPATIENT
Start: 2018-05-22 | End: 2018-05-23 | Stop reason: HOSPADM

## 2018-05-22 RX ORDER — PROPOFOL 10 MG/ML
INJECTION, EMULSION INTRAVENOUS PRN
Status: DISCONTINUED | OUTPATIENT
Start: 2018-05-22 | End: 2018-05-22

## 2018-05-22 RX ORDER — CLONAZEPAM 0.5 MG/1
0.5 TABLET ORAL AT BEDTIME
Status: DISCONTINUED | OUTPATIENT
Start: 2018-05-22 | End: 2018-05-23 | Stop reason: HOSPADM

## 2018-05-22 RX ORDER — NALOXONE HYDROCHLORIDE 0.4 MG/ML
.1-.4 INJECTION, SOLUTION INTRAMUSCULAR; INTRAVENOUS; SUBCUTANEOUS
Status: DISCONTINUED | OUTPATIENT
Start: 2018-05-22 | End: 2018-05-23 | Stop reason: HOSPADM

## 2018-05-22 RX ORDER — ONDANSETRON 2 MG/ML
4-8 INJECTION INTRAMUSCULAR; INTRAVENOUS EVERY 6 HOURS PRN
Status: DISCONTINUED | OUTPATIENT
Start: 2018-05-22 | End: 2018-05-23 | Stop reason: HOSPADM

## 2018-05-22 RX ORDER — CARBIDOPA AND LEVODOPA 25; 100 MG/1; MG/1
2 TABLET ORAL DAILY
Status: CANCELLED | OUTPATIENT
Start: 2018-05-22

## 2018-05-22 RX ORDER — CLINDAMYCIN PHOSPHATE 900 MG/50ML
900 INJECTION, SOLUTION INTRAVENOUS EVERY 8 HOURS
Status: COMPLETED | OUTPATIENT
Start: 2018-05-22 | End: 2018-05-23

## 2018-05-22 RX ORDER — ACETAMINOPHEN 325 MG/1
975 TABLET ORAL ONCE
Status: COMPLETED | OUTPATIENT
Start: 2018-05-22 | End: 2018-05-22

## 2018-05-22 RX ORDER — FENTANYL CITRATE 50 UG/ML
25-50 INJECTION, SOLUTION INTRAMUSCULAR; INTRAVENOUS EVERY 5 MIN PRN
Status: DISCONTINUED | OUTPATIENT
Start: 2018-05-22 | End: 2018-05-22 | Stop reason: HOSPADM

## 2018-05-22 RX ORDER — CITALOPRAM HYDROBROMIDE 10 MG/1
10 TABLET ORAL EVERY MORNING
Status: DISCONTINUED | OUTPATIENT
Start: 2018-05-23 | End: 2018-05-23 | Stop reason: HOSPADM

## 2018-05-22 RX ORDER — ONDANSETRON 4 MG/1
4 TABLET, ORALLY DISINTEGRATING ORAL EVERY 30 MIN PRN
Status: DISCONTINUED | OUTPATIENT
Start: 2018-05-22 | End: 2018-05-22 | Stop reason: HOSPADM

## 2018-05-22 RX ORDER — CARBIDOPA AND LEVODOPA 25; 100 MG/1; MG/1
1 TABLET, EXTENDED RELEASE ORAL DAILY
Status: DISCONTINUED | OUTPATIENT
Start: 2018-05-22 | End: 2018-05-22 | Stop reason: ALTCHOICE

## 2018-05-22 RX ORDER — ACETAMINOPHEN 325 MG/1
975 TABLET ORAL EVERY 8 HOURS
Status: DISCONTINUED | OUTPATIENT
Start: 2018-05-22 | End: 2018-05-23 | Stop reason: HOSPADM

## 2018-05-22 RX ORDER — LIDOCAINE 40 MG/G
CREAM TOPICAL
Status: DISCONTINUED | OUTPATIENT
Start: 2018-05-22 | End: 2018-05-23 | Stop reason: HOSPADM

## 2018-05-22 RX ORDER — THYROID 60 MG/1
60 TABLET ORAL EVERY MORNING
Status: DISCONTINUED | OUTPATIENT
Start: 2018-05-23 | End: 2018-05-23 | Stop reason: HOSPADM

## 2018-05-22 RX ORDER — ACETAMINOPHEN 325 MG/1
650 TABLET ORAL EVERY 4 HOURS PRN
Status: DISCONTINUED | OUTPATIENT
Start: 2018-05-25 | End: 2018-05-23 | Stop reason: HOSPADM

## 2018-05-22 RX ORDER — MAGNESIUM SULFATE HEPTAHYDRATE 40 MG/ML
4 INJECTION, SOLUTION INTRAVENOUS EVERY 4 HOURS PRN
Status: DISCONTINUED | OUTPATIENT
Start: 2018-05-22 | End: 2018-05-23 | Stop reason: HOSPADM

## 2018-05-22 RX ORDER — POTASSIUM CHLORIDE 29.8 MG/ML
20 INJECTION INTRAVENOUS
Status: DISCONTINUED | OUTPATIENT
Start: 2018-05-22 | End: 2018-05-23 | Stop reason: HOSPADM

## 2018-05-22 RX ORDER — CALCIUM CARBONATE 500 MG/1
1000 TABLET, CHEWABLE ORAL 4 TIMES DAILY PRN
Status: DISCONTINUED | OUTPATIENT
Start: 2018-05-22 | End: 2018-05-23 | Stop reason: HOSPADM

## 2018-05-22 RX ORDER — LIDOCAINE HYDROCHLORIDE AND EPINEPHRINE 10; 10 MG/ML; UG/ML
INJECTION, SOLUTION INFILTRATION; PERINEURAL PRN
Status: DISCONTINUED | OUTPATIENT
Start: 2018-05-22 | End: 2018-05-22 | Stop reason: HOSPADM

## 2018-05-22 RX ORDER — HYDRALAZINE HYDROCHLORIDE 20 MG/ML
10-20 INJECTION INTRAMUSCULAR; INTRAVENOUS EVERY 30 MIN PRN
Status: DISCONTINUED | OUTPATIENT
Start: 2018-05-22 | End: 2018-05-23 | Stop reason: HOSPADM

## 2018-05-22 RX ORDER — ACETAMINOPHEN 325 MG/1
650 TABLET ORAL
Status: COMPLETED | OUTPATIENT
Start: 2018-05-22 | End: 2018-05-22

## 2018-05-22 RX ORDER — POTASSIUM CHLORIDE 1.5 G/1.58G
20-40 POWDER, FOR SOLUTION ORAL
Status: DISCONTINUED | OUTPATIENT
Start: 2018-05-22 | End: 2018-05-23 | Stop reason: HOSPADM

## 2018-05-22 RX ORDER — FENTANYL CITRATE 50 UG/ML
10-20 INJECTION, SOLUTION INTRAMUSCULAR; INTRAVENOUS
Status: ACTIVE | OUTPATIENT
Start: 2018-05-22 | End: 2018-05-23

## 2018-05-22 RX ORDER — POTASSIUM CL/LIDO/0.9 % NACL 10MEQ/0.1L
10 INTRAVENOUS SOLUTION, PIGGYBACK (ML) INTRAVENOUS
Status: DISCONTINUED | OUTPATIENT
Start: 2018-05-22 | End: 2018-05-23 | Stop reason: HOSPADM

## 2018-05-22 RX ORDER — LIDOCAINE HYDROCHLORIDE 20 MG/ML
INJECTION, SOLUTION INFILTRATION; PERINEURAL PRN
Status: DISCONTINUED | OUTPATIENT
Start: 2018-05-22 | End: 2018-05-22

## 2018-05-22 RX ORDER — OXYCODONE HYDROCHLORIDE 5 MG/1
5-10 TABLET ORAL EVERY 4 HOURS PRN
Status: DISCONTINUED | OUTPATIENT
Start: 2018-05-22 | End: 2018-05-23 | Stop reason: HOSPADM

## 2018-05-22 RX ORDER — BACITRACIN 500 [USP'U]/G
OINTMENT OPHTHALMIC PRN
Status: DISCONTINUED | OUTPATIENT
Start: 2018-05-22 | End: 2018-05-22 | Stop reason: HOSPADM

## 2018-05-22 RX ORDER — CLINDAMYCIN PHOSPHATE 900 MG/50ML
900 INJECTION, SOLUTION INTRAVENOUS
Status: COMPLETED | OUTPATIENT
Start: 2018-05-22 | End: 2018-05-22

## 2018-05-22 RX ORDER — CARBIDOPA AND LEVODOPA 25; 100 MG/1; MG/1
2 TABLET, EXTENDED RELEASE ORAL AT BEDTIME
Status: DISCONTINUED | OUTPATIENT
Start: 2018-05-22 | End: 2018-05-23 | Stop reason: HOSPADM

## 2018-05-22 RX ORDER — CARBIDOPA AND LEVODOPA 25; 100 MG/1; MG/1
1.5 TABLET, EXTENDED RELEASE ORAL ONCE
Status: DISCONTINUED | OUTPATIENT
Start: 2018-05-22 | End: 2018-05-22

## 2018-05-22 RX ORDER — CARBIDOPA AND LEVODOPA 25; 100 MG/1; MG/1
1 TABLET ORAL
COMMUNITY
End: 2018-10-30

## 2018-05-22 RX ORDER — LABETALOL HYDROCHLORIDE 5 MG/ML
10 INJECTION, SOLUTION INTRAVENOUS
Status: DISCONTINUED | OUTPATIENT
Start: 2018-05-22 | End: 2018-05-22 | Stop reason: HOSPADM

## 2018-05-22 RX ORDER — HYDROMORPHONE HCL/0.9% NACL/PF 0.2MG/0.2
.2-.4 SYRINGE (ML) INTRAVENOUS EVERY 5 MIN PRN
Status: DISCONTINUED | OUTPATIENT
Start: 2018-05-22 | End: 2018-05-22 | Stop reason: HOSPADM

## 2018-05-22 RX ORDER — CARBIDOPA AND LEVODOPA 25; 100 MG/1; MG/1
1 TABLET ORAL
Status: DISCONTINUED | OUTPATIENT
Start: 2018-05-22 | End: 2018-05-23 | Stop reason: HOSPADM

## 2018-05-22 RX ORDER — LABETALOL HYDROCHLORIDE 5 MG/ML
10-40 INJECTION, SOLUTION INTRAVENOUS EVERY 10 MIN PRN
Status: DISCONTINUED | OUTPATIENT
Start: 2018-05-22 | End: 2018-05-23 | Stop reason: HOSPADM

## 2018-05-22 RX ORDER — CARBIDOPA AND LEVODOPA 25; 100 MG/1; MG/1
1.5 TABLET, EXTENDED RELEASE ORAL ONCE
Status: DISCONTINUED | OUTPATIENT
Start: 2018-05-22 | End: 2018-05-22 | Stop reason: DRUGHIGH

## 2018-05-22 RX ORDER — ONDANSETRON 2 MG/ML
4 INJECTION INTRAMUSCULAR; INTRAVENOUS EVERY 30 MIN PRN
Status: DISCONTINUED | OUTPATIENT
Start: 2018-05-22 | End: 2018-05-22 | Stop reason: HOSPADM

## 2018-05-22 RX ORDER — SODIUM CHLORIDE 9 MG/ML
INJECTION, SOLUTION INTRAVENOUS CONTINUOUS
Status: ACTIVE | OUTPATIENT
Start: 2018-05-22 | End: 2018-05-23

## 2018-05-22 RX ORDER — POTASSIUM CHLORIDE 7.45 MG/ML
10 INJECTION INTRAVENOUS
Status: DISCONTINUED | OUTPATIENT
Start: 2018-05-22 | End: 2018-05-23 | Stop reason: HOSPADM

## 2018-05-22 RX ORDER — MAGNESIUM HYDROXIDE 1200 MG/15ML
LIQUID ORAL PRN
Status: DISCONTINUED | OUTPATIENT
Start: 2018-05-22 | End: 2018-05-22 | Stop reason: HOSPADM

## 2018-05-22 RX ADMIN — PROPOFOL 10 MG: 10 INJECTION, EMULSION INTRAVENOUS at 13:41

## 2018-05-22 RX ADMIN — CLINDAMYCIN PHOSPHATE 900 MG: 18 INJECTION, SOLUTION INTRAVENOUS at 19:17

## 2018-05-22 RX ADMIN — SENNOSIDES AND DOCUSATE SODIUM 1 TABLET: 8.6; 5 TABLET ORAL at 19:45

## 2018-05-22 RX ADMIN — PROPOFOL 20 MG: 10 INJECTION, EMULSION INTRAVENOUS at 08:25

## 2018-05-22 RX ADMIN — ACETAMINOPHEN 975 MG: 325 TABLET, FILM COATED ORAL at 20:33

## 2018-05-22 RX ADMIN — CLONAZEPAM 0.5 MG: 0.5 TABLET ORAL at 21:47

## 2018-05-22 RX ADMIN — Medication 2 MG: at 21:47

## 2018-05-22 RX ADMIN — LIDOCAINE HYDROCHLORIDE 100 MG: 20 INJECTION, SOLUTION INFILTRATION; PERINEURAL at 08:15

## 2018-05-22 RX ADMIN — CLINDAMYCIN PHOSPHATE 900 MG: 18 INJECTION, SOLUTION INTRAVENOUS at 09:10

## 2018-05-22 RX ADMIN — CARBIDOPA AND LEVODOPA 1 TABLET: 25; 100 TABLET ORAL at 19:38

## 2018-05-22 RX ADMIN — PROPOFOL 50 MG: 10 INJECTION, EMULSION INTRAVENOUS at 09:10

## 2018-05-22 RX ADMIN — PROPOFOL 30 MG: 10 INJECTION, EMULSION INTRAVENOUS at 08:20

## 2018-05-22 RX ADMIN — DEXMEDETOMIDINE HYDROCHLORIDE 0.5 MCG/KG/HR: 100 INJECTION, SOLUTION INTRAVENOUS at 09:00

## 2018-05-22 RX ADMIN — CARBIDOPA AND LEVODOPA 2 TABLET: 25; 100 TABLET, EXTENDED RELEASE ORAL at 21:46

## 2018-05-22 RX ADMIN — Medication: at 16:44

## 2018-05-22 RX ADMIN — PROPOFOL 50 MG: 10 INJECTION, EMULSION INTRAVENOUS at 08:15

## 2018-05-22 RX ADMIN — PROPOFOL 30 MG: 10 INJECTION, EMULSION INTRAVENOUS at 09:05

## 2018-05-22 RX ADMIN — ACETAMINOPHEN 650 MG: 325 TABLET, FILM COATED ORAL at 15:07

## 2018-05-22 RX ADMIN — SODIUM CHLORIDE: 9 INJECTION, SOLUTION INTRAVENOUS at 07:50

## 2018-05-22 RX ADMIN — PROPOFOL 10 MG: 10 INJECTION, EMULSION INTRAVENOUS at 09:50

## 2018-05-22 RX ADMIN — ACETAMINOPHEN 975 MG: 325 TABLET, FILM COATED ORAL at 07:10

## 2018-05-22 RX ADMIN — SODIUM CHLORIDE: 9 INJECTION, SOLUTION INTRAVENOUS at 17:00

## 2018-05-22 ASSESSMENT — PAIN DESCRIPTION - DESCRIPTORS: DESCRIPTORS: ACHING;SORE

## 2018-05-22 NOTE — ANESTHESIA POSTPROCEDURE EVALUATION
Patient: Kwame Lin    Procedure(s):  Stealth Assisted Right Deep Brain Stimulator Placement, Phase I And II Combined, Placement Of Right Deep Brain Stimulator Electrode, Target Right Subthalamic Nucleus With Microelectrode Recording And Connection To Previous Implanted Generator/Battery - Wound Class: I-Clean    Diagnosis:Parkinson's Disease   Diagnosis Additional Information: No value filed.    Anesthesia Type:  MAC    Note:  Anesthesia Post Evaluation    Patient location during evaluation: PACU  Patient participation: Able to fully participate in evaluation  Level of consciousness: awake and alert  Pain management: adequate  Airway patency: patent  Cardiovascular status: acceptable  Respiratory status: acceptable  Hydration status: acceptable  PONV: none     Anesthetic complications: None          Last vitals:  Vitals:    05/22/18 1448 05/22/18 1450 05/22/18 1500   BP: 115/75 118/78 116/74   Resp: 16  16   Temp: 36.7  C (98.1  F)     SpO2: 98% 97% 99%         Electronically Signed By: Arpan Barriga MD  May 22, 2018  3:20 PM

## 2018-05-22 NOTE — PROGRESS NOTES
SPIRITUAL HEALTH SERVICES  [facility] ([place])  3C  PRE-SURGERY VISIT    Had pre-surgery visit with Kwame and 2 family members.  Provided spiritual support, prayer.     Marilee Portillo MDiv    Pager [phone]

## 2018-05-22 NOTE — BRIEF OP NOTE
[facility], [place]    Brief Operative Note    Pre-operative diagnosis: Parkinson's Disease   Post-operative diagnosis Parkinson's Disease  Procedure: Procedure(s):  Stealth Assisted Right Deep Brain Stimulator Placement, Phase I And II Combined, Placement Of Right Deep Brain Stimulator Electrode, Target Right Subthalamic Nucleus With Microelectrode Recording And Connection To Previous Implanted Generator/Battery - Wound Class: I-Clean  Surgeon: Surgeon(s) and Role:     * Arpan Huynh MD - Primary     * Clarita Lo MD - Resident - Assisting  Anesthesia: Combined MAC with Local   Estimated blood loss: 10 ml  Drains: None  Specimens: * No specimens in log *  Findings:   None.  Complications: None.  Implants: [employer] 45 cm lead and susan hole cover kit .

## 2018-05-22 NOTE — OR NURSING
Patient left OR #20 via cart accompanied by anesthesia and surgeon to MRI at 0832,  Returned at 0849.

## 2018-05-22 NOTE — OR NURSING
Needing afternoon Sinemet medication.  Home medication list reviewed and corrected by this RN.  Dr GRANT Meyer sent following message: hello, please review Kwame Lin's home meds.  corrected sinemet dosing and added magnesium citrate tabs.  Could you review and put in dose for this afternoon? thanks Marialuisa SETH PACU #[id number].  Reviewed with OR Pharm Braeden to have Sinemet ordered and she will await new order.

## 2018-05-22 NOTE — OR NURSING
Patient to CT via cart and monitor.NEIDA Shook RN accompanied.  Will give report to ICU RN to assume care upon return.

## 2018-05-22 NOTE — OR NURSING
Dr Meyer called this RN and will give Sinemet 1.5 tab po ASAP.  Informed of low heart rate and BP/MAP in relation to admission VS.  Updated on neuro status.  Dr Meyer is OK as long as MAP remains above 65.  Continue to monitor.  Braeden in Pharmacy informed of one time order and will order is to be delivered to PACU.

## 2018-05-22 NOTE — OR NURSING
"Dr Huynh to bedside to see patient.  Patient states pain at \"halo\" sites of anterior head/forehead and to right side of head at incision.  Requesting just Tylenol for this pain and given.  Noted tremor to left hand and complains of restless legs/tremors.  VSS.  Denies nausea.  Tolerates drinking- intact swallow with ice chips and pills.   "

## 2018-05-22 NOTE — IP AVS SNAPSHOT
[address] [address]    Phone:  [phone]                                       After Visit Summary   5/22/2018    Kwame Lin    MRN: [id number]           After Visit Summary Signature Page     I have received my discharge instructions, and my questions have been answered. I have discussed any challenges I see with this plan with the nurse or doctor.    ..........................................................................................................................................  Patient/Patient Representative Signature      ..........................................................................................................................................  Patient Representative Print Name and Relationship to Patient    ..................................................               ................................................  Date                                            Time    ..........................................................................................................................................  Reviewed by Signature/Title    ...................................................              ..............................................  Date                                                            Time

## 2018-05-22 NOTE — OR NURSING
Dr Meyer to bedside.  Patient resting between cares and states Tylenol has helped headache but still hurts.  Will complete CT after medication given.  Continue to monitor.

## 2018-05-22 NOTE — ANESTHESIA CARE TRANSFER NOTE
Patient: Kwame Lin    Procedure(s):  Stealth Assisted Right Deep Brain Stimulator Placement, Phase I And II Combined, Placement Of Right Deep Brain Stimulator Electrode, Target Right Subthalamic Nucleus With Microelectrode Recording And Connection To Previous Implanted Generator/Battery - Wound Class: I-Clean    Diagnosis: Parkinson's Disease   Diagnosis Additional Information: No value filed.    Anesthesia Type:   MAC     Note:  Airway :Nasal Cannula  Patient transferred to:PACU  Comments: Transferred to PACU, report given to RNJavier ALVAREZ.  Rasheed.  Handoff Report: Identifed the Patient, Identified the Reponsible Provider, Reviewed the pertinent medical history, Discussed the surgical course, Reviewed Intra-OP anesthesia mangement and issues during anesthesia, Set expectations for post-procedure period and Allowed opportunity for questions and acknowledgement of understanding      Vitals: (Last set prior to Anesthesia Care Transfer)    CRNA VITALS  5/22/2018 1413 - 5/22/2018 1448      5/22/2018             Pulse: 62    SpO2: 99 %                Electronically Signed By: Jeff Roa MD  May 22, 2018  2:48 PM

## 2018-05-22 NOTE — IP AVS SNAPSHOT
Kwame Lin #[id number] (CSN: [id number])  (63 year old M)  (Adm: 18)     [id number]               [address] [facility]: [phone]            Patient Demographics     Patient Name Sex          Age SSN Address Phone    Kwame Lin Male 1955 (63 year old) [id number] [place] [address] [phone] (Home)  [phone] (Mobile) *Preferred*      Emergency Contact(s)     Name Relation Home Work Mobile    PATRICIA LIN Spouse [phone]    Georges Lin Son   [phone]      Admission Information     Attending Provider Admitting Provider Admission Type Admission Date/Time    Arpan Huynh MD Park, Arpan Barbosa MD Elective 18  0601    Discharge Date Hospital Service Auth/Cert Status Service Area     Surgery [facility]    Unit Room/Bed Admission Status       U U SURG & NEURO P101/P101- Admission (Confirmed)       Admission     Complaint    Parkinson's Disease , S/P deep brain stimulator placement      Hospital Account     Name Acct ID Class Status Primary Coverage    Kwame Lin [id number] Inpatient [employer] [place] [employer]            Guarantor Account (for Hospital Account #[id number])     Name Relation to Pt Service Area Active? Acct Type    DeliaKwame Self FCS Yes Personal/Family    Address Phone          [address] [place] [address] [phone](H)              Coverage Information (for Hospital Account #[id number])     F/O Payor/Plan Precert #    [employer] ADVANTAGE [id number]    Subscriber Subscriber #    Patricia Lin [id number]    Address Phone    [address]  [place] [address] [phone]                                                INTERAGENCY TRANSFER FORM - PHYSICIAN ORDERS   2018                       [address] [facility]: [phone]            Attending Provider: Amina  "Arpan Barbosa MD     Allergies:  Bactrim, Codeine, Ketorolac, Morphine, Nalbuphine, Penicillins, Seasonal Allergies, Sulfa Drugs, Toradol    Infection:  None   Service:  SURGERY    Ht:  1.753 m (5' 9\")   Wt:  92.6 kg (204 lb 2.3 oz)   Admission Wt:  92.6 kg (204 lb 2.3 oz)    BMI:  30.15 kg/m 2   BSA:  2.12 m 2            ED Clinical Impression     Diagnosis Description Comment Added By Time Added    Parkinson disease (H) [G20] Parkinson disease (H) [G20]  Anna Ocampo APRN CNP 5/23/2018  8:06 AM      Hospital Problems as of 5/23/2018              Priority Class Noted POA    S/P deep brain stimulator placement Medium  5/22/2018 Yes      Non-Hospital Problems as of 5/23/2018              Priority Class Noted    Hypothyroidism Medium  5/15/2009    Restless leg syndrome Medium  8/19/2011    Hyperlipemia Medium  12/19/2011    Parkinsonian tremor (H) Medium  5/6/2013    Parkinson disease (H) Medium  8/17/2015    Anxiety disorder Medium  6/2/2017    Anosmia Medium  10/12/2017    Constipation Medium  10/12/2017    Depressed mood Medium  10/12/2017    RBD (REM behavioral disorder) Medium  10/12/2017      Code Status History     Date Active Date Inactive Code Status Order ID Comments User Context    5/23/2018 12:25 PM  Full Code [id number]  Helena Fish APRN CNP Outpatient    12/22/2017  4:42 PM 5/23/2018 12:25 PM Full Code [id number]  Jose Maria Dillon MD Outpatient    12/15/2017  7:36 AM 12/22/2017  4:42 PM Full Code [id number]  Anna Ocampo APRN CNP Outpatient      Current Code Status     Date Active Code Status Order ID Comments User Context       Prior      Summary of Visit     Reason for your hospital stay       You underwent placement of Deep Brain Stimulator and connection to battery                Medication Review      START taking        Dose / Directions Comments    ondansetron 4 MG ODT tab   Commonly known as:  ZOFRAN ODT   Used for:  Parkinson disease (H)        Dose:  4-8 " mg   Take 1-2 tablets (4-8 mg) by mouth every 8 hours as needed for nausea   Quantity:  20 tablet   Refills:  1        traMADol 50 MG tablet   Commonly known as:  ULTRAM   Used for:  Parkinson disease (H)        Dose:  50 mg   Take 1 tablet (50 mg) by mouth every 6 hours as needed for severe pain   Quantity:  25 tablet   Refills:  0          CONTINUE these medications which have NOT CHANGED        Dose / Directions Comments    Acidophilus/Goat Milk Caps        At Bedtime   Refills:  0        ARMOUR THYROID 60 MG tablet   Indication:  Underactive Thyroid   Generic drug:  thyroid        Dose:  60 mg   Take 60 mg by mouth every morning   Refills:  0        * SINEMET  MG per tablet   Indication:  Parkinson's Disease   Generic drug:  carbidopa-levodopa        Dose:  1.5 tablet   Take 1.5 tablets by mouth every morning 1.5 tab in am and 1 tab every 4 hours after that   Refills:  0        * SINEMET  MG per tablet   Indication:  doses in between a.m. and HS doses   Generic drug:  carbidopa-levodopa        Dose:  1 tablet   Take 1 tablet by mouth every 4 hours (while awake)   Refills:  0        * carbidopa-levodopa  MG per tablet   Commonly known as:  SINEMET        Dose:  2 tablet   2 tablets At Bedtime Extended release   Quantity:  90 tablet   Refills:  0        citalopram 10 MG tablet   Commonly known as:  celeXA        Dose:  10 mg   Take 10 mg by mouth every morning   Refills:  0        clonazePAM 0.5 MG tablet   Commonly known as:  klonoPIN        Dose:  0.5 mg   Take 0.5 mg by mouth At Bedtime   Refills:  0        Coenzyme Q10 300 MG Caps        Dose:  300 mg   Take 300 mg by mouth every morning   Refills:  0        diclofenac 1 % Gel topical gel   Commonly known as:  VOLTAREN        Apply qid prn pain   Refills:  0        magnesium 100 MG Tabs   Indication:  Mag Citrate: total 300 mg, 1 tab with breakfast and 2 tab with dinner        Dose:  100 tablet   Take 100 tablets by mouth 2 times daily  (before meals)   Refills:  0        MELATONIN PO        Dose:  2 mg   Take 2 mg by mouth At Bedtime   Refills:  0        Multi-vitamin Tabs tablet        Dose:  1 tablet   Take 1 tablet by mouth every morning   Refills:  0        Red Wine Extract Caps        Take by mouth daily (with lunch)   Refills:  0        TYLENOL 325 MG tablet   Generic drug:  acetaminophen        Dose:  325-650 mg   Take 325-650 mg by mouth nightly as needed for mild pain   Refills:  0        * Notice:  This list has 3 medication(s) that are the same as other medications prescribed for you. Read the directions carefully, and ask your doctor or other care provider to review them with you.            After Care     Activity       Your activity upon discharge:   Do not do any bending, twisting, strenuous exercise, or heavy lifting (greater than 10 pounds) for 4-6 weeks. Be careful and ask for assistance when walking or going up and down stairs. Avoid any activities that could result in trauma to the surgical wound. Do not drive within 3 months of having your last seizure or while using narcotics or other sedating medications, such as sleep aids, muscle relaxants, etc.'       Diet       Follow this diet upon discharge: Orders Placed This Encounter      Advance Diet as Tolerated: Regular Diet Adult       Discharge Instructions       You underwent surgery place a  deep brain stimulator  by Arpan Huynh MD, PhD      - Your sutures are absorbable.     - You will have follow up scheduled with the physician assistants and/or nurse practitioners in our clinic 2 weeks after your surgery.  If you live far away, you may see your primary care doctor for a wound check at 2 weeks.     - If you have not heard from our clinic about your follow up visit by 3-4 days following your discharge, please call our clinic at [phone] to schedule an appointment with the Neurosurgery teams.     After discharge, your activity restrictions are:   -We encourage short  frequent walks, increasing as tolerated.  - No driving until you are seen in clinic and cleared by your neurosurgeon.  If you have had a seizure, you may not drive for at least 3 months according to [place] law.    - No strenuous activity.  - No lifting more than 10 pounds until you are seen in clinic (a gallon of milk weighs approximately 8 pounds)    Wound care  - You are ok to shower, but do not soak your incisions. Pat them dry if they get damp.   - Avoid coloring your hair or permanent styling until cleared by your surgeon  - No baths, hot tubs or pools for 4-6 weeks after surgery.       Call if you have any of the followin. Temperature greater than 101.5 F.   2. Any redness, swelling or discharge from the wound.   3. Any new weakness, numbness or altered mental status.  4. Worsening pain that is not improving with the pain medications you were prescribed.     Call [phone] or after 5:00 pm or on weekends call [phone] and ask for the neurosurgery resident on call. Thank You.       Wound care and dressings       Instructions to care for your wound at home:  You should remove your dressings and bandages on post-operative day #2. You should then keep the wound undressed and open to air. You are allowed to take showers and get the wound wet starting on post-operative day #3 but you may not scrub or soak the wound or keep it submerged under water. If you do happen to get the wound wet, be sure to pat dry it rather than scrubbing it with a towel.             Follow-Up Appointment Instructions     Adult [facility] Follow-up and recommended labs and tests       Follow up with Dr Arpan Huynh in 2 weeks for wound check     Follow up with Neurology as previously scheduled     Appointments on [place] and/or [facility] (with [facility] or [facility] provider or service). Call [phone] if you haven't heard regarding these appointments within 7 days of discharge.             Statement of Approval     Ordered  "         05/23/18 1344  I have reviewed and agree with all the recommendations and orders detailed in this document.  EFFECTIVE NOW     Approved and electronically signed by:  Helena Fish APRN CNP                                                 INTERAGENCY TRANSFER FORM - NURSING   5/22/2018                       [address] [facility]: [phone]            Attending Provider: Arpan Huynh MD     Allergies:  Bactrim, Codeine, Ketorolac, Morphine, Nalbuphine, Penicillins, Seasonal Allergies, Sulfa Drugs, Toradol    Infection:  None   Service:  SURGERY    Ht:  1.753 m (5' 9\")   Wt:  92.6 kg (204 lb 2.3 oz)   Admission Wt:  92.6 kg (204 lb 2.3 oz)    BMI:  30.15 kg/m 2   BSA:  2.12 m 2            Advance Directives        Scanned docmt in ACP Activity?           No scanned doc        Immunizations     None      ASSESSMENT     Discharge Profile Flowsheet     GASTROINTESTINAL (ADULT,PEDIATRIC,OB)     Skin Color/Characteristics  without discoloration 05/22/18 1847    GI WDL  ex 05/23/18 0840   Skin Temperature  warm 05/23/18 0840    GI Signs/Symptoms  nausea;constipation 05/23/18 0840   Skin Moisture  dry 05/23/18 0840    COMMUNICATION ASSESSMENT     Skin Integrity  drain/device(s);incision(s);scar(s) 05/23/18 0840    Patient's communication style  spoken language (English or Bilingual) 05/22/18 1908   SAFETY      SKIN     Safety WDL  WDL 05/23/18 0850    Inspection of bony prominences  Full 05/23/18 0840   Safety Factors  upper side rails raised x 2;ID band on;call light in reach;wheels locked;bed in low position 05/23/18 0850    Inspection under devices  Full 05/23/18 0840   Safety Equipment  oxygen flowmeter;suction regulator;suction equipment 05/23/18 0850    Skin WDL  ex 05/23/18 0840   All Alarms  alarm(s) activated and audible 05/23/18 0850                 Assessment WDL (Within Defined Limits) Definitions           Safety WDL     Effective: 09/28/15    Row Information: <b>WDL " "Definition:</b> Bed in low position, wheels locked; call light in reach; upper side rails up x 2; ID band on<br> <font color=\"gray\"><i>Item=AS safety wdl>>List=AS safety wdl>>Version=F14</i></font>      Skin WDL     Effective: 09/28/15    Row Information: <b>WDL Definition:</b> Warm; dry; intact; elastic; without discoloration; pressure points without redness<br> <font color=\"gray\"><i>Item=AS skin wdl>>List=AS skin wdl>>Version=F14</i></font>      Vitals     Vital Signs Flowsheet     VITAL SIGNS     Pain Control  partially effective 05/23/18 1125    Temp  98.2  F (36.8  C) 05/23/18 0832   Functioning  can do most things, but pain gets in the way of some 05/23/18 1125    Temp src  Oral 05/23/18 0832   Sleep  awake with occasional pain 05/23/18 1125    Resp  15 05/23/18 1123   ANALGESIA SIDE EFFECTS MONITORING      Heart Rate  71 05/23/18 1123   Side Effects Monitoring: Respiratory Quality  R 05/23/18 1125    BP  106/70 05/23/18 1123   Side Effects Monitoring: Respiratory Depth  N 05/23/18 1125    BP Location  Right arm 05/23/18 0432   Side Effects Monitoring: Sedation Level  S 05/23/18 1125    Patient Position  Lying 05/22/18 0618   HEIGHT AND WEIGHT      OXYGEN THERAPY     Height  1.753 m (5' 9\") 05/22/18 0618    SpO2  94 % 05/23/18 1123   Weight  92.6 kg (204 lb 2.3 oz) 05/22/18 0618    O2 Device  None (Room air) 05/23/18 1125   BSA (Calculated - sq m)  2.12 05/22/18 0618    Oxygen Delivery  1 LPM 05/22/18 2011   BMI (Calculated)  30.21 05/22/18 0618    RESPIRATORY MONITORING     POSITIONING      Respiratory Monitoring (EtCO2)  36 mmHg 05/23/18 0422   Body Position  independently positioning 05/23/18 1125    Integrated Pulmonary Index (IPI)  10 05/23/18 0422   Head of Bed (HOB)  HOB at 30 degrees 05/23/18 1125    PAIN/COMFORT     Positioning/Transfer Devices  pillows;in use 05/23/18 1125    Patient Currently in Pain  yes 05/23/18 1125   DAILY CARE      Preferred Pain Scale  CAPA (Clinically Aligned Pain Assessment) " ([facility] Adults Only) 05/23/18 1125   Activity Management  activity adjusted per tolerance 05/23/18 1125    Pain Location  Head 05/23/18 1125   Activity Assistance Provided  assistance, 1 person 05/23/18 1125    Pain Orientation  Right;Anterior 05/23/18 0832   ECG      Pain Descriptors  Headache 05/23/18 1125   ECG Rhythm  Normal sinus rhythm 05/23/18 1125    Pain Intervention(s)  Medication (See eMAR) 05/23/18 1125   Ectopy  None 05/23/18 1125    Response to Interventions  Decrease in pain 05/23/18 1125   Lead Monitored  Lead II;V 1 05/23/18 1125    CLINICALLY ALIGNED PAIN ASSESSMENT (CAPA) ([facility] ADULTS ONLY)     Equipment  electrodes changed 05/23/18 0145    Comfort  comfortably manageable 05/23/18 1125   DRUG CALCULATION WEIGHT      Change in Pain  getting better 05/23/18 1125   Drug Calculation Weight  92.6 kg (204 lb 2.3 oz) 05/22/18 2011            Patient Lines/Drains/Airways Status    Active LINES/DRAINS/AIRWAYS     Name: Placement date: Placement time: Site: Days: Last dressing change:    Peripheral IV 05/22/18 Right Hand 05/22/18   0720   Hand   1     Incision/Surgical Site 12/14/17 Left Head 12/14/17   0929    160     Incision/Surgical Site 12/22/17 Left Chest 12/22/17   1618    151     Incision/Surgical Site 12/26/17 Bilateral Head 12/26/17   0939    148     Incision/Surgical Site 05/22/18 Right;Anterior Head 05/22/18   1345    1     Incision/Surgical Site 05/22/18 Left;Anterior Head 05/22/18   1353    1     Incision/Surgical Site 05/22/18 Right;Posterior Head 05/22/18   1353    1     Incision/Surgical Site 05/22/18 Mid;Posterior Head 05/22/18   1500    less than 1             Patient Lines/Drains/Airways Status    Active PICC/CVC     None            Intake/Output Detail Report     Date Intake     Output   Net    Shift P.O. I.V. IV Piggyback Total Urine Blood Total       Day 05/22/18 0000 - 05/22/18 0659 -- -- -- -- -- -- -- 0    Cathi 05/22/18 0700 - 05/22/18 1459 --  750 -- 750 675 10 685 65    Noc 05/22/18 1500 - 05/22/18 2359 1070 725 -- 1795 555 -- 555 1240    Day 05/23/18 0000 - 05/23/18 0659 360 450 -- 810 200 -- 200 610    Cathi 05/23/18 0700 - 05/23/18 1459 -- 387.5 -- 387.5 -- -- -- 387.5      Last Void/BM       Most Recent Value    Urine Occurrence 1 at 05/23/2018 0400    Stool Occurrence       Case Management/Discharge Planning     Case Management/Discharge Planning Flowsheet     LIVING ENVIRONMENT     QUESTION TO PATIENT:  Has a member of your family or a partner(now or in the past) intimidated, hurt, manipulated, or controlled you in any way?  no 05/22/18 0643    Lives With  spouse 05/18/18 1419   QUESTION TO PATIENT: Do you feel safe going back to the place where you are living?  yes 05/22/18 0643    Provides Primary Care For  no one 05/22/18 1908   OBSERVATION: Is there reason to believe there has been maltreatment of a vulnerable adult (ie. Physical/Sexual/Emotional abuse, self neglect, lack of adequate food, shelter, medical care, or financial exploitation)?  no 05/22/18 0643    COPING/STRESS     OTHER      Major Change/Loss/Stressor  hospitalization 05/22/18 1908   Are you depressed or being treated for depression?  Yes 05/22/18 1908    ABUSE RISK SCREEN                         [address] [facility]: [phone]            Medication Administration Report for Kwame Lin as of 05/23/18 1358   Legend:    Given Hold Not Given Due Canceled Entry Other Actions    Time Time (Time) Time  Time-Action       Inactive    Active    Linked        Medications 05/17/18 05/18/18 05/19/18 05/20/18 05/21/18 05/22/18 05/23/18    acetaminophen (TYLENOL) tablet 975 mg  Dose: 975 mg  Freq: EVERY 8 HOURS Route: PO  Start: 05/22/18 2100   End: 05/25/18 2059   Admin Instructions: Do not use if patient has an active opioid/acetaminophen combined analgesic product ordered for pain.  Maximum acetaminophen dose from all sources = 75 mg/kg/day not to exceed 4 grams/day.    Admin.  Amount: 3 tablet (3 × 325 mg tablet)  Last Admin: 18 1331  Dispense Loc: [facility] ADS PACU  Administrations Remainin           (975 mg)-Given        454 (975 mg)-Given       133 (975 mg)-Given       [ ] 2100           calcium carbonate (TUMS) chewable tablet 1,000 mg  Dose: 1,000 mg  Freq: 4 TIMES DAILY PRN Route: PO  PRN Reason: heartburn  Start: 18 1830   Admin. Amount: 2 tablet (2 × 500 mg tablet)  Dispense Loc: [facility]               carbidopa-levodopa (SINEMET CR)  MG per CR tablet 1 tablet  Dose: 1 tablet  Freq: DAILY Route: PO  Start: 18 1600   Admin Instructions: DO NOT CRUSH.    Admin. Amount: 1 tablet  Dispense Loc: [facility]                  [ ] 1600           carbidopa-levodopa (SINEMET CR)  MG per CR tablet 2 tablet  Dose: 2 tablet  Freq: AT BEDTIME Route: PO  Start: 180   Admin Instructions: DO NOT CRUSH.    Admin. Amount: 2 tablet  Last Admin: 18  Dispense Loc: [facility]          214 (2 tablet)-Given        [ ] 2200           carbidopa-levodopa (SINEMET)  MG per tablet 1 tablet  Dose: 1 tablet  Freq: EVERY 4 HOURS WHILE AWAKE Route: PO  Indications Comment: doses in between a.m. and HS doses  Start: 18   Admin. Amount: 1 tablet  Last Admin: 18 1153  Dispense Loc: [facility]          1938 (1 tablet)-Given        0736 (1 tablet)-Given       1153 (1 tablet)-Given       [ ] 1600       [ ] 2000           carbidopa-levodopa half-tab 12.5-50 mg  Dose: 3 half-tab  Freq: DAILY Route: PO  Start: 18 0730   Admin. Amount: 3 half-tab  Last Admin: 18 0735  Dispense Loc: [facility]           0735 (3 half-tab)-Given           citalopram (celeXA) tablet 10 mg  Dose: 10 mg  Freq: EVERY MORNING Route: PO  Start: 18 0800   Admin. Amount: 1 tablet (1 × 10 mg tablet)  Last Admin: 18 0735  Dispense Loc: [facility]           0735 (10 mg)-Given            clonazePAM (klonoPIN) tablet 0.5 mg  Dose: 0.5 mg  Freq: AT BEDTIME Route: PO  Start: 05/22/18 2200   Admin. Amount: 1 tablet (1 × 0.5 mg tablet)  Last Admin: 05/22/18 2147  Dispense Loc: Contact Rx for dose          2147 (0.5 mg)-Given        [ ] 2200           co-enzyme Q-10 capsule 300 mg  Dose: 300 mg  Freq: EVERY MORNING Route: PO  Start: 05/23/18 0800   Admin. Amount: 3 capsule (3 × 100 mg capsule)  Last Admin: 05/23/18 0741  Dispense Loc: [facility]           0741 (300 mg)-Given             Dose: 10-20 mcg  Freq: EVERY 1 HOUR PRN Route: IV  PRN Reason: other  PRN Comment: pain control or improvement in physical function. Hold dose for analgesic side effects.  Start: 05/22/18 1830   End: 05/23/18 0629   Admin Instructions: Start at the lowest dose. May adjust dose by 5 mcg every 1 hour as needed.  Notify provider to assess for uncontrolled pain or analgesic side effects.  Hold while on IV PCA or with regular IV opioid dosing.  For ordered doses up to 100 mcg give IV Push undiluted over a minimum of 3-5 minutes.    Admin. Amount: 10-20 mcg = 0.2-0.4 mL Conc: 50 mcg/mL  Last Admin: 05/23/18 0335  Dispense Loc: [facility] ADS PACU  Volume: 2 mL  POC: Post-procedure   Current Line: Peripheral IV 05/22/18 Right Hand          0335 (20 mcg)-Given       0629-Med Discontinued       hydrALAZINE (APRESOLINE) injection 10-20 mg  Dose: 10-20 mg  Freq: EVERY 30 MIN PRN Route: IV  PRN Reason: high blood pressure  Start: 05/22/18 1830   Admin Instructions: IF Heart Rate less than 60 initiate hydrALAZINE (APRESOLINE) for hypertension. For Systolic Blood Pressure greater than 140 mmHg. Give 10 mg, wait 30 minutes. If not effective then repeat 10 mg. Wait 30 minutes. If not effective then give 20 mg. If still not effective then start niCARdipine (CARDENE) IV infusion IF ORDERED. Notify provider within 1 hour if Blood Pressure parameters are not met.  For ordered doses up to 40 mg, give IV Push undiluted over 1 minute.  "   Admin. Amount: 10-20 mg = 0.5-1 mL Conc: 20 mg/mL  Dispense Loc: [facility] PACU  Infused Over: 1 Minutes  Volume: 1 mL               labetalol (NORMODYNE/TRANDATE) injection 10-40 mg  Dose: 10-40 mg  Freq: EVERY 10 MIN PRN Route: IV  PRN Reason: high blood pressure  Start: 05/22/18 1830   Admin Instructions: IF Heart Rate 60 beats per minute or greater initiate labetalol (NORMODYNE,TRANDATE) for hypertension. For Systolic Blood Pressure greater than 140 mmHg. Hold if Heart Rate less than 60 beats per minute. Give dose over 1-2 minutes. Increase or repeat the dose if Blood Pressure goal not met. Give 10 mg, wait 10 minutes.  If not effective then give 20 mg. Wait 10 minutes.  If not effective then give 40 mg. If still not effective then start niCARdipine (CARDENE) IV infusion IF ORDERED. Notify provider within 1 hour if Blood Pressure parameters are not met.  For ordered doses up to 80 mg, give IV Push undiluted. Give each 20 mg over 2 minutes.    Admin. Amount: 10-40 mg = 2-8 mL Conc: 5 mg/mL  Dispense Loc: [facility] PACU  Infused Over: 2-8 Minutes  Volume: 8 mL               lidocaine (LMX4) kit  Freq: EVERY 1 HOUR PRN Route: Top  PRN Reason: pain  PRN Comment: with VAD insertion or accessing implanted port.  Start: 05/22/18 1830   Admin Instructions: Do NOT give if patient has a history of allergy to any local anesthetic or any \"haylie\" product.   Apply 30 minutes prior to VAD insertion or port access.  MAX Dose:  2.5 g (  of 5 g tube)    Dispense Loc: [facility]               lidocaine 1 % 1 mL  Dose: 1 mL  Freq: EVERY 1 HOUR PRN Route: OTHER  PRN Comment: mild pain with VAD insertion or accessing implanted port  Start: 05/22/18 1830   Admin Instructions: Do NOT give if patient has a history of allergy to any local anesthetic or any \"haylie\" product. MAX dose 1 mL subcutaneous OR intradermal in divided doses.    Admin. Amount: 1 mL  Dispense Loc: [facility] PACU  Volume: 2 mL               " magnesium sulfate 4 g in 100 mL sterile water (premade)  Dose: 4 g  Freq: EVERY 4 HOURS PRN Route: IV  PRN Reason: magnesium supplementation  Start: 05/22/18 1830   Admin Instructions: For serum Mg++ less than 1.6 mg/dL  Give 4 g and recheck magnesium level 2 hours after dose, and next AM.    Admin. Amount: 4 g = 100 mL Conc: 4 g/100 mL  Dispense Loc: [facility] PACU  Infused Over: 120 Minutes  Volume: 100 mL               melatonin tablet 2 mg  Dose: 2 mg  Freq: AT BEDTIME Route: PO  Start: 05/22/18 2200   Admin. Amount: 2 tablet (2 × 1 mg tablet)  Last Admin: 05/22/18 2147  Dispense Loc: [facility]          2147 (2 mg)-Given        [ ] 2200           naloxone (NARCAN) injection 0.1-0.4 mg  Dose: 0.1-0.4 mg  Freq: EVERY 2 MIN PRN Route: IV  PRN Reason: opioid reversal  Start: 05/22/18 1830   Admin Instructions: For respiratory rate LESS than or EQUAL to 8.  Partial reversal dose:  0.1 mg titrated q 2 minutes for Analgesia Side Effects Monitoring Sedation Level of 3 (frequently drowsy, arousable, drifts to sleep during conversation).Full reversal dose:  0.4 mg bolus for Analgesia Side Effects Monitoring Sedation Level of 4 (somnolent, minimal or no response to stimulation).  For ordered doses up to 2mg give IVP. Give each 0.4mg over 15 seconds in emergency situations. For non-emergent situations further dilute in 9mL of NS to facilitate titration of response.    Admin. Amount: 0.1-0.4 mg = 0.25-1 mL Conc: 0.4 mg/mL  Dispense Loc: [facility] PACU  Volume: 1 mL               naloxone (NARCAN) injection 0.1-0.4 mg  Dose: 0.1-0.4 mg  Freq: EVERY 2 MIN PRN Route: IV  PRN Reason: opioid reversal  Start: 05/22/18 1830   End: 05/23/18 1829   Admin Instructions: For apnea or imminent respiratory arrest: give 0.4 mg IV undiluted Q 2 minutes PRN until desired degree of reversal is obtained, stop opioid and notify provider. Continue monitoring until discharge criteria are met for a minimum of 2 hours.  For severe  sedation, decrease in respiratory depth, quality or respiratory rate less than 8: give 0.1 mg IV Q 2 minutes x 3 doses, stop opioid and notify provider.  Try to minimize reversal of analgesia especially in end-of-life patients  For ordered doses up to 2mg give IVP. Give each 0.4mg over 15 seconds in emergency situations. For non-emergent situations further dilute in 9mL of NS to facilitate titration of response.    Admin. Amount: 0.1-0.4 mg = 0.25-1 mL Conc: 0.4 mg/mL  Dispense Loc: [facility] PACU  Volume: 1 mL           1829-Med Discontinued       ondansetron (ZOFRAN-ODT) ODT tab 4-8 mg  Dose: 4-8 mg  Freq: EVERY 6 HOURS PRN Route: PO  PRN Reasons: nausea,vomiting  Start: 05/22/18 1830   Admin Instructions: This is Step 1 of nausea and vomiting management.  If nausea not resolved in 15 minutes, go to Step 2 prochlorperazine (COMPAZINE). Do not push through foil backing. Peel back foil and gently remove. Place on tongue immediately. Administration with liquid unnecessary  With dry hands, peel back foil backing and gently remove tablet; do not push oral disintegrating tablet through foil backing; administer immediately on tongue and oral disintegrating tablet dissolves in seconds; then swallow with saliva; liquid not required.    Admin. Amount: 1-2 tablet (1-2 × 4 mg tablet)  Dispense Loc: [facility]                                   Or  ondansetron (ZOFRAN) injection 4-8 mg  Dose: 4-8 mg  Freq: EVERY 6 HOURS PRN Route: IV  PRN Reasons: nausea,vomiting  Start: 05/22/18 1830   Admin Instructions: This is Step 1 of nausea and vomiting management.  If nausea not resolved in 15 minutes, go to Step 2 prochlorperazine (COMPAZINE).  Irritant. For ordered doses up to 4 mg, give IV Push undiluted over 2-5 minutes.    Admin. Amount: 4-8 mg = 2-4 mL Conc: 4 mg/2 mL  Last Admin: 05/23/18 0856  Dispense Loc: [facility] PACU  Infused Over: 2-5 Minutes  Volume: 4 mL   Current Line: Peripheral IV 05/22/18 Right Hand           0217 (4 mg)-Given       0330 (4 mg)-Given       0856 (4 mg)-Given           oxyCODONE IR (ROXICODONE) tablet 5-10 mg  Dose: 5-10 mg  Freq: EVERY 4 HOURS PRN Route: PO  PRN Reason: other  PRN Comment: pain control or improvement in physical function. Hold dose for analgesic side effects.  Start: 05/22/18 1830   Admin Instructions: Start with the lowest dose. May adjust dose by 5 mg every 4 hours as needed. Notify provider to assess for uncontrolled pain or analgesic side effects. Hold while on PCA or with regular IV opioid dosing. Maximum total is 60 mg in 24 hours.    Admin. Amount: 1-2 tablet (1-2 × 5 mg tablet)  [facility]               potassium chloride (KLOR-CON) Packet 20-40 mEq  Dose: 20-40 mEq  Freq: EVERY 2 HOURS PRN Route: ORAL OR FEED  PRN Reason: potassium supplementation  Start: 05/22/18 1830   Admin Instructions: Use if unable to tolerate tablets.  If Serum K+ 3.0-3.3, dose = 60 mEq po total dose (40 mEq x1 followed in 2 hours by 20 mEq x1). Recheck K+ level 4 hours after dose and the next AM.  If Serum K+ 2.5-2.9, dose = 80 mEq po total dose (40 mEq Q2H x2). Recheck K+ level 4 hours after dose and the next AM.  If Serum K+ less than 2.5, See IV order.  Dissolve packet contents in 4-8 ounces of cold water or juice.    Admin. Amount: 20-40 mEq  [facility]               potassium chloride 10 mEq in 100 mL intermittent infusion with 10 mg lidocaine  Dose: 10 mEq  Freq: EVERY 1 HOUR PRN Route: IV  PRN Reason: potassium supplementation  Start: 05/22/18 1830   Admin Instructions: Infuse via PERIPHERAL LINE. Use potassium with lidocaine for pain with peripheral administration.  If Serum K+ 3.0-3.3, dose = 10 mEq/hr x4 doses (40 mEq IV total dose). Recheck K+ level 2 hours after dose and the next AM.  If Serum K+ less than 3.0, dose = 10 mEq/hr x6 doses (60 mEq IV total dose). Recheck K+ level 2 hours after dose and the next AM.    Admin. Amount: 10 mEq = 100 mL  Conc: 10 mEq/100 mL  Dispense Loc: [facility] PACU  Infused Over: 1 Hours  Volume: 100 mL               potassium chloride 10 mEq in 100 mL sterile water intermittent infusion (premix)  Dose: 10 mEq  Freq: EVERY 1 HOUR PRN Route: IV  PRN Reason: potassium supplementation  Start: 05/22/18 1830   Admin Instructions: Infuse via PERIPHERAL LINE or CENTRAL LINE. Use for central line replacement if patient weight less than 65 kg, if patient is on TPN with high potassium content or if unit does not stock 20 mEq bags.   If Serum K+ 3.0-3.3, dose = 10 mEq/hr x4 doses (40 mEq IV total dose). Recheck K+ level 2 hours after dose and the next AM.   If Serum K+ less than 3.0, dose = 10 mEq/hr x6 doses (60 mEq IV total dose). Recheck K+ level 2 hours after dose and the next AM.    Admin. Amount: 10 mEq = 100 mL Conc: 10 mEq/100 mL  Dispense Loc: [facility]  Infused Over: 60 Minutes  Volume: 100 mL               potassium chloride 20 mEq in 50 mL intermittent infusion  Dose: 20 mEq  Freq: EVERY 1 HOUR PRN Route: IV  PRN Reason: potassium supplementation  Start: 05/22/18 1830   Admin Instructions: Infuse via CENTRAL LINE Only. May need EKG if less than 65 kg or on TPN - Max rate is 0.3 mEq/kg/hr for patients not on EKG monitoring.   If Serum K+ 3.0-3.3, dose = 20 mEq/hr x2 doses (40 mEq IV total dose). Recheck K+ level 2 hours after dose and the next AM.  If Serum K+ less than 3.0, dose = 20 mEq/hr x3 doses (60 mEq IV total dose). Recheck K+ level 2 hours after dose and the next AM.    Admin. Amount: 20 mEq = 50 mL Conc: 20 mEq/50 mL  Dispense Loc: [facility] PACU  Volume: 50 mL               potassium chloride SA (K-DUR/KLOR-CON M) CR tablet 20-40 mEq  Dose: 20-40 mEq  Freq: EVERY 2 HOURS PRN Route: PO  PRN Reason: potassium supplementation  Start: 05/22/18 1830   Admin Instructions: Use if able to take PO.   If Serum K+ 3.0-3.3, dose = 60 mEq po total dose (40 mEq x1 followed in 2 hours by 20 mEq x1). Recheck K+ level  4 hours after dose and the next AM.  If Serum K+ 2.5-2.9, dose = 80 mEq po total dose (40 mEq Q2H x2). Recheck K+ level 4 hours after dose and the next AM.  If Serum K+ less than 2.5, See IV order.  DO NOT CRUSH    Admin. Amount: 1-2 tablet (1-2 × 20 mEq tablet)  Dispense Loc: [facility]               potassium phosphate 15 mmol in D5W 250 mL intermittent infusion  Dose: 15 mmol  Freq: DAILY PRN Route: IV  PRN Reason: phosphorous supplementation  Start: 05/22/18 1830   Admin Instructions: For serum phosphorus level 2-2.4  Do not infuse Phosphorus in the same line as TPN.   Give 15 mmol and recheck phosphorus level next AM.  Each mmol of phosphate provides 1.47 mEq of Potassium. Multiply the patient's phosphate dose by 1.47 to determine the amount of potassium in this dose.    Admin. Amount: 15 mmol  Dispense Loc: [facility]  Infused Over: 4 Hours  Volume: 250 mL   Mixture Administration Information:   Medication Type Amount   potassium phosphate 3 MMOLE/ML SOLN Medications 15 mmol   D5W 5 % SOLN Base 250 mL                       potassium phosphate 20 mmol in D5W 250 mL intermittent infusion  Dose: 20 mmol  Freq: EVERY 6 HOURS PRN Route: IV  PRN Reason: phosphorous supplementation  Start: 05/22/18 1830   Admin Instructions: For serum phosphorus level 1.1-1.9  For CENTRAL Line ONLY  Do not infuse Phosphorus in the same line as TPN.   Give 20 mmol and recheck phosphorus level 2 hours after last dose and next AM.  Each mmol of phosphate provides 1.47 mEq of Potassium. Multiply the patient's phosphate dose by 1.47 to determine the amount of potassium in this dose.    Admin. Amount: 20 mmol  Dispense Loc: [facility]  Infused Over: 4 Hours  Volume: 250 mL   Mixture Administration Information:   Medication Type Amount   potassium phosphate 3 MMOLE/ML SOLN Medications 20 mmol   D5W 5 % SOLN Base 250 mL                       potassium phosphate 20 mmol in D5W 500 mL intermittent  infusion  Dose: 20 mmol  Freq: EVERY 6 HOURS PRN Route: IV  PRN Reason: phosphorous supplementation  Start: 05/22/18 1830   Admin Instructions: For serum phosphorus level 1.1-1.9  For Peripheral Line  Do not infuse Phosphorus in the same line as TPN.   Give 20 mmol and recheck phosphorus level 2 hours after last dose and next AM.  Each mmol of phosphate provides 1.47 mEq of Potassium. Multiply the patient's phosphate dose by 1.47 to determine the amount of potassium in this dose.    Admin. Amount: 20 mmol  Dispense Loc: [facility]  Infused Over: 4 Hours  Volume: 500 mL   Mixture Administration Information:   Medication Type Amount   potassium phosphate 3 MMOLE/ML SOLN Medications 20 mmol   D5W 5 % SOLN Base 500 mL                       potassium phosphate 25 mmol in D5W 500 mL intermittent infusion  Dose: 25 mmol  Freq: EVERY 8 HOURS PRN Route: IV  PRN Reason: phosphorous supplementation  Start: 05/22/18 1830   Admin Instructions: For serum phosphorus level less than 1.1  Do not infuse Phosphorus in the same line as TPN.   Give 25 mmol and recheck phosphorus level 2 hours after last dose and next AM.  Each mmol of phosphate provides 1.47 mEq of Potassium. Multiply the patient's phosphate dose by 1.47 to determine the amount of potassium in this dose.    Admin. Amount: 25 mmol  Dispense Loc: [facility]  Infused Over: 6 Hours  Volume: 500 mL   Mixture Administration Information:   Medication Type Amount   potassium phosphate 3 MMOLE/ML SOLN Medications 25 mmol   D5W 5 % SOLN Base 500 mL                       prochlorperazine (COMPAZINE) injection 5 mg  Dose: 5 mg  Freq: EVERY 6 HOURS PRN Route: IV  PRN Reasons: nausea,vomiting  Start: 05/23/18 0753   Admin Instructions: For ordered doses up to 10 mg, give IV Push undiluted. Each 5mg over 1 minute.    Admin. Amount: 5 mg = 1 mL Conc: 5 mg/mL  Last Admin: 05/23/18 0800  Dispense Loc: [facility] PACU  Infused Over: 1-2 Minutes  Volume: 1 mL            0800 (5 mg)-Given           senna-docusate (SENOKOT-S;PERICOLACE) 8.6-50 MG per tablet 1 tablet  Dose: 1 tablet  Freq: 2 TIMES DAILY Route: PO  Start: 05/22/18 1830   Admin Instructions: If no bowel movement in 24 hours, increase to 2 tablets PO.  Hold for loose stools.    Admin. Amount: 1 tablet  Last Admin: 05/23/18 0454  Dispense Loc: [facility] PAC[facility]          1945 (1 tablet)-Given        0454 (1 tablet)-Given              [ ] 2000          Or  senna-docusate (SENOKOT-S;PERICOLACE) 8.6-50 MG per tablet 2 tablet  Dose: 2 tablet  Freq: 2 TIMES DAILY Route: PO  Start: 05/22/18 1830   Admin Instructions: Hold for loose stools.    Admin. Amount: 2 tablet  Last Admin: 05/23/18 0743  Dispense Loc: [facility] PAC[facility]                         0743 (2 tablet)-Given       [ ] 2000           sodium chloride (PF) 0.9% PF flush 3 mL  Dose: 3 mL  Freq: EVERY 8 HOURS Route: IK  Start: 05/22/18 1845   Admin Instructions: And Q1H PRN, to lock peripheral IV dormant line.    Admin. Amount: 3 mL  Dispense Loc: [facility] Floor Stock  Volume: 3 mL   Current Line: Peripheral IV 05/22/18 Right Hand         (1929)-Not Given        (0147)-Not Given       (1014)-Not Given       [ ] 1800           sodium chloride (PF) 0.9% PF flush 3 mL  Dose: 3 mL  Freq: EVERY 1 HOUR PRN Route: IK  PRN Reason: line flush  PRN Comment: for peripheral IV flush post IV meds  Start: 05/22/18 1830   Admin. Amount: 3 mL  Dispense Loc: [facility] Floor Stock  Volume: 3 mL               sodium chloride 0.9% infusion  Rate: 125 mL/hr   Freq: CONTINUOUS Route: IV  Start: 05/23/18 0830   Last Admin: 05/23/18 0854  Dispense Loc: [facility] Floor Stock  Volume: 1,000 mL           0854 ( )-New Bag             Rate: 100 mL/hr   Freq: CONTINUOUS Route: IV  Last Dose: Stopped (05/23/18 0400)  Start: 05/22/18 1515   End: 05/23/18 0314   Last Admin: 05/23/18 0300  Dispense Loc: [facility] Floor Stock  Volume: 1,000 mL  POC: Post-procedure   Current Line: Peripheral IV 05/22/18 Right Hand          1515 ( )-Rate/Dose Verify       1700 ( )-New Bag        0300 ( )-Rate/Dose Change       0314-Med Discontinued  0400-Stopped           thyroid (ARMOUR) tablet 60 mg  Dose: 60 mg  Freq: EVERY MORNING Route: PO  Indications of Use: HYPOTHYROIDISM  Start: 18 0800   Admin. Amount: 1 tablet (1 × 60 mg tablet)  Last Admin: 18 0735  Dispense Loc: [facility]           0735 (60 mg)-Given          Future Medications  Medications 18       acetaminophen (TYLENOL) tablet 650 mg  Dose: 650 mg  Freq: EVERY 4 HOURS PRN Route: PO  PRN Reason: other  PRN Comment: multimodal surgical pain management along with NSAIDS and opioid medication as indicated based on pain control and physical function.  Start: 18 0000   Admin Instructions: May give first dose 4 hours after last scheduled dose of acetaminophen.  Maximum acetaminophen dose from all sources = 75 mg/kg/day not to exceed 4 grams/day.    Admin. Amount: 2 tablet (2 × 325 mg tablet)  Dispense Loc: [facility] PAC[facility]               carbidopa-levodopa (SINEMET CR)  MG per CR tablet 2 tablet  Dose: 2 tablet  Freq: AT BEDTIME Route: PO  Start: 180   Admin Instructions: DO NOT CRUSH.    Admin. Amount: 2 tablet  Dispense Loc: [facility]           [ ] 2200          Completed Medications  Medications 18         Dose: 650 mg  Freq: ONCE PRN Route: PO  PRN Reason: mild pain  Start: 18 1449   End: 18 1507   Admin Instructions: Maximum acetaminophen dose from all sources = 75 mg/kg/day not to exceed 4 grams/day.    Admin. Amount: 2 tablet (2 × 325 mg tablet)  Last Admin: 18 1507  Dispense Loc: [facility] PAC[facility]  Administrations Remainin  POC: PACU/Phase II          1507 (650 mg)-Given              Dose: 975 mg  Freq: ONCE Route: PO  Start: 18 0700   End: 18 0710   Admin Instructions:  Maximum acetaminophen dose from all sources = 75 mg/kg/day not to exceed 4 grams/day.    Admin. Amount: 3 tablet (3 × 325 mg tablet)  Last Admin: 18 0710  Dispense Loc: [facility] 3C  Administrations Remainin  POC: Pre-procedure          0710 (975 mg)-Given              Freq: ONCE Route: PO  Start: 18 1615   End: 18 164   Last Admin: 18 164  Dispense Loc: [facility]  Administrations Remainin  POC: PACU   Mixture Administration Information:   Medication Type Amount   carbidopa-levodopa  MG TABS Medications 1 tablet   carbidopa-levodopa 12.5-50 mg TABS Medications 1 half-tab                  1644 ( )-New Bag [C]              Dose: 900 mg  Freq: EVERY 8 HOURS Route: IV  Indications of Use: PERIOPERATIVE PHARMACOPROPHYLAXIS  Last Dose: 900 mg (18 1143)  Start: 18   End: 18 1243   Admin. Amount: 900 mg = 50 mL Conc: 900 mg/50 mL  Last Admin: 18 1143  Dispense Loc: [facility] PACU  Infused Over: 60 Minutes  Administrations Remainin  Volume: 50 mL   Current Line: Peripheral IV 18 Right Hand         1917 (900 mg)-New Bag        0250 (900 mg)-New Bag       1143 (900 mg)-New Bag             Dose: 900 mg  Freq: PRE-OP/PRE-PROCEDURE Route: IV  Indications of Use: PERIOPERATIVE PHARMACOPROPHYLAXIS  Start: 18 0645   End: 18 0940   Admin Instructions: Give first dose within 1 hour PRIOR to incision.    Admin. Amount: 900 mg = 50 mL Conc: 900 mg/50 mL  Last Admin: 18 0910  Dispense Loc: [facility] 3C  Infused Over: 60 Minutes  Administrations Remainin  Volume: 50 mL  POC: Pre-procedure          0910 (900 mg)-Given           Discontinued Medications  Medications 18         Freq: PRN  Start: 18 1312   End: 05/22/18 1732   Last Admin: 18 1312  POC: Intra-procedure   Mixture Administration Information:   Medication Type Amount   bacitracin 23870 units  SOLR Medications 50,000 Units   sodium chloride 0.9% (bottle) 0.9 % SOLN Base 1,000 mL                  1312 (500 mL)-Given [C]       1732-Med Discontinued          Freq: PRN  Start: 18 144   End: 18   Last Admin: 18 1442  POC: Intra-procedure          1442 (1 g)-Given [C]       1732-Med Discontinued          Dose: 1 tablet  Freq: DAILY Route: PO  Start: 18 1845   End: 18 184   Admin Instructions: DO NOT CRUSH.    Admin. Amount: 1 tablet          1840-Med Discontinued          Dose: 2 tablet  Freq: ONCE Route: PO  Start: 18 1630   End: 18 161   Admin Instructions: DO NOT CRUSH.  Will give 1 1/2 tab    Admin. Amount: 2 tablet  Administrations Remainin  POC: PACU          1618-Med Discontinued          Dose: 2 tablet  Freq: ONCE Route: PO  Start: 18 161   End: 18 1609   Admin Instructions: DO NOT CRUSH.    Admin. Amount: 2 tablet  Administrations Remainin  POC: PACU          1609-Med Discontinued          Dose: 2 tablet  Freq: AT BEDTIME Route: PO  Start: 18 2200   End: 18 1054   Admin. Amount: 2 tablet           1054-Med Discontinued         Dose: 900 mg  Freq: SEE ADMIN INSTRUCTIONS Route: IV  Indications of Use: PERIOPERATIVE PHARMACOPROPHYLAXIS  Start: 18 0645   End: 18 1446   Admin Instructions: Intra-Op Dose.  Give every 6 hours while patient in surgery, starting 6 hours after pre-op dose.  DO NOT GIVE intra-op dose if CrCl less than 10 mL/min (on dialysis).  If CrCL less than 50 mL/min, double the time interval between doses.    Admin. Amount: 900 mg = 50 mL Conc: 900 mg/50 mL  Dispense Loc: [facility] 3C  Infused Over: 60 Minutes  Volume: 50 mL  POC: Pre-procedure          1446-Med Discontinued          Rate: 4.6-27.8 mL/hr Dose: 0.2-1.2 mcg/kg/hr  Weight Dosing Info: 92.6 kg  Freq: CONTINUOUS Route: IV  Last Dose: Stopped (18 1436)  Start: 1830   End: 18 5978   Admin Instructions: For range  orders: start at lowest dose ordered. Titrate by 0.1 mcg/kg/hr every 5 minutes to achieve the defined goals of therapy.      Order specific questions:  Population for use? OR     Admin. Amount: 18..12 mcg/hr  Last Admin: 05/22/18 1426  Dispense Loc: [facility]  Volume: 50 mL  POC: Intra-procedure   Mixture Administration Information:   Medication Type Amount   dexmedetomidine 200 MCG/2ML SOLN Medications 4 mcg/mL   sodium chloride 0.9 % SOLN Base 50 mL                  0900 (0.5 mcg/kg/hr)-New Bag       0913 (0.8 mcg/kg/hr)-Rate/Dose Change       0953 (0.6 mcg/kg/hr)-Rate/Dose Change       0957 (0.5 mcg/kg/hr)-Rate/Dose Change              1019-Stopped       1334 (0.8 mcg/kg/hr)-Restarted       1349 (0.6 mcg/kg/hr)-Rate/Dose Change       1421 (0.4 mcg/kg/hr)-Rate/Dose Change       1423 (0.8 mcg/kg/hr)-Rate/Dose Change       1426 (0.4 mcg/kg/hr)-Rate/Dose Change       1436-Stopped       1732-Med Discontinued          Dose: 25-50 mcg  Freq: EVERY 5 MIN PRN Route: IV  PRN Reason: other  PRN Comment: acute pain  Start: 05/22/18 1502   End: 05/22/18 1732   Admin Instructions: MAX cumulative dose = 250 mcg.  Use fentaNYL (SUBLIMAZE) initially, as a short acting agent for acute pain control. If insufficient, or a longer acting agent is needed, begin morphine or HYDROmorphone (DILAUDID) if order.  For ordered doses up to 100 mcg give IV Push undiluted over a minimum of 3-5 minutes.    Admin. Amount: 25-50 mcg = 0.5-1 mL Conc: 50 mcg/mL  Dispense Loc: [facility] PACU  Volume: 2 mL  POC: PACU          1732-Med Discontinued          Freq: PRN  Start: 05/22/18 1038   End: 05/22/18 1732   Last Admin: 05/22/18 1038  POC: Intra-procedure          1038 (1 each)-Given [C]       1732-Med Discontinued          Dose: 0.2-0.4 mg  Freq: EVERY 5 MIN PRN Route: IV  PRN Reason: other  PRN Comment: acute pain.  May administer if Respiratory Rate is greater than 10  Start: 05/22/18 1502   End: 05/22/18 1732   Admin  "Instructions: Max cumulative dose = 2 mg  If fentaNYL (SUBLIMAZE) is also ordered, use HYDROmorphone (DILAUDID) if pain control insufficient with fentaNYL (SUBLIMAZE) or a longer acting agent is needed.    Admin. Amount: 0.2-0.4 mg = 0.2-0.4 mL Conc: 1 mg/mL  Dispense Loc: [facility] PACU  Volume: 0.4 mL  POC: PACU          1732-Med Discontinued          Dose: 10 mg  Freq: ONCE PRN Route: IV  PRN Reason: high blood pressure  PRN Comment: for Systemic Blood Pressure greater than 160 mmHg and Heart Rate greater than 60 bpm.    Start: 18 1502   End: 18   Admin Instructions: For PACU USE ONLY.  DC WHEN TRANSFERRED TO FLOOR.  For ordered doses up to 80 mg, give IV Push undiluted. Give each 20 mg over 2 minutes.    Admin. Amount: 10 mg = 2 mL Conc: 5 mg/mL  Dispense Loc: [facility] PACU  Infused Over: 2-8 Minutes  Administrations Remainin  Volume: 2 mL  POC: PACU          -Med Discontinued          Rate: 100 mL/hr   Freq: CONTINUOUS Route: IV  Start: 18 1515   End: 18   Admin Instructions: Continue until IV catheter is weaned    Dispense Loc: [facility] Floor Stock  Volume: 1,000 mL  POC: PACU                 173-Med Discontinued          Freq: EVERY 1 HOUR PRN Route: Top  PRN Reason: pain  PRN Comment: with VAD insertion or accessing implanted port.  Start: 18   End: 18   Admin Instructions: Do NOT give if patient has a history of allergy to any local anesthetic or any \"haylie\" product.   Apply 30 minutes prior to VAD insertion or port access.  MAX Dose:  2.5 g (  of 5 g tube)    Dispense Loc: [facility] 3C  POC: Pre-procedure          1446-Med Discontinued          Dose: 1 mL  Freq: EVERY 1 HOUR PRN Route: OTHER  PRN Comment: mild pain with VAD insertion or accessing implanted port  Start: 18   End: 18   Admin Instructions: Do NOT give if patient has a history of allergy to any local anesthetic or any \"haylie\" product. MAX dose 1 mL " subcutaneous OR intradermal in divided doses.    Admin. Amount: 1 mL  Dispense Loc: [facility] ADS 3C  Volume: 2 mL  POC: Pre-procedure          1446-Med Discontinued          Freq: PRN  Start: 18 1031   End: 18   Last Admin: 18 0815  POC: Intra-procedure          0815 (30 mL)-Given       1732-Med Discontinued          Freq: PRN  Start: 18 1032   End: 18   Last Admin: 18 1420  Volume: 60 mL  POC: Intra-procedure   Mixture Administration Information:   Medication Type Amount   bupivacaine 0.25 % SOLN Medications 30 mL   lidocaine 1% with EPINEPHrine 1:100,000 1 %[id number] SOLN Medications 30 mL                  0955 (18 mL)-Given       1420 (6 mL)-Given       1732-Med Discontinued          Freq: PRN  Start: 18 103   End: 18   Last Admin: 18 0920  POC: Intra-procedure          0920 (5 mL)-Given [C]       1732-Med Discontinued          Dose: 4 mg  Freq: EVERY 30 MIN PRN Route: PO  PRN Reason: nausea  Start: 18 150   End: 18   Admin Instructions: MAX total dose = 8 mg, including OR dosing. If not resolved in 15 minutes, then go to step 2 [prochlorperazine (COMPAZINE), if ordered].  With dry hands, peel back foil backing and gently remove tablet; do not push oral disintegrating tablet through foil backing; administer immediately on tongue and oral disintegrating tablet dissolves in seconds; then swallow with saliva; liquid not required.    Admin. Amount: 1 tablet (1 × 4 mg tablet)  Dispense Loc: [facility] ADS 3C  Administrations Remainin  POC: PACU          -Med Discontinued       Or    Dose: 4 mg  Freq: EVERY 30 MIN PRN Route: IV  PRN Reason: nausea  Start: 18 1502   End: 18   Admin Instructions: MAX total dose = 8 mg, including OR dosing. If not resolved in 15 minutes, then go to step 2 [prochlorperazine (COMPAZINE), if ordered].  Irritant. For ordered doses up to 4 mg, give IV Push undiluted over 2-5 minutes.     Admin. Amount: 4 mg = 2 mL Conc: 4 mg/2 mL  Dispense Loc: [facility] PACU  Infused Over: 2-5 Minutes  Administrations Remainin  Volume: 2 mL  POC: PACU          1732-Med Discontinued          Dose: 5 mg  Freq: EVERY 6 HOURS PRN Route: IV  PRN Reasons: nausea,vomiting  Start: 18 1502   End: 18   Admin Instructions: This is Step 2 of the nausea and vomiting protocol.   If nausea not resolved in 15 minutes, give metoclopramide (REGLAN) if ordered (step 3 of nausea and vomiting protocol)  For ordered doses up to 10 mg, give IV Push undiluted. Each 5mg over 1 minute.    Admin. Amount: 5 mg = 1 mL Conc: 5 mg/mL  Dispense Loc: [facility] PACU  Infused Over: 1-2 Minutes  Volume: 1 mL  POC: PACU          1732-Med Discontinued          Dose: 1 patch  Freq: EVERY 72 HOURS Route: TD  Start: 18 0830   End: 18 1006   Admin Instructions: Apply patch to skin, behind ear.  Remove every 72 hours.  Each 1.5 mg patch delivers 1 mg of scopolamine.    Admin. Amount: 1 patch  Last Admin: 18 0854  Dispense Loc: [facility] PACU           0854 (1 patch)-Given       1006-Med Discontinued  1041 (1 patch)-Patch Removed          And    Freq: EVERY 8 HOURS Route: TD  Start: 18 0830   End: 18 1029   Admin Instructions: Chart every shift, confirming that patch is still in place on patient (no barcode scan needed). See patch order for dose information.    Last Admin: 18 0859  Dispense Loc: [facility]           0859 ( )-Patch in Place       1029-Med Discontinued      And    Freq: EVERY 72 HOURS Route: TD  Start: 18 0830   End: 18 1029   Admin Instructions: Nurse may need to adjust schedule time to match new patch application time.  Old patch should be removed when new patch is applied.    Dispense Loc: [facility]           1029-Med Discontinued         Dose: 3 mL  Freq: EVERY 8 HOURS Route: IK  Start: 18 0645   End: 18 1446   Admin  Instructions: And Q1H PRN, to lock peripheral IV dormant line.    Admin. Amount: 3 mL  Dispense Loc: [facility] Floor Stock  Volume: 3 mL  POC: Pre-procedure                 1446-Med Discontinued          Dose: 3 mL  Freq: EVERY 1 HOUR PRN Route: IK  PRN Reason: line flush  PRN Comment: for peripheral IV flush post IV meds  Start: 05/22/18 0645   End: 05/22/18 1446   Admin. Amount: 3 mL  Dispense Loc: [facility] Floor Stock  Volume: 3 mL  POC: Pre-procedure          1446-Med Discontinued          Freq: PRN  Start: 05/22/18 0957   End: 05/22/18 1732   Last Admin: 05/22/18 0957  POC: Intra-procedure          0957 (500 mL)-Given [C]       1732-Med Discontinued          Freq: PRN  Start: 05/22/18 1032   End: 05/22/18 1732   Last Admin: 05/22/18 1032  POC: Intra-procedure          1032 (5,000 Units)-Given [C]       1732-Med Discontinued     Medications 05/17/18 05/18/18 05/19/18 05/20/18 05/21/18 05/22/18 05/23/18               INTERAGENCY TRANSFER FORM - NOTES (H&P, Discharge Summary, Consults, Procedures, Therapies)   5/22/2018                       [address] [place] [address] BANK: [phone]               History & Physicals      H&P signed by Anil Pryor at 5/18/2018 12:41 PM      Author:  Anil Pryor Service:  (none) Author Type:  Physician    Filed:  5/18/2018 12:41 PM Date of Service:  5/18/2018 12:41 PM Creation Time:  5/18/2018 12:41 PM    Status:  Signed :  Anil Pryor (Physician)     Scan on 5/18/2018 12:41 PM by Konstantin Provider : [facility] 5/11/18 1          Revision History        User Key Date/Time User Provider Type Action    > [N/A] 5/18/2018 12:41 PM Scan, Provider Physician Sign                  Discharge Summaries     No notes of this type exist for this encounter.      Consult Notes     No notes of this type exist for this encounter.      Progress Notes - Physician (Notes for yesterday and today)     No notes of this type exist for this encounter.      Procedure Notes      "No notes of this type exist for this encounter.      Progress Notes - Therapies (Notes from 05/20/18 through 05/23/18)     No notes of this type exist for this encounter.                                          INTERAGENCY TRANSFER FORM - LAB / IMAGING / EKG / EMG RESULTS   5/22/2018                       [facility] [address] [facility] BANK: [phone]            Unresulted Labs (24h ago through future)    Start       Ordered    Unscheduled  Specific gravity urine  CONDITIONAL (SPECIFY),   Routine     Comments:  IF urine output greater than or equal to 300 mL for 3 consecutive hours, obtain urine specific gravity and immediately Notify Provider.    05/22/18 1830    Unscheduled  Potassium  (Potassium Replacement - \"Standard\" - For K levels less than 3.4 mmol/L - UU,UR,UA,RH,SH,PH,WY )  CONDITIONAL (SPECIFY),   Routine     Comments:  Obtain Potassium Level for these conditions:  *IF no potassium result within 24 hours before initiation of order set, draw potassium level with next lab collect.    *2 HOURS AFTER last IV potassium replacement dose and 4 hours after an oral replacement dose.  *Next morning after potassium dose.     Repeat Potassium Replacement if necessary.    05/22/18 1830    Unscheduled  Magnesium  (Magnesium Replacement -  Adult - \"Standard\" - Replacement for all levels less than 1.6 mg/dL )  CONDITIONAL (SPECIFY),   Routine     Comments:  Obtain Magnesium Level for these conditions:  *IF no magnesium result within 24 hrs before initiation of order set, draw magnesium level with next lab collect.    *2 HOURS AFTER last magnesium replacement dose when magnesium replacement given for level less than 1.6   *Next morning after magnesium dose.     Repeat Magnesium Replacement if necessary.    05/22/18 1830    Unscheduled  Phosphorus  (POTASSIUM Phosphate - \"Standard\" - Replacement for levels less than or equal to 2.4 mg/dL )  CONDITIONAL (SPECIFY),   Routine     Comments:  Obtain Phosphorus Level for these " conditions:  *IF no phosphorus result within 24 hrs before initiation of order set, draw phosphorus level with next lab collect.    *2 HOURS AFTER last phosphorus replacement dose for levels less than 2.0.  *Next morning after phosphorus dose.     Repeat Phosphorus Replacement if necessary.    05/22/18 1830         Lab Results - 3 Days      Basic metabolic panel [[id number]] (Abnormal)  Resulted: 05/23/18 0606, Result status: Final result    Ordering provider: Diego Meyer MD  05/22/18 2200 Resulting lab: [facility]    Specimen Information    Type Source Collected On   Blood  05/23/18 0508          Components       Value Reference Range Flag Lab   Sodium 139 133 - 144 mmol/L  51   Potassium 3.5 3.4 - 5.3 mmol/L  51   Chloride 106 94 - 109 mmol/L  51   Carbon Dioxide 26 20 - 32 mmol/L  51   Anion Gap 8 3 - 14 mmol/L  51   Glucose 138 70 - 99 mg/dL H 51   Urea Nitrogen 14 7 - 30 mg/dL  51   Creatinine 0.81 0.66 - 1.25 mg/dL  51   GFR Estimate >90 >60 mL/min/1.7m2  51   Comment:  Non  GFR Calc   GFR Estimate If Black >90 >60 mL/min/1.7m2  51   Comment:  African American GFR Calc   Calcium 8.3 8.5 - 10.1 mg/dL L 51            CBC with platelets [[id number]] (Abnormal)  Resulted: 05/23/18 0543, Result status: Final result    Ordering provider: Diego Meyer MD  05/22/18 2200 Resulting lab: [facility]    Specimen Information    Type Source Collected On   Blood  05/23/18 0508          Components       Value Reference Range Flag Lab   WBC 7.5 4.0 - 11.0 10e9/L  51   RBC Count 3.77 4.4 - 5.9 10e12/L L 51   Hemoglobin 11.5 13.3 - 17.7 g/dL L 51   Hematocrit 35.3 40.0 - 53.0 % L 51   MCV 94 78 - 100 fl  51   MCH 30.5 26.5 - 33.0 pg  51   MCHC 32.6 31.5 - 36.5 g/dL  51   RDW 13.6 10.0 - 15.0 %  51   Platelet Count 145 150 - 450 10e9/L L 51            INR [[id number]]  Resulted: 05/22/18 0748, Result status: Final result     Ordering provider: Arpan Huynh MD  05/22/18 0645 Resulting lab: [facility]    Specimen Information    Type Source Collected On   Blood  05/22/18 0721          Components       Value Reference Range Flag Lab   INR 0.98 0.86 - 1.14  51            Partial thromboplastin time (PTT) [[id number]]  Resulted: 05/22/18 0748, Result status: Final result    Ordering provider: Arpan Huynh MD  05/22/18 0645 Resulting lab: [facility]    Specimen Information    Type Source Collected On   Blood  05/22/18 0721          Components       Value Reference Range Flag Lab   PTT 25 22 - 37 sec  51            Glucose by meter [[id number]] (Abnormal)  Resulted: 05/22/18 0634, Result status: Final result    Ordering provider: Arpan Huynh MD  05/22/18 0620 Resulting lab: POINT OF CARE TEST, GLUCOSE    Specimen Information    Type Source Collected On     05/22/18 0620          Components       Value Reference Range Flag Lab   Glucose 101 70 - 99 mg/dL H 170            Testing Performed By     Lab - Abbreviation Name Director Address Valid Date Range    51 - Unknown [facility] Unknown [address] [facility] [address] 12/31/14 1010 - Present    170 - Unknown POINT OF CARE TEST, GLUCOSE Unknown Unknown 10/31/11 1114 - Present               Imaging Results - 3 Days      CT Head w/o Contrast [[id number]]  Resulted: 05/22/18 2114, Result status: Final result    Ordering provider: Diego Meyer MD  05/22/18 1607 Resulted by: Yunior Fitzpatrick MD Murphy, Ryan, MD    Performed: 05/22/18 1707 - 05/22/18 1717 Resulting lab: RADIOLOGY RESULTS    Narrative:       Stealth CT imaging for purposes of stereotactic evaluation    Provided History: s/p DBS placement; please acquire with stereotactic  stealth protocol without fiducials;     Comparison: CT head 5/22/2018, 1/12/2018    Technique: CT imaging performed with  axial, sagittal, and coronal  reconstructed images obtained without intravenous contrast.    Contrast: Head CT earlier the same day.    Findings: New high right frontal approach deep brain stimulator with  tip in the right subthalamic region. Small amount of expected  postsurgical pneumocephalus. Unchanged position of a left frontal  parietal approach deep brain stimulator lead. No acute intracranial  hemorrhage. The ventricles are proportionate to the cerebral sulci.    Mild paranasal sinus mucosal thickening, greatest in the ethmoid air  cells. Mastoid air cells are clear.      Impression:       Impression: New right subthalamic deep brain simulator lead. Unchanged  left subthalamic deep brain stimulator. Small amount of postoperative  pneumocephalus. Limited imaging performed primarily for the purposes  of stereotactic localization.    I have personally reviewed the examination and initial interpretation  and I agree with the findings.    GEORGE GUILLORY MD      XR Surgery CK Fluoro L/T 5 Min w Stills [[id number]]  Resulted: 05/22/18 1431, Result status: Final result    Ordering provider: Arpan Huynh MD  05/22/18 1325 Performed: 05/22/18 1325 - 05/22/18 1429    Resulting lab: [facility]      Narrative:       This exam was marked as non-reportable because it will not be read by a   radiologist or a [facility] non-radiologist provider.                CT Head without contrast [XWY224] [[id number]]  Resulted: 05/22/18 0907, Result status: Final result    Ordering provider: Arpan Huynh MD  05/22/18 0825 Resulted by: Levi King MD    Performed: 05/22/18 0839 - 05/22/18 0855 Resulting lab: RADIOLOGY RESULTS    Narrative:       Stealth CT imaging for purposes of stereotactic evaluation    Provided History: ; Parkinson's disease (H)  ICD-10: Parkinson's disease (H)    Comparison: 1/12/2018    Technique: CT imaging performed with axial, sagittal, and coronal  reconstructed images obtained  without intravenous contrast.    Contrast: None    Findings: Left-sided deep brain stimulator electrode is in place, tip  near the expected location of the left subthalamic nucleus. No  hydrocephalus or acute intracranial hemorrhage. Mild ethmoid mucosal  thickening. No air-fluid levels.      Impression:       Impression: No acute abnormality. Limited imaging performed primarily  for the purposes of stereotactic localization.    KAREN MCCLAIN MD      Testing Performed By     Lab - Abbreviation Name Director Address Valid Date Range    104 - [facility] RADIOLOGY RESULTS Unknown Unknown 02/16/05 1553 - Present    178 - [facility] Unknown Unknown 07/20/12 1135 - Present            Encounter-Level Documents:     There are no encounter-level documents.      Order-Level Documents:     There are no order-level documents.

## 2018-05-22 NOTE — IP AVS SNAPSHOT
MRN:[id number]                      After Visit Summary   5/22/2018    Kwame Lin    MRN: [id number]           Thank you!     Thank you for choosing [facility] for your care. Our goal is always to provide you with excellent care. Hearing back from our patients is one way we can continue to improve our services. Please take a few minutes to complete the written survey that you may receive in the mail after you visit with us. Thank you!        Patient Information     Date Of Birth          1955        About your hospital stay     You were admitted on:  May 22, 2018 You last received care in the:  [address] [facility]    You were discharged on:  May 23, 2018        Reason for your hospital stay       You underwent placement of Deep Brain Stimulator and connection to battery                  Who to Call     For medical emergencies, please call 911.  For non-urgent questions about your medical care, please call your primary care provider or clinic, [phone]  For questions related to your surgery, please call your surgery clinic        Attending Provider     Provider Arpan Rosen MD Neurosurgery       Primary Care Provider Office Phone # Fax #    Silvina Armenta [phone]      After Care Instructions     Activity       Your activity upon discharge:   Do not do any bending, twisting, strenuous exercise, or heavy lifting (greater than 10 pounds) for 4-6 weeks. Be careful and ask for assistance when walking or going up and down stairs. Avoid any activities that could result in trauma to the surgical wound. Do not drive within 3 months of having your last seizure or while using narcotics or other sedating medications, such as sleep aids, muscle relaxants, etc.'            Diet       Follow this diet upon discharge: Orders Placed This Encounter      Advance Diet as Tolerated: Regular Diet Adult            Discharge Instructions       You underwent  surgery place a  deep brain stimulator  by Arpan Huynh MD, PhD      - Your sutures are absorbable.     - You will have follow up scheduled with the physician assistants and/or nurse practitioners in our clinic 2 weeks after your surgery.  If you live far away, you may see your primary care doctor for a wound check at 2 weeks.     - If you have not heard from our clinic about your follow up visit by 3-4 days following your discharge, please call our clinic at [phone] to schedule an appointment with the Neurosurgery teams.     After discharge, your activity restrictions are:   -We encourage short frequent walks, increasing as tolerated.  - No driving until you are seen in clinic and cleared by your neurosurgeon.  If you have had a seizure, you may not drive for at least 3 months according to [place] law.    - No strenuous activity.  - No lifting more than 10 pounds until you are seen in clinic (a gallon of milk weighs approximately 8 pounds)    Wound care  - You are ok to shower, but do not soak your incisions. Pat them dry if they get damp.   - Avoid coloring your hair or permanent styling until cleared by your surgeon  - No baths, hot tubs or pools for 4-6 weeks after surgery.       Call if you have any of the followin. Temperature greater than 101.5 F.   2. Any redness, swelling or discharge from the wound.   3. Any new weakness, numbness or altered mental status.  4. Worsening pain that is not improving with the pain medications you were prescribed.     Call [phone] or after 5:00 pm or on weekends call [phone] and ask for the neurosurgery resident on call. Thank You.            Wound care and dressings       Instructions to care for your wound at home:  You should remove your dressings and bandages on post-operative day #2. You should then keep the wound undressed and open to air. You are allowed to take showers and get the wound wet starting on post-operative day #3 but you may not  "scrub or soak the wound or keep it submerged under water. If you do happen to get the wound wet, be sure to pat dry it rather than scrubbing it with a towel.                  Follow-up Appointments     Adult [facility] Follow-up and recommended labs and tests       Follow up with Dr Arpan Huynh in 2 weeks for wound check     Follow up with Neurology as previously scheduled     Appointments on [facility] and/or [facility] (with [facility] or [facility] provider or service). Call [phone] if you haven't heard regarding these appointments within 7 days of discharge.                  Pending Results     No orders found for last 3 day(s).            Statement of Approval     Ordered          05/23/18 6457  I have reviewed and agree with all the recommendations and orders detailed in this document.  EFFECTIVE NOW     Approved and electronically signed by:  Helena Fish APRN CNP             Admission Information     Date & Time Provider Department Dept. Phone    5/22/2018 Arpan Huynh MD [address] [facility] [place] [phone]      Your Vitals Were     Blood Pressure Temperature Respirations Height Weight Pulse Oximetry    106/70 98.2  F (36.8  C) (Oral) 15 1.753 m (5' 9\") 92.6 kg (204 lb 2.3 oz) 94%    BMI (Body Mass Index)                   30.15 kg/m2           [facility] Information     [facility] gives you secure access to your electronic health record. If you see a primary care provider, you can also send messages to your care team and make appointments. If you have questions, please call your primary care clinic.  If you do not have a primary care provider, please call [phone] and they will assist you.        Care EveryWhere ID     This is your Care EveryWhere ID. This could be used by other organizations to access your [facility] medical records  [id number]        Equal Access to Services     KARISSA CASON : lakia Monroe qaybta kaalmada adeegyada, waxay idiin " popeye plunkettaimee la'aan ah. [facility] [phone].    ATENCIÓN: Si ryleyla faye, tiene a mcrae disposición servicios gratuitos de asistencia lingüística. Jake al [phone].    We comply with applicable federal civil rights laws and [place] laws. We do not discriminate on the basis of race, color, national origin, age, disability, sex, sexual orientation, or gender identity.               Review of your medicines      START taking        Dose / Directions    ondansetron 4 MG ODT tab   Commonly known as:  ZOFRAN ODT   Used for:  Parkinson disease (H)        Dose:  4-8 mg   Take 1-2 tablets (4-8 mg) by mouth every 8 hours as needed for nausea   Quantity:  20 tablet   Refills:  1       traMADol 50 MG tablet   Commonly known as:  ULTRAM   Used for:  Parkinson disease (H)        Dose:  50 mg   Take 1 tablet (50 mg) by mouth every 6 hours as needed for severe pain   Quantity:  25 tablet   Refills:  0         CONTINUE these medicines which have NOT CHANGED        Dose / Directions    Acidophilus/Goat Milk Caps        At Bedtime   Refills:  0       ARMOUR THYROID 60 MG tablet   Indication:  Underactive Thyroid   Generic drug:  thyroid        Dose:  60 mg   Take 60 mg by mouth every morning   Refills:  0       * SINEMET  MG per tablet   Indication:  Parkinson's Disease   Generic drug:  carbidopa-levodopa        Dose:  1.5 tablet   Take 1.5 tablets by mouth every morning 1.5 tab in am and 1 tab every 4 hours after that   Refills:  0       * SINEMET  MG per tablet   Indication:  doses in between a.m. and HS doses   Generic drug:  carbidopa-levodopa        Dose:  1 tablet   Take 1 tablet by mouth every 4 hours (while awake)   Refills:  0       * carbidopa-levodopa  MG per tablet   Commonly known as:  SINEMET        Dose:  2 tablet   2 tablets At Bedtime Extended release   Quantity:  90 tablet   Refills:  0       citalopram 10 MG tablet   Commonly known as:  celeXA        Dose:  10 mg   Take 10 mg by mouth  every morning   Refills:  0       clonazePAM 0.5 MG tablet   Commonly known as:  klonoPIN        Dose:  0.5 mg   Take 0.5 mg by mouth At Bedtime   Refills:  0       Coenzyme Q10 300 MG Caps        Dose:  300 mg   Take 300 mg by mouth every morning   Refills:  0       diclofenac 1 % Gel topical gel   Commonly known as:  VOLTAREN        Apply qid prn pain   Refills:  0       magnesium 100 MG Tabs   Indication:  Mag Citrate: total 300 mg, 1 tab with breakfast and 2 tab with dinner        Dose:  100 tablet   Take 100 tablets by mouth 2 times daily (before meals)   Refills:  0       MELATONIN PO        Dose:  2 mg   Take 2 mg by mouth At Bedtime   Refills:  0       Multi-vitamin Tabs tablet        Dose:  1 tablet   Take 1 tablet by mouth every morning   Refills:  0       Red Wine Extract Caps        Take by mouth daily (with lunch)   Refills:  0       TYLENOL 325 MG tablet   Generic drug:  acetaminophen        Dose:  325-650 mg   Take 325-650 mg by mouth nightly as needed for mild pain   Refills:  0       * Notice:  This list has 3 medication(s) that are the same as other medications prescribed for you. Read the directions carefully, and ask your doctor or other care provider to review them with you.         Where to get your medicines      Some of these will need a paper prescription and others can be bought over the counter. Ask your nurse if you have questions.     Bring a paper prescription for each of these medications     ondansetron 4 MG ODT tab    traMADol 50 MG tablet                Protect others around you: Learn how to safely use, store and throw away your medicines at www.disposemymeds.org.        Information about OPIOIDS     PRESCRIPTION OPIOIDS: WHAT YOU NEED TO KNOW   You have a prescription for an opioid (narcotic) pain medicine. Opioids can cause addiction. If you have a history of chemical dependency of any type, you are at a higher risk of becoming addicted to opioids. Only take this medicine after  all other options have been tried. Take it for as short a time and as few doses as possible.     Do not:    Drive. If you drive while taking these medicines, you could be arrested for driving under the influence (DUI).    Operate heavy machinery    Do any other dangerous activities while taking these medicines.     Drink any alcohol while taking these medicines.      Take with any other medicines that contain acetaminophen. Read all labels carefully. Look for the word  acetaminophen  or  Tylenol.  Ask your pharmacist if you have questions or are unsure.    Store your pills in a secure place, locked if possible. We will not replace any lost or stolen medicine. If you don t finish your medicine, please throw away (dispose) as directed by your pharmacist. The [place] Pollution Control Agency has more information about safe disposal: https://www.pca.[facility].[place].us/living-green/managing-unwanted-medications    All opioids tend to cause constipation. Drink plenty of water and eat foods that have a lot of fiber, such as fruits, vegetables, prune juice, apple juice and high-fiber cereal. Take a laxative (Miralax, milk of magnesia, Colace, Senna) if you don t move your bowels at least every other day.              Medication List: This is a list of all your medications and when to take them. Check marks below indicate your daily home schedule. Keep this list as a reference.      Medications           Morning Afternoon Evening Bedtime As Needed    Acidophilus/Goat Milk Caps   At Bedtime                                ARMOUR THYROID 60 MG tablet   Take 60 mg by mouth every morning   Last time this was given:  60 mg on 5/23/2018  7:35 AM   Generic drug:  thyroid                                * SINEMET  MG per tablet   Take 1.5 tablets by mouth every morning 1.5 tab in am and 1 tab every 4 hours after that   Last time this was given:  1 tablet on 5/23/2018 11:53 AM   Generic drug:  carbidopa-levodopa                                 * SINEMET  MG per tablet   Take 1 tablet by mouth every 4 hours (while awake)   Last time this was given:  1 tablet on 5/23/2018 11:53 AM   Generic drug:  carbidopa-levodopa                                * carbidopa-levodopa  MG per tablet   Commonly known as:  SINEMET   2 tablets At Bedtime Extended release   Last time this was given:  1 tablet on 5/23/2018 11:53 AM                                citalopram 10 MG tablet   Commonly known as:  celeXA   Take 10 mg by mouth every morning   Last time this was given:  10 mg on 5/23/2018  7:35 AM                                clonazePAM 0.5 MG tablet   Commonly known as:  klonoPIN   Take 0.5 mg by mouth At Bedtime   Last time this was given:  0.5 mg on 5/22/2018  9:47 PM                                Coenzyme Q10 300 MG Caps   Take 300 mg by mouth every morning   Last time this was given:  300 mg on 5/23/2018  7:41 AM                                diclofenac 1 % Gel topical gel   Commonly known as:  VOLTAREN   Apply qid prn pain                                magnesium 100 MG Tabs   Take 100 tablets by mouth 2 times daily (before meals)                                MELATONIN PO   Take 2 mg by mouth At Bedtime   Last time this was given:  2 mg on 5/22/2018  9:47 PM                                Multi-vitamin Tabs tablet   Take 1 tablet by mouth every morning                                ondansetron 4 MG ODT tab   Commonly known as:  ZOFRAN ODT   Take 1-2 tablets (4-8 mg) by mouth every 8 hours as needed for nausea                                Red Wine Extract Caps   Take by mouth daily (with lunch)                                traMADol 50 MG tablet   Commonly known as:  ULTRAM   Take 1 tablet (50 mg) by mouth every 6 hours as needed for severe pain                                TYLENOL 325 MG tablet   Take 325-650 mg by mouth nightly as needed for mild pain   Last time this was given:  975 mg on 5/23/2018  1:31 PM   Generic drug:   acetaminophen                                * Notice:  This list has 3 medication(s) that are the same as other medications prescribed for you. Read the directions carefully, and ask your doctor or other care provider to review them with you.

## 2018-05-23 VITALS
TEMPERATURE: 98.2 F | SYSTOLIC BLOOD PRESSURE: 112 MMHG | WEIGHT: 204.15 LBS | RESPIRATION RATE: 17 BRPM | OXYGEN SATURATION: 97 % | DIASTOLIC BLOOD PRESSURE: 86 MMHG | HEIGHT: 69 IN | BODY MASS INDEX: 30.24 KG/M2

## 2018-05-23 LAB
ANION GAP SERPL CALCULATED.3IONS-SCNC: 8 MMOL/L (ref 3–14)
BUN SERPL-MCNC: 14 MG/DL (ref 7–30)
CALCIUM SERPL-MCNC: 8.3 MG/DL (ref 8.5–10.1)
CHLORIDE SERPL-SCNC: 106 MMOL/L (ref 94–109)
CO2 SERPL-SCNC: 26 MMOL/L (ref 20–32)
CREAT SERPL-MCNC: 0.81 MG/DL (ref 0.66–1.25)
ERYTHROCYTE [DISTWIDTH] IN BLOOD BY AUTOMATED COUNT: 13.6 % (ref 10–15)
GFR SERPL CREATININE-BSD FRML MDRD: >90 ML/MIN/1.7M2
GLUCOSE SERPL-MCNC: 138 MG/DL (ref 70–99)
HCT VFR BLD AUTO: 35.3 % (ref 40–53)
HGB BLD-MCNC: 11.5 G/DL (ref 13.3–17.7)
MCH RBC QN AUTO: 30.5 PG (ref 26.5–33)
MCHC RBC AUTO-ENTMCNC: 32.6 G/DL (ref 31.5–36.5)
MCV RBC AUTO: 94 FL (ref 78–100)
PLATELET # BLD AUTO: 145 10E9/L (ref 150–450)
POTASSIUM SERPL-SCNC: 3.5 MMOL/L (ref 3.4–5.3)
RBC # BLD AUTO: 3.77 10E12/L (ref 4.4–5.9)
SODIUM SERPL-SCNC: 139 MMOL/L (ref 133–144)
WBC # BLD AUTO: 7.5 10E9/L (ref 4–11)

## 2018-05-23 PROCEDURE — 25000125 ZZHC RX 250: Performed by: NURSE PRACTITIONER

## 2018-05-23 PROCEDURE — 36415 COLL VENOUS BLD VENIPUNCTURE: CPT | Performed by: STUDENT IN AN ORGANIZED HEALTH CARE EDUCATION/TRAINING PROGRAM

## 2018-05-23 PROCEDURE — 85027 COMPLETE CBC AUTOMATED: CPT | Performed by: STUDENT IN AN ORGANIZED HEALTH CARE EDUCATION/TRAINING PROGRAM

## 2018-05-23 PROCEDURE — 80048 BASIC METABOLIC PNL TOTAL CA: CPT | Performed by: STUDENT IN AN ORGANIZED HEALTH CARE EDUCATION/TRAINING PROGRAM

## 2018-05-23 PROCEDURE — 25000132 ZZH RX MED GY IP 250 OP 250 PS 637: Performed by: STUDENT IN AN ORGANIZED HEALTH CARE EDUCATION/TRAINING PROGRAM

## 2018-05-23 PROCEDURE — 25000128 H RX IP 250 OP 636: Performed by: NURSE PRACTITIONER

## 2018-05-23 PROCEDURE — 25000128 H RX IP 250 OP 636: Performed by: STUDENT IN AN ORGANIZED HEALTH CARE EDUCATION/TRAINING PROGRAM

## 2018-05-23 PROCEDURE — 25000125 ZZHC RX 250: Performed by: STUDENT IN AN ORGANIZED HEALTH CARE EDUCATION/TRAINING PROGRAM

## 2018-05-23 RX ORDER — SCOLOPAMINE TRANSDERMAL SYSTEM 1 MG/1
1 PATCH, EXTENDED RELEASE TRANSDERMAL
Status: DISCONTINUED | OUTPATIENT
Start: 2018-05-23 | End: 2018-05-23

## 2018-05-23 RX ORDER — CARBIDOPA AND LEVODOPA 25; 100 MG/1; MG/1
2 TABLET ORAL AT BEDTIME
Status: DISCONTINUED | OUTPATIENT
Start: 2018-05-23 | End: 2018-05-23

## 2018-05-23 RX ORDER — CARBIDOPA AND LEVODOPA 25; 100 MG/1; MG/1
2 TABLET, EXTENDED RELEASE ORAL AT BEDTIME
Status: DISCONTINUED | OUTPATIENT
Start: 2018-05-23 | End: 2018-05-23 | Stop reason: HOSPADM

## 2018-05-23 RX ORDER — TRAMADOL HYDROCHLORIDE 50 MG/1
50 TABLET ORAL EVERY 6 HOURS PRN
Qty: 25 TABLET | Refills: 0 | Status: SHIPPED | OUTPATIENT
Start: 2018-05-23 | End: 2019-03-06

## 2018-05-23 RX ORDER — SODIUM CHLORIDE 9 MG/ML
INJECTION, SOLUTION INTRAVENOUS CONTINUOUS
Status: DISCONTINUED | OUTPATIENT
Start: 2018-05-23 | End: 2018-05-23 | Stop reason: HOSPADM

## 2018-05-23 RX ORDER — OXYCODONE HYDROCHLORIDE 5 MG/1
5-10 TABLET ORAL EVERY 6 HOURS PRN
Qty: 40 TABLET | Refills: 0 | Status: SHIPPED | OUTPATIENT
Start: 2018-05-23 | End: 2018-05-23

## 2018-05-23 RX ORDER — ONDANSETRON 4 MG/1
4-8 TABLET, ORALLY DISINTEGRATING ORAL EVERY 8 HOURS PRN
Qty: 20 TABLET | Refills: 1 | Status: SHIPPED | OUTPATIENT
Start: 2018-05-23 | End: 2019-10-17

## 2018-05-23 RX ADMIN — SODIUM CHLORIDE: 9 INJECTION, SOLUTION INTRAVENOUS at 08:54

## 2018-05-23 RX ADMIN — ACETAMINOPHEN 975 MG: 325 TABLET, FILM COATED ORAL at 04:54

## 2018-05-23 RX ADMIN — PROCHLORPERAZINE EDISYLATE 5 MG: 5 INJECTION INTRAMUSCULAR; INTRAVENOUS at 08:00

## 2018-05-23 RX ADMIN — Medication 300 MG: at 07:41

## 2018-05-23 RX ADMIN — ONDANSETRON 4 MG: 2 INJECTION INTRAMUSCULAR; INTRAVENOUS at 08:56

## 2018-05-23 RX ADMIN — ONDANSETRON 4 MG: 2 INJECTION INTRAMUSCULAR; INTRAVENOUS at 02:17

## 2018-05-23 RX ADMIN — THYROID, PORCINE 60 MG: 60 TABLET ORAL at 07:35

## 2018-05-23 RX ADMIN — SENNOSIDES AND DOCUSATE SODIUM 1 TABLET: 8.6; 5 TABLET ORAL at 04:54

## 2018-05-23 RX ADMIN — ONDANSETRON 4 MG: 2 INJECTION INTRAMUSCULAR; INTRAVENOUS at 03:30

## 2018-05-23 RX ADMIN — CLINDAMYCIN PHOSPHATE 900 MG: 18 INJECTION, SOLUTION INTRAVENOUS at 02:50

## 2018-05-23 RX ADMIN — FENTANYL CITRATE 20 MCG: 50 INJECTION, SOLUTION INTRAMUSCULAR; INTRAVENOUS at 03:35

## 2018-05-23 RX ADMIN — CARBIDOPA AND LEVODOPA 1 TABLET: 25; 100 TABLET ORAL at 07:36

## 2018-05-23 RX ADMIN — CITALOPRAM HYDROBROMIDE 10 MG: 10 TABLET ORAL at 07:35

## 2018-05-23 RX ADMIN — CLINDAMYCIN PHOSPHATE 900 MG: 18 INJECTION, SOLUTION INTRAVENOUS at 11:43

## 2018-05-23 RX ADMIN — Medication 3 HALF-TAB: at 07:35

## 2018-05-23 RX ADMIN — SENNOSIDES AND DOCUSATE SODIUM 2 TABLET: 8.6; 5 TABLET ORAL at 07:43

## 2018-05-23 RX ADMIN — CARBIDOPA AND LEVODOPA 1 TABLET: 25; 100 TABLET ORAL at 11:53

## 2018-05-23 RX ADMIN — ACETAMINOPHEN 975 MG: 325 TABLET, FILM COATED ORAL at 13:31

## 2018-05-23 RX ADMIN — SCOPALAMINE 1 PATCH: 1 PATCH, EXTENDED RELEASE TRANSDERMAL at 08:54

## 2018-05-23 ASSESSMENT — PAIN DESCRIPTION - DESCRIPTORS
DESCRIPTORS: HEADACHE
DESCRIPTORS: HEADACHE
DESCRIPTORS: ACHING;SORE

## 2018-05-23 NOTE — PLAN OF CARE
Problem: Patient Care Overview  Goal: Plan of Care/Patient Progress Review  Outcome: No Change  Pt stable overnight. Neuro exam intact. Pt c/o pain above right eye with some relief from scheduled tylenol, cold packs, and one dose of prn fentanyl. Tolerating PO intake last evening, but he has had some intermittent nausea since [place], zofran given x2. [place] dc'd last evening, voiding without difficulty. Pt up to bathroom for approx one hour, but unable to have a BM. Additional senokot tablet given.     Continue with plan of care. Pt will likely DC home today.

## 2018-05-23 NOTE — PLAN OF CARE
Problem: Patient Care Overview  Goal: Plan of Care/Patient Progress Review  Outcome: Improving   05/22/18 1926   OTHER   Plan Of Care Reviewed With patient   Plan of Care Review   Progress improving   Pt arrived to the ICU at 17:00 after having a right brain stimulator  Placed today. Pt's neuro's intact with a slight left hand tremor noted. Pt is afebrile.  I: RN monitored pt's neurological status throughout the shift. RN monitored pt's urine output throughout the shift.   A: Pt is stable with vial signs of 120's.  P: Continue with curren plan of care.    Problem: Craniotomy/Craniectomy/Cranioplasty (Adult)  Goal: Signs and Symptoms of Listed Potential Problems Will be Absent, Minimized or Managed (Craniotomy/Craniectomy/Cranioplasty)  Signs and symptoms of listed potential problems will be absent, minimized or managed by discharge/transition of care (reference Craniotomy/Craniectomy/Cranioplasty (Adult) CPG).  Outcome: No Change   05/22/18 1926   Craniotomy/Craniectomy/Cranioplasty   Problems Assessed (Craniotomy/Craniectomy/Cranioplasty) all   Problems Present (Craniotomy/ectomy) situational response

## 2018-05-23 NOTE — PROGRESS NOTES
S: Feeling well postoperatively.    O:  Exam:  General: Awake;  Alert, In No Acute Distress  Pulm: Breathing Comfortably on room air  Mental status: Oriented x 3  Cranial Nerves: Cranial Nerves II-XII Grossly Intact Bilaterally  Strength:      Del Tr Bi WE WF Gr  R 5 5 5 5 5 5  L 5 5 5 5 5 5     HF KE KF DF PF EHL  R 5 5 5 5 5 5  L 5 5 5 5 5 5    Pronator Drift: Absent  Sensory: Intact to Light Touch  Reflexes: No Hyperreflexia, Elizabeth s or Clonus Present; Toes Down-Going Bilaterally  INCISION: right frontal incision clean, dry, intact with dermabond in place, no significant erythema or swelling; forehead pin sites dressed with primapore     A: Doing well post-operatively.     P:  Operation: S/p right STN DBS placement  Drains: none   Imaging: post-operative CT head completed, appropriate DBS lead placement with expected post-surgical changes, no hemorrhage  Pain: pain controlled   Anti-epileptics: none  Blood pressure goals: SBP < 140   Diet: advance diet as tolerated   Hematological goals: Platelets > 100k, INR < 1.5, Hemoglobin > 8   Antibiotics: IV ancef x3 for perioperative prophylaxis   PT/OT: not indicated   DVT prophylaxis: Sequential compression devices  Ulcer prophylaxis: none indicated  DISPO:  Anticipate D/C Home 5/23  Barriers: none    -----------------------------------  Aida Pretty MD, MS  Neurosurgery PGY-1

## 2018-05-23 NOTE — DISCHARGE SUMMARY
"[facility] Discharge Summary and Instructions    Kwame Lin MRN# [id number]   Age: 63 year old YOB: 1955     Date of Admission:  5/22/2018  Date of Discharge::  5/23/2018  2:15 PM  Admitting Physician:  Arpan Huynh MD  Discharge Physician:  Arpan Huynh MD          Admission Diagnoses:   Parkinson's Disease   S/P deep brain stimulator placement          Discharge Diagnosis:   Parkinson's Disease   S/P deep brain stimulator placement          Procedures:   5/22/2018  Stealth Assisted Right Deep Brain Stimulator Placement, Phase I And II Combined, Placement Of Right Deep Brain Stimulator Electrode, Target Right Subthalamic Nucleus With Microelectrode Recording And Connection To Previous Implanted Generator/Battery           Brief History of Illness:    Mr. Kwame Lin returns for his second side deep brain stimulator placement surgery.  He is s/p left side deep brain stimulator placement, phase I, placement of left side deep brain stimulator electrode, target left subthalamic nucleus with microelectrode re cording on 12/14/2017 and s/p deep brain stimulator placement, phase II, placement of new deep brain stimulator generator/battery, [employer], over left chest wall on 12/22/2017.  He tolerated both procedures well and there were no complications.  Subsequently, he has had the device turned on and programmed and it has provided him with excellent therapeutic results.  He now returns for his second side implantation, as planned, since he was a staged bilateral implant patient.  Briefly, he is a 63 year old right handed male with a history of Parkinson's Disease that was diagnosed around 2009.  He has followed by Dr. Caldwell [facility]. Records indicate and patient confirms that his symptoms began back in 2007 when he was flying back from [place].  He had nausea and felt sick, and he was told that it was \"aerotoxic syndrome\".  His symptoms " "reportedly resolved and then returned about one year later when he and his wife went on a strict diet at which time he lost 25 lbs in six weeks.  During this weight loss period, his tremors came back.  His main symptom is right-sided tremors, that is now starting to progress to his left side. He takes sinemet.  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  His grandchildren do not want to hold his hand because of his tremor.  His goal for DBS surgery is to control his tremor and \"have improvement in his quality of life\".            Hospital Course:   Following surgery the patient was kept in the PACU due to lack of bed availability in the ICU.  Due to this fact, post operative orders were delayed and the patient did not receive his Sinemet dose immediately upon arrival from OR.  The patient therefore became quite symptomatic and this was very distressing to the patient and his family.  Once the patient received his medication, he began to begin feeling better.   The patient otherwise remained neurologically and hemodynamically intact throughout his stay.  Post operative head CT revealed proper DBS lead placement with expected post operative changes, no hemorrhage was noted.  Patient denied pain.  Patient was able to tolerate diet, void, was passing flatus and ambulatory prior to discharge.   Patient will follow up with Dr Huynh in 2 weeks for post operative follow up.  Patient will continue on his Parkinsonian medications as prescribed pre operatively.  Patient discharged home with family.            Discharge Medications:     Current Discharge Medication List      START taking these medications    Details   oxyCODONE IR (ROXICODONE) 5 MG tablet Take 1-2 tablets (5-10 mg) by mouth every 6 hours as needed for moderate to severe pain or other (pain " "control or improvement in physical function. Hold dose for analgesic side effects.)  Qty: 40 tablet, Refills: 0    Comments: Indication: Moderate to Severe Post-Operative Pain  Associated Diagnoses: Parkinson disease (H)         CONTINUE these medications which have NOT CHANGED    Details   acetaminophen (TYLENOL) 325 MG tablet Take 325-650 mg by mouth nightly as needed for mild pain      !! carbidopa-levodopa (SINEMET)  MG per tablet Take 1 tablet by mouth every 4 hours (while awake)      !! carbidopa-levodopa (SINEMET)  MG per tablet Take 1.5 tablets by mouth every morning 1.5 tab in am and 1 tab every 4 hours after that       !! carbidopa-levodopa (SINEMET)  MG per tablet 2 tablets At Bedtime Extended release  Qty: 90 tablet      citalopram (CELEXA) 10 MG tablet Take 10 mg by mouth every morning       clonazePAM (KLONOPIN) 0.5 MG tablet Take 0.5 mg by mouth At Bedtime       Coenzyme Q10 300 MG CAPS Take 300 mg by mouth every morning      magnesium 100 MG TABS Take 100 tablets by mouth 2 times daily (before meals)      MELATONIN PO Take 2 mg by mouth At Bedtime      Misc Natural Products (RED WINE EXTRACT) CAPS Take by mouth daily (with lunch)       multivitamin, therapeutic with minerals (MULTI-VITAMIN) TABS tablet Take 1 tablet by mouth every morning      Probiotic Product (ACIDOPHILUS/GOAT MILK) CAPS At Bedtime       thyroid (ARMOUR THYROID) 60 MG tablet Take 60 mg by mouth every morning       diclofenac (VOLTAREN) 1 % GEL topical gel Apply qid prn pain       !! - Potential duplicate medications found. Please discuss with provider.      /86  Temp 98.2  F (36.8  C) (Oral)  Resp 17  Ht 1.753 m (5' 9\")  Wt 92.6 kg (204 lb 2.3 oz)  SpO2 97%  BMI 30.15 kg/m2     Exam:  General: Awake;  Alert, In No Acute Distress  Pulm: Breathing Comfortably on room air  Mental status: Oriented x 3  Cranial Nerves: Cranial Nerves II-XII Grossly Intact Bilaterally  Strength:                         Del   "               Tr                   Bi                   WE                WF                 Gr  R                    5                    5                    5                    5                    5                    5  L                    5                    5                    5                    5                    5                    5                        HF                 KE                 KF                  DF                 PF                  EHL  R                    5                    5                    5                    5                    5                    5  L                    5                    5                    5                    5                    5                    5     Pronator Drift: Absent  Sensory: Intact to Light Touch  Reflexes: No Hyperreflexia, Elizabeth s or Clonus Present; Toes Down-Going Bilaterally  INCISION: right frontal incision clean, dry, intact with dermabond in place, no significant erythema or swelling; forehead pin sites dressed with primapore          Discharge Instructions and Follow-Up:   Discharge diet: Regular   Discharge activity: You may advance activity as tolerated. No strenuous exercise or heay lifting greater than 10 lbs for 4 weeks or until seen and cleared in clinic.   Discharge follow-up: Follow-up with Dr. Arpan Huynh MD in 2 weeks   Wound care: Ok to shower,however no scrubbing of the wound and no soaking of the wound, meaning no bathtubs or swimming pools. Pat dry only. Leave wound open to air.  Sutures are not absorbable and need to be removed in 2 weeks. If patient still at rehab by this time, the sutures may be removed by the rehab physician if he or she considers that the wound has healed completely.     Please call if you have:  1. increased pain, redness, drainage, swelling at your incision  2. fevers > 101.5 F degrees  3. with any questions or concerns.  You may reach the Neurosurgery clinic at  [phone] during regular work hours. ER at [phone].    and ask for the Neurosurgery Resident on call at [phone], during off hours or weekends.         Discharge Disposition:   Discharged to home        АННА Stevenson, CNP  Department of Neurosurgery  Pager: [phone]

## 2018-05-25 ENCOUNTER — CARE COORDINATION (OUTPATIENT)
Dept: NEUROSURGERY | Facility: CLINIC | Age: 63
End: 2018-05-25

## 2018-05-25 NOTE — PROGRESS NOTES
Neurosurgery Discharge Coordination Note     Attending physician: Dr. Huynh  Surgery performed: DBS lead placement (right) and connection to previously implanted generator  Date of Discharge: 5/23/18  Discharge to: Home     Current status: Patient states he  feels pretty good today. In the day after surgery he was having trouble figuring out what day it was and kept thinking it was Saturday, but he said this has resolved. Denies incisional pain. Denies redness, swelling, increased tenderness, drainage, incision opening or elevated temp. Reports Incision CDI without signs of infection.  Denies current bowel or bladder issues.    Discharge instructions and medications reviewed with patient.  Follow up appointments/imaging/tests needed: 2 week post op with Dr. Huynh on June 11 at 1:00 pm. Will message  to put it on the schedule    RN triage/on call number given: [phone]/ [phone]

## 2018-05-29 ENCOUNTER — TELEPHONE (OUTPATIENT)
Dept: NEUROLOGY | Facility: CLINIC | Age: 63
End: 2018-05-29

## 2018-05-29 DIAGNOSIS — G20.A1 PARALYSIS AGITANS (H): Primary | ICD-10-CM

## 2018-05-29 NOTE — TELEPHONE ENCOUNTER
Called patient to schedule his initial Deep Brain Stimulation programming. Left voice mail that I scheduled 6/21 from 9-1 with Dr. Kauffman and Katerina with the CT head to follow.

## 2018-05-31 NOTE — OP NOTE
PATIENT NAME: WILFRED CISNEROS  YOB: 1955  MRN:   [id number]  ACCOUNT:  [id number]      DATE OF PROCEDURE:  05/22/2018    PREOPERATIVE DIAGNOSIS:   1.  Parkinson's disease.  2.  S/p left side deep brain stimulator placement, phase I, placement of left side deep brain stimulator electrode, target left subthalamic nucleus, with microelectrode recording on 12/14/2017.  3.  S/p deep brain stimulator placement, phase II, placement of new deep brain stimulator generator/battery, [employer] Vercise, over left chest wall on 12/22/2017.    POSTOPERATIVE DIAGNOSIS:   1.  Parkinson's disease.  2.  S/p left side deep brain stimulator placement, phase I, placement of left side deep brain stimulator electrode, target left subthalamic nucleus, with microelectrode recording on 12/14/2017.  3.  S/p deep brain stimulator placement, phase II, placement of new deep brain stimulator generator/battery, [employer] Vercise, over left chest wall on 12/22/2017.    PROCEDURE PERFORMED:  1.  Placement of CRW stereotactic head frame.  2.  Stereotactic neuronavigation planning CT of the head.  3.  Stereotactic neuronavigation using Innohub system for surgical planning, targeting and approach: target is right subthalamic nucleus.  4.  Right side deep brain stimulator placement, phase I, placement of right side deep brain stimulator electrode: target is right subthalamic nucleus.  5.  Use of intraoperative microelectrode recording.  6.  Use of intraoperative fluoroscopy.  7.  Deep brain stimulator placement, phase II, connection of the right side deep brain stimulator electrode, single array, to the existing generator/battery.  8.  Electrical interrogation and analysis of deep brain stimulator system.    ATTENDING SURGEON:  Arpan Huynh MD, PhD.    RESIDENT SURGEON: Clarita Lo MD.    ANESTHESIA:  Monitored anesthesia care and local anesthetic.    ESTIMATED BLOOD LOSS:  10 mL.    COMPLICATIONS:   "None.    FINDINGS:  Successful testing.  Right side electrode connected to preexisting extension wire.  Final electrode location, center Ryan Gun position, at target depth.  All impedances within normal.  No problems found.    IMPLANT:    1.  [employer] bur hole cover, DB-[id number], Lot#[id number]  2.  [employer] electrode, [id number], [id number]    INDICATIONS FOR PROCEDURE:  Mr. Kwame Lin returns for his second side deep brain stimulator placement surgery.  He is s/p left side deep brain stimulator placement, phase I, placement of left side deep brain stimulator electrode, target left subthalamic nucleus, with microelectrode recording on 12/14/2017 and s/p deep brain stimulator placement, phase II, placement of new deep brain stimulator generator/battery, [employer] Vercise, over left chest wall on 12/22/2017.  He tolerated both procedures well and there were no complications.  Subsequently, he has had the device turned on and programmed and it has provided him with excellent therapeutic results.  He now returns for his second side implantation, as planned, since he was a staged bilateral implant patient.  He already has two extension wires implanted so that one could be used to connect the right side electrode during today's procedure.  Briefly, he is a 63 year old right handed male with a history of Parkinson's Disease that was diagnosed around 2009.  He has followed by Dr. Caldwell at the [facility].  Records indicate and patient confirms that his symptoms began back in 2007 when he was flying back from [place].  He had nausea and felt sick, and he was told that it was \"aerotoxic syndrome\".  His symptoms reportedly resolved and then returned about one year later when he and his wife went on a strict diet at which time he lost 25 lbs in six weeks.  During this weight loss period, his tremors came back.  His main symptom is right-sided tremors, that is now starting to progress to his " "left side. He takes sinemet.  He has tried various treatments, including chiropractic treatments, acupuncture, acupolarity, Qi Gong, meditation and various tests.  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  His grandchildren do not want to hold his hand because of his tremor.  His goal for DBS surgery is to control his tremor and \"have improvement in his quality of life\".  We discussed the right side phase I and II combined DBS surgery, placement of the DBS electrode on the right side under MAC and microelectrode recording followed by connection to the previously implanted generator/battery.  Risks, benefits, alternative therapies were discussed with the patient, including but not limited to infection and bleeding (intracranial), injury to the brain, stroke, death, hardware failure and possible need for more surgeries. Surgical procedure was discussed in detail.  All questions were answered, and he expressed understanding.    DESCRIPTION OF PROCEDURE:    CRW head frame placement and stereotactic neuronavigation.     The patient was brought to the operating room and placed in a supine position in his bed.  Brief timeout was performed for placement of the CRW head frame.  He was given sedation to make him comfortable.  Intended pin sites, two in the front and two in the back of the head, were cleaned and were injected with local anesthetic, 1% Lidocaine with epinephrine.  Total of 30 mL were used.  Then, CRW stereotactic head frame was placed onto his head with the four pins for fixation.  Care was taken so that the fiducial box would fit over the head and the frame.  Also, posterior left pin site was carefully chosen to stay away from the buried extension wires.  Once the head frame was on, the fiducial box was easily placed without interference from his forehead.  The patient tolerated the procedure well and his sedation was lightened and he was awakened.     After the CRW " stereotactic head frame was placed, he was taken directly to the CT scanner, at which time CT of the head stereotactic protocol was obtained.  Subsequently, the patient was taken back up to the operating room where he was placed supine on the operative bed with the CRW head frame affixed to the bed as well.  Patient was in a slight sitting up position with the neck in a neutral position and he was made comfortable.  The bed was positioned so that it would be a beach chair reclining position (head up, legs down, trendelenburg).  All pressure points were well padded.  Care was taken to make sure that the neck was in neutral position and that the [place] head ji device had room for manipulation in case more flexion or extension is needed throughout the case.    At this time, attention was turned to the neuro navigation imaging that was obtained.  The Stealth neuronavigation device was loaded with the CT head that had just been obtained.  The device also had an MRI of the head, stereotactic protocol, that was obtained prior to surgery.  We also utilized MRI brain 7 [place]lin alpha protocol along with the Stealth MRI to assist with the localization and targeting of the subthalamic nucleus (STN).  CT of the head was merged with the previously obtained MRI of the brain.  The merge demonstrated good coherence/registration.  Then, using this merged image, neuronavigation was used to stereotactically target the right STN.  The technique used was the AC-PC line localization technique to target the STN using stereotactic coordinates, adjustment with available 7T alpha MRI images, and the X, Y and Z as well as the ring and arc angles for the CRW head frame were obtained for the right side.  Dr. Borges also adjusted the trajectory and the plan based on the imaging available.  The entry into the skull, as well as the trajectory of the electrode were checked with a probe's eye view to avoid any sulci, ventricle or vascular  structures.       The stereotactic coordinates for the right side was then transferred to the [facility] stereotactic targeting apparatus as well as the phantom base.  The coordinates were confirmed and double checked for accuracy.  Accuracy was also confirmed using phantom base.  The coordinates were X = +11.1, Y = -6.1, Z =+15.8, ring angle = 60.4 degrees anterior, and arc angle = 15.0 degrees to the right.     At this time, attention was turned to the patient.  Using a hair clipper, his hair over the right frontal area was clipped using the surgical clipper.  A semicircular incision was planned on the right side and this was marked.  Then, the surgical area was prepped and draped in routine surgical fashion.  We also prepped the area posterior to this as well as area towards the left parietal area where the previously implanted connector portion of the extension wire is located.  The patient was also made comfortable.     Timeout was then performed confirming the patient's identity, procedure to be performed, side and site of surgery identified, imaging corresponding with the patient and administration of preoperative prophylactic antibiotic.    Right-sided electrode placement with microelectrode recording: Target right STN.     The CRW targeting apparatus was attached to the head frame on top of the sterile drapes.  The entry point was marked on the scalp on the right side.  A C-shaped incision was made with a skin knife, after the area was injected with local anesthetic, 1% Lidocaine with epinephrine and 1/4% Marcaine plain, 50:50 mix.  The area posterior to the incision was also injected as a pocket would be created.  Area posterior medial, towards the left parietal area where the previously buried extension wire is located, was also injected as the electrode connector will be tunneled here later.  Total of 24 mL of the above mentioned local anesthetic was used.  The incision was then further carried down to the  pericranium.  Hemostasis was obtained using bipolar cautery.  Since he has a [employer] DBS generator/battery, Vercise, implanted, monopolar cautery was not used during this entire case.  Thin layer of pericranial tissue was left behind on the scalp and the skin flap was reflected posteriorly.  Then, using a blunt dissection technique, a pocket was created posteriorly as well as a tract that was made towards the left parietal area for later use.    At this time, the targeting apparatus again positioned and the entry point to the skull was marked.  Area of the intended bur hole was cleaned and pericranial tissue removed.  Then using an [employer] Needle drill, bur hole was created over this entry point to expose the dura.  The area was vigorously irrigated and bone wax used to control the bone bleeding.  Dura was coagulated with bipolar cautery for hemostasis, and again no active bleeding was noted.     At this time, the electrode fixation cover was fixed to the skull using two screws.  Care was taken to overlap the pericranium over the edge of the cover to provide a smooth tissue transition.  Then, the electrode drive was attached to the targeting apparatus.  The entry into the dura was again checked.  It appeared that all positions of the Ryan Gun electrode ji were accessible without any interference from the bone edge.  Then using a #15 blade, opening was made into the dura in a cruciate fashion and the dural leaflets coagulated with bipolar cautery to shrink them.  Then, using the bipolar cautery, a small corticectomy was performed at the intended canula insertion site.  No active bleeding was noted.    Dr. Clarita Borges and Dr. Juan Hughes of the Neurology Department participated in the recording and neurological testing.  During the microelectrode recording and testing, Dr. Borges and Dr. Hughes took detailed notes of the electrophysiologic data and neurologic exam as well as any stimulation effects.    At this  "time, the electrode guides were inserted in the center, medial and posterior Ryan Gun positions and they were advanced slowly until they were fully inserted at which point the tips would be well above the target.  No abnormal resistance was noted.  Duraseal was used to seal the entry site to provide a seal and to minimize cerebrospinal fluid leak and air entry.  Then, recording microelectrodes were brought in and placed within the guide tubes and they were connected for intraoperative recording.  The tips of the electrode were now 15 mm above target.  The patient was awakened.  Then, using a micro drive, the electrodes were slowly advanced towards the target, collecting microelectrode recording data for mapping the tract.  Throughout the tract, good quality expected recording was observed.  The center electrode recordings identified approximately 6.18 mm of STN with somatosensory response activity related to shoulder, elbow, and wrist.  The medial electrode recordings identified approximately 3.7 mm of STN and the posterior electrode recordings identified approximately 3.42 mm of STN.  Ring electrode stimulation in the center track yielded good tremor reduction with no internal capsular effects or paresthesias.  Medial track stimulation yielded \"tingling\" across the entire body and the posterior track stimulation yielded \"hot and sweaty, dizzy and tingling\".      During the recording, with the patient's informed consent obtained previously and willingness to participate in the research protocol, microelectrode recordings of the units along with patient's movement data were collected.  Dr. Hughes and the neurology research team collected electrophysiology data from the implanted electrode while the patient performed motor tasks, for which he gave consent previously, as part of a research protocol.    Based on the mapping data, it was decided that the current center Ryan Gun position may be a good placement for the " permanent electrode.  The electrode drive was then positioned to the desired position with the micro drive and the electrodes pulled out of the guide tubes.  The permanent DBS electrode was brought into the field and placed in the center guide tube with the tip being placed at the target depth.  The electrode demonstrated good impedance values.  The electrode was tested and the stimulation yielded the followin- 2+ with good tremor reduction and no internal capsular or paresthesia effects, 3- 4+ with good tremor reduction and no internal capsular or paresthesia effects.  All the impedances were also within normal.  Based on the testing and results, it was decided that the current position was satisfactory.    With the satisfactory testing of the electrode position and the stimulation parameters, the electrode was secured at this location.  The patient was again made comfortable.  The guide tubes were removed.  Duraseal was again used to seal any opening. The area was generously irrigated.  Hemostasis was also obtained.  Fluoroscopy was brought into the field to test that the electrode did not move with each manipulation.  The electrode tip was seen to be at the target, as expected.  The electrode clamp was applied to hold the electrode.  Then, the electrode stylet was removed.  The electrode was then removed from the electrode ji.  The cap cover was finally placed and secured in place.  Fluoroscopy confirmed that the tip of the electrode did not change in position with each manipulation.    Connection of the right side deep brain stimulator electrode, single array, to the generator/battery.    At this time, attention was turned to the left parietal area where the previously buried connector portion of the extension wire, intended for this right side, was located.  This area was also injected with local anesthetic that was previously mentioned.  Using a #10 blade, 3 cm incision was made over the buried  connector.  Care was taken to only expose the connector and not harm the hardware around it.  The connector was visualized and now accessible for connection.    Then, using a tunneler, a passage was created between the right frontal incision and left parietal incision.  When the tunneler was removed, plastic sheath remained.  Using this, the newly placed right side DBS electrode was tunneled to the extension wire connector site.  Then the plastic sheath was also pulled out, thus leaving the tunneled right side electrode.  The connection was cleaned.  Then, the newly placed right STN electrode was connected to the previously buried extension wire and secured gently with a screw .  At this time, the right side DBS system was interrogated.  All the impedance values were within normal.  No problems were found.  With this confirmed, the electrode was secured to the extension wire using the screw  to secure it.  Then, the connector was buried within the left parietal pocket.  From the right frontal incision, excess wire was placed posterior to the incision, in the pocket previously created.  Final fluoroscopy image confirmed that the tip did not move.  Both wounds were then generously irrigated.    With the proper placement and connection of the deep brain stimulator system, electrical interrogation and analysis was again performed.  All the impedance values were noted to be within normal.    We began closing the wound.  The right frontal incision was reapproximated first.  The galeal layer was reapproximated using 3-0 Vicryl sutures in an inverted interrupted fashion and the skin was reapproximated using 4-0 Monocryl suture in a running fashion.  The wound was cleaned and dried and Dermabond was applied.  Attention was then turned to the left parietal area.  The galeal layer was reapproximated using 3-0 Vicryl sutures in an inverted interrupted fashion and the skin was reapproximated using 4-0 Monocryl suture  in a running fashion.  The wound was cleaned and dried and Dermabond was applied.    Removal of the head frame and end of procedure.    First, the stereotactic targeting apparatus was removed.  Then, the sterile drapes were removed.  Subsequently, the patient was taken out of the CRW head frame.  The four pin sites were clean and dry and no active bleeding was noted.  Antibiotic ointment was applied to the pin sites.    Patient was further awakened and taken to the recovery room in a stable condition.  At the end of the case, all counts including needle, sponge and instrument counts were correct x 2.  There were no complications.      Arpan PARKER M.D., Ph.D., Neurosurgery Attending, was present and scrubbed for the entire case and performed the key and critical portions of the case.

## 2018-06-11 ENCOUNTER — OFFICE VISIT (OUTPATIENT)
Dept: NEUROSURGERY | Facility: CLINIC | Age: 63
End: 2018-06-11
Payer: COMMERCIAL

## 2018-06-11 VITALS
BODY MASS INDEX: 30.12 KG/M2 | WEIGHT: 210.4 LBS | DIASTOLIC BLOOD PRESSURE: 72 MMHG | HEIGHT: 70 IN | HEART RATE: 68 BPM | SYSTOLIC BLOOD PRESSURE: 110 MMHG

## 2018-06-11 DIAGNOSIS — G20.A1 PARKINSON'S DISEASE (H): Primary | ICD-10-CM

## 2018-06-11 DIAGNOSIS — Z98.890 POST-OPERATIVE STATE: ICD-10-CM

## 2018-06-11 DIAGNOSIS — Z96.89 S/P DEEP BRAIN STIMULATOR PLACEMENT: ICD-10-CM

## 2018-06-11 ASSESSMENT — PAIN SCALES - GENERAL: PAINLEVEL: NO PAIN (0)

## 2018-06-11 NOTE — LETTER
"6/11/2018       RE: Kwame Lin  [address] [place] [address]     Dear Colleague,    Thank you for referring your patient, Kwame Lin, to the [facility] at [facility]. Please see a copy of my visit note below.    HISTORY AND PHYSICAL EXAM    Chief Complaint   Patient presents with     RECHECK     Parkinson's disease s/p left STN DBS 12/2017. S/p right STN DBS 05/2018.       HISTORY OF PRESENT ILLNESS  We saw Mr. Kwame Lin back in Neurosurgery Clinic today for a post-operative follow-up.  He is a 63 year old man who suffers from Parkinson s disease.  He underwent left-sided STN DBS lead implantation in 12/2017 followed by a right-sided STN DBS lead implantation on 05/22/2018.  He has a [employer] DBS system with Vercise generator/battery over the right chest wall, which he charges every 10 days.  Currently, he is doing well.  He reports no problem with his incision.  He did have word finding difficulties and vague 'brain fog' since his recent operation but these are improving.  He was also reporting new dyskinesias in his right arm since surgery, but this is also improved.  Overall, his tremor is improved from DBS.  He was able to write his first letter in many years.  He states \"he has his life back.\"  He has his left side STN DBS system on.  His right side, most recent implant, is not yet turned on.        Past Medical History:   Diagnosis Date     Anosmia 10/12/2017     Anxiety      Depressed mood 10/12/2017     Hypothyroid      Parkinson disease (H)      Parkinsons disease (H)      PONV (postoperative nausea and vomiting)      RBD (REM behavioral disorder) 10/12/2017       Past Surgical History:   Procedure Laterality Date     ARTHROSCOPY KNEE Right     twice     ARTHROSCOPY KNEE Right     left     epidydmal mass removal, benign       IMPLANT DEEP BRAIN STIMULATION GENERATOR / BATTERY Left 12/22/2017    Procedure: IMPLANT DEEP " BRAIN STIMULATION GENERATOR / BATTERY;  Deep Brain Stimulator Placement, Phase II, Placement Of Deep Brain Stimulator Generator/Battery Over The Left Chest Wall;  Surgeon: Arpan Huynh MD;  Location: [facility] OR     OPTICAL TRACKING SYSTEM INSERTION DEEP BRAIN STIMULATION Left 12/14/2017    Procedure: OPTICAL TRACKING SYSTEM INSERTION DEEP BRAIN STIMULATION;  Stealth Assisted Left Deep Brain Stimulator Placement, Phase I, Placement Of Left Side Deep Brain Stimulator Electrode, Target Left Subthalamic Nucleus With Microelectrode Recording;  Surgeon: Arpan Huynh MD;  Location: [facility] OR     OPTICAL TRACKING SYSTEM INSERTION DEEP BRAIN STIMULATION Right 5/22/2018    Procedure: OPTICAL TRACKING SYSTEM INSERTION DEEP BRAIN STIMULATION;  Stealth Assisted Right Deep Brain Stimulator Placement, Phase I And II Combined, Placement Of Right Deep Brain Stimulator Electrode, Target Right Subthalamic Nucleus With Microelectrode Recording And Connection To Previous Implanted Generator/Battery;  Surgeon: Arpan Huynh MD;  Location: [facility] OR       Family History   Problem Relation Age of Onset     Lung Cancer Father      Parkinsonism Cousin      paternal side     Parkinsonism Maternal Aunt        Social History     Social History     Marital status:      Spouse name: N/A     Number of children: N/A     Years of education: N/A     Occupational History     Small business start consultant      Social History Main Topics     Smoking status: Never Smoker     Smokeless tobacco: Never Used     Alcohol use 0.6 - 1.2 oz/week     1 - 2 Cans of beer per week      Comment: MONTHLY     Drug use: No     Sexual activity: Yes     Partners: Female     Other Topics Concern     Not on file     Social History Narrative    Previous worked as [employer].    Now consulting part-time.    , lives with wife.    Non-smoker. Occasional drink.        Past surgical history that  "includes epidydmal mass removal, benign.        Social History:    Previous worked as [employer].    Now consulting part-time.    , lives with wife.    Non-smoker. Occasional drink.         family history includes Lung Cancer in his father; Parkinsonism in his cousin and maternal aunt.               Allergies   Allergen Reactions     Bactrim Hives     Codeine Nausea and Vomiting     Ketorolac Nausea and Vomiting     Morphine      Nalbuphine      Other reaction(s): Unknown Reaction     Nsaids Other (See Comments)     vominting  vominting     Penicillins Hives     Seasonal Allergies Itching     Sulfa Drugs      Toradol Nausea and Vomiting       Current Outpatient Prescriptions   Medication     acetaminophen (TYLENOL) 325 MG tablet     carbidopa-levodopa (SINEMET)  MG per tablet     carbidopa-levodopa (SINEMET)  MG per tablet     citalopram (CELEXA) 10 MG tablet     clonazePAM (KLONOPIN) 0.5 MG tablet     Coenzyme Q10 300 MG CAPS     diclofenac (VOLTAREN) 1 % GEL topical gel     magnesium 100 MG TABS     MELATONIN PO     Misc Natural Products (RED WINE EXTRACT) CAPS     multivitamin, therapeutic with minerals (MULTI-VITAMIN) TABS tablet     ondansetron (ZOFRAN ODT) 4 MG ODT tab     Probiotic Product (ACIDOPHILUS/GOAT MILK) CAPS     thyroid (ARMOUR THYROID) 60 MG tablet     traMADol (ULTRAM) 50 MG tablet     [DISCONTINUED] carbidopa-levodopa (SINEMET)  MG per tablet     No current facility-administered medications for this visit.          REVIEW OF SYSTEMS  General: negative for difficulty sleeping and headaches, chills/sweats/fever, fatigue, weight gain or loss.  Neurologic: negative for headaches, numbness of arms or legs, problem with memory, tingling of hands, arms or legs.      PHYSICAL EXAM  /72  Pulse 68  Ht 1.765 m (5' 9.5\")  Wt 95.4 kg (210 lb 6.4 oz)  BMI 30.63 kg/m2    General: Awake, alert, oriented. Well nourished, well developed, " he is not in any acute distress.  Incisions: Dermabond and sutures removed without difficulty from right frontal incision, which is healing well. Left parietal area is healing well. Left frontal incision is well-healed.     Neurological:  Awake, alert and oriented to date, time, place and person. Speech fluent.   Pupils equal, round, reactive to light.  Extraocular movement intact.  Facial sensation intact.  Face symmetric.    Motor: moves all extremities well.  Sensation: grossly intact.      ASSESSMENT  63 year old man with a history of Parkinson's disease  S/p left STN DBS lead implantation in 12/2017  S/p right STN DBS lead implantation in 5/2018    Patient has done well from his bilateral DBS surgeries.  We did have some issues postoperatively but these have been addressed.  They were logistic and postoperative medication issues.  I have reassured the family that certain protocols are now in place to minimize or eliminate what the patient experienced after his last surgery.    Overall, he does not have any active issues from the surgery and he is quite happy with the results so far.  His right side system will be turned on soon.      PLAN  1.  Follow up with Neurology for activation and programming of his right side DBS system.  2.  Follow up with Neurosurgery as needed.    I, Max Ware, am serving as a scribe to document services personally performed by Arpan Huynh MD, PhD, based upon my observations and the provider's statements to me. All documentation has been reviewed and edited by the aforementioned doctor prior to being entered into the official medical record.    I, Arpan Huynh, attest that above named individual is acting in scribe capacity, has observed my performance of the services and has documented them in accordance with my direction. The documentation recorded by the scribe accurately reflects the service I personally performed and the decisions made by me. The document was also  partially recorded by me and the entire document was edited by me as well.     Again, thank you for allowing me to participate in the care of your patient.      Sincerely,    Arpan Huynh MD

## 2018-06-11 NOTE — PROGRESS NOTES
"HISTORY AND PHYSICAL EXAM    Chief Complaint   Patient presents with     RECHECK     Parkinson's disease s/p left STN DBS 12/2017. S/p right STN DBS 05/2018.       HISTORY OF PRESENT ILLNESS  We saw Mr. Kwame Lin back in Neurosurgery Clinic today for a post-operative follow-up.  He is a 63 year old man who suffers from Parkinson s disease.  He underwent left-sided STN DBS lead implantation in 12/2017 followed by a right-sided STN DBS lead implantation on 05/22/2018.  He has a [employer] DBS system with Vercise generator/battery over the right chest wall, which he charges every 10 days.  Currently, he is doing well.  He reports no problem with his incision.  He did have word finding difficulties and vague 'brain fog' since his recent operation but these are improving.  He was also reporting new dyskinesias in his right arm since surgery, but this is also improved.  Overall, his tremor is improved from DBS.  He was able to write his first letter in many years.  He states \"he has his life back.\"  He has his left side STN DBS system on.  His right side, most recent implant, is not yet turned on.        Past Medical History:   Diagnosis Date     Anosmia 10/12/2017     Anxiety      Depressed mood 10/12/2017     Hypothyroid      Parkinson disease (H)      Parkinsons disease (H)      PONV (postoperative nausea and vomiting)      RBD (REM behavioral disorder) 10/12/2017       Past Surgical History:   Procedure Laterality Date     ARTHROSCOPY KNEE Right     twice     ARTHROSCOPY KNEE Right     left     epidydmal mass removal, benign       IMPLANT DEEP BRAIN STIMULATION GENERATOR / BATTERY Left 12/22/2017    Procedure: IMPLANT DEEP BRAIN STIMULATION GENERATOR / BATTERY;  Deep Brain Stimulator Placement, Phase II, Placement Of Deep Brain Stimulator Generator/Battery Over The Left Chest Wall;  Surgeon: Arpan Huynh MD;  Location: [facility] OR     OPTICAL TRACKING SYSTEM INSERTION DEEP BRAIN STIMULATION Left " 12/14/2017    Procedure: OPTICAL TRACKING SYSTEM INSERTION DEEP BRAIN STIMULATION;  Stealth Assisted Left Deep Brain Stimulator Placement, Phase I, Placement Of Left Side Deep Brain Stimulator Electrode, Target Left Subthalamic Nucleus With Microelectrode Recording;  Surgeon: Arpan Huynh MD;  Location: [facility] OR     OPTICAL TRACKING SYSTEM INSERTION DEEP BRAIN STIMULATION Right 5/22/2018    Procedure: OPTICAL TRACKING SYSTEM INSERTION DEEP BRAIN STIMULATION;  Stealth Assisted Right Deep Brain Stimulator Placement, Phase I And II Combined, Placement Of Right Deep Brain Stimulator Electrode, Target Right Subthalamic Nucleus With Microelectrode Recording And Connection To Previous Implanted Generator/Battery;  Surgeon: Arpan Huynh MD;  Location: [facility] OR       Family History   Problem Relation Age of Onset     Lung Cancer Father      Parkinsonism Cousin      paternal side     Parkinsonism Maternal Aunt        Social History     Social History     Marital status:      Spouse name: N/A     Number of children: N/A     Years of education: N/A     Occupational History     Small business start consultant      Social History Main Topics     Smoking status: Never Smoker     Smokeless tobacco: Never Used     Alcohol use 0.6 - 1.2 oz/week     1 - 2 Cans of beer per week      Comment: MONTHLY     Drug use: No     Sexual activity: Yes     Partners: Female     Other Topics Concern     Not on file     Social History Narrative    Previous worked as [employer].    Now consulting part-time.    , lives with wife.    Non-smoker. Occasional drink.        Past surgical history that includes epidydmal mass removal, benign.        Social History:    Previous worked as [employer].    Now consulting part-time.    , lives with wife.    Non-smoker. Occasional drink.         family history includes Lung Cancer in his father;  "Parkinsonism in his cousin and maternal aunt.               Allergies   Allergen Reactions     Bactrim Hives     Codeine Nausea and Vomiting     Ketorolac Nausea and Vomiting     Morphine      Nalbuphine      Other reaction(s): Unknown Reaction     Nsaids Other (See Comments)     vominting  vominting     Penicillins Hives     Seasonal Allergies Itching     Sulfa Drugs      Toradol Nausea and Vomiting       Current Outpatient Prescriptions   Medication     acetaminophen (TYLENOL) 325 MG tablet     carbidopa-levodopa (SINEMET)  MG per tablet     carbidopa-levodopa (SINEMET)  MG per tablet     citalopram (CELEXA) 10 MG tablet     clonazePAM (KLONOPIN) 0.5 MG tablet     Coenzyme Q10 300 MG CAPS     diclofenac (VOLTAREN) 1 % GEL topical gel     magnesium 100 MG TABS     MELATONIN PO     Misc Natural Products (RED WINE EXTRACT) CAPS     multivitamin, therapeutic with minerals (MULTI-VITAMIN) TABS tablet     ondansetron (ZOFRAN ODT) 4 MG ODT tab     Probiotic Product (ACIDOPHILUS/GOAT MILK) CAPS     thyroid (ARMOUR THYROID) 60 MG tablet     traMADol (ULTRAM) 50 MG tablet     [DISCONTINUED] carbidopa-levodopa (SINEMET)  MG per tablet     No current facility-administered medications for this visit.          REVIEW OF SYSTEMS  General: negative for difficulty sleeping and headaches, chills/sweats/fever, fatigue, weight gain or loss.  Neurologic: negative for headaches, numbness of arms or legs, problem with memory, tingling of hands, arms or legs.      PHYSICAL EXAM  /72  Pulse 68  Ht 1.765 m (5' 9.5\")  Wt 95.4 kg (210 lb 6.4 oz)  BMI 30.63 kg/m2    General: Awake, alert, oriented. Well nourished, well developed, he is not in any acute distress.  Incisions: Dermabond and sutures removed without difficulty from right frontal incision, which is healing well. Left parietal area is healing well. Left frontal incision is well-healed.     Neurological:  Awake, alert and oriented to date, time, place and " person. Speech fluent.   Pupils equal, round, reactive to light.  Extraocular movement intact.  Facial sensation intact.  Face symmetric.    Motor: moves all extremities well.  Sensation: grossly intact.      ASSESSMENT  63 year old man with a history of Parkinson's disease  S/p left STN DBS lead implantation in 12/2017  S/p right STN DBS lead implantation in 5/2018    Patient has done well from his bilateral DBS surgeries.  We did have some issues postoperatively but these have been addressed.  They were logistic and postoperative medication issues.  I have reassured the family that certain protocols are now in place to minimize or eliminate what the patient experienced after his last surgery.    Overall, he does not have any active issues from the surgery and he is quite happy with the results so far.  His right side system will be turned on soon.      PLAN  1.  Follow up with Neurology for activation and programming of his right side DBS system.  2.  Follow up with Neurosurgery as needed.    I, Max Ware, am serving as a scribe to document services personally performed by Arpan Huynh MD, PhD, based upon my observations and the provider's statements to me. All documentation has been reviewed and edited by the aforementioned doctor prior to being entered into the official medical record.    I, Arpan Huynh, attest that above named individual is acting in scribe capacity, has observed my performance of the services and has documented them in accordance with my direction. The documentation recorded by the scribe accurately reflects the service I personally performed and the decisions made by me. The document was also partially recorded by me and the entire document was edited by me as well.

## 2018-06-11 NOTE — MR AVS SNAPSHOT
After Visit Summary   6/11/2018    Kwame Lin    MRN: [id number]           Patient Information     Date Of Birth          1955        Visit Information        Provider Department      6/11/2018 1:00 PM Arpan Huynh MD [facility] Neurosurgery        Today's Diagnoses     Parkinson's disease (H)    -  1    Post-operative state        S/P deep brain stimulator placement           Follow-ups after your visit        Your next 10 appointments already scheduled     Jun 21, 2018  8:20 AM CDT   CT HEAD W/O CONTRAST with CT1   [facility] [place] [facility] ([facility])    [address] [place] [id number] [phone]           Please bring any scans or X-rays taken at other hospitals, if similar tests were done. Also bring a list of your medicines, including vitamins, minerals and over-the-counter drugs. It is safest to leave personal items at home.  Be sure to tell your doctor:   If you have any allergies.   If there s any chance you are pregnant.   If you are breastfeeding.  You do not need to do anything special to prepare for this exam.  Please wear loose clothing, such as a sweat suit or jogging clothes. Avoid snaps, zippers and other metal. We may ask you to undress and put on a hospital gown.            Jun 21, 2018  9:00 AM CDT   (Arrive by 8:45 AM)   Return Movement Disorder with LUIZ JOHNSON FELLOW   [facility]rology ([facility])    [address] [place] [id number] [phone]              Who to contact     Please call your clinic at [phone] to:    Ask questions about your health    Make or cancel appointments    Discuss your medicines    Learn about your test results    Speak to your doctor            Additional Information About Your Visit        MyChart Information     [employer] gives you secure access to your electronic health record. If you see a primary care provider,  "you can also send messages to your care team and make appointments. If you have questions, please call your primary care clinic.  If you do not have a primary care provider, please call [phone] and they will assist you.      [employer] is an electronic gateway that provides easy, online access to your medical records. With [employer], you can request a clinic appointment, read your test results, renew a prescription or communicate with your care team.     To access your existing account, please contact your [facility] or call [phone] for assistance.        Care EveryWhere ID     This is your Care EveryWhere ID. This could be used by other organizations to access your [facility] medical records  [id number]        Your Vitals Were     Pulse Height BMI (Body Mass Index)             68 1.765 m (5' 9.5\") 30.63 kg/m2          Blood Pressure from Last 3 Encounters:   06/11/18 110/72   05/23/18 112/86   03/07/18 111/69    Weight from Last 3 Encounters:   06/11/18 95.4 kg (210 lb 6.4 oz)   05/22/18 92.6 kg (204 lb 2.3 oz)   03/07/18 95.6 kg (210 lb 12.8 oz)              Today, you had the following     No orders found for display       Primary Care Provider Office Phone # Fax #    Silvina Armenta [phone]       [facility] [address] [facility] [address]        Equal Access to Services     KARISSA CASON : Hadii jr thompsono Soheideali, waaxda luqadaha, qaybta kaalmada adeegyada, waxay pilar dias . So [facility] [phone].    ATENCIÓN: Si habla español, tiene a mcrae disposición servicios gratuitos de asistencia lingüística. Jake al [phone].    We comply with applicable federal civil rights laws and [place] laws. We do not discriminate on the basis of race, color, national origin, age, disability, sex, sexual orientation, or gender identity.            Thank you!     Thank you for choosing [facility]  for your " care. Our goal is always to provide you with excellent care. Hearing back from our patients is one way we can continue to improve our services. Please take a few minutes to complete the written survey that you may receive in the mail after your visit with us. Thank you!             Your Updated Medication List - Protect others around you: Learn how to safely use, store and throw away your medicines at www.disposemymeds.org.          This list is accurate as of 6/11/18 11:59 PM.  Always use your most recent med list.                   Brand Name Dispense Instructions for use Diagnosis    Acidophilus/Goat Milk Caps      At Bedtime        ARMOUR THYROID 60 MG tablet   Generic drug:  thyroid      Take 60 mg by mouth every morning        * SINEMET  MG per tablet   Generic drug:  carbidopa-levodopa      Take 1 tablet by mouth every 4 hours (while awake)        * carbidopa-levodopa  MG per tablet    SINEMET    90 tablet    2 tablets At Bedtime Extended release        citalopram 10 MG tablet    celeXA     Take 10 mg by mouth every morning        clonazePAM 0.5 MG tablet    klonoPIN     Take 0.5 mg by mouth At Bedtime        Coenzyme Q10 300 MG Caps      Take 300 mg by mouth every morning        diclofenac 1 % Gel topical gel    VOLTAREN     Apply qid prn pain        magnesium 100 MG Tabs      Take 100 tablets by mouth 2 times daily (before meals)        MELATONIN PO      Take 2 mg by mouth At Bedtime        Multi-vitamin Tabs tablet      Take 1 tablet by mouth every morning        ondansetron 4 MG ODT tab    ZOFRAN ODT    20 tablet    Take 1-2 tablets (4-8 mg) by mouth every 8 hours as needed for nausea    Parkinson disease (H)       Red Wine Extract Caps      Take by mouth daily (with lunch)        traMADol 50 MG tablet    ULTRAM    25 tablet    Take 1 tablet (50 mg) by mouth every 6 hours as needed for severe pain    Parkinson disease (H)       TYLENOL 325 MG tablet   Generic drug:  acetaminophen      Take  325-650 mg by mouth nightly as needed for mild pain        * Notice:  This list has 2 medication(s) that are the same as other medications prescribed for you. Read the directions carefully, and ask your doctor or other care provider to review them with you.

## 2018-06-11 NOTE — NURSING NOTE
Chief Complaint   Patient presents with     RECHECK     [facility]- 2 WEEKS POST OP     Zakiya Encarnacion MA

## 2018-06-20 ASSESSMENT — ENCOUNTER SYMPTOMS
DECREASED CONCENTRATION: 1
PANIC: 0
INSOMNIA: 1
NERVOUS/ANXIOUS: 0
DEPRESSION: 0

## 2018-06-21 ENCOUNTER — RADIANT APPOINTMENT (OUTPATIENT)
Dept: CT IMAGING | Facility: CLINIC | Age: 63
End: 2018-06-21
Attending: PSYCHIATRY & NEUROLOGY
Payer: COMMERCIAL

## 2018-06-21 ENCOUNTER — OFFICE VISIT (OUTPATIENT)
Dept: NEUROLOGY | Facility: CLINIC | Age: 63
End: 2018-06-21
Payer: COMMERCIAL

## 2018-06-21 VITALS
HEIGHT: 69 IN | WEIGHT: 210 LBS | BODY MASS INDEX: 31.1 KG/M2 | DIASTOLIC BLOOD PRESSURE: 67 MMHG | SYSTOLIC BLOOD PRESSURE: 109 MMHG | HEART RATE: 79 BPM

## 2018-06-21 DIAGNOSIS — G47.52 DREAM ENACTMENT BEHAVIOR: ICD-10-CM

## 2018-06-21 DIAGNOSIS — G20.A1 PARKINSON DISEASE (H): ICD-10-CM

## 2018-06-21 DIAGNOSIS — G20.A1 PARALYSIS AGITANS (H): ICD-10-CM

## 2018-06-21 DIAGNOSIS — G47.33 OBSTRUCTIVE SLEEP APNEA SYNDROME: Primary | ICD-10-CM

## 2018-06-21 ASSESSMENT — UNIFIED PARKINSONS DISEASE RATING SCALE (UPDRS)
RIGIDITY_RLE: NORMAL
FACIAL_EXPRESSION: NORMAL.
FREEZING_GAIT: NORMAL
TOTAL_SCORE: 13
FINGER_TAPPING_LEFT: SLIGHT: ANY OF THE FOLLOWING: A) THE REGULAR RHYTHM IS BROKEN WITH ONE WITH ONE OR TWO INTERRUPTIONS OR HESITATIONS OF THE MOVEMENT B) SLIGHT SLOWING C) THE AMPLITUDE DECREMENTS NEAR THE END OF THE 10 MOVEMENTS.
GAIT: NORMAL
FINGER_TAPPING_RIGHT: NORMAL
AMPLITUDE_LUE: MILD > 1 CM BUT < 3 CM IN MAXIMAL AMPLITUDE.
LEG_AGILITY_LEFT: NORMAL
RIGIDITY_RLE: NORMAL
POSTURE: 0 NORMAL, NO PROBLEMS
GAIT: NORMAL
TOETAPPING_RIGHT: NORMAL
SPEECH: NORMAL
ARISING_CHAIR: NORMAL: ABLE TO ARISE QUICKLY WITHOUT HESITATION.
FACIAL_EXPRESSION: NORMAL.
TOTAL_SCORE: 18
AMPLITUDE_RUE: NORMAL: NO TREMOR.
HANDMOVEMENTS_RIGHT: SLIGHT: ANY OF THE FOLLOWING: A) THE REGULAR RHYTHM IS BROKEN WITH ONE WITH ONE OR TWO INTERRUPTIONS OR HESITATIONS OF THE MOVEMENT B) SLIGHT SLOWING C) THE AMPLITUDE DECREMENTS NEAR THE END OF THE 10 MOVEMENTS.
AMPLITUDE_RUE: NORMAL: NO TREMOR.
AXIAL_SCORE: 2
RIGIDITY_RUE: SLIGHT: RIGIDITY ONLY DETECTED WITH ACTIVATION MANEUVER.
SPONTANEITY_OF_MOVEMENT: 0: NORMAL.  NO PROBLEMS.
PARKINSONS_MEDS: OFF
CONSTANCY_TREMOR_ATREST: MODERATE: TREMOR AT REST IS PRESENT 51-75% OF THE ENTIRE EXAMINATION PERIOD.
FINGER_TAPPING_RIGHT: NORMAL
TOTAL_SCORE_LEFT: 11
AMPLITUDE_LUE: SLIGHT: < 1 CM IN MAXIMAL AMPLITUDE.
TOETAPPING_RIGHT: NORMAL
AMPLITUDE_RLE: NORMAL: NO TREMOR.
TOTAL_SCORE_LEFT: 8
AMPLITUDE_LIP_JAW: NORMAL: NO TREMOR.
SPEECH: SLIGHT: LOSS OF MODULATION, DICTION OR VOLUME, BUT STILL ALL WORDS EASY TO UNDERSTAND.
POSTURAL_STABILITY: NORMAL:  RECOVERS WITH ONE OR TWO STEPS.
RIGIDITY_NECK: SLIGHT: RIGIDITY ONLY DETECTED WITH ACTIVATION MANEUVER.
HANDMOVEMENTS_RIGHT: NORMAL
PRONATION_SUPINATION_LEFT: SLIGHT: ANY OF THE FOLLOWING: A) THE REGULAR RHYTHM IS BROKEN WITH ONE WITH ONE OR TWO INTERRUPTIONS OR HESITATIONS OF THE MOVEMENT B) SLIGHT SLOWING C) THE AMPLITUDE DECREMENTS NEAR THE END OF THE 10 MOVEMENTS.
AMPLITUDE_LLE: NORMAL: NO TREMOR.
FREEZING_GAIT: NORMAL
PRONATION_SUPINATION_RIGHT: NORMAL
POSTURE: 0 NORMAL, NO PROBLEMS
AXIAL_SCORE: 1
PRONATION_SUPINATION_RIGHT: NORMAL
LEG_AGILITY_RIGHT: NORMAL
SPONTANEITY_OF_MOVEMENT: 0: NORMAL.  NO PROBLEMS.
RIGIDITY_NECK: SLIGHT: RIGIDITY ONLY DETECTED WITH ACTIVATION MANEUVER.
RIGIDITY_LUE: SLIGHT: RIGIDITY ONLY DETECTED WITH ACTIVATION MANEUVER.
FINGER_TAPPING_LEFT: SLIGHT: ANY OF THE FOLLOWING: A) THE REGULAR RHYTHM IS BROKEN WITH ONE WITH ONE OR TWO INTERRUPTIONS OR HESITATIONS OF THE MOVEMENT B) SLIGHT SLOWING C) THE AMPLITUDE DECREMENTS NEAR THE END OF THE 10 MOVEMENTS.
RIGIDITY_LUE: SLIGHT: RIGIDITY ONLY DETECTED WITH ACTIVATION MANEUVER.
TOTAL_SCORE: 2
LEG_AGILITY_RIGHT: NORMAL
TOETAPPING_LEFT: SLIGHT: ANY OF THE FOLLOWING: A) THE REGULAR RHYTHM IS BROKEN WITH ONE WITH ONE OR TWO INTERRUPTIONS OR HESITATIONS OF THE MOVEMENT B) SLIGHT SLOWING C) THE AMPLITUDE DECREMENTS NEAR THE END OF THE 10 MOVEMENTS.
CONSTANCY_TREMOR_ATREST: MODERATE: TREMOR AT REST IS PRESENT 51-75% OF THE ENTIRE EXAMINATION PERIOD.
HANDMOVEMENTS_LEFT: SLIGHT: ANY OF THE FOLLOWING: A) THE REGULAR RHYTHM IS BROKEN WITH ONE WITH ONE OR TWO INTERRUPTIONS OR HESITATIONS OF THE MOVEMENT B) SLIGHT SLOWING C) THE AMPLITUDE DECREMENTS NEAR THE END OF THE 10 MOVEMENTS.
RIGIDITY_LLE: NORMAL
HANDMOVEMENTS_LEFT: SLIGHT: ANY OF THE FOLLOWING: A) THE REGULAR RHYTHM IS BROKEN WITH ONE WITH ONE OR TWO INTERRUPTIONS OR HESITATIONS OF THE MOVEMENT B) SLIGHT SLOWING C) THE AMPLITUDE DECREMENTS NEAR THE END OF THE 10 MOVEMENTS.
ARISING_CHAIR: NORMAL: ABLE TO ARISE QUICKLY WITHOUT HESITATION.
PRONATION_SUPINATION_LEFT: SLIGHT: ANY OF THE FOLLOWING: A) THE REGULAR RHYTHM IS BROKEN WITH ONE WITH ONE OR TWO INTERRUPTIONS OR HESITATIONS OF THE MOVEMENT B) SLIGHT SLOWING C) THE AMPLITUDE DECREMENTS NEAR THE END OF THE 10 MOVEMENTS.
AMPLITUDE_RLE: NORMAL: NO TREMOR.
AMPLITUDE_LIP_JAW: NORMAL: NO TREMOR.
PARKINSONS_MEDS: ON
RIGIDITY_RUE: NORMAL
POSTURAL_STABILITY: NORMAL:  RECOVERS WITH ONE OR TWO STEPS.
RIGIDITY_LLE: NORMAL
AMPLITUDE_LLE: SLIGHT: < 1 CM IN MAXIMAL AMPLITUDE.
LEG_AGILITY_LEFT: NORMAL
TOTAL_SCORE: 1
TOETAPPING_LEFT: SLIGHT: ANY OF THE FOLLOWING: A) THE REGULAR RHYTHM IS BROKEN WITH ONE WITH ONE OR TWO INTERRUPTIONS OR HESITATIONS OF THE MOVEMENT B) SLIGHT SLOWING C) THE AMPLITUDE DECREMENTS NEAR THE END OF THE 10 MOVEMENTS.

## 2018-06-21 ASSESSMENT — PAIN SCALES - GENERAL: PAINLEVEL: NO PAIN (0)

## 2018-06-21 NOTE — PROGRESS NOTES
Department of Neurology  Movement Disorders Division   DBS Follow-up Note    Patient: Kwame Lin  MRN: [id number]   : 1955   Date of Visit: 2018    Diagnosis: Parkinson disease  DBS Target(s): Bilateral STN   Date(s) of DBS Lead Placement:   Left STN 2017, right STN 2018  Date(s) of IPG Placement:   Left chest 2017    Chief Complaint:  Kwame Lin is a 63 year old male who returns to clinic for follow up of Parkinson disease status post left STN DBS (2017) and recent right STN DBS (2018).    Interval History:  Mr. Lin underwent right STN DBS 2018. No problems with surgery. He noted improved tremor on the left body for a few hours after surgery. There was not a sustained microlesion effect for the left body like he had experienced after the left STN surgery.     Right body is still well treated with DBS stimulation and medication. Noted occasional catching of the right toe when walking - does not cause tripping or falls.     Experienced a two week episode of daily right arm dyskinesias, typically in the morning when waking up when meds worn off. It seemed to depend on nature of actions - when arm outstretched arm seems to do its own thing. No peak dose dyskinesias. It was more severe for a couple weeks, now not frequent.     Dream enactment is still uncontrolled, despite treatment with clonazepam and melatonin. He was disturbing his wife's sleep, so now they are sleeping in separate beds. In general, feels sleep quality is poor. He wakes frequently through the night. Endorsed snoring. He is sleepy during the day. Stop Bang Score is 6.     Parkinson Disease Motor Symptom Review:  Motor fluctuations: No  Dyskinesia:  Occasional, none currently  Wearing off:  At night, around 8:30pm - gets restless legs. Tremor doesn't get worse.   Freezing of gait: No  Dystonia: No  Tremor: Generally well controlled on the right body, mild tremor on the left  Rigidity: No stiffness  "or pain  Bradykinesia: Improved since first surgery       Review of Systems:  Other than that noted at the end of this note, the remainder of 12 systems reviewed were negative.    Medications:  Reviewed and updated in Epic. Parkinson disease related medications below     PD Medications (current)            7:30 12pm 4pm 10:30pm    Carbidopa/levodopa 25/100 mg 1.5 1 1      Carbidopa/levodopa 25/100 CR       2    Clonazepam 0.5 mg       1    Melatonin 5 mg       1       Allergies: is allergic to bactrim; codeine; ketorolac; morphine; nalbuphine; nsaids; penicillins; seasonal allergies; sulfa drugs; and toradol.     Past Medical History:  Reviewed and updated    Past Surgical History:  Reviewed and updated    Social History:  No new information    Family History:  No new history    Physical Exam:  The patient's  height is 1.753 m (5' 9\") and weight is 95.3 kg (210 lb). His blood pressure is 109/67 and his pulse is 79.      Incisions: Well healed left skull incision.      Neurological Examination:   Last medication dose: Last night at 8:30 pm    See UPDRS below:    MDS-UPDRS Part III   0: Normal -- 1: Slight -- 2: Mild -- 3: Moderate -- 4: Severe   UPDRS Values 6/21/2018 6/21/2018   Time: 9:56 AM 1:46 PM   Medication Off On   R Brain DBS: Off On   L Brain DBS: On On   Speech 1 0   Facial Expression 0 0   Rigidity Neck 1 1   Rigidity RUE 1 0   Rigidity LUE 1 1   Rigidity RLE 0 0   Rigidity LLE 0 0   Finger Taps R 0 0   Finger Taps L 1 1   Hand Mvt R 1 0   Hand Mvt L 1 1   Pron-/Supinate R 0 0   Pron-/Supinate L 1 1   Toe Tap R 0 0   Toe Tap L 1 1   Leg Agility R 0 0   Leg Agility L 0 0   Arise From Chair 0 0   Gait 0 0   Gait Freezing 0 0   Postural Stability 0 0   Posture 0 0   Global Spont Mvt 0 0   Postural Tremor RUE 0 1   Postural Tremor LUE 2 1   Kinetic Tremor RUE 0 0   Kinetic Tremor LUE 1 1   Rest Tremor RUE 0 0   Rest Tremor LUE 2 1   Rest Tremor RLE 0 0   Rest Tremor LLE 1 0   Rest Tremor Lip/Jaw 0 0   Rest " Tremor Constancy 3 3   Total Right 2 1   Total Left 11 8   Axial Total 2 1   Total 18 13         Data Reviewed:   Reviewed post-op imaging with stealth head CT. Bilateral STN DBS leads noted. Left STN lead in good position. Estimate that contacts 10 and 11 most likely to be effective.    Procedure: DBS Programming    Lead(s):    Left Right   DBS Target   STN  STN   DBS Lead Type [employer]   Lead Implant Date 12/14/2017 5/22/2018        IPG(s):   1   IPG  [employer]     IPG Implant Date 12/22/2017   Location Left chest    Battery (V) Charging well      Full Impedence/Currents Check:   1: 1301  2: 1183  3: 902  4: 1043  5: 1037  6: 980  7: 897  8: 968  9: 1221  10: 1266  11: 1361  12: 1066  13: 997  14: 888  15: 814  16: 848     Initial Settings:  Left Brain [id number]   Contacts  C+, 3-(75), 4-(25)   Amplitude (mA)  3.1   Pulse Width ( s)  60   Frequency (Hz)  136     Right Brain    Contacts  Off   Amplitude (mA)    Pulse Width ( s)    Frequency (Hz)           MONOPOLAR REVIEW  Contacts Amplitude  (mA) PW (ms) Rate (Hz) Comments   Case +, 9- 1.5 60 136 LUE, LLE resting tremor 2+, posture 1+    2.0 60 136 No change    2.5 60 136 No change    3.0 60 136 Transient L hand tingling. Tremor unchanged.     3.5 60 136 Tremor slightly worse, LUE resting & posture 2+    4.0 60 136 No SE. 2+ tremor    4.5 60 136 No SE. 1+ tremor    5.0 60 136 Mild dysarthria, L hand transient tingling, tremor 1+          Case +, 10- 1.5 60 136 LUE, LLE tremor 1+, no SE    2.0 60 136 LUE, LLE tremor 1+, FT 1+    2.5 60 136 LLE 0, LUE tremor 1+, no SE    3.0 60 136 L thumb tremor 1+    3.5 60 136 L thumb tremor 1+, FT 1+    4.0 60 136 Trace resting thumb tremor, on posture thumb 1+, 0 kinetic    4.5 60 136 Trace resting thumb tremor, 1+ on posture    5.0 60 136 No SE, no dysarthria. Rest tremor 0, posture 1+ but better. FT 1+          Case +, 11- 1.5 60 136 2+ resting tremor, 2+ posture    2.0 60 136 1+ resting  "tremor, L thumb 1+ posture    2.5 60 136 1+ resting tremor, L thumb 1+ posture    3.0 60 136 No change    3.5 60 136 No change    4.0 60 136 No change    4.5 60 136 No SE. Trace resting tremor L thumb. 1+ on posture.     5.0 60 136 No SE, no dysarthria. Trace resting tremor. L thumb 1+ on posture. Transient L hand tingling.    Best 5.5 60 136 Rest tremor 0. L thumb trace postural tremor. FT 1+.     6.0 60 136 Rest tremor 0. L thumb trace posture. Transient L face & arm tingling, but uncomfortable (8/10)          Case +, 12- 1.5 60 136 Tremor 1+ at rest & on posture    2.0 60 136 Transient mild L arm tingling (1/10). Tremor slightly better, still 1+    2.5 60 136 Transient L hand tingling. Tremor unchanged.     3.0 60 136 Tremor 1+, FT 1+    3.5 60 136 Tremor 1+, FT 1+    4.0 60 136 Feels \"sweaty.\" Tremor 1+    4.5 60 136 Tremor 1+    5.0 60 136 Tremor 1+    5.5 60 136 Feels uncomfortably flushed, improves when R STN DBS turned off. Tremor unchanged.           C+, 13- 1.0 60 136 No SE, no dysarthria. Rest tremor 1+, 2+ on posture.     2.0 60 136 1+ tremor    3.0 60 136 1+ tremor    3.5 60 136 Transient L arm tingling, moderately severe (4/10). Rest tremor 1+ but postural tremor worse, 2+.     4.0 60 136 2+ tremor    5.0 60 136 L chest tingling, uncomfortable. 2+ tremor.           C+, 14- 1.0 60 136 No SE    2.0 60 136 No SE    3.0 60 136 No SE, tremor 2+    4.0 60 136 No SE    5.0 60 136 No SE    6.0 60 136 No SE. Tremor fluctuates 1-2+          C+, 15- 1.0 60 136 No SE    2.0 60 136 No SE    3.0 60 136 No SE    4.0 60 136 No SE    5.0 60 136 No SE, no dysarthria, tremor 2+          C+, 16- 1.0 60 136 No SE    2.0 60 136 No SE    3.0 60 136 No SE    4.0 60 136 No SE, no dysarthria. Tremor 2+    5.0 60 136 No SE, no dysarthria          C+, 11- 2.0 90 136 Tremor 1+, no SE    3.0 90 136 No change    3.5 90 136 Trace resting, 1+ postural tremor  (perhaps better than at 60 ms)    4.0 90 136 Trace resting, 1+ postural " thumb tremor          C+, 12- 2.0 90 136 1+ tremor    3.0 90 136 Felt woozy, 1+ tremor          C+, 11- 3.0 60 139 1+ thumb & index finger tremor    3.0 90 139 Trace thumb tremor, normal gait  Best so far          C+, 10- 3.0 90 139 Trace thumb tremor, normal gait. No difference compared to contact 11.           C+, 10-(50), 11-(50) 3.0 90 139 Trace thumb tremor, no change   C+, 10-(50), 11-(50) 4.0 90 139 Trace thumb tremor, no change          C+, 11-(50), 12-(50) 3.5 90 139 Thumb tremor 1+, no quite as good as previous settings at contacts 10 and 11   C+, 11-(50), 12-(50) 4.0 90 139 Thumb tremor trace to 1+, still not as good              Final settings  Right STN: C+, 11 -, 3.0 mA, 90 microseconds, 139 Hz      Final Settings:  Left Brain 1-Birdie   Contacts  C+, 2-(75), 3-(25)   Amplitude (mA)  3.1   Pulse Width ( s)  60   Frequency (Hz)  139   Patient Control (+/- V) 0 to 3.5     Right Brain 1-Birdie   Contacts  C+, 11-   Amplitude (mA)  3.0   Pulse Width ( s)  90   Frequency (Hz)  139   Patient Control (+/- V) 0 - 4.0     Final Program selected:    1-Birdie       Impression:    1. 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 Possible DIPIKA, Stop Bang score 6  4. Restless legs    Kwame ARAM Lin is a 63 year old male with tremor predominant Parkinson disease s/p bilateral STN DBS (L STN 12/2017; R STN 5/2018). Today we completed monopolar survey and initial programming for the right STN. Left sided tremor was responsive to stimulation, but thumb in particular was difficulty to completely abolish - with trace left thumb tremor persisting even on-meds, on-stimulation. His thresholds are high in all contacts and imaging is suggestive of good lead location. In the end, we programmed the right STN at higher pulse width, which seemed to offer additional benefit. His final setting for the right STN are C+11- at 3.0 mA, 90  microseconds and 139 Hz.     We discussed that some effects may be delayed. If there are no dyskinesias and tremor is still suboptimally controlled by tomorrow afternoon, will plan for patient to increase to 3.5 mA in the R STN.     Recommendations:   - Final settings as above.   - Practiced with patient . Patient may increase current for the R STN if tremor still uncontrolled.   - Increase melatonin to 10 mg. If dream enactment still uncontrolled, increase to 15 mg. Continue with sustained release form of melatonin. If melatonin 15 mg HS is still no effective - will discuss increasing clonazepam  - Sleep study to investigate for DIPIKA. OK to do home sleep study  - Will observe whether restless legs improves with DBS stimulation    Coby Kauffman MD  Movement Disorders Fellow      Patient seen and discussed with Dr. Borges    Neurology Attending Attestation:     I, Clarita Borges, personally saw this patient with our Movement Disorders Fellow and agree with the fellow's findings and plan of care as documented in the movement disorder fellow's note, with my personal summary below. I personally performed salient aspects of the history and neurological examination.     I personally reviewed the vital signs, medications, and labs. I personally viewed the imaging, and agree with the interpretation documented by the fellow.    I personally performed or supervised all procedures.    Time spent with patient: Greater than 50% of this 45 minute visit was spent in counseling and coordination of care related to Parkinson's disease, tremors, medications, sleep difficulties, dream enactment behavior.    Additional time spent for separate DBS programmin minutes    Clarita Borges MD    of Neurology       Answers for HPI/ROS submitted by the patient on 2018   General Symptoms: No  Skin Symptoms: No  HENT Symptoms: No  EYE SYMPTOMS: No  HEART SYMPTOMS: No  LUNG SYMPTOMS: No  INTESTINAL  SYMPTOMS: No  URINARY SYMPTOMS: No  REPRODUCTIVE SYMPTOMS: No  SKELETAL SYMPTOMS: No  BLOOD SYMPTOMS: No  NERVOUS SYSTEM SYMPTOMS: No  MENTAL HEALTH SYMPTOMS: Yes  Nervous or Anxious: No  Depression: No  Trouble sleeping: Yes  Trouble thinking or concentrating: Yes  Mood changes: No  Panic attacks: No

## 2018-06-21 NOTE — PATIENT INSTRUCTIONS
Today you saw Dr. Kauffman and Dr. Borges for DBS programming.     We discussed:  1. Final left brain DBS settings for right body: You are at 3.1 mA. You can go up to 3.5 mA maximum.   2. Final right brain DBS settings for left body: You are at 3.0 mA. You can go up to 4.0 mA maximum. If left hand tremor is still uncontrolled by tomorrow afternoon, go up five clicks (3.5 mA)  3. Dream enactment, vivid dreams - Increase melatonin up to 10 mg. Stay on long acting formulation. If not controlled after a couple weeks, go up to 15 mg of melatonin at night.   4. Poor sleep, snoring - we should set up a sleep study to look into sleep apnea. It's ok to do the home sleep study.   5. Restless legs - let's see how you do on the new DBS settings for the left body.   6. Right toe catching - we are reluctant to make changes for the left brain today. We can look into the DBS settings for this side if you are still having trouble at your next appointment.     Charging needs will likely go up now that we have added the second side. We estimate you will need 1.5 hours of charging weekly.     Plan to return to clinic 7/25/2018 at 1pm with Dr. Borges.     Contact Fatmata Quintanilla RN or message us if there are questions.

## 2018-06-21 NOTE — MR AVS SNAPSHOT
After Visit Summary   6/21/2018    Kwame Lin    MRN: [id number]           Patient Information     Date Of Birth          1955        Visit Information        Provider Department      6/21/2018 9:00 AM LUIZ BORGES FELLOW M [facility] Neurology        Today's Diagnoses     Obstructive sleep apnea syndrome    -  1      Care Instructions    Today you saw Dr. Kauffman and Dr. Borges for DBS programming.     We discussed:  1. Final left brain DBS settings for right body: You are at 3.1 mA. You can go up to 3.5 mA maximum.   2. Final right brain DBS settings for left body: You are at 3.0 mA. You can go up to 4.0 mA maximum. If left hand tremor is still uncontrolled by tomorrow afternoon, go up five clicks (3.5 mA)  3. Dream enactment, vivid dreams - Increase melatonin up to 10 mg. Stay on long acting formulation. If not controlled after a couple weeks, go up to 15 mg of melatonin at night.   4. Poor sleep, snoring - we should set up a sleep study to look into sleep apnea. It's ok to do the home sleep study.   5. Restless legs - let's see how you do on the new DBS settings for the left body.   6. Right toe catching - we are reluctant to make changes for the left brain today. We can look into the DBS settings for this side if you are still having trouble at your next appointment.     Charging needs will likely go up now that we have added the second side. We estimate you will need 1.5 hours of charging weekly.     Plan to return to clinic 7/25/2018 at 1pm with Dr. Borges.     Contact Fatmata Quintanilla RN or message us if there are questions.           Follow-ups after your visit        Additional Services     SLEEP HOME STUDY REFERRAL       Your provider has referred you to: Home sleep study    Please be aware that coverage of these services is subject to the terms and limitations of your health insurance plan.  Call member services at your health plan with any benefit or coverage questions.      Please bring  "the following to your appointment:    >>   Any x-rays, CTs or MRIs which have been performed.  Contact the facility where they were done to arrange for  prior to your scheduled appointment.   >>   List of current medications   >>   This referral request   >>   Any documents/labs given to you for this referral                  Who to contact     Please call your clinic at [phone] to:    Ask questions about your health    Make or cancel appointments    Discuss your medicines    Learn about your test results    Speak to your doctor            Additional Information About Your Visit        [facility] Information     [facility] gives you secure access to your electronic health record. If you see a primary care provider, you can also send messages to your care team and make appointments. If you have questions, please call your primary care clinic.  If you do not have a primary care provider, please call [phone] and they will assist you.      [facility] is an electronic gateway that provides easy, online access to your medical records. With [facility], you can request a clinic appointment, read your test results, renew a prescription or communicate with your care team.     To access your existing account, please contact your [facility] or call [phone] for assistance.        Care EveryWhere ID     This is your Care EveryWhere ID. This could be used by other organizations to access your [facility] medical records  [id number]        Your Vitals Were     Pulse Height BMI (Body Mass Index)             79 1.753 m (5' 9\") 31.01 kg/m2          Blood Pressure from Last 3 Encounters:   06/21/18 109/67   06/11/18 110/72   05/23/18 112/86    Weight from Last 3 Encounters:   06/21/18 95.3 kg (210 lb)   06/11/18 95.4 kg (210 lb 6.4 oz)   05/22/18 92.6 kg (204 lb 2.3 oz)              We Performed the Following     SLEEP HOME STUDY REFERRAL        Primary Care Provider Office Phone # Fax #    Silvina " VIVEK Armenta -[phone]       [facility] [address] [address]        Equal Access to Services     KARISSA CASON : Hadii aad ku hadeugenemartha Salas, wachrisda chancoreyha, analita karosinada adriana, bebe vincein hayaamed dotymichelle [place] arun meza. So [facility] [phone].    ATENCIÓN: Si habla español, tiene a mcrae disposición servicios gratuitos de asistencia lingüística. Llame al [phone].    We comply with applicable federal civil rights laws and [place] laws. We do not discriminate on the basis of race, color, national origin, age, disability, sex, sexual orientation, or gender identity.            Thank you!     Thank you for choosing [facility]  for your care. Our goal is always to provide you with excellent care. Hearing back from our patients is one way we can continue to improve our services. Please take a few minutes to complete the written survey that you may receive in the mail after your visit with us. Thank you!             Your Updated Medication List - Protect others around you: Learn how to safely use, store and throw away your medicines at www.disposemymeds.org.          This list is accurate as of 6/21/18  1:54 PM.  Always use your most recent med list.                   Brand Name Dispense Instructions for use Diagnosis    Acidophilus/Goat Milk Caps      At Bedtime        ARMOUR THYROID 60 MG tablet   Generic drug:  thyroid      Take 60 mg by mouth every morning        * SINEMET  MG per tablet   Generic drug:  carbidopa-levodopa      Take 1 tablet by mouth every 4 hours (while awake)        * carbidopa-levodopa  MG per tablet    SINEMET    90 tablet    2 tablets At Bedtime Extended release        citalopram 10 MG tablet    celeXA     Take 10 mg by mouth every morning        clonazePAM 0.5 MG tablet    klonoPIN     Take 0.5 mg by mouth At Bedtime        Coenzyme Q10 300 MG Caps      Take 300 mg by mouth every morning        diclofenac 1 %  Gel topical gel    VOLTAREN     Apply qid prn pain        magnesium 100 MG Tabs      Take 100 tablets by mouth 2 times daily (before meals)        MELATONIN PO      Take 2 mg by mouth At Bedtime        Multi-vitamin Tabs tablet      Take 1 tablet by mouth every morning        ondansetron 4 MG ODT tab    ZOFRAN ODT    20 tablet    Take 1-2 tablets (4-8 mg) by mouth every 8 hours as needed for nausea    Parkinson disease (H)       Red Wine Extract Caps      Take by mouth daily (with lunch)        traMADol 50 MG tablet    ULTRAM    25 tablet    Take 1 tablet (50 mg) by mouth every 6 hours as needed for severe pain    Parkinson disease (H)       TYLENOL 325 MG tablet   Generic drug:  acetaminophen      Take 325-650 mg by mouth nightly as needed for mild pain        * Notice:  This list has 2 medication(s) that are the same as other medications prescribed for you. Read the directions carefully, and ask your doctor or other care provider to review them with you.

## 2018-06-28 ENCOUNTER — OFFICE VISIT (OUTPATIENT)
Dept: NEUROLOGY | Facility: CLINIC | Age: 63
End: 2018-06-28
Payer: COMMERCIAL

## 2018-06-28 VITALS
TEMPERATURE: 96.8 F | OXYGEN SATURATION: 94 % | HEIGHT: 70 IN | DIASTOLIC BLOOD PRESSURE: 69 MMHG | HEART RATE: 79 BPM | BODY MASS INDEX: 30.14 KG/M2 | RESPIRATION RATE: 17 BRPM | WEIGHT: 210.5 LBS | SYSTOLIC BLOOD PRESSURE: 125 MMHG

## 2018-06-28 DIAGNOSIS — G25.81 RESTLESS LEG SYNDROME: ICD-10-CM

## 2018-06-28 DIAGNOSIS — G20.A1 PARKINSON DISEASE (H): Primary | ICD-10-CM

## 2018-06-28 DIAGNOSIS — R26.9 GAIT DIFFICULTY: ICD-10-CM

## 2018-06-28 PROBLEM — M17.0 OSTEOARTHRITIS OF BOTH KNEES: Status: ACTIVE | Noted: 2018-06-25

## 2018-06-28 PROBLEM — H43.811 VITREOUS DEGENERATION, RIGHT EYE: Status: ACTIVE | Noted: 2017-03-29

## 2018-06-28 PROBLEM — H25.13 AGE-RELATED NUCLEAR CATARACT, BILATERAL: Status: ACTIVE | Noted: 2017-03-29

## 2018-06-28 PROBLEM — H04.123 DRY EYE SYNDROME OF BILATERAL LACRIMAL GLANDS: Status: ACTIVE | Noted: 2017-03-29

## 2018-06-28 LAB
FERRITIN SERPL-MCNC: 107 NG/ML (ref 26–388)
IRON SATN MFR SERPL: 28 % (ref 15–46)
IRON SERPL-MCNC: 90 UG/DL (ref 35–180)
TIBC SERPL-MCNC: 317 UG/DL (ref 240–430)

## 2018-06-28 RX ORDER — GABAPENTIN 300 MG/1
300 CAPSULE ORAL AT BEDTIME
Qty: 180 CAPSULE | Refills: 3 | Status: SHIPPED | OUTPATIENT
Start: 2018-06-28 | End: 2018-07-26 | Stop reason: ALTCHOICE

## 2018-06-28 RX ORDER — THYROID 60 MG/1
TABLET ORAL DAILY
COMMUNITY
Start: 2018-06-28 | End: 2019-05-15

## 2018-06-28 ASSESSMENT — UNIFIED PARKINSONS DISEASE RATING SCALE (UPDRS)
HANDMOVEMENTS_LEFT: NORMAL
TOTAL_SCORE_LEFT: 7
POSTURE: 0 NORMAL, NO PROBLEMS
PRONATION_SUPINATION_LEFT: MILD: ANY OF THE FOLLOWING: A) 3 TO 5 INTERRUPTIONS DURING TAPPING B) MILD SLOWING C) THE AMPLITUDE DECREMENTS MIDWAY IN THE 10-MOVEMENT SEQUENCE
RIGIDITY_LLE: NORMAL
PRONATION_SUPINATION_RIGHT: SLIGHT: ANY OF THE FOLLOWING: A) THE REGULAR RHYTHM IS BROKEN WITH ONE WITH ONE OR TWO INTERRUPTIONS OR HESITATIONS OF THE MOVEMENT B) SLIGHT SLOWING C) THE AMPLITUDE DECREMENTS NEAR THE END OF THE 10 MOVEMENTS.
TOETAPPING_RIGHT: NORMAL
TOTAL_SCORE: 15
SPEECH: NORMAL
HANDMOVEMENTS_RIGHT: NORMAL
FINGER_TAPPING_RIGHT: SLIGHT: ANY OF THE FOLLOWING: A) THE REGULAR RHYTHM IS BROKEN WITH ONE WITH ONE OR TWO INTERRUPTIONS OR HESITATIONS OF THE MOVEMENT B) SLIGHT SLOWING C) THE AMPLITUDE DECREMENTS NEAR THE END OF THE 10 MOVEMENTS.
SPONTANEITY_OF_MOVEMENT: 0: NORMAL.  NO PROBLEMS.
LEG_AGILITY_RIGHT: NORMAL
AMPLITUDE_RLE: SLIGHT: < 1 CM IN MAXIMAL AMPLITUDE.
RIGIDITY_LUE: SLIGHT: RIGIDITY ONLY DETECTED WITH ACTIVATION MANEUVER.
LEG_AGILITY_LEFT: SLIGHT: ANY OF THE FOLLOWING: A) THE REGULAR RHYTHM IS BROKEN WITH ONE WITH ONE OR TWO INTERRUPTIONS OR HESITATIONS OF THE MOVEMENT B) SLIGHT SLOWING C) THE AMPLITUDE DECREMENTS NEAR THE END OF THE 10 MOVEMENTS.
RIGIDITY_RLE: NORMAL
FREEZING_GAIT: NORMAL
RIGIDITY_NECK: NORMAL
TOTAL_SCORE: 5
TOETAPPING_LEFT: SLIGHT: ANY OF THE FOLLOWING: A) THE REGULAR RHYTHM IS BROKEN WITH ONE WITH ONE OR TWO INTERRUPTIONS OR HESITATIONS OF THE MOVEMENT B) SLIGHT SLOWING C) THE AMPLITUDE DECREMENTS NEAR THE END OF THE 10 MOVEMENTS.
AMPLITUDE_LUE: SLIGHT: < 1 CM IN MAXIMAL AMPLITUDE.
AMPLITUDE_LLE: NORMAL: NO TREMOR.
AMPLITUDE_LIP_JAW: SLIGHT: < 1 CM IN MAXIMAL AMPLITUDE.
PARKINSONS_MEDS: OFF
FACIAL_EXPRESSION: NORMAL.
POSTURAL_STABILITY: NORMAL:  RECOVERS WITH ONE OR TWO STEPS.
AXIAL_SCORE: 1
AMPLITUDE_RUE: NORMAL: NO TREMOR.
ARISING_CHAIR: SLIGHT: ARISING IS SLOWER THAN NORMAL, OR MAY NEED MORE THAN ONE ATTEMPT, OR MAY NEED TO MOVE FORWARD IN THE CHAIR TO ARISE.  NO NEED TO USE THE ARMS OF THE CHAIR.
CONSTANCY_TREMOR_ATREST: SLIGHT: TREMOR AT REST IS PRESENT  25% OF THE ENTIRE EXAMINATION PERIOD.
RIGIDITY_RUE: SLIGHT: RIGIDITY ONLY DETECTED WITH ACTIVATION MANEUVER.
GAIT: NORMAL
FINGER_TAPPING_LEFT: NORMAL

## 2018-06-28 ASSESSMENT — PAIN SCALES - GENERAL: PAINLEVEL: NO PAIN (0)

## 2018-06-28 NOTE — NURSING NOTE
Chief Complaint   Patient presents with     RECHECK     P RETURN MOVEMENT DISORDER     Gabriel Henriquez, EMT

## 2018-06-28 NOTE — PATIENT INSTRUCTIONS
"1. DBS - I set up a new program to help prevent the side effects you were noticing (slurred speech, swallowing issues, dyskinesias, left leg \"weakness\".  Your new program is \"2-CALM\".  We have room to increase or decrease if you still notice side effects or if your tremor returns too much.    This new program also includes switching your right body settings back to \"New Day\" equivalent    2. Medication plan:    -decrease Sinemet IR 25/100 to 1/2 tab per dose  -Decrease Sinemet CR 25/100 to 1 tab per dose  -Increase melatonin to 3 of your 2 mg tabs  PD Medications (current)            7:30 12pm 4pm 10:30pm    Carbidopa/levodopa 25/100 mg 1/2 1/2 1/2       Carbidopa/levodopa 25/100 CR          1    Clonazepam 0.5 mg          1    Melatonin 2 mg          3      3. Restless legs syndrome. I want you to get your ferritin and iron levels checked today (lab ordered). If your restless legs continues to be very problematic, then I recommend we start a medication called gabapentin at bedtime to help this.    4. Follow-up as previously planned on July 25th        "

## 2018-06-28 NOTE — PROGRESS NOTES
Department of Neurology  Movement Disorders Division   DBS Follow-up Note    Patient: Kwame Lin  MRN: [id number]   : 1955   Date of Visit: 2018    Diagnosis: Parkinson disease  DBS Target(s): Bilateral STN   Date(s) of DBS Lead Placement:   Left STN 2017, right STN 2018  Date(s) of IPG Placement:   Left chest 2017     Chief Complaint:  Kwame Lin is a 63 year old male who returns to clinic for follow up of Parkinson disease status post left STN DBS (2017) and recent right STN DBS (2018).     Interval History:  After he left he noticed that after programming of his right STN lead 18 after 1.5 days he still had some thumb tremor, so he turned it up by 5 clicks as instructed. He noticed some slurred speech and a sense of pressure behind his eyes. He also noticed some choking/coughing with drinking liquid. He also had dyskinesias. He kept it at that setting for 9 hours, then turned it back down. He also notices that his left knee will give out.     Since he turned it back down he still has some dyskinesia.     He was walking 1.7 miles per day before, now he has trouble walking 100 feet.    His wife also notices that he may be cognitively slightly off (he misdescribed a parkway as a driveway, and forgot to brinig his meds in from the car today).      Review of Systems:  Other than that noted at the end of this note, the remainder of 12 systems reviewed were negative.    Medications:  Current Outpatient Prescriptions   Medication Sig Dispense Refill     acetaminophen (TYLENOL) 325 MG tablet Take 325-650 mg by mouth nightly as needed for mild pain       carbidopa-levodopa (SINEMET)  MG per tablet Take 1 tablet by mouth every 4 hours (while awake)       carbidopa-levodopa (SINEMET)  MG per tablet 2 tablets At Bedtime Extended release 90 tablet      citalopram (CELEXA) 10 MG tablet Take 10 mg by mouth every morning        clonazePAM (KLONOPIN) 0.5 MG tablet Take  0.5 mg by mouth At Bedtime        Coenzyme Q10 300 MG CAPS Take 300 mg by mouth every morning       magnesium 100 MG TABS Take 100 tablets by mouth 2 times daily (before meals)       MELATONIN PO Take 2 mg by mouth At Bedtime       Misc Natural Products (RED WINE EXTRACT) CAPS Take by mouth daily (with lunch)        multivitamin, therapeutic with minerals (MULTI-VITAMIN) TABS tablet Take 1 tablet by mouth every morning       Probiotic Product (ACIDOPHILUS/GOAT MILK) CAPS At Bedtime        thyroid (ARMOUR THYROID) 60 MG tablet daily       thyroid (ARMOUR THYROID) 60 MG tablet Take 60 mg by mouth every morning        traMADol (ULTRAM) 50 MG tablet Take 1 tablet (50 mg) by mouth every 6 hours as needed for severe pain 25 tablet 0     diclofenac (VOLTAREN) 1 % GEL topical gel Apply qid prn pain       ondansetron (ZOFRAN ODT) 4 MG ODT tab Take 1-2 tablets (4-8 mg) by mouth every 8 hours as needed for nausea (Patient not taking: Reported on 6/21/2018) 20 tablet 1      PD Medications (current)            7:30 12pm 4pm 10:30pm    Carbidopa/levodopa 25/100 mg 1 1 1       Carbidopa/levodopa 25/100 CR          2    Clonazepam 0.5 mg          1    Melatonin 5 mg          1           Allergies: is allergic to bactrim; codeine; ketorolac; morphine; nalbuphine; nsaids; penicillins; seasonal allergies; sulfa drugs; and toradol.    Past Medical History:  Past Medical History:   Diagnosis Date     Anosmia 10/12/2017     Anxiety      Depressed mood 10/12/2017     Hypothyroid      Parkinson disease (H)      Parkinsons disease (H)      PONV (postoperative nausea and vomiting)      RBD (REM behavioral disorder) 10/12/2017       Past Surgical History:  Past Surgical History:   Procedure Laterality Date     ARTHROSCOPY KNEE Right     twice     ARTHROSCOPY KNEE Right     left     epidydmal mass removal, benign       IMPLANT DEEP BRAIN STIMULATION GENERATOR / BATTERY Left 12/22/2017    Procedure: IMPLANT DEEP BRAIN STIMULATION GENERATOR /  BATTERY;  Deep Brain Stimulator Placement, Phase II, Placement Of Deep Brain Stimulator Generator/Battery Over The Left Chest Wall;  Surgeon: Arpan Huynh MD;  Location: [facility] OR     OPTICAL TRACKING SYSTEM INSERTION DEEP BRAIN STIMULATION Left 12/14/2017    Procedure: OPTICAL TRACKING SYSTEM INSERTION DEEP BRAIN STIMULATION;  Stealth Assisted Left Deep Brain Stimulator Placement, Phase I, Placement Of Left Side Deep Brain Stimulator Electrode, Target Left Subthalamic Nucleus With Microelectrode Recording;  Surgeon: Arpan Huynh MD;  Location: [facility] OR     OPTICAL TRACKING SYSTEM INSERTION DEEP BRAIN STIMULATION Right 5/22/2018    Procedure: OPTICAL TRACKING SYSTEM INSERTION DEEP BRAIN STIMULATION;  Stealth Assisted Right Deep Brain Stimulator Placement, Phase I And II Combined, Placement Of Right Deep Brain Stimulator Electrode, Target Right Subthalamic Nucleus With Microelectrode Recording And Connection To Previous Implanted Generator/Battery;  Surgeon: Arpan Huynh MD;  Location: [facility] OR       Social History:  Social History     Social History     Marital status:      Spouse name: N/A     Number of children: N/A     Years of education: N/A     Occupational History     Small business start consultant      Social History Main Topics     Smoking status: Never Smoker     Smokeless tobacco: Never Used     Alcohol use 0.6 - 1.2 oz/week     1 - 2 Cans of beer per week      Comment: MONTHLY     Drug use: No     Sexual activity: Yes     Partners: Female     Other Topics Concern     None     Social History Narrative    Previous worked as [employer].    Now consulting part-time.    , lives with wife.    Non-smoker. Occasional drink.        Past surgical history that includes epidydmal mass removal, benign.        Social History:    Previous worked as [employer].    Now consulting part-time.     ", lives with wife.    Non-smoker. Occasional drink.         family history includes Lung Cancer in his father; Parkinsonism in his cousin and maternal aunt.            Family History:  Family History   Problem Relation Age of Onset     Lung Cancer Father      Parkinsonism Cousin      paternal side     Parkinsonism Maternal Aunt        Physical Exam:  The patient's  height is 1.783 m (5' 10.2\") and weight is 95.5 kg (210 lb 8 oz). His oral temperature is 96.8  F (36  C). His blood pressure is 125/69 and his pulse is 79. His respiration is 17 and oxygen saturation is 94%.      Incisions: Well-healed     Neurological Examination:     He is alert and oriented and has fluent speech without dysarthria and is able to provide an interval medical history  Extraocular movements are full. He has normal smooth pursuits and saccades. His face is symmetric with equal activation.     Last medication dose: 11 hours prior to initial exa  UPDRS Values 6/28/2018   Time: 10:42 AM   Medication Off   R Brain DBS: On   L Brain DBS: On   Speech 0   Facial Expression 0   Rigidity Neck 0   Rigidity RUE 1   Rigidity LUE 1   Rigidity RLE 0   Rigidity LLE 0   Finger Taps R 1   Finger Taps L 0   Hand Mvt R 0   Hand Mvt L 0   Pron-/Supinate R 1   Pron-/Supinate L 2   Toe Tap R 0   Toe Tap L 1   Leg Agility R 0   Leg Agility L 1   Arise From Chair 1   Gait 0   Gait Freezing 0   Postural Stability 0   Posture 0   Global Spont Mvt 0   Postural Tremor RUE 1   Postural Tremor LUE 1   Kinetic Tremor RUE 0   Kinetic Tremor LUE 0   Rest Tremor RUE 0   Rest Tremor LUE 1   Rest Tremor RLE 1   Rest Tremor LLE 0   Rest Tremor Lip/Jaw 1   Rest Tremor Constancy 1   Total Right 5   Total Left 7   Axial Total 1   Total 15     Comments: has a tendency to hold his head in left laterocollis. Very subtle dyskinesias left foot and some puckering of mouth with concentration.       Procedure: DBS Programming    Lead(s):     Left   DBS Target    STN   DBS Lead " "Type     [employer] - 8 contact lead   Lead Implant Date   12/14/2017      IPG(s):    1   IPG  [employer]     IPG Implant Date    12/22/2017   Location    Left Chest   Battery (V) Charging well        Full Impedence Check: No problems found      Initial Settings::  Left Brain 1-Birdie   Contacts  C+, 2-(25), 3-(75)   Amplitude (mA)  3.1   Pulse Width ( s)  60   Frequency (Hz)  139   Patient Control (+/- V) 0 to 3.5      Right Brain 1-Birdie   Contacts  C+, 11-   Amplitude (mA)  2.8 (decr by 0.2)   Pulse Width ( s)  90   Frequency (Hz)  139   Patient Control (+/- V) 0 - 4.0      Initial program selected:    1-Birdie    Final Settings:  Left Brain 2-Calm Program B Program C Program D    A1      Contacts  C+;3-        Amplitude (mA)  3.0         Pulse Width ( s)  60         Frequency (Hz)  139         Patient Control (min/max)  0/3.5   /   /   /      Right Brain 2-Calm Program B Program C Program D    A2      Contacts C+;9-(50);14-(50)          Amplitude (mA) 4.0          Pulse Width ( s)  60         Frequency (Hz)  139         Patient Control (min/max) 0 / 5.0  /   /   /      Final Program selected:  2-Calm       Impression:  Kwame Lin is a 63 year old male with tremor predominant Parkinson disease s/p bilateral STN DBS (L STN 12/2017; R STN 5/2018). After initial programming of his right STN lead 1 week ago he noticed delayed side effects of mild dyskinesias causing mild speech slurring, dysphagia, and the sensation of weakness in his left knee.  No medication reductions were made at his last visit.          Recommendations:     1. DBS - I set up a new program to help prevent the side effects you were noticing (slurred speech, swallowing issues, dyskinesias, left leg \"weakness\".  Your new program is \"2-CALM\".  We have room to increase or decrease if you still notice side effects or if your tremor returns too much.    This new program also includes switching your right body " "settings back to \"New Day\" equivalent    2. Medication plan:    -decrease Sinemet IR 25/100 to 1/2 tab per dose  -Decrease Sinemet CR 25/100 to 1 tab per dose  -Increase melatonin to 3 of your 2 mg tabs  PD Medications (current)            7:30 12pm 4pm 10:30pm    Carbidopa/levodopa 25/100 mg 1/2 1/2 1/2       Carbidopa/levodopa 25/100 CR          1    Clonazepam 0.5 mg          1    Melatonin 2 mg          3      3. Restless legs syndrome. I want you to get your ferritin and iron levels checked today (lab ordered). If your restless legs continues to be very problematic, then I recommend we start a medication called gabapentin at bedtime to help this.    4. Follow-up as previously planned on           Time spent with patient: Greater than 50% of this 60 minute visit was spent in counseling and coordination of care related to the above issues.    Time spent for separate DBS programmin minutes    Clarita Borges MD    of Neurology           "

## 2018-06-28 NOTE — MR AVS SNAPSHOT
"              After Visit Summary   6/28/2018    Kwame Lin    MRN: [id number]           Patient Information     Date Of Birth          1955        Visit Information        Provider Department      6/28/2018 10:00 AM Clarita Borges MD [facility]        Today's Diagnoses     Restless leg syndrome    -  1    Parkinson disease (H)          Care Instructions    1. DBS - I set up a new program to help prevent the side effects you were noticing (slurred speech, swallowing issues, dyskinesias, left leg \"weakness\".  Your new program is \"2-CALM\".  We have room to increase or decrease if you still notice side effects or if your tremor returns too much.    This new program also includes switching your right body settings back to \"New Day\" equivalent    2. Medication plan:    -decrease Sinemet IR 25/100 to 1/2 tab per dose  -Decrease Sinemet CR 25/100 to 1 tab per dose  -Increase melatonin to 3 of your 2 mg tabs  PD Medications (current)            7:30 12pm 4pm 10:30pm    Carbidopa/levodopa 25/100 mg 1/2 1/2 1/2       Carbidopa/levodopa 25/100 CR          1    Clonazepam 0.5 mg          1    Melatonin 2 mg          3      3. Restless legs syndrome. I want you to get your ferritin and iron levels checked today (lab ordered). If your restless legs continues to be very problematic, then I recommend we start a medication called gabapentin at bedtime to help this.    4. Follow-up as previously planned on July 25th                Follow-ups after your visit        Your next 10 appointments already scheduled     Jul 25, 2018  1:00 PM CDT   (Arrive by 12:45 PM)   Return Movement Disorder with Clarita Borges MD   [facility] ([facility])    [address] [place] [id number] [phone]              Future tests that were ordered for you today     Open Future Orders        Priority Expected Expires Ordered    Iron and iron binding " "capacity Routine  6/29/2019 6/28/2018    Ferritin Routine  6/29/2019 6/28/2018            Who to contact     Please call your clinic at [phone] to:    Ask questions about your health    Make or cancel appointments    Discuss your medicines    Learn about your test results    Speak to your doctor            Additional Information About Your Visit        [employer] Information     [employer] gives you secure access to your electronic health record. If you see a primary care provider, you can also send messages to your care team and make appointments. If you have questions, please call your primary care clinic.  If you do not have a primary care provider, please call [phone] and they will assist you.      [employer] is an electronic gateway that provides easy, online access to your medical records. With [employer], you can request a clinic appointment, read your test results, renew a prescription or communicate with your care team.     To access your existing account, please contact your [facility] or call [phone] for assistance.        Care EveryWhere ID     This is your Care EveryWhere ID. This could be used by other organizations to access your [facility] medical records  [id number]        Your Vitals Were     Pulse Temperature Respirations Height Pulse Oximetry BMI (Body Mass Index)    79 96.8  F (36  C) (Oral) 17 1.783 m (5' 10.2\") 94% 30.03 kg/m2       Blood Pressure from Last 3 Encounters:   06/28/18 125/69   06/21/18 109/67   06/11/18 110/72    Weight from Last 3 Encounters:   06/28/18 95.5 kg (210 lb 8 oz)   06/21/18 95.3 kg (210 lb)   06/11/18 95.4 kg (210 lb 6.4 oz)               Primary Care Provider Office Phone # Fax #    Silvina Armenta [phone]       [facility] [address] [facility] [facility] [address]        Equal Access to Services     KARISSA CASON AH: Isma Salas, lakia banegas, qabebe miranda " pilar dotymichelle collier'aan ah. So [facility] [phone].    ATENCIÓN: Si ruddy mckinney, tiene a mcrae disposición servicios gratuitos de asistencia lingüística. Jake al [phone].    We comply with applicable federal civil rights laws and [place] laws. We do not discriminate on the basis of race, color, national origin, age, disability, sex, sexual orientation, or gender identity.            Thank you!     Thank you for choosing [facility]  for your care. Our goal is always to provide you with excellent care. Hearing back from our patients is one way we can continue to improve our services. Please take a few minutes to complete the written survey that you may receive in the mail after your visit with us. Thank you!             Your Updated Medication List - Protect others around you: Learn how to safely use, store and throw away your medicines at www.disposemymeds.org.          This list is accurate as of 6/28/18 12:53 PM.  Always use your most recent med list.                   Brand Name Dispense Instructions for use Diagnosis    Acidophilus/Goat Milk Caps      At Bedtime        * ARMOUR THYROID 60 MG tablet   Generic drug:  thyroid      Take 60 mg by mouth every morning        * ARMOUR THYROID 60 MG tablet   Generic drug:  thyroid      daily        * SINEMET  MG per tablet   Generic drug:  carbidopa-levodopa      Take 1 tablet by mouth every 4 hours (while awake)        * carbidopa-levodopa  MG per tablet    SINEMET    90 tablet    2 tablets At Bedtime Extended release        citalopram 10 MG tablet    celeXA     Take 10 mg by mouth every morning        clonazePAM 0.5 MG tablet    klonoPIN     Take 0.5 mg by mouth At Bedtime        Coenzyme Q10 300 MG Caps      Take 300 mg by mouth every morning        diclofenac 1 % Gel topical gel    VOLTAREN     Apply qid prn pain        magnesium 100 MG Tabs      Take 100 tablets by mouth 2 times daily (before meals)        MELATONIN PO      Take 2 mg  by mouth At Bedtime        Multi-vitamin Tabs tablet      Take 1 tablet by mouth every morning        ondansetron 4 MG ODT tab    ZOFRAN ODT    20 tablet    Take 1-2 tablets (4-8 mg) by mouth every 8 hours as needed for nausea    Parkinson disease (H)       Red Wine Extract Caps      Take by mouth daily (with lunch)        traMADol 50 MG tablet    ULTRAM    25 tablet    Take 1 tablet (50 mg) by mouth every 6 hours as needed for severe pain    Parkinson disease (H)       TYLENOL 325 MG tablet   Generic drug:  acetaminophen      Take 325-650 mg by mouth nightly as needed for mild pain        * Notice:  This list has 4 medication(s) that are the same as other medications prescribed for you. Read the directions carefully, and ask your doctor or other care provider to review them with you.

## 2018-06-28 NOTE — LETTER
2018       RE: Kwame Lin  [address] [place] [address]     Dear Colleague,    Thank you for referring your patient, Kwame Lin, to the [facility] NEUROLOGY at [facility]. Please see a copy of my visit note below.    Department of Neurology  Movement Disorders Division   DBS Follow-up Note    Patient: Kwmae Lin  MRN: [id number]   : 1955   Date of Visit: 2018    Diagnosis: Parkinson disease  DBS Target(s): Bilateral STN   Date(s) of DBS Lead Placement:   Left STN 2017, right STN 2018  Date(s) of IPG Placement:   Left chest 2017     Chief Complaint:  Kwame Lin is a 63 year old male who returns to clinic for follow up of Parkinson disease status post left STN DBS (2017) and recent right STN DBS (2018).     Interval History:  After he left he noticed that after programming of his right STN lead 18 after 1.5 days he still had some thumb tremor, so he turned it up by 5 clicks as instructed. He noticed some slurred speech and a sense of pressure behind his eyes. He also noticed some choking/coughing with drinking liquid. He also had dyskinesias. He kept it at that setting for 9 hours, then turned it back down. He also notices that his left knee will give out.     Since he turned it back down he still has some dyskinesia.     He was walking 1.7 miles per day before, now he has trouble walking 100 feet.    His wife also notices that he may be cognitively slightly off (he misdescribed a parkway as a driveway, and forgot to brinig his meds in from the car today).      Review of Systems:  Other than that noted at the end of this note, the remainder of 12 systems reviewed were negative.    Medications:  Current Outpatient Prescriptions   Medication Sig Dispense Refill     acetaminophen (TYLENOL) 325 MG tablet Take 325-650 mg by mouth nightly as needed for mild pain       carbidopa-levodopa (SINEMET)  MG per  tablet Take 1 tablet by mouth every 4 hours (while awake)       carbidopa-levodopa (SINEMET)  MG per tablet 2 tablets At Bedtime Extended release 90 tablet      citalopram (CELEXA) 10 MG tablet Take 10 mg by mouth every morning        clonazePAM (KLONOPIN) 0.5 MG tablet Take 0.5 mg by mouth At Bedtime        Coenzyme Q10 300 MG CAPS Take 300 mg by mouth every morning       magnesium 100 MG TABS Take 100 tablets by mouth 2 times daily (before meals)       MELATONIN PO Take 2 mg by mouth At Bedtime       Misc Natural Products (RED WINE EXTRACT) CAPS Take by mouth daily (with lunch)        multivitamin, therapeutic with minerals (MULTI-VITAMIN) TABS tablet Take 1 tablet by mouth every morning       Probiotic Product (ACIDOPHILUS/GOAT MILK) CAPS At Bedtime        thyroid (ARMOUR THYROID) 60 MG tablet daily       thyroid (ARMOUR THYROID) 60 MG tablet Take 60 mg by mouth every morning        traMADol (ULTRAM) 50 MG tablet Take 1 tablet (50 mg) by mouth every 6 hours as needed for severe pain 25 tablet 0     diclofenac (VOLTAREN) 1 % GEL topical gel Apply qid prn pain       ondansetron (ZOFRAN ODT) 4 MG ODT tab Take 1-2 tablets (4-8 mg) by mouth every 8 hours as needed for nausea (Patient not taking: Reported on 6/21/2018) 20 tablet 1      PD Medications (current)            7:30 12pm 4pm 10:30pm    Carbidopa/levodopa 25/100 mg 1 1 1       Carbidopa/levodopa 25/100 CR          2    Clonazepam 0.5 mg          1    Melatonin 5 mg          1           Allergies: is allergic to bactrim; codeine; ketorolac; morphine; nalbuphine; nsaids; penicillins; seasonal allergies; sulfa drugs; and toradol.    Past Medical History:  Past Medical History:   Diagnosis Date     Anosmia 10/12/2017     Anxiety      Depressed mood 10/12/2017     Hypothyroid      Parkinson disease (H)      Parkinsons disease (H)      PONV (postoperative nausea and vomiting)      RBD (REM behavioral disorder) 10/12/2017       Past Surgical History:  Past  Surgical History:   Procedure Laterality Date     ARTHROSCOPY KNEE Right     twice     ARTHROSCOPY KNEE Right     left     epidydmal mass removal, benign       IMPLANT DEEP BRAIN STIMULATION GENERATOR / BATTERY Left 12/22/2017    Procedure: IMPLANT DEEP BRAIN STIMULATION GENERATOR / BATTERY;  Deep Brain Stimulator Placement, Phase II, Placement Of Deep Brain Stimulator Generator/Battery Over The Left Chest Wall;  Surgeon: Arpan Huynh MD;  Location: [facility] OR     OPTICAL TRACKING SYSTEM INSERTION DEEP BRAIN STIMULATION Left 12/14/2017    Procedure: OPTICAL TRACKING SYSTEM INSERTION DEEP BRAIN STIMULATION;  Stealth Assisted Left Deep Brain Stimulator Placement, Phase I, Placement Of Left Side Deep Brain Stimulator Electrode, Target Left Subthalamic Nucleus With Microelectrode Recording;  Surgeon: Arpan Huynh MD;  Location: [facility] OR     OPTICAL TRACKING SYSTEM INSERTION DEEP BRAIN STIMULATION Right 5/22/2018    Procedure: OPTICAL TRACKING SYSTEM INSERTION DEEP BRAIN STIMULATION;  Stealth Assisted Right Deep Brain Stimulator Placement, Phase I And II Combined, Placement Of Right Deep Brain Stimulator Electrode, Target Right Subthalamic Nucleus With Microelectrode Recording And Connection To Previous Implanted Generator/Battery;  Surgeon: Arpan Huynh MD;  Location: [facility] OR       Social History:  Social History     Social History     Marital status:      Spouse name: N/A     Number of children: N/A     Years of education: N/A     Occupational History     Small business start consultant      Social History Main Topics     Smoking status: Never Smoker     Smokeless tobacco: Never Used     Alcohol use 0.6 - 1.2 oz/week     1 - 2 Cans of beer per week      Comment: MONTHLY     Drug use: No     Sexual activity: Yes     Partners: Female     Other Topics Concern     None     Social History Narrative    Previous worked as [employer].    Now consulting  "part-time.    , lives with wife.    Non-smoker. Occasional drink.        Past surgical history that includes epidydmal mass removal, benign.        Social History:    Previous worked as [employer].    Now consulting part-time.    , lives with wife.    Non-smoker. Occasional drink.         family history includes Lung Cancer in his father; Parkinsonism in his cousin and maternal aunt.            Family History:  Family History   Problem Relation Age of Onset     Lung Cancer Father      Parkinsonism Cousin      paternal side     Parkinsonism Maternal Aunt        Physical Exam:  The patient's  height is 1.783 m (5' 10.2\") and weight is 95.5 kg (210 lb 8 oz). His oral temperature is 96.8  F (36  C). His blood pressure is 125/69 and his pulse is 79. His respiration is 17 and oxygen saturation is 94%.      Incisions: Well-healed     Neurological Examination:     He is alert and oriented and has fluent speech without dysarthria and is able to provide an interval medical history  Extraocular movements are full. He has normal smooth pursuits and saccades. His face is symmetric with equal activation.     Last medication dose: 11 hours prior to initial exa  UPDRS Values 6/28/2018   Time: 10:42 AM   Medication Off   R Brain DBS: On   L Brain DBS: On   Speech 0   Facial Expression 0   Rigidity Neck 0   Rigidity RUE 1   Rigidity LUE 1   Rigidity RLE 0   Rigidity LLE 0   Finger Taps R 1   Finger Taps L 0   Hand Mvt R 0   Hand Mvt L 0   Pron-/Supinate R 1   Pron-/Supinate L 2   Toe Tap R 0   Toe Tap L 1   Leg Agility R 0   Leg Agility L 1   Arise From Chair 1   Gait 0   Gait Freezing 0   Postural Stability 0   Posture 0   Global Spont Mvt 0   Postural Tremor RUE 1   Postural Tremor LUE 1   Kinetic Tremor RUE 0   Kinetic Tremor LUE 0   Rest Tremor RUE 0   Rest Tremor LUE 1   Rest Tremor RLE 1   Rest Tremor LLE 0   Rest Tremor Lip/Jaw 1   Rest Tremor Constancy 1   Total Right 5 "   Total Left 7   Axial Total 1   Total 15     Comments: has a tendency to hold his head in left laterocollis. Very subtle dyskinesias left foot and some puckering of mouth with concentration.       Procedure: DBS Programming    Lead(s):     Left   DBS Target    STN   DBS Lead Type     [employer] - 8 contact lead   Lead Implant Date   12/14/2017      IPG(s):    1   IPG  [employer]     IPG Implant Date    12/22/2017   Location    Left Chest   Battery (V) Charging well        Full Impedence Check: No problems found      Initial Settings::  Left Brain 1-Birdie   Contacts  C+,  2-(25), 3-(75)   Amplitude (mA)  3.1   Pulse Width ( s)  60   Frequency (Hz)  139   Patient Control (+/- V) 0 to 3.5      Right Brain 1-Birdie   Contacts  C+, 11-   Amplitude (mA)   2.8 (decr by 0.2)   Pulse Width ( s)  90   Frequency (Hz)  139   Patient Control (+/- V) 0 - 4.0      Initial program selected:    1-Birdie    Final Settings:  Left Brain 2-Calm Program B Program C Program D    A1      Contacts  C+;3-        Amplitude (mA)  3.0         Pulse Width ( s)  60         Frequency (Hz)  139         Patient Control (min/max)  0/3.5   /   /   /      Right Brain 2-Calm Program B Program C Program D    A2      Contacts C+;9-(50);14-(50)          Amplitude (mA) 4.0          Pulse Width ( s)  60         Frequency (Hz)  139         Patient Control (min/max) 0 / 5.0  /   /   /      Final Program selected:  2-Calm       Impression:  Kwame Lin is a 63 year old male with tremor predominant Parkinson disease s/p bilateral STN DBS (L STN 12/2017; R STN 5/2018). After initial programming of his right STN lead 1 week ago he noticed delayed side effects of mild dyskinesias causing mild speech slurring, dysphagia, and the sensation of weakness in his left knee.  No medication reductions were made at his last visit.          Recommendations:     1. DBS - I set up a new program to help prevent the side effects you were  "noticing (slurred speech, swallowing issues, dyskinesias, left leg \"weakness\".  Your new program is \"2-CALM\".  We have room to increase or decrease if you still notice side effects or if your tremor returns too much.    This new program also includes switching your right body settings back to \"New Day\" equivalent    2. Medication plan:    -decrease Sinemet IR 25/100 to 1/2 tab per dose  -Decrease Sinemet CR 25/100 to 1 tab per dose  -Increase melatonin to 3 of your 2 mg tabs  PD Medications (current)            7:30 12pm 4pm 10:30pm    Carbidopa/levodopa 25/100 mg 1/2 1/2 1/2       Carbidopa/levodopa 25/100 CR          1    Clonazepam 0.5 mg          1    Melatonin 2 mg          3      3. Restless legs syndrome. I want you to get your ferritin and iron levels checked today (lab ordered). If your restless legs continues to be very problematic, then I recommend we start a medication called gabapentin at bedtime to help this.    4. Follow-up as previously planned on           Time spent with patient: Greater than 50% of this 60 minute visit was spent in counseling and coordination of care related to the above issues.    Time spent for separate DBS programmin minutes    Clarita Borges MD    of Neurology          "

## 2018-07-08 ENCOUNTER — MYC MEDICAL ADVICE (OUTPATIENT)
Dept: NEUROLOGY | Facility: CLINIC | Age: 63
End: 2018-07-08

## 2018-07-18 ENCOUNTER — TRANSFERRED RECORDS (OUTPATIENT)
Dept: HEALTH INFORMATION MANAGEMENT | Facility: CLINIC | Age: 63
End: 2018-07-18

## 2018-07-25 ENCOUNTER — OFFICE VISIT (OUTPATIENT)
Dept: NEUROLOGY | Facility: CLINIC | Age: 63
End: 2018-07-25
Payer: COMMERCIAL

## 2018-07-25 VITALS
DIASTOLIC BLOOD PRESSURE: 64 MMHG | HEART RATE: 72 BPM | HEIGHT: 70 IN | SYSTOLIC BLOOD PRESSURE: 118 MMHG | BODY MASS INDEX: 28.77 KG/M2 | WEIGHT: 201 LBS

## 2018-07-25 DIAGNOSIS — G47.33 OSA (OBSTRUCTIVE SLEEP APNEA): ICD-10-CM

## 2018-07-25 DIAGNOSIS — R47.89 WORD FINDING DIFFICULTY: ICD-10-CM

## 2018-07-25 DIAGNOSIS — G20.A1 PARKINSON DISEASE (H): Primary | ICD-10-CM

## 2018-07-25 DIAGNOSIS — G25.81 RESTLESS LEG SYNDROME: ICD-10-CM

## 2018-07-25 ASSESSMENT — MOVEMENT DISORDERS SOCIETY - UNIFIED PARKINSONS DISEASE RATING SCALE (MDS-UPDRS)
FREEZING: NORMAL: NOT AT ALL (NO PROBLEMS).
TREMOR: SLIGHT: SHAKING OR TREMOR OCCURS BUT DOES NOT CAUSE PROBLEMS WITH ANY ACTIVITIES.
HOBBIES_AND_OTHER_ACTIVITIES: SLIGHT: I AM A BIT SLOW BUT DO THESE ACTIVITIES EASILY.
CHEWING_AND_SWALLOWING: NORMAL: NO PROBLEMS.
HYGIENE: NORMAL: NOT AT ALL (NO PROBLEMS).
TURNING_IN_BED: NORMAL: NOT AT ALL (NO PROBLEMS).
DRESSING: NORMAL: NOT AT ALL (NO PROBLEMS).
WALKING_AND_BALANCE: SLIGHT: I AM SLIGHTLY SLOW OR MAY DRAG A LEG.  I NEVER USE A WALKING AID.
GETTING_OUT_OF_BED_CAR_DEEP_CHAIR: SLIGHT: I AM SLOW OR AWKWARD, BUT I USUALLY CAN DO IT ON MY FIRST TRY.
TOTAL_SCORE: 8
EATING_TASKS: NORMAL: NOT AT ALL (NO PROBLEMS).
HANDWRITING: MILD: SOME WORDS ARE UNCLEAR AND DIFFICULT TO READ.
SALIVA_AND_DROOLING: NORMAL: NOT AT ALL (NO PROBLEMS).
SPEECH: MILD: MY SPEECH CAUSES PEOPLE TO ASK ME TO OCCASIONALLY REPEAT MYSELF, BUT NOT EVERYDAY.

## 2018-07-25 ASSESSMENT — UNIFIED PARKINSONS DISEASE RATING SCALE (UPDRS)
RIGIDITY_NECK: NORMAL
SPONTANEITY_OF_MOVEMENT: 0: NORMAL.  NO PROBLEMS.
AXIAL_SCORE: 0
AMPLITUDE_LIP_JAW: NORMAL: NO TREMOR.
RIGIDITY_RLE: NORMAL
FINGER_TAPPING_RIGHT: SLIGHT: ANY OF THE FOLLOWING: A) THE REGULAR RHYTHM IS BROKEN WITH ONE WITH ONE OR TWO INTERRUPTIONS OR HESITATIONS OF THE MOVEMENT B) SLIGHT SLOWING C) THE AMPLITUDE DECREMENTS NEAR THE END OF THE 10 MOVEMENTS.
POSTURAL_STABILITY: NORMAL:  RECOVERS WITH ONE OR TWO STEPS.
AMPLITUDE_LUE: SLIGHT: < 1 CM IN MAXIMAL AMPLITUDE.
AMPLITUDE_LLE: NORMAL: NO TREMOR.
ARISING_CHAIR: NORMAL: ABLE TO ARISE QUICKLY WITHOUT HESITATION.
LEG_AGILITY_RIGHT: NORMAL
LEG_AGILITY_LEFT: NORMAL
AMPLITUDE_RLE: NORMAL: NO TREMOR.
PRONATION_SUPINATION_RIGHT: NORMAL
TOTAL_SCORE: 6
FINGER_TAPPING_LEFT: NORMAL
SPEECH: NORMAL
TOTAL_SCORE_LEFT: 3
FACIAL_EXPRESSION: NORMAL.
GAIT: NORMAL
RIGIDITY_LLE: NORMAL
PRONATION_SUPINATION_LEFT: NORMAL
TOTAL_SCORE: 2
RIGIDITY_RUE: NORMAL
POSTURE: 0 NORMAL, NO PROBLEMS
TOETAPPING_RIGHT: NORMAL
HANDMOVEMENTS_RIGHT: NORMAL
TOETAPPING_LEFT: NORMAL
CONSTANCY_TREMOR_ATREST: SLIGHT: TREMOR AT REST IS PRESENT  25% OF THE ENTIRE EXAMINATION PERIOD.
AMPLITUDE_RUE: SLIGHT: < 1 CM IN MAXIMAL AMPLITUDE.
RIGIDITY_LUE: SLIGHT: RIGIDITY ONLY DETECTED WITH ACTIVATION MANEUVER.
HANDMOVEMENTS_LEFT: NORMAL
PARKINSONS_MEDS: OFF
FREEZING_GAIT: NORMAL

## 2018-07-25 ASSESSMENT — ENCOUNTER SYMPTOMS
MEMORY LOSS: 0
SEIZURES: 0
PARALYSIS: 0
MUSCLE CRAMPS: 0
NERVOUS/ANXIOUS: 1
WEAKNESS: 1
EYE REDNESS: 0
MYALGIAS: 1
BACK PAIN: 0
DOUBLE VISION: 0
SPEECH CHANGE: 1
STIFFNESS: 1
EYE WATERING: 0
DEPRESSION: 0
DISTURBANCES IN COORDINATION: 1
INSOMNIA: 1
EYE IRRITATION: 0
NECK PAIN: 0
NUMBNESS: 0
ARTHRALGIAS: 1
EYE PAIN: 0
HEADACHES: 0
TREMORS: 0
JOINT SWELLING: 1
LOSS OF CONSCIOUSNESS: 0
PANIC: 0
DECREASED CONCENTRATION: 1
TINGLING: 0
DIZZINESS: 0
MUSCLE WEAKNESS: 1

## 2018-07-25 ASSESSMENT — PAIN SCALES - GENERAL: PAINLEVEL: NO PAIN (0)

## 2018-07-25 NOTE — LETTER
"2018       RE: Kwame Lin  [address] [place] [address]     Dear Colleague,    Thank you for referring your patient, Kwame Lin, to the [facility] NEUROLOGY at [facility]. Please see a copy of my visit note below.    Department of Neurology  Movement Disorders Division   DBS Follow-up Note    Patient: Kwame Lin  MRN: [id number]   : 1955   Date of Visit: 2018    Diagnosis: Parkinson disease  DBS Target(s): Bilateral STN   Date(s) of DBS Lead Placement:   Left STN 2017, right STN 2018  Date(s) of IPG Placement:   Left chest 2017     Chief Complaint:  Kwame Lin is a 63 year old right handed male who returns to clinic for follow up of Parkinson disease status post left STN DBS (2017) and right STN DBS (2018). He was last seen 18, 1 week after initial programming on 18, for mild stim-induced side effects of dyskinesia (including sensation of weakness in his left knee), as well as transient speech slurring and dysphagia when he increased his settings using his remote. His DBS was reprogrammed to \"2-CALM\" to address these issues, and recommended a decrease in his Sinemet IR and Sinemet CR at bedtime doses. He was also complaining of restless legs syndrome and his ferritin and iron levels were checked, which were both normal.    Interval History:  History obtained from patient and accompanied by wife. Patient reports that today is a \"good day.\" Currently his legs do not hurt and tremors are mostly controlled since last programming and medication change. Patient completed reflexology on both feet/legs and reports improvement in L knee pain. He continues with PT also. Despite this, he states that walking continues to be an issue as he feels uncoordinated. He states that he is able to walk a half block then must rest due to L knee pain. Denies recent falls but often his R foot will catch the floor, and this " "occurs daily.     He also shares that he has the sensation of a stiff tongue which has been present since last programming an improved from last visit but still there. He states his tongue will feel more stiff in the am. He began taking gabapentin 300mg but doesn't think it helps. Believes that he is low on LD causing RLS symptoms. He also increased melatonin to 12mg at bedtime (from 10mg) which has helped to control his active dreaming. He states that in the am, he notices that he has more slurred speech and as the day progresses he begins to have more word finding difficulties or he is tongue tied. He also shares that his \"lebido is low\" however he in uninterested in starting any new meds for this. He recently completed a sleep study which recommended he consider a CPAP for mild DIPIKA. DBS is never turned off and he does not change the settings. He charges his DBS about once a week.     Parkinson Disease Motor Symptom Review:  Currently off - feels \"ansty\"    Motor fluctuations:     Dyskinesia:  Duration -  30mins around off peroid Disability -  denies  Wearing off:  3 hours  Freezing of gait: no   Dystonia: BLE, L knee  Tremor: Continues to have a tremor in his L thumb but largely his tremors are well controlled.   Rigidity: very minimal, except sees in thumb and when drinking water with L hand  Bradykinesia: none     Parkinson's Disease Non-motor Symptom Review:    Psychiatric disturbances - denies  Cognitive impairment -  denies  Sleep disturbances - deneis   GI symptoms - denies  Balance - Despite this, he states that walking continues to be an issue as he feels uncoordinated. He states that he is able to walk a half block then must rest due to L knee pain. Denies recent falls but often his R foot will catch the floor, and this occurs daily.  Pain - BLE, L knee - both improved   Autonomic dysfunction - denies  Hallucinations - deneis  Speech - no issues this visit   Swallowing - no issues this visit   Salivation " - no issues   Dopamine agonist side effects - denies      Living situation: no issues  Medication compliance yes  ADL's: the patient is independent     Review of Systems:  Other than that noted at the end of this note, the remainder of 12 systems reviewed were negative.    Medications:  Current Outpatient Prescriptions   Medication Sig Dispense Refill     acetaminophen (TYLENOL) 325 MG tablet Take 325-650 mg by mouth nightly as needed for mild pain       carbidopa-levodopa (SINEMET)  MG per tablet Take 1 tablet by mouth every 4 hours (while awake)       carbidopa-levodopa (SINEMET)  MG per tablet 2 tablets At Bedtime Extended release 90 tablet      citalopram (CELEXA) 10 MG tablet Take 10 mg by mouth every morning        clonazePAM (KLONOPIN) 0.5 MG tablet Take 0.5 mg by mouth At Bedtime        Coenzyme Q10 300 MG CAPS Take 300 mg by mouth every morning       diclofenac (VOLTAREN) 1 % GEL topical gel Apply qid prn pain       gabapentin (NEURONTIN) 300 MG capsule Take 1 capsule (300 mg) by mouth At Bedtime 180 capsule 3     magnesium 100 MG TABS Take 100 tablets by mouth 2 times daily (before meals)       MELATONIN PO Take 2 mg by mouth At Bedtime       Misc Natural Products (RED WINE EXTRACT) CAPS Take by mouth daily (with lunch)        multivitamin, therapeutic with minerals (MULTI-VITAMIN) TABS tablet Take 1 tablet by mouth every morning       ondansetron (ZOFRAN ODT) 4 MG ODT tab Take 1-2 tablets (4-8 mg) by mouth every 8 hours as needed for nausea (Patient not taking: Reported on 6/21/2018) 20 tablet 1     Probiotic Product (ACIDOPHILUS/GOAT MILK) CAPS At Bedtime        thyroid (ARMOUR THYROID) 60 MG tablet daily       thyroid (ARMOUR THYROID) 60 MG tablet Take 60 mg by mouth every morning        traMADol (ULTRAM) 50 MG tablet Take 1 tablet (50 mg) by mouth every 6 hours as needed for severe pain 25 tablet 0          PD Medications                   7:30AM 12PM 4PM 8PM 10:30pm         Carbidopa/levodopa IR 25/100                                           1/2 tab 1/2 tab 1/2 tab      Carbidopa/levodopa CR 25/100                            1   Clonazepam 0.5mg                               1   Melatonin 3 mg     4   Gabapentin 300mg     1       Allergies: is allergic to bactrim; codeine; ketorolac; morphine; nalbuphine; nsaids; penicillins; seasonal allergies; sulfa drugs; and toradol.    Past Medical History:  Past Medical History:   Diagnosis Date     Anosmia 10/12/2017     Anxiety      Depressed mood 10/12/2017     Hypothyroid      Parkinson disease (H)      Parkinsons disease (H)      PONV (postoperative nausea and vomiting)      RBD (REM behavioral disorder) 10/12/2017       Past Surgical History:  Past Surgical History:   Procedure Laterality Date     ARTHROSCOPY KNEE Right     twice     ARTHROSCOPY KNEE Right     left     epidydmal mass removal, benign       IMPLANT DEEP BRAIN STIMULATION GENERATOR / BATTERY Left 12/22/2017    Procedure: IMPLANT DEEP BRAIN STIMULATION GENERATOR / BATTERY;  Deep Brain Stimulator Placement, Phase II, Placement Of Deep Brain Stimulator Generator/Battery Over The Left Chest Wall;  Surgeon: Arpan Huynh MD;  Location: [facility] OR     OPTICAL TRACKING SYSTEM INSERTION DEEP BRAIN STIMULATION Left 12/14/2017    Procedure: OPTICAL TRACKING SYSTEM INSERTION DEEP BRAIN STIMULATION;  Stealth Assisted Left Deep Brain Stimulator Placement, Phase I, Placement Of Left Side Deep Brain Stimulator Electrode, Target Left Subthalamic Nucleus With Microelectrode Recording;  Surgeon: Arpan Huynh MD;  Location: [facility] OR     OPTICAL TRACKING SYSTEM INSERTION DEEP BRAIN STIMULATION Right 5/22/2018    Procedure: OPTICAL TRACKING SYSTEM INSERTION DEEP BRAIN STIMULATION;  Stealth Assisted Right Deep Brain Stimulator Placement, Phase I And II Combined, Placement Of Right Deep Brain Stimulator Electrode, Target Right Subthalamic Nucleus With Microelectrode Recording And  Connection To Previous Implanted Generator/Battery;  Surgeon: Arpan Huynh MD;  Location:  OR       Social History:  Social History     Social History     Marital status:      Spouse name: N/A     Number of children: N/A     Years of education: N/A     Occupational History     Small business start consultant      Social History Main Topics     Smoking status: Never Smoker     Smokeless tobacco: Never Used     Alcohol use 0.6 - 1.2 oz/week     1 - 2 Cans of beer per week      Comment: MONTHLY     Drug use: No     Sexual activity: Yes     Partners: Female     Other Topics Concern     Not on file     Social History Narrative    Previous worked as [employer].    Now consulting part-time.    , lives with wife.    Non-smoker. Occasional drink.        Past surgical history that includes epidydmal mass removal, benign.        Social History:    Previous worked as [employer].    Now consulting part-time.    , lives with wife.    Non-smoker. Occasional drink.         family history includes Lung Cancer in his father; Parkinsonism in his cousin and maternal aunt.            Family History:  Family History   Problem Relation Age of Onset     Lung Cancer Father      Parkinsonism Cousin      paternal side     Parkinsonism Maternal Aunt        Physical Exam:  The patient's  vitals were not taken for this visit.      Neurological Examination:   Last medication dose: 5pm on 7/24/2018  Physical Exam:  General: Resting comfortably   Respiratory: No respiratory distress     Neuro Exam:  Mental status: Alert and oriented to person, place, time, and situation. Follows commands  Speech: Fluent, intact comprehension and articulation  CN: EOMIB, PERRL, facial sensation intact, facial movement symmetric, hearing grossly intact, tongue protrudes midline   Motor: Moves all extremities equally against gravity without  difficulty or abnormalities with 5/5 strength, mild rigidity in LUE, rest tremor in both hands, postural tremor in LUE, R foot tremor emerges on distraction   Reflexes (R/L): 2/2 in biceps, brachioradialis, triceps, patellars; no clonus  Sensation: intact to light touch throughout   Coordination: grossly intact with FTN, HTS  [facility]: able to rise from a seated position, decreased R arm swing and step length, no decompensation of turn, no ataxia, decreased ADF of R foot on ambulation, observed R foot to catch on the ground during ambulation     UPDRS Values 7/25/2018   Time: 1:43 PM   Medication Off   R Brain DBS: On   L Brain DBS: On   Speech 0   Facial Expression 0   Rigidity Neck 0   Rigidity RUE 0   Rigidity LUE 1   Rigidity RLE 0   Rigidity LLE 0   Finger Taps R 1   Finger Taps L 0   Hand Mvt R 0   Hand Mvt L 0   Pron-/Supinate R 0   Pron-/Supinate L 0   Toe Tap R 0   Toe Tap L 0   Leg Agility R 0   Leg Agility L 0   Arise From Chair 0   Gait 0   Gait Freezing 0   Postural Stability 0   Posture 0   Global Spont Mvt 0   Postural Tremor RUE 0   Postural Tremor LUE 1   Kinetic Tremor RUE 0   Kinetic Tremor LUE 0   Rest Tremor RUE 1   Rest Tremor LUE 1   Rest Tremor RLE 0   Rest Tremor LLE 0   Rest Tremor Lip/Jaw 0   Rest Tremor Constancy 1   Total Right 2   Total Left 3   Axial Total 0   Total 6       Procedure: DBS Programming     Lead(s):      Left   DBS Target    STN   DBS Lead Type     [employer] - 8 contact lead   Lead Implant Date   12/14/2017       IPG(s):     1   IPG  [employer]     IPG Implant Date    12/22/2017   Location    Left Chest   Battery (V) Charging well         Full Impedence Check: No problems found     Initial Settings::  Left Brain 2-Calm (A1)   Contacts C+;3-   Amplitude (mA)  3.3 (pt increase 0.3)   Pulse Width ( s)  60   Frequency (Hz)  139   Patient Control  0 to 3.5       Right Brain 2-Calm (A2)   Contacts  C+;11-(50);14-(50)    Amplitude (mA)  4.2 (pt  increased 0.2)   Pulse Width ( s)  60   Frequency (Hz)  139   Patient Control (+/- V) 0 - 5.0       Initial program selected:     2-Calm     Final Settings:  Left Brain 3-NANASPL* Program B Program C Program D     A1         Contacts   C+, 2-(40); 3-(60)*          Amplitude (mA)  3. 3*            Pulse Width ( s)  60            Frequency (Hz)  139                Right Brain 3-NANASPL Program B Program C Program D     A2         Contacts C+;9-(50);14-(50)             Amplitude (mA) 4.0             Pulse Width ( s)  60            Frequency (Hz)  139            Patient Control (min/max) 0 / 5.0  /   /   /     *indicates changes made to settings     Final Program selected:   3-NANASPL to L   Still has 2-Calm as an option.    Programming comments: Contact 3 at 3.3 likely spreads toward corticospinal fibers (causing slight leg dragging sensation and possibly more effortful speech; this resolves when reduced to 3.0mA). Thus tried fractionation of current between contacts 2 and 3. This gave higher side effect thresholds.    Amplitude range: 3.0 to 3.8. When trialed on   C+, 2-(50); 3-(50), patient experienced re-emergence of R hand tremor with improved R arm swing. With  C+, 2-(50); 3-(50) and 3.8mA, tremor disappeared, gait improved, increased RLE stride, R arm swing remained improved. 3.8mA with  C+, 2-(40); 3-(60), tongue stiffness appeared with dysarthria. 3.5mA with  C+, 2-(40); 3-(60), dysarthria and tongue stiffness resolved. 3.8mA with with  C+, 2-(40); 3-(60), chin tremor and R hand tremor re-emerged. On 3.3mA with with  C+, 2-(40); 3-(60), RLE stride is improved, RUE arm swing is improved, no dysarthria or tongue stiffness, no re-emergence of tremors. de    Impression:  Kwame Lin is a 63 year old right handed male with tremor predominant Parkinson's disease s/p bilateral STN DBS (L STN 12/2017, R STN 5/2018) that is here for follow up regarding Parkinson's disease management.   .    1. Parkinson's disease  "with a tremor predominance s/p b/l STN DBS (L STN 2017, R STN 2018)  2. Restless leg syndrome, associated with #1, improved with melatonin  3. Word finding issues: likely associated with untreated DIPIKA  4. DIPIKA, mild: verified via sleep study, CPAP recommended    Recommendations:   > A new DBS program \"3-NANASPL\" was programed to help with our right leg draging and speech. Patient settings adjustment was not made. Still has the option to use the prior setting under 2-Calm..  > For RLS, take an extra 0.5 tab carbidopa/levodopa IR 25/100 at 8PM. Stop gabapentin, medically maximize CD/LD  PD Medications                   7:30AM 12PM 4PM 8PM 10:30PM        Carbidopa/levodopa IR 25/100                                           1/2 1/2 /2 /2     Carbidopa/levodopa CR 25/100                            1   Clonazepam 0.5mg                               1   Melatonin ER 3 mg     4   Gabapentin 300mg     stop   > For Word-finding issues, treat sleep apnea. Make a follow-up appointment with the sleep doctor to address this. Alternatively, some people notice these symptoms when their PD medications are reduced a bit much. We will keep an eye on this.  > Continue exercising regularly    Time spent with patient: Greater than 50% of this 45 minute visit was spent in counseling and coordination of care related to above issues.     Time spent for separate DBS programmin minutes    RTC: 2018    Patient seen and plan of care discussed with Dr. Borges.    Angie Pugh DO  Movement Disorders Fellow  [facility]     Neurology Attending Attestation:     I, Clarita Borges, personally saw this patient with our Movement Disorders Fellow and agree with the fellow's findings and plan of care as documented in the movement disorder fellow's note, with my personal summary below. I personally performed salient aspects of the history and neurological examination.     I personally reviewed the vital signs, " medications, and labs. I personally viewed the imaging, and agree with the interpretation documented by the fellow.    I personally performed or supervised all procedures.    Time spent with patient: Greater than 50% of this 60 minute visit was spent in counseling and coordination of care related to the above issues.    Additional time spent for separate DBS programmin minutes      Clarita Borges MD    of Neurology

## 2018-07-25 NOTE — MR AVS SNAPSHOT
"              After Visit Summary   7/25/2018    Kwame Lin    MRN: [id number]           Patient Information     Date Of Birth          1955        Visit Information        Provider Department      7/25/2018 1:00 PM Clarita Borges MD [facility] Neu[facility]        Care Instructions    1. DBS - I set up a new DBS program \"3-NANASPL\" to help your right leg drag and speech.   I did not give you the ability to increase or decrease your settings this time.    Your old program \"2-CALM\" is still saved in your device.    2. Restless legs - take an extra carbidopa/levodopa IR 25/100 at 8AM. For now will have you stop gabapentin and work with carbidopa/levodopa    PD Medications                   7:30AM 12PM 4PM 8PM 10:30PM        Carbidopa/levodopa IR 25/100                                           1/2 1/2 1/2 1/2     Carbidopa/levodopa CR 25/100                            1   Clonazepam 0.5mg                               1   Melatonin ER 3 mg     4   Gabapentin 300mg     stop     3. Word-finding issues - I recommend you start by treating your sleep apnea. Please make a follow-up appointment with your sleep doctor to address this. Alternatively, some people notice these symptoms when their PD medications are reduced a bit much. We will keep an eye on this.    4. Make sure to continue exercising regularly          Follow-ups after your visit        Follow-up notes from your care team     Return in about 8 weeks (around 9/19/2018), or at 2PM (per Katerina).      Your next 10 appointments already scheduled     Sep 19, 2018  2:00 PM CDT   (Arrive by 1:45 PM)   Return Movement Disorder with Clarita Borges MD   [facility] ([facility])    [address] [place] [id number] [phone]              Who to contact     Please call your clinic at [phone] to:    Ask questions about your health    Make or cancel appointments    Discuss your " "medicines    Learn about your test results    Speak to your doctor            Additional Information About Your Visit        MyChart Information     [employer] gives you secure access to your electronic health record. If you see a primary care provider, you can also send messages to your care team and make appointments. If you have questions, please call your primary care clinic.  If you do not have a primary care provider, please call [phone] and they will assist you.      [employer] is an electronic gateway that provides easy, online access to your medical records. With [employer], you can request a clinic appointment, read your test results, renew a prescription or communicate with your care team.     To access your existing account, please contact your [facility] or call [phone] for assistance.        Care EveryWhere ID     This is your Care EveryWhere ID. This could be used by other organizations to access your [facility] medical records  [id number]        Your Vitals Were     Pulse Height BMI (Body Mass Index)             72 1.778 m (5' 10\") 28.84 kg/m2          Blood Pressure from Last 3 Encounters:   07/25/18 118/64   06/28/18 125/69   06/21/18 109/67    Weight from Last 3 Encounters:   07/25/18 91.2 kg (201 lb)   06/28/18 95.5 kg (210 lb 8 oz)   06/21/18 95.3 kg (210 lb)              Today, you had the following     No orders found for display       Primary Care Provider Office Phone # Fax #    Silvina Armenta [phone]       [facility] [address] [facility] [address]        Equal Access to Services     [facility]: Hadii aad lj hadasho Sotsering, waaxda luqadaha, qaybta kaalmada adeeggreer, bebe dias . So [facility] [phone].    ATENCIÓN: Si habla español, tiene a mcrae disposición servicios gratuitos de asistencia lingüística. Llame al [phone].    We comply with applicable federal civil rights laws " and [place] laws. We do not discriminate on the basis of race, color, national origin, age, disability, sex, sexual orientation, or gender identity.            Thank you!     Thank you for choosing [facility] NEUROLOGY  for your care. Our goal is always to provide you with excellent care. Hearing back from our patients is one way we can continue to improve our services. Please take a few minutes to complete the written survey that you may receive in the mail after your visit with us. Thank you!             Your Updated Medication List - Protect others around you: Learn how to safely use, store and throw away your medicines at www.disposemymeds.org.          This list is accurate as of 7/25/18  3:35 PM.  Always use your most recent med list.                   Brand Name Dispense Instructions for use Diagnosis    Acidophilus/Goat Milk Caps      At Bedtime        * ARMOUR THYROID 60 MG tablet   Generic drug:  thyroid      Take 60 mg by mouth every morning        * ARMOUR THYROID 60 MG tablet   Generic drug:  thyroid      daily        * SINEMET  MG per tablet   Generic drug:  carbidopa-levodopa      Take 1 tablet by mouth every 4 hours (while awake)        * carbidopa-levodopa  MG per tablet    SINEMET    90 tablet    2 tablets At Bedtime Extended release        citalopram 10 MG tablet    celeXA     Take 10 mg by mouth every morning        clonazePAM 0.5 MG tablet    klonoPIN     Take 0.5 mg by mouth At Bedtime        Coenzyme Q10 300 MG Caps      Take 300 mg by mouth every morning        diclofenac 1 % Gel topical gel    VOLTAREN     Apply qid prn pain        gabapentin 300 MG capsule    NEURONTIN    180 capsule    Take 1 capsule (300 mg) by mouth At Bedtime    Restless leg syndrome       magnesium 100 MG Tabs      Take 100 tablets by mouth 2 times daily (before meals)        MELATONIN PO      Take 2 mg by mouth At Bedtime        Multi-vitamin Tabs tablet      Take 1 tablet by mouth every morning         ondansetron 4 MG ODT tab    ZOFRAN ODT    20 tablet    Take 1-2 tablets (4-8 mg) by mouth every 8 hours as needed for nausea    Parkinson disease (H)       Red Wine Extract Caps      Take by mouth daily (with lunch)        traMADol 50 MG tablet    ULTRAM    25 tablet    Take 1 tablet (50 mg) by mouth every 6 hours as needed for severe pain    Parkinson disease (H)       TYLENOL 325 MG tablet   Generic drug:  acetaminophen      Take 325-650 mg by mouth nightly as needed for mild pain        * Notice:  This list has 4 medication(s) that are the same as other medications prescribed for you. Read the directions carefully, and ask your doctor or other care provider to review them with you.

## 2018-07-25 NOTE — PROGRESS NOTES
"Department of Neurology  Movement Disorders Division   DBS Follow-up Note    Patient: Kwame Lin  MRN: [id number]   : 1955   Date of Visit: 2018    Diagnosis: Parkinson disease  DBS Target(s): Bilateral STN   Date(s) of DBS Lead Placement:   Left STN 2017, right STN 2018  Date(s) of IPG Placement:   Left chest 2017     Chief Complaint:  Kwame Lin is a 63 year old right handed male who returns to clinic for follow up of Parkinson disease status post left STN DBS (2017) and right STN DBS (2018). He was last seen 18, 1 week after initial programming on 18, for mild stim-induced side effects of dyskinesia (including sensation of weakness in his left knee), as well as transient speech slurring and dysphagia when he increased his settings using his remote. His DBS was reprogrammed to \"2-CALM\" to address these issues, and recommended a decrease in his Sinemet IR and Sinemet CR at bedtime doses. He was also complaining of restless legs syndrome and his ferritin and iron levels were checked, which were both normal.    Interval History:  History obtained from patient and accompanied by wife. Patient reports that today is a \"good day.\" Currently his legs do not hurt and tremors are mostly controlled since last programming and medication change. Patient completed reflexology on both feet/legs and reports improvement in L knee pain. He continues with PT also. Despite this, he states that walking continues to be an issue as he feels uncoordinated. He states that he is able to walk a half block then must rest due to L knee pain. Denies recent falls but often his R foot will catch the floor, and this occurs daily.     He also shares that he has the sensation of a stiff tongue which has been present since last programming an improved from last visit but still there. He states his tongue will feel more stiff in the am. He began taking gabapentin 300mg but doesn't think it helps. " "Believes that he is low on LD causing RLS symptoms. He also increased melatonin to 12mg at bedtime (from 10mg) which has helped to control his active dreaming. He states that in the am, he notices that he has more slurred speech and as the day progresses he begins to have more word finding difficulties or he is tongue tied. He also shares that his \"lebido is low\" however he in uninterested in starting any new meds for this. He recently completed a sleep study which recommended he consider a CPAP for mild DIPIKA. DBS is never turned off and he does not change the settings. He charges his DBS about once a week.     Parkinson Disease Motor Symptom Review:  Currently off - feels \"ansty\"    Motor fluctuations:     Dyskinesia:  Duration -  30mins around off peroid Disability -  denies  Wearing off:  3 hours  Freezing of gait: no   Dystonia: BLE, L knee  Tremor: Continues to have a tremor in his L thumb but largely his tremors are well controlled.   Rigidity: very minimal, except sees in thumb and when drinking water with L hand  Bradykinesia: none     Parkinson's Disease Non-motor Symptom Review:    Psychiatric disturbances - denies  Cognitive impairment -  denies  Sleep disturbances - deneis   GI symptoms - denies  Balance - Despite this, he states that walking continues to be an issue as he feels uncoordinated. He states that he is able to walk a half block then must rest due to L knee pain. Denies recent falls but often his R foot will catch the floor, and this occurs daily.  Pain - BLE, L knee - both improved   Autonomic dysfunction - denies  Hallucinations - deneis  Speech - no issues this visit   Swallowing - no issues this visit   Salivation - no issues   Dopamine agonist side effects - denies      Living situation: no issues  Medication compliance yes  ADL's: the patient is independent     Review of Systems:  Other than that noted at the end of this note, the remainder of 12 systems reviewed were " negative.    Medications:  Current Outpatient Prescriptions   Medication Sig Dispense Refill     acetaminophen (TYLENOL) 325 MG tablet Take 325-650 mg by mouth nightly as needed for mild pain       carbidopa-levodopa (SINEMET)  MG per tablet Take 1 tablet by mouth every 4 hours (while awake)       carbidopa-levodopa (SINEMET)  MG per tablet 2 tablets At Bedtime Extended release 90 tablet      citalopram (CELEXA) 10 MG tablet Take 10 mg by mouth every morning        clonazePAM (KLONOPIN) 0.5 MG tablet Take 0.5 mg by mouth At Bedtime        Coenzyme Q10 300 MG CAPS Take 300 mg by mouth every morning       diclofenac (VOLTAREN) 1 % GEL topical gel Apply qid prn pain       gabapentin (NEURONTIN) 300 MG capsule Take 1 capsule (300 mg) by mouth At Bedtime 180 capsule 3     magnesium 100 MG TABS Take 100 tablets by mouth 2 times daily (before meals)       MELATONIN PO Take 2 mg by mouth At Bedtime       Misc Natural Products (RED WINE EXTRACT) CAPS Take by mouth daily (with lunch)        multivitamin, therapeutic with minerals (MULTI-VITAMIN) TABS tablet Take 1 tablet by mouth every morning       ondansetron (ZOFRAN ODT) 4 MG ODT tab Take 1-2 tablets (4-8 mg) by mouth every 8 hours as needed for nausea (Patient not taking: Reported on 6/21/2018) 20 tablet 1     Probiotic Product (ACIDOPHILUS/GOAT MILK) CAPS At Bedtime        thyroid (ARMOUR THYROID) 60 MG tablet daily       thyroid (ARMOUR THYROID) 60 MG tablet Take 60 mg by mouth every morning        traMADol (ULTRAM) 50 MG tablet Take 1 tablet (50 mg) by mouth every 6 hours as needed for severe pain 25 tablet 0          PD Medications                   7:30AM 12PM 4PM 8PM 10:30pm        Carbidopa/levodopa IR 25/100                                           1/2 tab 1/2 tab 1/2 tab      Carbidopa/levodopa CR 25/100                            1   Clonazepam 0.5mg                               1   Melatonin 3 mg     4   Gabapentin 300mg     1       Allergies: is  allergic to bactrim; codeine; ketorolac; morphine; nalbuphine; nsaids; penicillins; seasonal allergies; sulfa drugs; and toradol.    Past Medical History:  Past Medical History:   Diagnosis Date     Anosmia 10/12/2017     Anxiety      Depressed mood 10/12/2017     Hypothyroid      Parkinson disease (H)      Parkinsons disease (H)      PONV (postoperative nausea and vomiting)      RBD (REM behavioral disorder) 10/12/2017       Past Surgical History:  Past Surgical History:   Procedure Laterality Date     ARTHROSCOPY KNEE Right     twice     ARTHROSCOPY KNEE Right     left     epidydmal mass removal, benign       IMPLANT DEEP BRAIN STIMULATION GENERATOR / BATTERY Left 12/22/2017    Procedure: IMPLANT DEEP BRAIN STIMULATION GENERATOR / BATTERY;  Deep Brain Stimulator Placement, Phase II, Placement Of Deep Brain Stimulator Generator/Battery Over The Left Chest Wall;  Surgeon: Arpan Huynh MD;  Location: [facility] OR     OPTICAL TRACKING SYSTEM INSERTION DEEP BRAIN STIMULATION Left 12/14/2017    Procedure: OPTICAL TRACKING SYSTEM INSERTION DEEP BRAIN STIMULATION;  Stealth Assisted Left Deep Brain Stimulator Placement, Phase I, Placement Of Left Side Deep Brain Stimulator Electrode, Target Left Subthalamic Nucleus With Microelectrode Recording;  Surgeon: Arpan Huynh MD;  Location: [facility] OR     OPTICAL TRACKING SYSTEM INSERTION DEEP BRAIN STIMULATION Right 5/22/2018    Procedure: OPTICAL TRACKING SYSTEM INSERTION DEEP BRAIN STIMULATION;  Stealth Assisted Right Deep Brain Stimulator Placement, Phase I And II Combined, Placement Of Right Deep Brain Stimulator Electrode, Target Right Subthalamic Nucleus With Microelectrode Recording And Connection To Previous Implanted Generator/Battery;  Surgeon: Arpan Huynh MD;  Location: [facility] OR       Social History:  Social History     Social History     Marital status:      Spouse name: N/A     Number of children: N/A     Years of education: N/A      Occupational History     Small business start consultant      Social History Main Topics     Smoking status: Never Smoker     Smokeless tobacco: Never Used     Alcohol use 0.6 - 1.2 oz/week     1 - 2 Cans of beer per week      Comment: MONTHLY     Drug use: No     Sexual activity: Yes     Partners: Female     Other Topics Concern     Not on file     Social History Narrative    Previous worked as [employer].    Now consulting part-time.    , lives with wife.    Non-smoker. Occasional drink.        Past surgical history that includes epidydmal mass removal, benign.        Social History:    Previous worked as [employer].    Now consulting part-time.    , lives with wife.    Non-smoker. Occasional drink.         family history includes Lung Cancer in his father; Parkinsonism in his cousin and maternal aunt.            Family History:  Family History   Problem Relation Age of Onset     Lung Cancer Father      Parkinsonism Cousin      paternal side     Parkinsonism Maternal Aunt        Physical Exam:  The patient's  vitals were not taken for this visit.      Neurological Examination:   Last medication dose: 5pm on 7/24/2018  Physical Exam:  General: Resting comfortably   Respiratory: No respiratory distress     Neuro Exam:  Mental status: Alert and oriented to person, place, time, and situation. Follows commands  Speech: Fluent, intact comprehension and articulation  CN: EOMIB, PERRL, facial sensation intact, facial movement symmetric, hearing grossly intact, tongue protrudes midline   Motor: Moves all extremities equally against gravity without difficulty or abnormalities with 5/5 strength, mild rigidity in LUE, rest tremor in both hands, postural tremor in LUE, R foot tremor emerges on distraction   Reflexes (R/L): 2/2 in biceps, brachioradialis, triceps, patellars; no clonus  Sensation: intact to light touch  throughout   Coordination: grossly intact with FTN, HTS  [place]: able to rise from a seated position, decreased R arm swing and step length, no decompensation of turn, no ataxia, decreased ADF of R foot on ambulation, observed R foot to catch on the ground during ambulation     UPDRS Values 7/25/2018   Time: 1:43 PM   Medication Off   R Brain DBS: On   L Brain DBS: On   Speech 0   Facial Expression 0   Rigidity Neck 0   Rigidity RUE 0   Rigidity LUE 1   Rigidity RLE 0   Rigidity LLE 0   Finger Taps R 1   Finger Taps L 0   Hand Mvt R 0   Hand Mvt L 0   Pron-/Supinate R 0   Pron-/Supinate L 0   Toe Tap R 0   Toe Tap L 0   Leg Agility R 0   Leg Agility L 0   Arise From Chair 0   Gait 0   Gait Freezing 0   Postural Stability 0   Posture 0   Global Spont Mvt 0   Postural Tremor RUE 0   Postural Tremor LUE 1   Kinetic Tremor RUE 0   Kinetic Tremor LUE 0   Rest Tremor RUE 1   Rest Tremor LUE 1   Rest Tremor RLE 0   Rest Tremor LLE 0   Rest Tremor Lip/Jaw 0   Rest Tremor Constancy 1   Total Right 2   Total Left 3   Axial Total 0   Total 6       Procedure: DBS Programming     Lead(s):      Left   DBS Target    STN   DBS Lead Type     [employer] - 8 contact lead   Lead Implant Date   12/14/2017       IPG(s):     1   IPG  [place] [employer]     IPG Implant Date    12/22/2017   Location    Left Chest   Battery (V) Charging well         Full Impedence Check: No problems found     Initial Settings::  Left Brain 2-Calm (A1)   Contacts C+;3-   Amplitude (mA)  3.3 (pt increase 0.3)   Pulse Width ( s)  60   Frequency (Hz)  139   Patient Control  0 to 3.5       Right Brain 2-Calm (A2)   Contacts  C+;11-(50);14-(50)    Amplitude (mA)  4.2 (pt increased 0.2)   Pulse Width ( s)  60   Frequency (Hz)  139   Patient Control (+/- V) 0 - 5.0       Initial program selected:    2-Calm     Final Settings:  Left Brain 3-NANASPL* Program B Program C Program D     A1         Contacts  C+, 2-(40); 3-(60)*          Amplitude (mA)   3.3*            Pulse Width ( s)  60            Frequency (Hz)  139                Right Brain 3-NANASPL Program B Program C Program D     A2         Contacts C+;9-(50);14-(50)             Amplitude (mA) 4.0             Pulse Width ( s)  60            Frequency (Hz)  139            Patient Control (min/max) 0 / 5.0  /   /   /     *indicates changes made to settings     Final Program selected:  3-NANASPL to L   Still has 2-Calm as an option.    Programming comments: Contact 3 at 3.3 likely spreads toward corticospinal fibers (causing slight leg dragging sensation and possibly more effortful speech; this resolves when reduced to 3.0mA). Thus tried fractionation of current between contacts 2 and 3. This gave higher side effect thresholds.    Amplitude range: 3.0 to 3.8. When trialed on  C+, 2-(50); 3-(50), patient experienced re-emergence of R hand tremor with improved R arm swing. With C+, 2-(50); 3-(50) and 3.8mA, tremor disappeared, gait improved, increased RLE stride, R arm swing remained improved. 3.8mA with C+, 2-(40); 3-(60), tongue stiffness appeared with dysarthria. 3.5mA with C+, 2-(40); 3-(60), dysarthria and tongue stiffness resolved. 3.8mA with with C+, 2-(40); 3-(60), chin tremor and R hand tremor re-emerged. On 3.3mA with with C+, 2-(40); 3-(60), RLE stride is improved, RUE arm swing is improved, no dysarthria or tongue stiffness, no re-emergence of tremors. de    Impression:  Kwame Lin is a 63 year old right handed male with tremor predominant Parkinson's disease s/p bilateral STN DBS (L STN 12/2017, R STN 5/2018) that is here for follow up regarding Parkinson's disease management.   .    1. Parkinson's disease with a tremor predominance s/p b/l STN DBS (L STN 12/2017, R STN 5/2018)  2. Restless leg syndrome, associated with #1, improved with melatonin  3. Word finding issues: likely associated with untreated DIPIKA  4. DIPIKA, mild: verified via sleep study, CPAP recommended    Recommendations:   > A  "new DBS program \"3-NANASPL\" was programed to help with our right leg draging and speech. Patient settings adjustment was not made. Still has the option to use the prior setting under 2-Calm..  > For RLS, take an extra 0.5 tab carbidopa/levodopa IR 25/100 at 8PM. Stop gabapentin, medically maximize CD/LD  PD Medications                   7:30AM 12PM 4PM 8PM 10:30PM        Carbidopa/levodopa IR 25/100                                           1/2 1/2 1/2 1/2     Carbidopa/levodopa CR 25/100                            1   Clonazepam 0.5mg                               1   Melatonin ER 3 mg     4   Gabapentin 300mg     stop   > For Word-finding issues, treat sleep apnea. Make a follow-up appointment with the sleep doctor to address this. Alternatively, some people notice these symptoms when their PD medications are reduced a bit much. We will keep an eye on this.  > Continue exercising regularly    Time spent with patient: Greater than 50% of this 45 minute visit was spent in counseling and coordination of care related to above issues.     Time spent for separate DBS programmin minutes    RTC: 2018    Patient seen and plan of care discussed with Dr. Borges.    Angie Pugh, DO  Movement Disorders Fellow  [facility]     Neurology Attending Attestation:     I, Clarita Borges, personally saw this patient with our Movement Disorders Fellow and agree with the fellow's findings and plan of care as documented in the movement disorder fellow's note, with my personal summary below. I personally performed salient aspects of the history and neurological examination.     I personally reviewed the vital signs, medications, and labs. I personally viewed the imaging, and agree with the interpretation documented by the fellow.    I personally performed or supervised all procedures.    Time spent with patient: Greater than 50% of this 60 minute visit was spent in counseling and coordination of care " related to the above issues.    Additional time spent for separate DBS programmin minutes      Clarita Borges MD    of Neurology           Answers for HPI/ROS submitted by the patient on 2018   General Symptoms: No  Skin Symptoms: No  HENT Symptoms: No  EYE SYMPTOMS: Yes  HEART SYMPTOMS: No  LUNG SYMPTOMS: No  INTESTINAL SYMPTOMS: No  URINARY SYMPTOMS: No  REPRODUCTIVE SYMPTOMS: Yes  SKELETAL SYMPTOMS: Yes  BLOOD SYMPTOMS: No  NERVOUS SYSTEM SYMPTOMS: Yes  MENTAL HEALTH SYMPTOMS: Yes  Eye pain: No  Vision loss: No  Dry eyes: No  Watery eyes: No  Eye bulging: No  Double vision: No  Flashing of lights: No  Spots: No  Floaters: Yes  Redness: No  Crossed eyes: No  Tunnel Vision: No  Yellowing of eyes: No  Eye irritation: No  Back pain: No  Muscle aches: Yes  Neck pain: No  Swollen joints: Yes  Joint pain: Yes  Bone pain: No  Muscle cramps: No  Muscle weakness: Yes  Joint stiffness: Yes  Bone fracture: No  Trouble with coordination: Yes  Dizziness or trouble with balance: No  Fainting or black-out spells: No  Memory loss: No  Headache: No  Seizures: No  Speech problems: Yes  Tingling: No  Tremor: No  Weakness: Yes  Difficulty walking: Yes  Paralysis: No  Numbness: No  Scrotal pain or swelling: No  Erectile dysfunction: Yes  Penile discharge: No  Genital ulcers: No  Reduced libido: Yes  Nervous or Anxious: Yes  Depression: No  Trouble sleeping: Yes  Trouble thinking or concentrating: Yes  Mood changes: No  Panic attacks: No

## 2018-07-25 NOTE — PATIENT INSTRUCTIONS
"1. DBS - I set up a new DBS program \"3-NANASPL\" to help your right leg drag and speech.   I did not give you the ability to increase or decrease your settings this time.    Your old program \"2-CALM\" is still saved in your device.    2. Restless legs - take an extra 0.5 tab of carbidopa/levodopa IR 25/100 at 8PM. For now will have you stop gabapentin and work with carbidopa/levodopa    PD Medications                   7:30AM 12PM 4PM 8PM 10:30PM        Carbidopa/levodopa IR 25/100                                           1/2 1/2 1/2 1/2     Carbidopa/levodopa CR 25/100                            1   Clonazepam 0.5mg                               1   Melatonin ER 3 mg     4   Gabapentin 300mg     stop     3. Word-finding issues - I recommend you start by treating your sleep apnea. Please make a follow-up appointment with your sleep doctor to address this. Alternatively, some people notice these symptoms when their PD medications are reduced a bit much. We will keep an eye on this.    4. Make sure to continue exercising regularly  "

## 2018-08-07 ENCOUNTER — TELEPHONE (OUTPATIENT)
Dept: NEUROLOGY | Facility: CLINIC | Age: 63
End: 2018-08-07

## 2018-08-07 NOTE — TELEPHONE ENCOUNTER
Received form for Dr. Borges to sign authorizing cpap for patient. Called Deandra at [facility] [phone] and [facility] stating that Dr. Borges is a movement disorder specialist and does not usually order equipment such as this. I asked her if the physician who evaluated the patient for the sleep study, could order this equipment.

## 2018-10-01 ASSESSMENT — ENCOUNTER SYMPTOMS
BACK PAIN: 0
DOUBLE VISION: 0
NUMBNESS: 0
DIZZINESS: 0
HEADACHES: 0
MYALGIAS: 0
STIFFNESS: 1
INSOMNIA: 0
MUSCLE WEAKNESS: 0
PANIC: 0
DECREASED CONCENTRATION: 1
EYE PAIN: 0
TINGLING: 0
TREMORS: 0
DEPRESSION: 0
MEMORY LOSS: 0
WEAKNESS: 0
JOINT SWELLING: 1
MUSCLE CRAMPS: 0
PARALYSIS: 0
LOSS OF CONSCIOUSNESS: 0
EYE REDNESS: 0
NERVOUS/ANXIOUS: 1
SEIZURES: 0
EYE IRRITATION: 0
NECK PAIN: 0
DISTURBANCES IN COORDINATION: 0
EYE WATERING: 0
ARTHRALGIAS: 1
SPEECH CHANGE: 1

## 2018-10-01 ASSESSMENT — MOVEMENT DISORDERS SOCIETY - UNIFIED PARKINSONS DISEASE RATING SCALE (MDS-UPDRS)
FREEZING: NORMAL: NOT AT ALL (NO PROBLEMS).
HOBBIES_AND_OTHER_ACTIVITIES: SLIGHT: I AM A BIT SLOW BUT DO THESE ACTIVITIES EASILY.
DRESSING: NORMAL: NOT AT ALL (NO PROBLEMS).
CHEWING_AND_SWALLOWING: SLIGHT: I AM AWARE OF SLOWNESS IN MY CHEWING OR INCREASED EFFORT AT SWALLOWING, BUT I DO NOT CHOKE OR NEED TO HAVE MY FOOD SPECIALLY PREPARED.
EATING_TASKS: SLIGHT: I AM SLOW, BUT I DO NOT NEED ANY HELP HANDLING MY FOOD AND HAVE NOT HAD FOOD SPILLS WHILE EATING.
TOTAL_SCORE: 10
SALIVA_AND_DROOLING: NORMAL: NOT AT ALL (NO PROBLEMS).
TREMOR: SLIGHT: SHAKING OR TREMOR OCCURS BUT DOES NOT CAUSE PROBLEMS WITH ANY ACTIVITIES.
SPEECH: MILD: MY SPEECH CAUSES PEOPLE TO ASK ME TO OCCASIONALLY REPEAT MYSELF, BUT NOT EVERYDAY.
WALKING_AND_BALANCE: SLIGHT: I AM SLIGHTLY SLOW OR MAY DRAG A LEG.  I NEVER USE A WALKING AID.
GETTING_OUT_OF_BED_CAR_DEEP_CHAIR: SLIGHT: I AM SLOW OR AWKWARD, BUT I USUALLY CAN DO IT ON MY FIRST TRY.
HYGIENE: NORMAL: NOT AT ALL (NO PROBLEMS).
TURNING_IN_BED: SLIGHT: I HAVE A BIT OF TROUBLE TURNING, BUT I DO NOT NEED ANY HELP.
HANDWRITING: SLIGHT: MY WRITING IS SLOW, CLUMSY OR UNEVEN, BUT ALL WORDS ARE CLEAR.

## 2018-10-03 ENCOUNTER — OFFICE VISIT (OUTPATIENT)
Dept: NEUROLOGY | Facility: CLINIC | Age: 63
End: 2018-10-03
Payer: COMMERCIAL

## 2018-10-03 ENCOUNTER — RADIANT APPOINTMENT (OUTPATIENT)
Dept: CT IMAGING | Facility: CLINIC | Age: 63
End: 2018-10-03
Attending: PSYCHIATRY & NEUROLOGY
Payer: COMMERCIAL

## 2018-10-03 VITALS
HEART RATE: 72 BPM | SYSTOLIC BLOOD PRESSURE: 113 MMHG | DIASTOLIC BLOOD PRESSURE: 70 MMHG | OXYGEN SATURATION: 98 % | WEIGHT: 204 LBS | BODY MASS INDEX: 29.27 KG/M2

## 2018-10-03 DIAGNOSIS — R47.9 SPEECH COMPLAINTS: ICD-10-CM

## 2018-10-03 DIAGNOSIS — G20.A1 PARKINSON DISEASE (H): ICD-10-CM

## 2018-10-03 DIAGNOSIS — R26.9 GAIT DIFFICULTY: Primary | ICD-10-CM

## 2018-10-03 DIAGNOSIS — G20.A1 PARKINSON DISEASE (H): Primary | ICD-10-CM

## 2018-10-03 ASSESSMENT — UNIFIED PARKINSONS DISEASE RATING SCALE (UPDRS)
HANDMOVEMENTS_RIGHT: SLIGHT: ANY OF THE FOLLOWING: A) THE REGULAR RHYTHM IS BROKEN WITH ONE WITH ONE OR TWO INTERRUPTIONS OR HESITATIONS OF THE MOVEMENT B) SLIGHT SLOWING C) THE AMPLITUDE DECREMENTS NEAR THE END OF THE 10 MOVEMENTS.
AMPLITUDE_RUE: NORMAL: NO TREMOR.
FACIAL_EXPRESSION: SLIGHT: MINIMAL MASKED FACIES MANIFESTED ONLY BY DECREASED FREQUENCY OF BLINKING.
AMPLITUDE_LUE: NORMAL: NO TREMOR.
FREEZING_GAIT: NORMAL
FINGER_TAPPING_LEFT: SLIGHT: ANY OF THE FOLLOWING: A) THE REGULAR RHYTHM IS BROKEN WITH ONE WITH ONE OR TWO INTERRUPTIONS OR HESITATIONS OF THE MOVEMENT B) SLIGHT SLOWING C) THE AMPLITUDE DECREMENTS NEAR THE END OF THE 10 MOVEMENTS.
TOETAPPING_LEFT: NORMAL
AMPLITUDE_LIP_JAW: SLIGHT: < 1 CM IN MAXIMAL AMPLITUDE.
SPEECH: NORMAL
ARISING_CHAIR: NORMAL: ABLE TO ARISE QUICKLY WITHOUT HESITATION.
TOETAPPING_RIGHT: NORMAL
TOTAL_SCORE: 6
PRONATION_SUPINATION_RIGHT: MILD: ANY OF THE FOLLOWING: A) 3 TO 5 INTERRUPTIONS DURING TAPPING B) MILD SLOWING C) THE AMPLITUDE DECREMENTS MIDWAY IN THE 10-MOVEMENT SEQUENCE
FINGER_TAPPING_RIGHT: SLIGHT: ANY OF THE FOLLOWING: A) THE REGULAR RHYTHM IS BROKEN WITH ONE WITH ONE OR TWO INTERRUPTIONS OR HESITATIONS OF THE MOVEMENT B) SLIGHT SLOWING C) THE AMPLITUDE DECREMENTS NEAR THE END OF THE 10 MOVEMENTS.
PRONATION_SUPINATION_LEFT: MILD: ANY OF THE FOLLOWING: A) 3 TO 5 INTERRUPTIONS DURING TAPPING B) MILD SLOWING C) THE AMPLITUDE DECREMENTS MIDWAY IN THE 10-MOVEMENT SEQUENCE
GAIT: SLIGHT: INDEPENDENT WALKING WITH MINOR GAIT IMPAIRMENT.
RIGIDITY_RLE: NORMAL
POSTURE: 0 NORMAL, NO PROBLEMS
AMPLITUDE_LLE: NORMAL: NO TREMOR.
RIGIDITY_RUE: SLIGHT: RIGIDITY ONLY DETECTED WITH ACTIVATION MANEUVER.
SPONTANEITY_OF_MOVEMENT: 0: NORMAL.  NO PROBLEMS.
RIGIDITY_LUE: SLIGHT: RIGIDITY ONLY DETECTED WITH ACTIVATION MANEUVER.
CONSTANCY_TREMOR_ATREST: NORMAL: NO TREMOR.
LEG_AGILITY_RIGHT: NORMAL
AXIAL_SCORE: 2
LEG_AGILITY_LEFT: NORMAL
TOTAL_SCORE_LEFT: 5
RIGIDITY_NECK: NORMAL
AMPLITUDE_RLE: NORMAL: NO TREMOR.
TOTAL_SCORE: 14
POSTURAL_STABILITY: NORMAL:  RECOVERS WITH ONE OR TWO STEPS.
RIGIDITY_LLE: NORMAL
PARKINSONS_MEDS: OFF
HANDMOVEMENTS_LEFT: NORMAL

## 2018-10-03 ASSESSMENT — PAIN SCALES - GENERAL: PAINLEVEL: NO PAIN (0)

## 2018-10-03 NOTE — PROGRESS NOTES
Department of Neurology  Movement Disorders Division   DBS Follow-up Note    Patient: Kwame Lin  MRN: [id number]   : 1955   Date of Visit: 10/3/2018    Diagnosis: Parkinson disease  DBS Target(s): Bilateral STN   Date(s) of DBS Lead Placement:   Left STN 2017, right STN 2018  Date(s) of IPG Placement:   Left chest 2017      Chief Complaint:  Kwame Lin is a 63 year right-handed man with tremor predominant Parkinson disease with symptom onset in  with right hand tremor status post left STN DBS (2017) and right STN DBS (2018).    Interval History:    Main issues today:  1. Walking - his right leg doesn't want to keep up and seems to be dragging. He may shuffle some at home    2. His wife has noticed some more raspiness in his voice since the second R lead was programmed.    3. He is noticing more difficulty swallowing his pills and drinking water (but not choking). It takes longer than it used to. This has only been present since his 2nd DBS laed was place.    4. He has noticed some cognitive issues: A couple of times he has noticed that he will say or type the wrong word than what he intends to say/type. He is having some paraphasic errors. This may be worse since    He has started CPAP and feels it has been helpful.     Parkinson Disease Motor Symptom Review:    Motor fluctuations:     Dyskinesia:  Duration -  Occasionally in his neck. Disability -  None. More likely to occur when he is talking to others. Also, when he shaves and holds up his fight arm above his shoulder it will wiggle on its own. More likely if her medications are off.   Wearing off: Doesn't notice much during the day. Does notice some restlessness 30 min before his last dose of Sinemet IR    Charging : Once every 2 weeks for 1.5 hours         Review of Systems:  Other than that noted at the end of this note, the remainder of 12 systems reviewed were negative.    Medications:  Current Outpatient  Prescriptions   Medication Sig Dispense Refill     acetaminophen (TYLENOL) 325 MG tablet Take 325-650 mg by mouth nightly as needed for mild pain       carbidopa-levodopa (SINEMET)  MG per tablet Take 1 tablet by mouth every 4 hours (while awake)       carbidopa-levodopa (SINEMET)  MG per tablet 2 tablets At Bedtime Extended release 90 tablet      citalopram (CELEXA) 10 MG tablet Take 10 mg by mouth every morning        clonazePAM (KLONOPIN) 0.5 MG tablet Take 0.5 mg by mouth At Bedtime        Coenzyme Q10 300 MG CAPS Take 300 mg by mouth every morning       diclofenac (VOLTAREN) 1 % GEL topical gel Apply qid prn pain       magnesium 100 MG TABS Take 100 tablets by mouth 2 times daily (before meals)       MELATONIN PO Take 2 mg by mouth At Bedtime       Misc Natural Products (RED WINE EXTRACT) CAPS Take by mouth daily (with lunch)        multivitamin, therapeutic with minerals (MULTI-VITAMIN) TABS tablet Take 1 tablet by mouth every morning       ondansetron (ZOFRAN ODT) 4 MG ODT tab Take 1-2 tablets (4-8 mg) by mouth every 8 hours as needed for nausea 20 tablet 1     Probiotic Product (ACIDOPHILUS/GOAT MILK) CAPS At Bedtime        thyroid (ARMOUR THYROID) 60 MG tablet daily       thyroid (ARMOUR THYROID) 60 MG tablet Take 60 mg by mouth every morning        traMADol (ULTRAM) 50 MG tablet Take 1 tablet (50 mg) by mouth every 6 hours as needed for severe pain 25 tablet 0         PD Medications                   7:30AM 12PM 4PM 8PM 10:30PM        Carbidopa/levodopa IR 25/100                                           1/2 1/2 1/2 1/2      Carbidopa/levodopa CR 25/100                                1   Clonazepam 0.5mg                                   1   Melatonin ER 3 mg         4       Allergies: is allergic to bactrim; codeine; ketorolac; morphine; nalbuphine; nsaids; penicillins; seasonal allergies; sulfa drugs; and toradol.    Past Medical History:  Past Medical History:   Diagnosis Date     Anosmia  10/12/2017     Anxiety      Depressed mood 10/12/2017     Hypothyroid      Parkinson disease (H)      Parkinsons disease (H)      PONV (postoperative nausea and vomiting)      RBD (REM behavioral disorder) 10/12/2017       Past Surgical History:  Past Surgical History:   Procedure Laterality Date     ARTHROSCOPY KNEE Right     twice     ARTHROSCOPY KNEE Right     left     epidydmal mass removal, benign       IMPLANT DEEP BRAIN STIMULATION GENERATOR / BATTERY Left 12/22/2017    Procedure: IMPLANT DEEP BRAIN STIMULATION GENERATOR / BATTERY;  Deep Brain Stimulator Placement, Phase II, Placement Of Deep Brain Stimulator Generator/Battery Over The Left Chest Wall;  Surgeon: Arpan Huynh MD;  Location: [facility] OR     OPTICAL TRACKING SYSTEM INSERTION DEEP BRAIN STIMULATION Left 12/14/2017    Procedure: OPTICAL TRACKING SYSTEM INSERTION DEEP BRAIN STIMULATION;  Stealth Assisted Left Deep Brain Stimulator Placement, Phase I, Placement Of Left Side Deep Brain Stimulator Electrode, Target Left Subthalamic Nucleus With Microelectrode Recording;  Surgeon: Arpan Huynh MD;  Location: [facility] OR     OPTICAL TRACKING SYSTEM INSERTION DEEP BRAIN STIMULATION Right 5/22/2018    Procedure: OPTICAL TRACKING SYSTEM INSERTION DEEP BRAIN STIMULATION;  Stealth Assisted Right Deep Brain Stimulator Placement, Phase I And II Combined, Placement Of Right Deep Brain Stimulator Electrode, Target Right Subthalamic Nucleus With Microelectrode Recording And Connection To Previous Implanted Generator/Battery;  Surgeon: Arpan Huynh MD;  Location: [facility] OR       Social History:  Social History     Social History     Marital status:      Spouse name: N/A     Number of children: N/A     Years of education: N/A     Occupational History     Small business start consultant      Social History Main Topics     Smoking status: Never Smoker     Smokeless tobacco: Never Used     Alcohol use 0.6 - 1.2 oz/week     1 - 2 Cans of beer  per week      Comment: MONTHLY     Drug use: No     Sexual activity: Yes     Partners: Female     Other Topics Concern     None     Social History Narrative    Previous worked as [employer].    Now consulting part-time.    , lives with wife.    Non-smoker. Occasional drink.        Past surgical history that includes epidydmal mass removal, benign.        Social History:    Previous worked as [employer].    Now consulting part-time.    , lives with wife.    Non-smoker. Occasional drink.         family history includes Lung Cancer in his father; Parkinsonism in his cousin and maternal aunt.            Family History:  Family History   Problem Relation Age of Onset     Lung Cancer Father      Parkinsonism Cousin      paternal side     Parkinsonism Maternal Aunt        Physical Exam:  The patient's  weight is 92.5 kg (204 lb). His blood pressure is 113/70 and his pulse is 72. His oxygen saturation is 98%.      Incisions: Well-healed.     Neurological Examination:   He is alert and oriented and has fluent speech without dysarthria and is able to provide an interval medical history  Extraocular movements are full. He has normal smooth pursuits and saccades. His face is symmetric with equal activation.     UPDRS Values 10/3/2018   Time: 3:26 PM   Medication Off   R Brain DBS: On   L Brain DBS: On   Speech 0   Facial Expression 1   Rigidity Neck 0   Rigidity RUE 1   Rigidity LUE 1   Rigidity RLE 0   Rigidity LLE 0   Finger Taps R 1   Finger Taps L 1   Hand Mvt R 1   Hand Mvt L 0   Pron-/Supinate R 2   Pron-/Supinate L 2   Toe Tap R 0   Toe Tap L 0   Leg Agility R 0   Leg Agility L 0   Arise From Chair 0   Gait 1   Gait Freezing 0   Postural Stability 0   Posture 0   Global Spont Mvt 0   Postural Tremor RUE 1   Postural Tremor LUE 1   Kinetic Tremor RUE 0   Kinetic Tremor LUE 0   Rest Tremor RUE 0   Rest Tremor LUE 0   Rest  Tremor RLE 0   Rest Tremor LLE 0   Rest Tremor Lip/Jaw 1   Rest Tremor Constancy 0   Total Right 6   Total Left 5   Axial Total 2   Total 14         Procedure: DBS Programming    Lead(s):    Left Right   DBS Target    STN STN      DBS Lead Type [employer]ercise     Lead Implant Date 12/14/17 5/22/18         IPG(s):   1 2   IPG [employer] Vercise          IPG Implant Date 12/22/17          Location Left chest          Battery (V) Charging well         Full Impedence/Currents Check: No problems.    Initial Settings:  Left Brain 3-NANASPL  Program C Program D   Contacts  C+;2-(40%);3-(60%)         Amplitude (mA) 3.3         Pulse Width ( s) 60         Frequency (Hz) 139           Right Brain 3-NANASPL   Program C Program D   Contacts C+;11-(50%);14-(50%)          Amplitude (mA)  4.0         Pulse Width ( s)  60         Frequency (Hz)  139           Initial program selected:    3-NANASPL    Final Settings:  Left Brain 4-Bear   Program C Program D   Contacts  C+;3-         Amplitude (mA)  2.8         Pulse Width ( s)  60         Frequency (Hz)  139         Patient Control (min/max) 2.5/3.3  /   /   /      Right Brain 4-Bear   Program C Program D   Contacts C+;11-(60%);14-(40%)          Amplitude (mA)  4.0         Pulse Width ( s)  60         Frequency (Hz)  139         Patient Control (min/max) 3.3/4.0   /   /   /      Final Program selected:    4-Bear       Impression:  Kwame Lin is a 63 year right-handed man with tremor predominant Parkinson disease with symptom onset in 2007 with right hand tremor status post left STN DBS (12/2017) and right STN DBS (5/2018). He has noticed increased difficulty and right leg dragging since his R STN DBS. He has also noticed some more speech issues (not really noticeable on exam). Today I discovered that we did do programming of the left STN lead on the same day we initially programmed the right STN lead, which likely explains the R leg  "symptoms he was noticing this. We adjusted his DBS with improvement on exam in clinic today. We also addressed speech and medications.    Recommendations:   1. I set up a new program, \"4-Bear\" to help your walking and speech.    I changed your left body slightly (simply shifted percentages on contacts from 50/50 to 60/40) to help your speech. You are still at 4.0mA.    I changed your right body to help your right foot dragging. I changed it to a single contact and turned it down from 3.3 to 2.8 mA. If you notice return of your R-sided tremor you can increase this by 1-2 clicks as needed.    2. Medications - give it a week on this new setting before changing your medications. After 1 week you can then increase your Sinemet IR 25/100 to 1 tab per dose as follows:         PD Medications                   7:30AM 12PM 4PM 8PM 10:30PM        Carbidopa/levodopa IR 25/100                                           1 1 1 1      Carbidopa/levodopa CR 25/100                                1   Clonazepam 0.5mg                                   1   Melatonin ER 3 mg         4     2. Follow-up in 3 months or earlier if needed    Time spent with patient: Greater than 50% of this 45 minute visit was spent in counseling and coordination of care related to the above issues.    Time spent for separate DBS programmin minutes    Clarita Borges MD    of Neurology           Answers for HPI/ROS submitted by the patient on 10/1/2018   General Symptoms: No  Skin Symptoms: No  HENT Symptoms: No  EYE SYMPTOMS: Yes  HEART SYMPTOMS: No  LUNG SYMPTOMS: No  INTESTINAL SYMPTOMS: No  URINARY SYMPTOMS: No  REPRODUCTIVE SYMPTOMS: Yes  SKELETAL SYMPTOMS: Yes  BLOOD SYMPTOMS: No  NERVOUS SYSTEM SYMPTOMS: Yes  MENTAL HEALTH SYMPTOMS: Yes  Eye pain: No  Vision loss: No  Dry eyes: No  Watery eyes: No  Eye bulging: No  Double vision: No  Flashing of lights: No  Spots: No  Floaters: Yes  Redness: No  Crossed eyes: No  Tunnel " Vision: No  Yellowing of eyes: No  Eye irritation: No  Back pain: No  Muscle aches: No  Neck pain: No  Swollen joints: Yes  Joint pain: Yes  Bone pain: No  Muscle cramps: No  Muscle weakness: No  Joint stiffness: Yes  Bone fracture: No  Trouble with coordination: No  Dizziness or trouble with balance: No  Fainting or black-out spells: No  Memory loss: No  Headache: No  Seizures: No  Speech problems: Yes  Tingling: No  Tremor: No  Weakness: No  Difficulty walking: Yes  Paralysis: No  Numbness: No  Scrotal pain or swelling: No  Erectile dysfunction: Yes  Penile discharge: No  Genital ulcers: No  Reduced libido: Yes  Nervous or Anxious: Yes  Depression: No  Trouble sleeping: No  Trouble thinking or concentrating: Yes  Mood changes: No  Panic attacks: No

## 2018-10-03 NOTE — PATIENT INSTRUCTIONS
"1. I set up a new program, \"4-Bear\" to help your walking and speech.    I changed your left body slightly (simply shifted percentages on contacts from 50/50 to 60/40) to help your speech. You are still at 4.0mA.    I changed your right body to help your right foot dragging. I changed it to a single contact and turned it down from 3.3 to 2.8 mA. If you notice return of your R-sided tremor you can increase this by 1-2 clicks as needed.    2. Medications - give it a week on this new setting before changing your medications. After 1 week you can then increase your Sinemet IR 25/100 to 1 tab per dose as follows:         PD Medications                   7:30AM 12PM 4PM 8PM 10:30PM        Carbidopa/levodopa IR 25/100                                           1 1 1 1      Carbidopa/levodopa CR 25/100                                1   Clonazepam 0.5mg                                   1   Melatonin ER 3 mg         4     2. Follow-up in 3 months or earlier if needed    "

## 2018-10-03 NOTE — LETTER
10/3/2018       RE: Kwame Lin  [address] [place] [address]     Dear Colleague,    Thank you for referring your patient, Kwame Lin, to the [facility] NEUROLOGY at [facility]. Please see a copy of my visit note below.    Department of Neurology  Movement Disorders Division   DBS Follow-up Note    Patient: Kwame Lin  MRN: [id number]   : 1955   Date of Visit: 10/3/2018    Diagnosis: Parkinson disease  DBS Target(s): Bilateral STN   Date(s) of DBS Lead Placement:   Left STN 2017, right STN 2018  Date(s) of IPG Placement:   Left chest 2017      Chief Complaint:  Kwame Lin is a 63 year right-handed man with tremor predominant Parkinson disease with symptom onset in  with right hand tremor status post left STN DBS (2017) and right STN DBS (2018).    Interval History:    Main issues today:  1. Walking - his right leg doesn't want to keep up and seems to be dragging. He may shuffle some at home    2. His wife has noticed some more raspiness in his voice since the second R lead was programmed.    3. He is noticing more difficulty swallowing his pills and drinking water (but not choking). It takes longer than it used to. This has only been present since his 2nd DBS laed was place.    4. He has noticed some cognitive issues: A couple of times he has noticed that he will say or type the wrong word than what he intends to say/type. He is having some paraphasic errors. This may be worse since    He has started CPAP and feels it has been helpful.     Parkinson Disease Motor Symptom Review:    Motor fluctuations:     Dyskinesia:  Duration -  Occasionally in his neck. Disability -  None. More likely to occur when he is talking to others. Also, when he shaves and holds up his fight arm above his shoulder it will wiggle on its own. More likely if her medications are off.   Wearing off: Doesn't notice much during the day. Does  notice some restlessness 30 min before his last dose of Sinemet IR    Charging : Once every 2 weeks for 1.5 hours         Review of Systems:  Other than that noted at the end of this note, the remainder of 12 systems reviewed were negative.    Medications:  Current Outpatient Prescriptions   Medication Sig Dispense Refill     acetaminophen (TYLENOL) 325 MG tablet Take 325-650 mg by mouth nightly as needed for mild pain       carbidopa-levodopa (SINEMET)  MG per tablet Take 1 tablet by mouth every 4 hours (while awake)       carbidopa-levodopa (SINEMET)  MG per tablet 2 tablets At Bedtime Extended release 90 tablet      citalopram (CELEXA) 10 MG tablet Take 10 mg by mouth every morning        clonazePAM (KLONOPIN) 0.5 MG tablet Take 0.5 mg by mouth At Bedtime        Coenzyme Q10 300 MG CAPS Take 300 mg by mouth every morning       diclofenac (VOLTAREN) 1 % GEL topical gel Apply qid prn pain       magnesium 100 MG TABS Take 100 tablets by mouth 2 times daily (before meals)       MELATONIN PO Take 2 mg by mouth At Bedtime       Misc Natural Products (RED WINE EXTRACT) CAPS Take by mouth daily (with lunch)        multivitamin, therapeutic with minerals (MULTI-VITAMIN) TABS tablet Take 1 tablet by mouth every morning       ondansetron (ZOFRAN ODT) 4 MG ODT tab Take 1-2 tablets (4-8 mg) by mouth every 8 hours as needed for nausea 20 tablet 1     Probiotic Product (ACIDOPHILUS/GOAT MILK) CAPS At Bedtime        thyroid (ARMOUR THYROID) 60 MG tablet daily       thyroid (ARMOUR THYROID) 60 MG tablet Take 60 mg by mouth every morning        traMADol (ULTRAM) 50 MG tablet Take 1 tablet (50 mg) by mouth every 6 hours as needed for severe pain 25 tablet 0         PD Medications                   7:30AM 12PM 4PM 8PM 10:30PM        Carbidopa/levodopa IR 25/100                                           1/2 1/2 1/2 1/2      Carbidopa/levodopa CR 25/100                                1   Clonazepam 0.5mg                                    1   Melatonin ER 3 mg         4       Allergies: is allergic to bactrim; codeine; ketorolac; morphine; nalbuphine; nsaids; penicillins; seasonal allergies; sulfa drugs; and toradol.    Past Medical History:  Past Medical History:   Diagnosis Date     Anosmia 10/12/2017     Anxiety      Depressed mood 10/12/2017     Hypothyroid      Parkinson disease (H)      Parkinsons disease (H)      PONV (postoperative nausea and vomiting)      RBD (REM behavioral disorder) 10/12/2017       Past Surgical History:  Past Surgical History:   Procedure Laterality Date     ARTHROSCOPY KNEE Right     twice     ARTHROSCOPY KNEE Right     left     epidydmal mass removal, benign       IMPLANT DEEP BRAIN STIMULATION GENERATOR / BATTERY Left 12/22/2017    Procedure: IMPLANT DEEP BRAIN STIMULATION GENERATOR / BATTERY;  Deep Brain Stimulator Placement, Phase II, Placement Of Deep Brain Stimulator Generator/Battery Over The Left Chest Wall;  Surgeon: Arpan Huynh MD;  Location: [facility] OR     OPTICAL TRACKING SYSTEM INSERTION DEEP BRAIN STIMULATION Left 12/14/2017    Procedure: OPTICAL TRACKING SYSTEM INSERTION DEEP BRAIN STIMULATION;  Stealth Assisted Left Deep Brain Stimulator Placement, Phase I, Placement Of Left Side Deep Brain Stimulator Electrode, Target Left Subthalamic Nucleus With Microelectrode Recording;  Surgeon: Arpan Huynh MD;  Location: [facility] OR     OPTICAL TRACKING SYSTEM INSERTION DEEP BRAIN STIMULATION Right 5/22/2018    Procedure: OPTICAL TRACKING SYSTEM INSERTION DEEP BRAIN STIMULATION;  Stealth Assisted Right Deep Brain Stimulator Placement, Phase I And II Combined, Placement Of Right Deep Brain Stimulator Electrode, Target Right Subthalamic Nucleus With Microelectrode Recording And Connection To Previous Implanted Generator/Battery;  Surgeon: Arpan Huynh MD;  Location: [facility] OR       Social History:  Social History     Social History     Marital status:      Spouse name: N/A      Number of children: N/A     Years of education: N/A     Occupational History     Small business start consultant      Social History Main Topics     Smoking status: Never Smoker     Smokeless tobacco: Never Used     Alcohol use 0.6 - 1.2 oz/week     1 - 2 Cans of beer per week      Comment: MONTHLY     Drug use: No     Sexual activity: Yes     Partners: Female     Other Topics Concern     None     Social History Narrative    Previous worked as [employer].    Now consulting part-time.    , lives with wife.    Non-smoker. Occasional drink.        Past surgical history that includes epidydmal mass removal, benign.        Social History:    Previous worked as [employer].    Now consulting part-time.    , lives with wife.    Non-smoker. Occasional drink.         family history includes Lung Cancer in his father; Parkinsonism in his cousin and maternal aunt.            Family History:  Family History   Problem Relation Age of Onset     Lung Cancer Father      Parkinsonism Cousin      paternal side     Parkinsonism Maternal Aunt        Physical Exam:  The patient's  weight is 92.5 kg (204 lb). His blood pressure is 113/70 and his pulse is 72. His oxygen saturation is 98%.      Incisions: Well-healed.     Neurological Examination:   He is alert and oriented and has fluent speech without dysarthria and is able to provide an interval medical history  Extraocular movements are full. He has normal smooth pursuits and saccades. His face is symmetric with equal activation.     UPDRS Values 10/3/2018   Time: 3:26 PM   Medication Off   R Brain DBS: On   L Brain DBS: On   Speech 0   Facial Expression 1   Rigidity Neck 0   Rigidity RUE 1   Rigidity LUE 1   Rigidity RLE 0   Rigidity LLE 0   Finger Taps R 1   Finger Taps L 1   Hand Mvt R 1   Hand Mvt L 0   Pron-/Supinate R 2   Pron-/Supinate L 2   Toe Tap R 0   Toe Tap L 0   Leg  Agility R 0   Leg Agility L 0   Arise From Chair 0   Gait 1   Gait Freezing 0   Postural Stability 0   Posture 0   Global Spont Mvt 0   Postural Tremor RUE 1   Postural Tremor LUE 1   Kinetic Tremor RUE 0   Kinetic Tremor LUE 0   Rest Tremor RUE 0   Rest Tremor LUE 0   Rest Tremor RLE 0   Rest Tremor LLE 0   Rest Tremor Lip/Jaw 1   Rest Tremor Constancy 0   Total Right 6   Total Left 5   Axial Total 2   Total 14         Procedure: DBS Programming    Lead(s):    Left Right   DBS Target    STN STN      DBS Lead Type [employer] Vercise [employer] Vercise     Lead Implant Date 12/14/17 5/22/18         IPG(s):   1 2   IPG [employer] Vercise          IPG Implant Date 12/22/17          Location Left chest          Battery (V) Charging well         Full Impedence/Currents Check: No problems.    Initial Settings:  Left Brain 3-NANASPL  Program C Program D   Contacts  C+;2-(40%);3-(60%)         Amplitude (mA) 3.3         Pulse Width ( s) 60         Frequency (Hz) 139           Right Brain 3-NANASPL   Program C Program D   Contacts C+;11-(50%);14-(50%)          Amplitude (mA)  4.0         Pulse Width ( s)  60         Frequency (Hz)  139           Initial program selected:    3-NANASPL    Final Settings:  Left Brain 4-Bear   Program C Program D   Contacts  C+;3-         Amplitude (mA)  2.8         Pulse Width ( s)  60         Frequency (Hz)  139         Patient Control (min/max) 2.5/3.3  /   /   /      Right Brain 4-Bear   Program C Program D   Contacts C+;11-(60%);14-(40%)          Amplitude (mA)  4.0         Pulse Width ( s)  60         Frequency (Hz)  139         Patient Control (min/max) 3.3/4.0   /   /   /      Final Program selected:    4-Bear       Impression:  Kwame Lin is a 63 year right-handed man with tremor predominant Parkinson disease with symptom onset in 2007 with right hand tremor status post left STN DBS (12/2017) and right STN DBS (5/2018). He has noticed increased difficulty and  "right leg dragging since his R STN DBS. He has also noticed some more speech issues (not really noticeable on exam). Today I discovered that we did do programming of the left STN lead on the same day we initially programmed the right STN lead, which likely explains the R leg symptoms he was noticing this. We adjusted his DBS with improvement on exam in clinic today. We also addressed speech and medications.    Recommendations:   1. I set up a new program, \"4-Bear\" to help your walking and speech.    I changed your left body slightly (simply shifted percentages on contacts from 50/50 to 60/40) to help your speech. You are still at 4.0mA.    I changed your right body to help your right foot dragging. I changed it to a single contact and turned it down from 3.3 to 2.8 mA. If you notice return of your R-sided tremor you can increase this by 1-2 clicks as needed.    2. Medications - give it a week on this new setting before changing your medications. After 1 week you can then increase your Sinemet IR 25/100 to 1 tab per dose as follows:         PD Medications                   7:30AM 12PM 4PM 8PM 10:30PM        Carbidopa/levodopa IR 25/100                                           1 1 1 1      Carbidopa/levodopa CR 25/100                                1   Clonazepam 0.5mg                                   1   Melatonin ER 3 mg         4     2. Follow-up in 3 months or earlier if needed    Time spent with patient: Greater than 50% of this 45 minute visit was spent in counseling and coordination of care related to the above issues.    Time spent for separate DBS programmin minutes      Again, thank you for allowing me to participate in the care of your patient.      Sincerely,    Clarita Borges MD      "

## 2018-10-03 NOTE — MR AVS SNAPSHOT
"              After Visit Summary   10/3/2018    Kwame Lin    MRN: [id number]           Patient Information     Date Of Birth          1955        Visit Information        Provider Department      10/3/2018 3:00 PM Clarita Borges MD [facility] Neurology        Today's Diagnoses     Parkinson disease (H)          Care Instructions    1. I set up a new program, \"4-Bear\" to help your walking and speech.    I changed your left body slightly (simply shifted percentages on contacts from 50/50 to 60/40) to help your speech. You are still at 4.0mA.    I changed your right body to help your right foot dragging. I changed it to a single contact and turned it down from 3.3 to 2.8 mA. If you notice return of your R-sided tremor you can increase this by 1-2 clicks as needed.    2. Medications - give it a week on this new setting before changing your medications. After 1 week you can then increase your Sinemet IR 25/100 to 1 tab per dose as follows:         PD Medications                   7:30AM 12PM 4PM 8PM 10:30PM        Carbidopa/levodopa IR 25/100                                           1 1 1 1      Carbidopa/levodopa CR 25/100                                1   Clonazepam 0.5mg                                   1   Melatonin ER 3 mg         4     2. Follow-up in 3 months or earlier if needed            Follow-ups after your visit        Follow-up notes from your care team     Return in about 2 months (around 12/3/2018), or RV DBS 1 hr.      Your next 10 appointments already scheduled     Oct 03, 2018  8:00 PM CDT   CT HEAD W/O CONTRAST with UCCT2   [facility] ([facility])    [address] [address] [phone]           How do I prepare for my exam? (Food and drink instructions) No Food and Drink Restrictions.  How do I prepare for my exam? (Other instructions) You do not need to do anything special to prepare for this " exam. For a sinus scan: Use your nose spray (nasal decongestant spray) as directed.  What should I wear: Please wear loose clothing, such as a sweat suit or jogging clothes. Avoid snaps, zippers and other metal. We may ask you to undress and put on a hospital gown.  How long does the exam take: Most scans take less than 20 minutes.  What should I bring: Please bring any scans or X-rays taken at other hospitals, if similar tests were done. Also bring a list of your medicines, including vitamins, minerals and over-the-counter drugs. It is safest to leave personal items at home.  Do I need a : No  is needed.  What do I need to tell my doctor? Be sure to tell your doctor: * If you have any allergies. * If there s any chance you are pregnant. * If you are breastfeeding.  What should I do after the exam: No restrictions, You may resume normal activities.  What is this test: A CT (computed tomography) scan is a series of pictures that allows us to look inside your body. The scanner creates images of the body in cross sections, much like slices of bread. This helps us see any problems more clearly.  Who should I call with questions: If you have any questions, please call the Imaging Department where you will have your exam. Directions, parking instructions, and other information is available on our website, [email or web address].            Dec 05, 2018 12:00 PM CST   (Arrive by 11:45 AM)   Return Movement Disorder with Clarita Borges MD   [facility] ([facility])    [address] [place] [address] [phone]              Who to contact     Please call your clinic at [phone] to:    Ask questions about your health    Make or cancel appointments    Discuss your medicines    Learn about your test results    Speak to your doctor            Additional Information About Your Visit        MyChart Information     [employer] gives you secure  access to your electronic health record. If you see a primary care provider, you can also send messages to your care team and make appointments. If you have questions, please call your primary care clinic.  If you do not have a primary care provider, please call [phone] and they will assist you.      [employer] is an electronic gateway that provides easy, online access to your medical records. With [employer], you can request a clinic appointment, read your test results, renew a prescription or communicate with your care team.     To access your existing account, please contact your [facility] or call [phone] for assistance.        Care EveryWhere ID     This is your Care EveryWhere ID. This could be used by other organizations to access your [facility] medical records  [id number]        Your Vitals Were     Pulse Pulse Oximetry BMI (Body Mass Index)             72 98% 29.27 kg/m2          Blood Pressure from Last 3 Encounters:   10/03/18 113/70   07/25/18 118/64   06/28/18 125/69    Weight from Last 3 Encounters:   10/03/18 92.5 kg (204 lb)   07/25/18 91.2 kg (201 lb)   06/28/18 95.5 kg (210 lb 8 oz)              We Performed the Following     CT Head w/o Contrast        Primary Care Provider Office Phone # Fax #    Silvina Armenta [phone]       [facility] [address] [facility] [address]        Equal Access to Services     [facility] : Hadii aad ku hadasho Soomaali, waaxda luqadaha, qaybta kaalmada adeegyada, bebe dias . So [facility] [phone].    ATENCIÓN: Si habla español, tiene a mcrae disposición servicios gratuitos de asistencia lingüística. Llame al [phone].    We comply with applicable federal civil rights laws and [place] laws. We do not discriminate on the basis of race, color, national origin, age, disability, sex, sexual orientation, or gender identity.            Thank you!     Thank you  for choosing [facility] NEUROLOGY  for your care. Our goal is always to provide you with excellent care. Hearing back from our patients is one way we can continue to improve our services. Please take a few minutes to complete the written survey that you may receive in the mail after your visit with us. Thank you!             Your Updated Medication List - Protect others around you: Learn how to safely use, store and throw away your medicines at www.disposemymeds.org.          This list is accurate as of 10/3/18  4:26 PM.  Always use your most recent med list.                   Brand Name Dispense Instructions for use Diagnosis    Acidophilus/Goat Milk Caps      At Bedtime        * ARMOUR THYROID 60 MG tablet   Generic drug:  thyroid      Take 60 mg by mouth every morning        * ARMOUR THYROID 60 MG tablet   Generic drug:  thyroid      daily        * SINEMET  MG per tablet   Generic drug:  carbidopa-levodopa      Take 1 tablet by mouth every 4 hours (while awake)        * carbidopa-levodopa  MG per tablet    SINEMET    90 tablet    2 tablets At Bedtime Extended release        citalopram 10 MG tablet    celeXA     Take 10 mg by mouth every morning        clonazePAM 0.5 MG tablet    klonoPIN     Take 0.5 mg by mouth At Bedtime        Coenzyme Q10 300 MG Caps      Take 300 mg by mouth every morning        diclofenac 1 % Gel topical gel    VOLTAREN     Apply qid prn pain        magnesium 100 MG Tabs      Take 100 tablets by mouth 2 times daily (before meals)        MELATONIN PO      Take 2 mg by mouth At Bedtime        Multi-vitamin Tabs tablet      Take 1 tablet by mouth every morning        ondansetron 4 MG ODT tab    ZOFRAN ODT    20 tablet    Take 1-2 tablets (4-8 mg) by mouth every 8 hours as needed for nausea    Parkinson disease (H)       Red Wine Extract Caps      Take by mouth daily (with lunch)        traMADol 50 MG tablet    ULTRAM    25 tablet    Take 1 tablet (50 mg) by mouth every 6 hours as  needed for severe pain    Parkinson disease (H)       TYLENOL 325 MG tablet   Generic drug:  acetaminophen      Take 325-650 mg by mouth nightly as needed for mild pain        * Notice:  This list has 4 medication(s) that are the same as other medications prescribed for you. Read the directions carefully, and ask your doctor or other care provider to review them with you.

## 2018-10-30 DIAGNOSIS — G20.A1 PARKINSON DISEASE (H): Primary | ICD-10-CM

## 2018-11-02 RX ORDER — CARBIDOPA AND LEVODOPA 25; 100 MG/1; MG/1
1 TABLET ORAL 4 TIMES DAILY
Qty: 360 TABLET | Refills: 3 | Status: SHIPPED | OUTPATIENT
Start: 2018-11-02 | End: 2019-10-11

## 2018-11-06 PROBLEM — R26.9 GAIT DIFFICULTY: Status: ACTIVE | Noted: 2018-11-06

## 2018-11-14 ENCOUNTER — PATIENT OUTREACH (OUTPATIENT)
Dept: NEUROSURGERY | Facility: CLINIC | Age: 63
End: 2018-11-14

## 2018-11-14 NOTE — PROGRESS NOTES
Pt has hx of bilateral deep brain stimulation for treatment of Parkinson's disease. DORINDA Weaver from [facility] called for information re pt's DBS system, as he is considering outpatient total knee replacement. Faxed operative reports and recent neurology note as requested to [phone] and referred RN to [facility] if they need further records. Advised that pt has a [employer] card with his stimulator information; there is a phone number on the back of the card that his surgeon can call for specific safety information, including what type of cautery can be used. I let her know that pt has his own patient  so he can turn his DBS off before surgery and on afterwards. No further questions at this time.

## 2018-12-05 ENCOUNTER — OFFICE VISIT (OUTPATIENT)
Dept: NEUROLOGY | Facility: CLINIC | Age: 63
End: 2018-12-05
Payer: COMMERCIAL

## 2018-12-05 VITALS
OXYGEN SATURATION: 94 % | HEART RATE: 65 BPM | SYSTOLIC BLOOD PRESSURE: 115 MMHG | HEIGHT: 69 IN | TEMPERATURE: 98.2 F | BODY MASS INDEX: 29.28 KG/M2 | WEIGHT: 197.7 LBS | RESPIRATION RATE: 18 BRPM | DIASTOLIC BLOOD PRESSURE: 74 MMHG

## 2018-12-05 DIAGNOSIS — G47.52 RBD (REM BEHAVIORAL DISORDER): ICD-10-CM

## 2018-12-05 DIAGNOSIS — G20.A1 PARKINSON DISEASE (H): Primary | ICD-10-CM

## 2018-12-05 PROBLEM — K59.00 CONSTIPATION: Status: RESOLVED | Noted: 2017-10-12 | Resolved: 2018-12-05

## 2018-12-05 PROBLEM — G47.33 OSA (OBSTRUCTIVE SLEEP APNEA): Status: ACTIVE | Noted: 2018-08-17

## 2018-12-05 PROBLEM — R43.0 ANOSMIA: Status: RESOLVED | Noted: 2017-10-12 | Resolved: 2018-12-05

## 2018-12-05 PROBLEM — R37 SEXUAL DYSFUNCTION: Status: ACTIVE | Noted: 2018-11-05

## 2018-12-05 ASSESSMENT — PAIN SCALES - GENERAL: PAINLEVEL: NO PAIN (0)

## 2018-12-05 ASSESSMENT — UNIFIED PARKINSONS DISEASE RATING SCALE (UPDRS)
FINGER_TAPPING_RIGHT: NORMAL
LEG_AGILITY_LEFT: SLIGHT: ANY OF THE FOLLOWING: A) THE REGULAR RHYTHM IS BROKEN WITH ONE WITH ONE OR TWO INTERRUPTIONS OR HESITATIONS OF THE MOVEMENT B) SLIGHT SLOWING C) THE AMPLITUDE DECREMENTS NEAR THE END OF THE 10 MOVEMENTS.
AMPLITUDE_LUE: SLIGHT: < 1 CM IN MAXIMAL AMPLITUDE.
HANDMOVEMENTS_LEFT: NORMAL
TOTAL_SCORE: 10
GAIT: NORMAL
ARISING_CHAIR: NORMAL: ABLE TO ARISE QUICKLY WITHOUT HESITATION.
PRONATION_SUPINATION_LEFT: SLIGHT: ANY OF THE FOLLOWING: A) THE REGULAR RHYTHM IS BROKEN WITH ONE WITH ONE OR TWO INTERRUPTIONS OR HESITATIONS OF THE MOVEMENT B) SLIGHT SLOWING C) THE AMPLITUDE DECREMENTS NEAR THE END OF THE 10 MOVEMENTS.
POSTURE: 0 NORMAL, NO PROBLEMS
RIGIDITY_LLE: NORMAL
CONSTANCY_TREMOR_ATREST: SLIGHT: TREMOR AT REST IS PRESENT  25% OF THE ENTIRE EXAMINATION PERIOD.
SPEECH: NORMAL
FREEZING_GAIT: NORMAL
RIGIDITY_LUE: SLIGHT: RIGIDITY ONLY DETECTED WITH ACTIVATION MANEUVER.
HANDMOVEMENTS_RIGHT: NORMAL
AMPLITUDE_RUE: SLIGHT: < 1 CM IN MAXIMAL AMPLITUDE.
FACIAL_EXPRESSION: NORMAL.
AMPLITUDE_LLE: NORMAL: NO TREMOR.
FINGER_TAPPING_LEFT: SLIGHT: ANY OF THE FOLLOWING: A) THE REGULAR RHYTHM IS BROKEN WITH ONE WITH ONE OR TWO INTERRUPTIONS OR HESITATIONS OF THE MOVEMENT B) SLIGHT SLOWING C) THE AMPLITUDE DECREMENTS NEAR THE END OF THE 10 MOVEMENTS.
RIGIDITY_RUE: NORMAL
TOTAL_SCORE_LEFT: 8
RIGIDITY_NECK: NORMAL
LEG_AGILITY_RIGHT: NORMAL
AMPLITUDE_LIP_JAW: NORMAL: NO TREMOR.
TOETAPPING_RIGHT: NORMAL
TOTAL_SCORE: 1
RIGIDITY_RLE: NORMAL
PARKINSONS_MEDS: ON
SPONTANEITY_OF_MOVEMENT: 0: NORMAL.  NO PROBLEMS.
PRONATION_SUPINATION_RIGHT: NORMAL
POSTURAL_STABILITY: NORMAL:  RECOVERS WITH ONE OR TWO STEPS.
AMPLITUDE_RLE: NORMAL: NO TREMOR.
AXIAL_SCORE: 0
TOETAPPING_LEFT: SLIGHT: ANY OF THE FOLLOWING: A) THE REGULAR RHYTHM IS BROKEN WITH ONE WITH ONE OR TWO INTERRUPTIONS OR HESITATIONS OF THE MOVEMENT B) SLIGHT SLOWING C) THE AMPLITUDE DECREMENTS NEAR THE END OF THE 10 MOVEMENTS.

## 2018-12-05 NOTE — MR AVS SNAPSHOT
After Visit Summary   12/5/2018    Kwame Lin    MRN: [id number]           Patient Information     Date Of Birth          1955        Visit Information        Provider Department      12/5/2018 11:30 AM Jarret Judge MD [facility]        Care Instructions    Plan:  - To help with paradoxical dyskinesias early in the morning, we recommend taking CD/LD 25/100mg 1 tab at 2:30am.   - For dream enactment after your surgery, we recommend taking clonazepam 0.5mg 2 tabs at night  - Follow the medication regimen below:     PD Medications                   7:30AM 12PM 4PM 8PM 10:30PM 2:30AM        Carbidopa/levodopa IR 25/100                                           1 1 1 1  1      Carbidopa/levodopa CR 25/100                                   1     Clonazepam 0.5mg                                       2    Melatonin ER 3 mg             4      We will call you regarding a follow up with Dr. Borges.             Follow-ups after your visit        Who to contact     Please call your clinic at [phone] to:    Ask questions about your health    Make or cancel appointments    Discuss your medicines    Learn about your test results    Speak to your doctor            Additional Information About Your Visit        [employer] Information     [employer] gives you secure access to your electronic health record. If you see a primary care provider, you can also send messages to your care team and make appointments. If you have questions, please call your primary care clinic.  If you do not have a primary care provider, please call [phone] and they will assist you.      [employer] is an electronic gateway that provides easy, online access to your medical records. With [employer], you can request a clinic appointment, read your test results, renew a prescription or communicate with your care team.     To access your existing account, please contact your [facility] or call  "[phone] for assistance.        Care EveryWhere ID     This is your Care EveryWhere ID. This could be used by other organizations to access your [place] medical records  [id number]        Your Vitals Were     Pulse Temperature Respirations Height Pulse Oximetry BMI (Body Mass Index)    65 98.2  F (36.8  C) (Oral) 18 1.753 m (5' 9\") 94% 29.2 kg/m2       Blood Pressure from Last 3 Encounters:   12/05/18 115/74   10/03/18 113/70   07/25/18 118/64    Weight from Last 3 Encounters:   12/05/18 89.7 kg (197 lb 11.2 oz)   10/03/18 92.5 kg (204 lb)   07/25/18 91.2 kg (201 lb)              Today, you had the following     No orders found for display       Primary Care Provider Office Phone # Fax #    Silvina Armenta [phone]       [facility] [address] [address]        Equal Access to Services     [facility]: Hadii aad ku hadasho Soomaali, waaxda luqadaha, qaybta kaalmada adeegyada, bebe disa . So [facility] [phone].    ATENCIÓN: Si habla español, tiene a mcrae disposición servicios gratuitos de asistencia lingüística. Llame al [phone].    We comply with applicable federal civil rights laws and [place] laws. We do not discriminate on the basis of race, color, national origin, age, disability, sex, sexual orientation, or gender identity.            Thank you!     Thank you for choosing [facility] NEUROLOGY  for your care. Our goal is always to provide you with excellent care. Hearing back from our patients is one way we can continue to improve our services. Please take a few minutes to complete the written survey that you may receive in the mail after your visit with us. Thank you!             Your Updated Medication List - Protect others around you: Learn how to safely use, store and throw away your medicines at www.disposemymeds.org.          This list is accurate as of 12/5/18 12:23 PM.  Always use your most recent med list. "                   Brand Name Dispense Instructions for use Diagnosis    Acidophilus/Goat Milk Caps      At Bedtime        * ARMOUR THYROID 60 MG tablet   Generic drug:  thyroid      Take 60 mg by mouth every morning        * ARMOUR THYROID 60 MG tablet   Generic drug:  thyroid      daily        * carbidopa-levodopa  MG tablet    SINEMET    90 tablet    2 tablets At Bedtime Extended release        * carbidopa-levodopa  MG tablet    SINEMET    360 tablet    Take 1 tablet by mouth 4 times daily    Parkinson disease (H)       citalopram 10 MG tablet    celeXA     Take 10 mg by mouth every morning        clonazePAM 0.5 MG tablet    klonoPIN     Take 0.5 mg by mouth At Bedtime        Coenzyme Q10 300 MG Caps      Take 300 mg by mouth every morning        diclofenac 1 % topical gel    VOLTAREN     Apply qid prn pain        magnesium 100 MG Tabs      Take 100 tablets by mouth 2 times daily (before meals)        MELATONIN PO      Take 2 mg by mouth At Bedtime        Multi-vitamin tablet      Take 1 tablet by mouth every morning        ondansetron 4 MG ODT tab    ZOFRAN ODT    20 tablet    Take 1-2 tablets (4-8 mg) by mouth every 8 hours as needed for nausea    Parkinson disease (H)       Red Wine Extract Caps      Take by mouth daily (with lunch)        traMADol 50 MG tablet    ULTRAM    25 tablet    Take 1 tablet (50 mg) by mouth every 6 hours as needed for severe pain    Parkinson disease (H)       TYLENOL 325 MG tablet   Generic drug:  acetaminophen      Take 325-650 mg by mouth nightly as needed for mild pain        * Notice:  This list has 4 medication(s) that are the same as other medications prescribed for you. Read the directions carefully, and ask your doctor or other care provider to review them with you.

## 2018-12-05 NOTE — NURSING NOTE
Chief Complaint   Patient presents with     RECHECK     ump return patient visit for 2 month follow up        Andressa Haywood MA

## 2018-12-05 NOTE — LETTER
2018       RE: wKame Lin  [address] [place] [address]     Dear Colleague,    Thank you for referring your patient, Kwame Lin, to the [facility] NEUROLOG[facility] at [facility]. Please see a copy of my visit note below.    Department of Neurology  Movement Disorders Division     Patient: Kwame Lin   MRN: [id number]   : 1955   Date of Visit: 2018     Chief Complaint: tremor predominant Parkinson's disease follow up    History of Present Illness  Mr. Lin is a 63 year old R handed male with tremor predominant Parkinson's disease that presents to Neurology Movement clinic for follow up. Patient was last seen on 10/2018 with Dr. Borges and at that time patient's CD/LD 25/100mg was increased to 1 tab qid (from 0.5 tabs) and a new program was created called 4-Bear to help improve R foot dragging and speech.     History obtained from patient and accompanied by wife and son. Patient reports his main concern this visit on dyskinesias involving his left foot which occur around the time his medications start to wear off.  These dyskinesias are most bothersome early in the morning around 3-5 AM.  Patient states that the dyskinesias are concerned for him as he is having corrective knee surgery on  in hopes that the dyskinesias will not interfere with his recovery process.  He reports that he has leg pain associated with dyskinesias as well as an uncomfortable feeling in his ankle. Patient denies falls.  He states that when taking his carbidopa levodopa the dyskinesias will resolve after 20 minutes of taking this medication.  In regards to his DBS, patient has not changed his settings since last clinic visit.  He keeps his DBS on at all times.  His DBS discharge every 10 days.   Patient has been involved with speech-language pathology and reports that daily exercises have helped to improve his speech.  Denies dysphagia. He has begun  wearing a CPAP for the past 3 months which has helped with his sleep.  Wife shares that patient has also been having dream enactment and notes that his bed is as if a tornado has gone through. Patient is compliant with all his PD medications, and apart from dyskinesias, patient denies other side effects such as hallucinations.    Review of Systems:  Other than that mentioned above, the remainder of 12 systems reviewed were negative.    PMH: unchanged  PSH: unchanged  FH: unchanged  SH: unchanged    Medications:  Current Outpatient Prescriptions   Medication Sig Dispense Refill     acetaminophen (TYLENOL) 325 MG tablet Take 325-650 mg by mouth nightly as needed for mild pain       carbidopa-levodopa (SINEMET)  MG per tablet Take 1 tablet by mouth 4 times daily 360 tablet 3     carbidopa-levodopa (SINEMET)  MG per tablet 2 tablets At Bedtime Extended release 90 tablet      citalopram (CELEXA) 10 MG tablet Take 10 mg by mouth every morning        clonazePAM (KLONOPIN) 0.5 MG tablet Take 0.5 mg by mouth At Bedtime        Coenzyme Q10 300 MG CAPS Take 300 mg by mouth every morning       diclofenac (VOLTAREN) 1 % GEL topical gel Apply qid prn pain       magnesium 100 MG TABS Take 100 tablets by mouth 2 times daily (before meals)       MELATONIN PO Take 2 mg by mouth At Bedtime       Misc Natural Products (RED WINE EXTRACT) CAPS Take by mouth daily (with lunch)        multivitamin, therapeutic with minerals (MULTI-VITAMIN) TABS tablet Take 1 tablet by mouth every morning       ondansetron (ZOFRAN ODT) 4 MG ODT tab Take 1-2 tablets (4-8 mg) by mouth every 8 hours as needed for nausea 20 tablet 1     Probiotic Product (ACIDOPHILUS/GOAT MILK) CAPS At Bedtime        thyroid (ARMOUR THYROID) 60 MG tablet daily       thyroid (ARMOUR THYROID) 60 MG tablet Take 60 mg by mouth every morning        traMADol (ULTRAM) 50 MG tablet Take 1 tablet (50 mg) by mouth every 6 hours as needed for severe pain 25 tablet 0      PD  "Medications                   7:30AM 12PM 4PM 8PM 10:30PM        Carbidopa/levodopa IR 25/100                                           1/2 1/2 1/2 1/2       Carbidopa/levodopa CR 25/100                                    1   Clonazepam 0.5mg                                       1   Melatonin ER 3 mg             4   Last took at 8:30am     Physical Exam:  The patient's  height is 1.753 m (5' 9\") and weight is 89.7 kg (197 lb 11.2 oz). His oral temperature is 98.2  F (36.8  C). His blood pressure is 115/74 and his pulse is 65. His respiration is 18 and oxygen saturation is 94%.    Physical Exam:  Gen: alert, active, attentive, appropriately groomed   HEENT: normocephalic, eyes open with no discharge, nares patent, oropharynx clear-no lesions  Chest: normal configuration, regular rate and rhythm, chest rise equal b/l, non labored breathing   Extremities: no clubbing/edema/cyanosis in BUE/BLE, distal pulses intact  Psych: mood stable     Neurologic Exam:  Mental status: Alert and oriented to person, place, time, and situation. Follows commands. Recent and remote memory intact. Attention span and concentration intact. Fund of knowledge intact to current events.   Speech: Fluent, intact comprehension and articulation  CN: visual fields intact in all fields, EOMIB,  no nystagmus, normal saccades, PERRL, facial sensation intact, facial movement symmetric, hearing grossly intact to conversation, tongue protrudes midline   Motor: Moves all extremities equally against gravity without difficulty or abnormalities with 5/5 strength, intermittent dyskinesias of L foot with minimal dyskinesias observed in trunk  Sensation: intact to light touch throughout   Coordination: grossly intact with FTN, HTS  Gait: able to rise unassisted from a seated position, normal arm swing and length of gait, no en bloc turns, no ataxia    Impression: Mr. Lin is a 63 year old R handed male with tremor predominant Parkinson's disease that " presents to Neurology Movement clinic for follow up.     1. Parkinson's Disease: Patient's main complaint this visit is dyskinesias of L foot that are most bothersome early in the morning, around 3-5am. His wife also reports that patient has been acting out his dreams, which is a concern especially after patient undergoes elective knee replacement on 12/6/2018. We discussed that medical management of these conditions as Dr. Borges is currently on leave and we are unable to adjust his DBS to treat his dyskinesias.   2. REM behavior sleep disorder: This is improved with clonazepam, however, wife reports that dream enactment continues to occur. Will increase dose of clonazepam and observe for improvement.   3. Hypophonia, improved: Patient is currently involved with SLP at [place] and performs speech exercises at home daily to bid and has noticed improvement with his speech from these efforts.     Plan:   - To help with paradoxical dyskinesias early in the morning, we recommend taking CD/LD 25/100mg 1 tab at 2:30am.   - For dream enactment after your surgery, we recommend taking clonazepam 0.5mg 2 tabs at night  - Follow the medication regimen below:  PD Medications                   7:30AM 12PM 4PM 8PM 10:30PM 2:30AM        Carbidopa/levodopa IR 25/100                                           1 1 1 1  1      Carbidopa/levodopa CR 25/100                                   1     Clonazepam 0.5mg                                       2    Melatonin ER 3 mg             4    - Continue daily speech exercises and encouraged regular exercise     RTC: will call patient to schedule a follow up for DBS settings adjustment with Dr. Borges or АННА Magana NP.     Angie Pugh, DO  Movement Disorders Fellow    Patient seen and discussed with Dr. Judge.     I, Jarret Judge, personally interviewed, examined and evaluated this patient on 12/5/2018.  I discussed the patient with Dr.Kristine Pugh and agree  with the assessment, examiantion and plan of care documented by Dr. Pugh.  I personally reviewed the vital signs, medications and labs/imaging.      Again, thank you for allowing me to participate in the care of your patient.      Sincerely,    Jarret Judge MD

## 2018-12-05 NOTE — PROGRESS NOTES
Department of Neurology  Movement Disorders Division     Patient: Kwame Lin   MRN: [id number]   : 1955   Date of Visit: 2018     Chief Complaint: tremor predominant Parkinson's disease follow up    History of Present Illness  Mr. Lin is a 63 year old R handed male with tremor predominant Parkinson's disease that presents to Neurology Movement clinic for follow up. Patient was last seen on 10/2018 with Dr. Borges and at that time patient's CD/LD 25/100mg was increased to 1 tab qid (from 0.5 tabs) and a new program was created called 4-Bear to help improve R foot dragging and speech.     History obtained from patient and accompanied by wife and son. Patient reports his main concern this visit on dyskinesias involving his left foot which occur around the time his medications start to wear off.  These dyskinesias are most bothersome early in the morning around 3-5 AM.  Patient states that the dyskinesias are concerned for him as he is having corrective knee surgery on  in hopes that the dyskinesias will not interfere with his recovery process.  He reports that he has leg pain associated with dyskinesias as well as an uncomfortable feeling in his ankle. Patient denies falls.  He states that when taking his carbidopa levodopa the dyskinesias will resolve after 20 minutes of taking this medication.  In regards to his DBS, patient has not changed his settings since last clinic visit.  He keeps his DBS on at all times.  His DBS discharge every 10 days.   Patient has been involved with speech-language pathology and reports that daily exercises have helped to improve his speech.  Denies dysphagia. He has begun wearing a CPAP for the past 3 months which has helped with his sleep.  Wife shares that patient has also been having dream enactment and notes that his bed is as if a tornado has gone through. Patient is compliant with all his PD medications, and apart from dyskinesias, patient denies  other side effects such as hallucinations.    Review of Systems:  Other than that mentioned above, the remainder of 12 systems reviewed were negative.    PMH: unchanged  PSH: unchanged  FH: unchanged  SH: unchanged    Medications:  Current Outpatient Prescriptions   Medication Sig Dispense Refill     acetaminophen (TYLENOL) 325 MG tablet Take 325-650 mg by mouth nightly as needed for mild pain       carbidopa-levodopa (SINEMET)  MG per tablet Take 1 tablet by mouth 4 times daily 360 tablet 3     carbidopa-levodopa (SINEMET)  MG per tablet 2 tablets At Bedtime Extended release 90 tablet      citalopram (CELEXA) 10 MG tablet Take 10 mg by mouth every morning        clonazePAM (KLONOPIN) 0.5 MG tablet Take 0.5 mg by mouth At Bedtime        Coenzyme Q10 300 MG CAPS Take 300 mg by mouth every morning       diclofenac (VOLTAREN) 1 % GEL topical gel Apply qid prn pain       magnesium 100 MG TABS Take 100 tablets by mouth 2 times daily (before meals)       MELATONIN PO Take 2 mg by mouth At Bedtime       Misc Natural Products (RED WINE EXTRACT) CAPS Take by mouth daily (with lunch)        multivitamin, therapeutic with minerals (MULTI-VITAMIN) TABS tablet Take 1 tablet by mouth every morning       ondansetron (ZOFRAN ODT) 4 MG ODT tab Take 1-2 tablets (4-8 mg) by mouth every 8 hours as needed for nausea 20 tablet 1     Probiotic Product (ACIDOPHILUS/GOAT MILK) CAPS At Bedtime        thyroid (ARMOUR THYROID) 60 MG tablet daily       thyroid (ARMOUR THYROID) 60 MG tablet Take 60 mg by mouth every morning        traMADol (ULTRAM) 50 MG tablet Take 1 tablet (50 mg) by mouth every 6 hours as needed for severe pain 25 tablet 0      PD Medications                   7:30AM 12PM 4PM 8PM 10:30PM        Carbidopa/levodopa IR 25/100                                           1/2 1/2 1/2 1/2       Carbidopa/levodopa CR 25/100                                    1   Clonazepam 0.5mg                                       1  "  Melatonin ER 3 mg             4   Last took at 8:30am     Physical Exam:  The patient's  height is 1.753 m (5' 9\") and weight is 89.7 kg (197 lb 11.2 oz). His oral temperature is 98.2  F (36.8  C). His blood pressure is 115/74 and his pulse is 65. His respiration is 18 and oxygen saturation is 94%.    Physical Exam:  Gen: alert, active, attentive, appropriately groomed   HEENT: normocephalic, eyes open with no discharge, nares patent, oropharynx clear-no lesions  Chest: normal configuration, regular rate and rhythm, chest rise equal b/l, non labored breathing   Extremities: no clubbing/edema/cyanosis in BUE/BLE, distal pulses intact  Psych: mood stable     Neurologic Exam:  Mental status: Alert and oriented to person, place, time, and situation. Follows commands. Recent and remote memory intact. Attention span and concentration intact. Fund of knowledge intact to current events.   Speech: Fluent, intact comprehension and articulation  CN: visual fields intact in all fields, EOMIB,  no nystagmus, normal saccades, PERRL, facial sensation intact, facial movement symmetric, hearing grossly intact to conversation, tongue protrudes midline   Motor: Moves all extremities equally against gravity without difficulty or abnormalities with 5/5 strength, intermittent dyskinesias of L foot with minimal dyskinesias observed in trunk  Sensation: intact to light touch throughout   Coordination: grossly intact with FTN, HTS  Gait: able to rise unassisted from a seated position, normal arm swing and length of gait, no en bloc turns, no ataxia    Impression: Mr. Lin is a 63 year old R handed male with tremor predominant Parkinson's disease that presents to Neurology Movement clinic for follow up.     1. Parkinson's Disease: Patient's main complaint this visit is dyskinesias of L foot that are most bothersome early in the morning, around 3-5am. His wife also reports that patient has been acting out his dreams, which is a concern " especially after patient undergoes elective knee replacement on 12/6/2018. We discussed that medical management of these conditions as Dr. Borges is currently on leave and we are unable to adjust his DBS to treat his dyskinesias.   2. REM behavior sleep disorder: This is improved with clonazepam, however, wife reports that dream enactment continues to occur. Will increase dose of clonazepam and observe for improvement.   3. Hypophonia, improved: Patient is currently involved with SLP at [place] and performs speech exercises at home daily to bid and has noticed improvement with his speech from these efforts.     Plan:   - To help with paradoxical dyskinesias early in the morning, we recommend taking CD/LD 25/100mg 1 tab at 2:30am.   - For dream enactment after your surgery, we recommend taking clonazepam 0.5mg 2 tabs at night  - Follow the medication regimen below:  PD Medications                   7:30AM 12PM 4PM 8PM 10:30PM 2:30AM        Carbidopa/levodopa IR 25/100                                           1 1 1 1  1      Carbidopa/levodopa CR 25/100                                   1     Clonazepam 0.5mg                                       2    Melatonin ER 3 mg             4    - Continue daily speech exercises and encouraged regular exercise     RTC: will call patient to schedule a follow up for DBS settings adjustment with Dr. Borges or АННА Magana NP.     Angie Pugh, DO  Movement Disorders Fellow    Patient seen and discussed with Dr. Judge.     I, Jarret Judge, personally interviewed, examined and evaluated this patient on 12/5/2018.  I discussed the patient with Dr.Kristine Pugh and agree with the assessment, examiantion and plan of care documented by Dr. Pugh.  I personally reviewed the vital signs, medications and labs/imaging.

## 2018-12-05 NOTE — PATIENT INSTRUCTIONS
Plan:  - To help with paradoxical dyskinesias early in the morning, we recommend taking CD/LD 25/100mg 1 tab at 2:30am.   - For dream enactment after your surgery, we recommend taking clonazepam 0.5mg 2 tabs at night  - Follow the medication regimen below:     PD Medications                   7:30AM 12PM 4PM 8PM 10:30PM 2:30AM        Carbidopa/levodopa IR 25/100                                           1 1 1 1  1      Carbidopa/levodopa CR 25/100                                   1     Clonazepam 0.5mg                                       2    Melatonin ER 3 mg             4      We will call you regarding a follow up with Dr. Borges.

## 2018-12-13 ENCOUNTER — TELEPHONE (OUTPATIENT)
Dept: NEUROLOGY | Facility: CLINIC | Age: 63
End: 2018-12-13

## 2018-12-19 ENCOUNTER — OFFICE VISIT (OUTPATIENT)
Dept: NEUROLOGY | Facility: CLINIC | Age: 63
End: 2018-12-19
Payer: COMMERCIAL

## 2018-12-19 VITALS
DIASTOLIC BLOOD PRESSURE: 70 MMHG | SYSTOLIC BLOOD PRESSURE: 108 MMHG | OXYGEN SATURATION: 97 % | BODY MASS INDEX: 29.77 KG/M2 | HEIGHT: 69 IN | WEIGHT: 201 LBS | HEART RATE: 78 BPM

## 2018-12-19 DIAGNOSIS — G20.A1 PARKINSON DISEASE (H): Primary | ICD-10-CM

## 2018-12-19 RX ORDER — ONDANSETRON 4 MG/1
TABLET, FILM COATED ORAL
Refills: 0 | COMMUNITY
Start: 2018-12-10 | End: 2019-10-17

## 2018-12-19 RX ORDER — OXYCODONE AND ACETAMINOPHEN 5; 325 MG/1; MG/1
TABLET ORAL
Refills: 0 | COMMUNITY
Start: 2018-12-06 | End: 2019-03-06

## 2018-12-19 RX ORDER — HYDROXYZINE PAMOATE 25 MG/1
CAPSULE ORAL
Refills: 0 | COMMUNITY
Start: 2018-12-06 | End: 2019-03-06

## 2018-12-19 ASSESSMENT — UNIFIED PARKINSONS DISEASE RATING SCALE (UPDRS)
TOTAL_SCORE_LEFT: 3
LEG_AGILITY_RIGHT: NORMAL
POSTURE: 0 NORMAL, NO PROBLEMS
HANDMOVEMENTS_RIGHT: NORMAL
HANDMOVEMENTS_LEFT: NORMAL
PARKINSONS_MEDS: ON
PRONATION_SUPINATION_RIGHT: NORMAL
RIGIDITY_NECK: NORMAL
FACIAL_EXPRESSION: NORMAL.
POSTURAL_STABILITY: NORMAL:  RECOVERS WITH ONE OR TWO STEPS.
FREEZING_GAIT: NORMAL
GAIT: NORMAL
TOETAPPING_LEFT: SLIGHT: ANY OF THE FOLLOWING: A) THE REGULAR RHYTHM IS BROKEN WITH ONE WITH ONE OR TWO INTERRUPTIONS OR HESITATIONS OF THE MOVEMENT B) SLIGHT SLOWING C) THE AMPLITUDE DECREMENTS NEAR THE END OF THE 10 MOVEMENTS.
TOETAPPING_RIGHT: SLIGHT: ANY OF THE FOLLOWING: A) THE REGULAR RHYTHM IS BROKEN WITH ONE WITH ONE OR TWO INTERRUPTIONS OR HESITATIONS OF THE MOVEMENT B) SLIGHT SLOWING C) THE AMPLITUDE DECREMENTS NEAR THE END OF THE 10 MOVEMENTS.
FINGER_TAPPING_RIGHT: NORMAL
TOTAL_SCORE: 2
SPONTANEITY_OF_MOVEMENT: 0: NORMAL.  NO PROBLEMS.
SPEECH: SLIGHT: LOSS OF MODULATION, DICTION OR VOLUME, BUT STILL ALL WORDS EASY TO UNDERSTAND.
AMPLITUDE_LIP_JAW: NORMAL: NO TREMOR.
FINGER_TAPPING_LEFT: NORMAL
RIGIDITY_RUE: SLIGHT: RIGIDITY ONLY DETECTED WITH ACTIVATION MANEUVER.
CONSTANCY_TREMOR_ATREST: NORMAL: NO TREMOR.
AMPLITUDE_LUE: NORMAL: NO TREMOR.
TOTAL_SCORE: 6
ARISING_CHAIR: NORMAL: ABLE TO ARISE QUICKLY WITHOUT HESITATION.
LEG_AGILITY_LEFT: NORMAL
RIGIDITY_LLE: NORMAL
RIGIDITY_LUE: NORMAL
AXIAL_SCORE: 1
AMPLITUDE_LLE: NORMAL: NO TREMOR.
AMPLITUDE_RLE: NORMAL: NO TREMOR.
AMPLITUDE_RUE: NORMAL: NO TREMOR.
RIGIDITY_RLE: NORMAL
PRONATION_SUPINATION_LEFT: NORMAL

## 2018-12-19 ASSESSMENT — MIFFLIN-ST. JEOR: SCORE: 1687.11

## 2018-12-19 ASSESSMENT — PAIN SCALES - GENERAL: PAINLEVEL: MILD PAIN (3)

## 2018-12-19 NOTE — PATIENT INSTRUCTIONS
I decreased your settings on your RIGHT brain LEFT body DBS to help with your left knee turning in.  See how this goes for a couple of weeks.    Your LEFT brain RIGHT body settings got turned up at some point since you were last seen by Dr. Borges. It was at 2.8 and got turned up to 3.3. Try turning this back down to 2.8 after a couple of weeks and see if this helps your walking as well.    What things on your stimulator mean:  A1 L STN = LEFT brain RIGHT body  A2 R STN = RIGHT brain LEFT body    Fatmata Quintanilla, RN  [phone]

## 2019-03-04 ASSESSMENT — ENCOUNTER SYMPTOMS
STIFFNESS: 1
SPEECH CHANGE: 1

## 2019-03-06 ENCOUNTER — OFFICE VISIT (OUTPATIENT)
Dept: NEUROLOGY | Facility: CLINIC | Age: 64
End: 2019-03-06
Payer: COMMERCIAL

## 2019-03-06 VITALS — DIASTOLIC BLOOD PRESSURE: 71 MMHG | SYSTOLIC BLOOD PRESSURE: 121 MMHG | HEART RATE: 82 BPM | OXYGEN SATURATION: 95 %

## 2019-03-06 DIAGNOSIS — G20.A1 PARKINSON DISEASE (H): Primary | ICD-10-CM

## 2019-03-06 ASSESSMENT — UNIFIED PARKINSONS DISEASE RATING SCALE (UPDRS)
TOTAL_SCORE: 2
SPEECH: SLIGHT: LOSS OF MODULATION, DICTION OR VOLUME, BUT STILL ALL WORDS EASY TO UNDERSTAND.
CONSTANCY_TREMOR_ATREST: NORMAL: NO TREMOR.
RIGIDITY_RLE: NORMAL
RIGIDITY_LLE: NORMAL
LEG_AGILITY_RIGHT: NORMAL
AMPLITUDE_RLE: NORMAL: NO TREMOR.
FINGER_TAPPING_RIGHT: NORMAL
SPONTANEITY_OF_MOVEMENT: 0: NORMAL.  NO PROBLEMS.
TOTAL_SCORE_LEFT: 7
GAIT: NORMAL
POSTURAL_STABILITY: NORMAL:  RECOVERS WITH ONE OR TWO STEPS.
FREEZING_GAIT: NORMAL
AMPLITUDE_LUE: NORMAL: NO TREMOR.
TOTAL_SCORE: 10
AMPLITUDE_RUE: NORMAL: NO TREMOR.
POSTURE: 0 NORMAL, NO PROBLEMS
FINGER_TAPPING_LEFT: SLIGHT: ANY OF THE FOLLOWING: A) THE REGULAR RHYTHM IS BROKEN WITH ONE WITH ONE OR TWO INTERRUPTIONS OR HESITATIONS OF THE MOVEMENT B) SLIGHT SLOWING C) THE AMPLITUDE DECREMENTS NEAR THE END OF THE 10 MOVEMENTS.
HANDMOVEMENTS_LEFT: SLIGHT: ANY OF THE FOLLOWING: A) THE REGULAR RHYTHM IS BROKEN WITH ONE WITH ONE OR TWO INTERRUPTIONS OR HESITATIONS OF THE MOVEMENT B) SLIGHT SLOWING C) THE AMPLITUDE DECREMENTS NEAR THE END OF THE 10 MOVEMENTS.
PRONATION_SUPINATION_LEFT: SLIGHT: ANY OF THE FOLLOWING: A) THE REGULAR RHYTHM IS BROKEN WITH ONE WITH ONE OR TWO INTERRUPTIONS OR HESITATIONS OF THE MOVEMENT B) SLIGHT SLOWING C) THE AMPLITUDE DECREMENTS NEAR THE END OF THE 10 MOVEMENTS.
TOETAPPING_RIGHT: SLIGHT: ANY OF THE FOLLOWING: A) THE REGULAR RHYTHM IS BROKEN WITH ONE WITH ONE OR TWO INTERRUPTIONS OR HESITATIONS OF THE MOVEMENT B) SLIGHT SLOWING C) THE AMPLITUDE DECREMENTS NEAR THE END OF THE 10 MOVEMENTS.
ARISING_CHAIR: NORMAL: ABLE TO ARISE QUICKLY WITHOUT HESITATION.
PRONATION_SUPINATION_RIGHT: NORMAL
RIGIDITY_NECK: NORMAL
RIGIDITY_RUE: SLIGHT: RIGIDITY ONLY DETECTED WITH ACTIVATION MANEUVER.
FACIAL_EXPRESSION: NORMAL.
RIGIDITY_LUE: NORMAL
LEG_AGILITY_LEFT: NORMAL
HANDMOVEMENTS_RIGHT: NORMAL
AMPLITUDE_LIP_JAW: NORMAL: NO TREMOR.
AMPLITUDE_LLE: NORMAL: NO TREMOR.
PARKINSONS_MEDS: ON
AXIAL_SCORE: 1
TOETAPPING_LEFT: MILD: ANY OF THE FOLLOWING: A) 3 TO 5 INTERRUPTIONS DURING TAPPING B) MILD SLOWING C) THE AMPLITUDE DECREMENTS MIDWAY IN THE 10-MOVEMENT SEQUENCE

## 2019-03-06 ASSESSMENT — PAIN SCALES - GENERAL: PAINLEVEL: MILD PAIN (2)

## 2019-03-06 NOTE — PROGRESS NOTES
Department of Neurology  Movement Disorders Division   DBS Follow-up Note    Patient: Kwame Lin  MRN: [id number]   : 1955   Date of Visit: 10/3/2018    Diagnosis: Parkinson disease  DBS Target(s): Bilateral STN   Date(s) of DBS Lead Placement:   Left STN 2017, right STN 2018  Date(s) of IPG Placement:   Left chest 2017      Chief Complaint:  Kwame Lin is a 63 year right-handed man with tremor predominant Parkinson disease with symptom onset in  with right hand tremor status post left STN DBS (2017) and right STN DBS (2018).    Interval History:  Last seen 2018. At that time he noted an increased levodopa dose had helped with his cognition. He complained mostly of what appeared to be a left leg dystonia. For this his current was turned down for his right brain/ left body. His left brain / right body was found to have been turned up inadvertently at some point. Higher current on this side was felt to have resulted in right leg dragging previously.    Today he reports that he hasn't noticed the left leg turning in anymore. This may be due to the fact, however, that he finished the exercises prescribed for his knee replacement.    His main complaint today is that his speech is both softer, and it is difficult to enunciate. He is doing LOUD therapy. His wife thinks it developed more after second side surgery. They feel it improved after setting changes made at last visit as well, but it has come back more now. They note this is a subtle problem.    He has been taking the clonazepam more consistently and feels he is moving around in bed less now. Wife still sleeps in separate bed.    He has wearing-off about every 3.5 hours. Some slight possible truncal dyskinesias. No orthostasis.    Review of Systems:  Other than that noted at the end of this note, the remainder of 12 systems reviewed were negative.    Medications:  Current Outpatient Medications   Medication Sig Dispense  Refill     acetaminophen (TYLENOL) 325 MG tablet Take 325-650 mg by mouth nightly as needed for mild pain       carbidopa-levodopa (SINEMET)  MG per tablet Take 1 tablet by mouth 4 times daily 360 tablet 3     carbidopa-levodopa (SINEMET)  MG per tablet 3 tablets At Bedtime Extended release 90 tablet      citalopram (CELEXA) 10 MG tablet Take 10 mg by mouth every morning        clonazePAM (KLONOPIN) 0.5 MG tablet Take 1 mg by mouth At Bedtime        Coenzyme Q10 300 MG CAPS Take 300 mg by mouth every morning       diclofenac (VOLTAREN) 1 % GEL topical gel Apply qid prn pain       hydrOXYzine (VISTARIL) 25 MG capsule TAKE ONE TO TWO CAPSULES BY MOUTH EVERY 6 HOURS AS NEEDED FOR PAIN  0     magnesium 100 MG TABS Take 100 tablets by mouth 2 times daily (before meals)       MELATONIN PO Take 2 mg by mouth At Bedtime       Misc Natural Products (RED WINE EXTRACT) CAPS Take by mouth daily (with lunch)        multivitamin, therapeutic with minerals (MULTI-VITAMIN) TABS tablet Take 1 tablet by mouth every morning       ondansetron (ZOFRAN ODT) 4 MG ODT tab Take 1-2 tablets (4-8 mg) by mouth every 8 hours as needed for nausea 20 tablet 1     ondansetron (ZOFRAN) 4 MG tablet TAKE ONE TABLET BY MOUTH EVERY 6 HOURS AS NEEDED FOR NAUSEA  0     oxyCODONE-acetaminophen (PERCOCET) 5-325 MG tablet TAKE ONE TO TWO TABLETS BY MOUTH EVERY 4 HOURS AS NEEDED FOR PAIN *CAUTION: OPIOID. RISK OF OVERDOSE AND ADDICTION.  0     Probiotic Product (ACIDOPHILUS/GOAT MILK) CAPS At Bedtime        thyroid (ARMOUR THYROID) 60 MG tablet daily       thyroid (ARMOUR THYROID) 60 MG tablet Take 60 mg by mouth every morning        traMADol (ULTRAM) 50 MG tablet Take 1 tablet (50 mg) by mouth every 6 hours as needed for severe pain 25 tablet 0         PD Medications                   7:30AM 12PM 4PM 8PM 10:30PM 2:30AM        Carbidopa/levodopa IR 25/100                                           1 1 1 1   1               Clonazepam 0.5mg                                         1     Melatonin 5 mg             2     - Continue daily speech exercises and encouraged regular exercise       Allergies: is allergic to bactrim; codeine; ketorolac; morphine; nalbuphine; nsaids; penicillins; seasonal allergies; sulfa drugs; and toradol.    Past Medical History:  Past Medical History:   Diagnosis Date     Anosmia 10/12/2017     Anxiety      Depressed mood 10/12/2017     Hypothyroid      Parkinson disease (H)      Parkinsons disease (H)      PONV (postoperative nausea and vomiting)      RBD (REM behavioral disorder) 10/12/2017       Past Surgical History:  Past Surgical History:   Procedure Laterality Date     ARTHROSCOPY KNEE Right     twice     ARTHROSCOPY KNEE Right     left     epidydmal mass removal, benign       IMPLANT DEEP BRAIN STIMULATION GENERATOR / BATTERY Left 12/22/2017    Procedure: IMPLANT DEEP BRAIN STIMULATION GENERATOR / BATTERY;  Deep Brain Stimulator Placement, Phase II, Placement Of Deep Brain Stimulator Generator/Battery Over The Left Chest Wall;  Surgeon: Arpan Huynh MD;  Location: [facility] OR     OPTICAL TRACKING SYSTEM INSERTION DEEP BRAIN STIMULATION Left 12/14/2017    Procedure: OPTICAL TRACKING SYSTEM INSERTION DEEP BRAIN STIMULATION;  Stealth Assisted Left Deep Brain Stimulator Placement, Phase I, Placement Of Left Side Deep Brain Stimulator Electrode, Target Left Subthalamic Nucleus With Microelectrode Recording;  Surgeon: Arpan Huynh MD;  Location: [facility] OR     OPTICAL TRACKING SYSTEM INSERTION DEEP BRAIN STIMULATION Right 5/22/2018    Procedure: OPTICAL TRACKING SYSTEM INSERTION DEEP BRAIN STIMULATION;  Stealth Assisted Right Deep Brain Stimulator Placement, Phase I And II Combined, Placement Of Right Deep Brain Stimulator Electrode, Target Right Subthalamic Nucleus With Microelectrode Recording And Connection To Previous Implanted Generator/Battery;  Surgeon: Arpan Huynh MD;  Location: [facility] OR        Social History:  Social History     Social History     Marital status:      Spouse name: N/A     Number of children: N/A     Years of education: N/A     Occupational History     Small business start consultant      Social History Main Topics     Smoking status: Never Smoker     Smokeless tobacco: Never Used     Alcohol use 0.6 - 1.2 oz/week     1 - 2 Cans of beer per week      Comment: MONTHLY     Drug use: No     Sexual activity: Yes     Partners: Female     Other Topics Concern     None     Social History Narrative    Previous worked as [employer].    Now consulting part-time.    , lives with wife.    Non-smoker. Occasional drink.        Past surgical history that includes epidydmal mass removal, benign.        Social History:    Previous worked as [employer].    Now consulting part-time.    , lives with wife.    Non-smoker. Occasional drink.         family history includes Lung Cancer in his father; Parkinsonism in his cousin and maternal aunt.            Family History:  Family History   Problem Relation Age of Onset     Lung Cancer Father      Parkinsonism Cousin         paternal side     Parkinsonism Maternal Aunt        Physical Exam:  The patient's  vitals were not taken for this visit.    UPDRS Values 3/6/2019   Time: 5:30 PM   Medication On   R Brain DBS: On   L Brain DBS: On   Speech 1   Facial Expression 0   Rigidity Neck 0   Rigidity RUE 1   Rigidity LUE 0   Rigidity RLE 0   Rigidity LLE 0   Finger Taps R 0   Finger Taps L 1   Hand Mvt R 0   Hand Mvt L 1   Pron-/Supinate R 0   Pron-/Supinate L 1   Toe Tap R 1   Toe Tap L 2   Leg Agility R 0   Leg Agility L 0   Arise From Chair 0   Gait 0   Gait Freezing 0   Postural Stability 0   Posture 0   Global Spont Mvt 0   Postural Tremor RUE 0   Postural Tremor LUE 1   Kinetic Tremor RUE 0   Kinetic Tremor LUE 1   Rest Tremor RUE 0   Rest Tremor LUE 0    Rest Tremor RLE 0   Rest Tremor LLE 0   Rest Tremor Lip/Jaw 0   Rest Tremor Constancy 0   Total Right 2   Total Left 7   Axial Total 1   Total 10       Procedure: DBS Programming    Lead(s):    Left Right   DBS Target    STN STN      DBS Lead Type [employer] V[employer] Vercise     Lead Implant Date 12/14/17 5/22/18         IPG(s):   1 2   IPG [employer] Vercise          IPG Implant Date 12/22/17          Location Left chest          Battery (V) Charging well         Full Impedence/Currents Check: No problems.    Initial Settings:  Left Brain 4-Bear   Program C Program D   Contacts  C+;3-         Amplitude (mA)  3.3         Pulse Width ( s)  60         Frequency (Hz)  139           Right Brain 4-Bear   Program C Program D   Contacts C+;11-(60%);14-(40%)          Amplitude (mA)  3         Pulse Width ( s)  60         Frequency (Hz)  139           Initial program selected:    4-Bear    Programming notes:  No significant changes on exam with changes made below.    Final Settings:  Left Brain 4-Bear   Program C Program D   Contacts  C+;3-         Amplitude (mA)  2.8         Pulse Width ( s)  60         Frequency (Hz)  139           Right Brain 4-Bear   Program C Program D   Contacts C+;11-(60%);14-(40%)          Amplitude (mA)  3.3         Pulse Width ( s)  60         Frequency (Hz)  139         Final Program selected:    4-Bear       Impression:  Kwame Lin is a 63 year right-handed man with tremor predominant Parkinson disease with symptom onset in 2007 with right hand tremor status post left STN DBS (12/2017) and right STN DBS (5/2018). Overall well-treated. Main complaint is of speech changes which could be a combination of hypophonia but also some possible dysarthria related to DBS. These findings are very subtle on exam today. Turned left brain / right body current back down to 2.8mA, as Dr. Borges had previously done with some right leg dragging / possible capsular side  effects were present. The hope would be that this may help his speech somewhat, although this is not entirely likely. Also increased right brain / left body back up to 3.3 mA as it was set at last visit. Unclear why this had changed to 3mA, but given his left arm parkinsonism was a little worse on exam today, felt it would be best to increase this back to 3.3mA.    Recommendations:   -DBS settings changed as above    Follow-up 2 months    Programming time 15 minutes  30 minutes separately spent regarding other PD symptoms, >50% counseling and coordination of care    Ian Yuan MD  Movement Disorders Fellow

## 2019-03-06 NOTE — NURSING NOTE
Chief Complaint   Patient presents with     RECHECK     P RETURN MOVEMENT DISORDER 4 WK DBS     Patient has some medications on med list that were from after surgery he would like removed.   Lynne Armstrong, EMT

## 2019-05-08 ENCOUNTER — OFFICE VISIT (OUTPATIENT)
Dept: NEUROPSYCHOLOGY | Facility: CLINIC | Age: 64
End: 2019-05-08
Payer: COMMERCIAL

## 2019-05-08 DIAGNOSIS — F09 MENTAL DISORDER DUE TO GENERAL MEDICAL CONDITION: ICD-10-CM

## 2019-05-08 DIAGNOSIS — G20.A1 PARKINSON'S DISEASE (H): Primary | ICD-10-CM

## 2019-05-08 NOTE — NURSING NOTE
The patient was seen for neuropsychological evaluation at the request of Dr. Navneet Olivares, for the purposes of diagnostic clarification and treatment planning. 183 minutes of test administration and scoring were provided by this writer, Neil Jose. Please see Dr. Vanessa Lanier's report for a full interpretation of the findings.

## 2019-05-15 ENCOUNTER — OFFICE VISIT (OUTPATIENT)
Dept: NEUROLOGY | Facility: CLINIC | Age: 64
End: 2019-05-15
Payer: COMMERCIAL

## 2019-05-15 VITALS
SYSTOLIC BLOOD PRESSURE: 119 MMHG | OXYGEN SATURATION: 95 % | HEART RATE: 82 BPM | HEIGHT: 69 IN | DIASTOLIC BLOOD PRESSURE: 66 MMHG | BODY MASS INDEX: 31.83 KG/M2 | WEIGHT: 214.9 LBS

## 2019-05-15 DIAGNOSIS — G47.52 RBD (REM BEHAVIORAL DISORDER): Primary | ICD-10-CM

## 2019-05-15 DIAGNOSIS — G20.A1 PARKINSON DISEASE (H): ICD-10-CM

## 2019-05-15 RX ORDER — CLONAZEPAM 1 MG/1
1 TABLET ORAL AT BEDTIME
Qty: 30 TABLET | Refills: 5 | Status: SHIPPED | OUTPATIENT
Start: 2019-05-15 | End: 2019-10-11

## 2019-05-15 SDOH — HEALTH STABILITY: MENTAL HEALTH: HOW OFTEN DO YOU HAVE A DRINK CONTAINING ALCOHOL?: MONTHLY OR LESS

## 2019-05-15 SDOH — HEALTH STABILITY: MENTAL HEALTH: HOW MANY STANDARD DRINKS CONTAINING ALCOHOL DO YOU HAVE ON A TYPICAL DAY?: 1 OR 2

## 2019-05-15 ASSESSMENT — UNIFIED PARKINSONS DISEASE RATING SCALE (UPDRS)
TOETAPPING_RIGHT: SLIGHT: ANY OF THE FOLLOWING: A) THE REGULAR RHYTHM IS BROKEN WITH ONE WITH ONE OR TWO INTERRUPTIONS OR HESITATIONS OF THE MOVEMENT B) SLIGHT SLOWING C) THE AMPLITUDE DECREMENTS NEAR THE END OF THE 10 MOVEMENTS.
FINGER_TAPPING_RIGHT: NORMAL
AMPLITUDE_RLE: NORMAL: NO TREMOR.
SPEECH: SLIGHT: LOSS OF MODULATION, DICTION OR VOLUME, BUT STILL ALL WORDS EASY TO UNDERSTAND.
TOTAL_SCORE_LEFT: 5
FACIAL_EXPRESSION: NORMAL.
POSTURE: 0 NORMAL, NO PROBLEMS
AMPLITUDE_LIP_JAW: NORMAL: NO TREMOR.
HANDMOVEMENTS_LEFT: NORMAL
AXIAL_SCORE: 2
LEG_AGILITY_RIGHT: NORMAL
PARKINSONS_MEDS: ON
TOETAPPING_LEFT: SLIGHT: ANY OF THE FOLLOWING: A) THE REGULAR RHYTHM IS BROKEN WITH ONE WITH ONE OR TWO INTERRUPTIONS OR HESITATIONS OF THE MOVEMENT B) SLIGHT SLOWING C) THE AMPLITUDE DECREMENTS NEAR THE END OF THE 10 MOVEMENTS.
POSTURAL_STABILITY: NORMAL:  RECOVERS WITH ONE OR TWO STEPS.
FREEZING_GAIT: NORMAL
TOTAL_SCORE: 10
RIGIDITY_LLE: NORMAL
RIGIDITY_NECK: SLIGHT: RIGIDITY ONLY DETECTED WITH ACTIVATION MANEUVER.
HANDMOVEMENTS_RIGHT: NORMAL
RIGIDITY_RUE: SLIGHT: RIGIDITY ONLY DETECTED WITH ACTIVATION MANEUVER.
AMPLITUDE_LUE: SLIGHT: < 1 CM IN MAXIMAL AMPLITUDE.
AMPLITUDE_RUE: NORMAL: NO TREMOR.
RIGIDITY_LUE: NORMAL
FINGER_TAPPING_LEFT: SLIGHT: ANY OF THE FOLLOWING: A) THE REGULAR RHYTHM IS BROKEN WITH ONE WITH ONE OR TWO INTERRUPTIONS OR HESITATIONS OF THE MOVEMENT B) SLIGHT SLOWING C) THE AMPLITUDE DECREMENTS NEAR THE END OF THE 10 MOVEMENTS.
AMPLITUDE_LLE: NORMAL: NO TREMOR.
CONSTANCY_TREMOR_ATREST: SLIGHT: TREMOR AT REST IS PRESENT  25% OF THE ENTIRE EXAMINATION PERIOD.
TOTAL_SCORE: 2
RIGIDITY_RLE: NORMAL
GAIT: NORMAL
PRONATION_SUPINATION_RIGHT: NORMAL
ARISING_CHAIR: NORMAL: ABLE TO ARISE QUICKLY WITHOUT HESITATION.
PRONATION_SUPINATION_LEFT: NORMAL
LEG_AGILITY_LEFT: NORMAL
SPONTANEITY_OF_MOVEMENT: 0: NORMAL.  NO PROBLEMS.

## 2019-05-15 ASSESSMENT — MIFFLIN-ST. JEOR: SCORE: 1747.14

## 2019-05-15 ASSESSMENT — PAIN SCALES - GENERAL: PAINLEVEL: NO PAIN (0)

## 2019-05-15 NOTE — PROGRESS NOTES
Department of Neurology  Movement Disorders Division   DBS Follow-up Note    Patient: Kwame Lin  MRN: [id number]   : 1955   Date of Visit: 10/3/2018    Diagnosis: Parkinson disease  DBS Target(s): Bilateral STN   Date(s) of DBS Lead Placement:   Left STN 2017, right STN 2018  Date(s) of IPG Placement:   Left chest 2017      Chief Complaint:  Kwame Lin is a 63 year right-handed man with tremor predominant Parkinson disease with symptom onset in  with right hand tremor status post left STN DBS (2017) and right STN DBS (2018).    Interval History:  Last seen 3/6/19. At that time had left brain current turned down and right brain turned up.     Today he reports this helped his speech. However he feels it is again a little more raspy. He is doing Loud Therapy and has noticed since his wife retired that he speaks more at home and does think that is helping.    He has some slight imbalance when stepping backward. No falls.    No orthostasis.    Has noted that rarely he will feel like he is wearing-off and if he is more dehydrated feels like his heart is beating faster.    System is charging well, charging about every 2 weeks.    No dyskinesias.    Continuing to act out dreams. Sleeps apart from wife.    Medications:  Current Outpatient Medications   Medication Sig Dispense Refill     acetaminophen (TYLENOL) 325 MG tablet Take 325-650 mg by mouth nightly as needed for mild pain       carbidopa-levodopa (SINEMET)  MG per tablet Take 1 tablet by mouth 4 times daily 360 tablet 3     citalopram (CELEXA) 10 MG tablet Take 10 mg by mouth every morning        clonazePAM (KLONOPIN) 1 MG tablet Take 1 tablet (1 mg) by mouth At Bedtime 30 tablet 5     Coenzyme Q10 300 MG CAPS Take 300 mg by mouth every morning       diclofenac (VOLTAREN) 1 % GEL topical gel Apply qid prn pain       magnesium 100 MG TABS Take 100 tablets by mouth 2 times daily (before meals)       MELATONIN PO Take 2  mg by mouth At Bedtime       Misc Natural Products (RED WINE EXTRACT) CAPS Take by mouth daily (with lunch)        multivitamin, therapeutic with minerals (MULTI-VITAMIN) TABS tablet Take 1 tablet by mouth every morning       Probiotic Product (ACIDOPHILUS/GOAT MILK) CAPS Take 1 capsule by mouth At Bedtime        thyroid (ARMOUR THYROID) 60 MG tablet Take 60 mg by mouth every morning        carbidopa-levodopa (SINEMET)  MG per tablet 3 tablets At Bedtime Extended release 90 tablet      ondansetron (ZOFRAN ODT) 4 MG ODT tab Take 1-2 tablets (4-8 mg) by mouth every 8 hours as needed for nausea (Patient not taking: Reported on 3/6/2019) 20 tablet 1     ondansetron (ZOFRAN) 4 MG tablet TAKE ONE TABLET BY MOUTH EVERY 6 HOURS AS NEEDED FOR NAUSEA  0         PD Medications                   7:30AM 12PM 4PM 8PM 10:30PM 2:30AM        Carbidopa/levodopa IR 25/100                                           1 1 1 1   1               Clonazepam 0.5mg                                        1     Melatonin 5 mg             2         Allergies: is allergic to bactrim; codeine; ketorolac; morphine; nalbuphine; nsaids; penicillins; seasonal allergies; sulfa drugs; and toradol.    Past Medical History:  Past Medical History:   Diagnosis Date     Anosmia 10/12/2017     Anxiety      Depressed mood 10/12/2017     Hypothyroid      Parkinson disease (H)      Parkinsons disease (H)      PONV (postoperative nausea and vomiting)      RBD (REM behavioral disorder) 10/12/2017       Past Surgical History:  Past Surgical History:   Procedure Laterality Date     ARTHROSCOPY KNEE Right     twice     ARTHROSCOPY KNEE Right     left     epidydmal mass removal, benign       IMPLANT DEEP BRAIN STIMULATION GENERATOR / BATTERY Left 12/22/2017    Procedure: IMPLANT DEEP BRAIN STIMULATION GENERATOR / BATTERY;  Deep Brain Stimulator Placement, Phase II, Placement Of Deep Brain Stimulator Generator/Battery Over The Left Chest Wall;  Surgeon: Amina  Arpan Barbosa MD;  Location: [facility] OR     OPTICAL TRACKING SYSTEM INSERTION DEEP BRAIN STIMULATION Left 12/14/2017    Procedure: OPTICAL TRACKING SYSTEM INSERTION DEEP BRAIN STIMULATION;  Stealth Assisted Left Deep Brain Stimulator Placement, Phase I, Placement Of Left Side Deep Brain Stimulator Electrode, Target Left Subthalamic Nucleus With Microelectrode Recording;  Surgeon: Arpan Huynh MD;  Location: [facility] OR     OPTICAL TRACKING SYSTEM INSERTION DEEP BRAIN STIMULATION Right 5/22/2018    Procedure: OPTICAL TRACKING SYSTEM INSERTION DEEP BRAIN STIMULATION;  Stealth Assisted Right Deep Brain Stimulator Placement, Phase I And II Combined, Placement Of Right Deep Brain Stimulator Electrode, Target Right Subthalamic Nucleus With Microelectrode Recording And Connection To Previous Implanted Generator/Battery;  Surgeon: Arpan Huynh MD;  Location: [facility] OR       Social History:  Social History     Social History     Marital status:      Spouse name: N/A     Number of children: N/A     Years of education: N/A     Occupational History     Small business start consultant      Social History Main Topics     Smoking status: Never Smoker     Smokeless tobacco: Never Used     Alcohol use 0.6 - 1.2 oz/week     1 - 2 Cans of beer per week      Comment: MONTHLY     Drug use: No     Sexual activity: Yes     Partners: Female     Other Topics Concern     None     Social History Narrative    Previous worked as [employer].    Now consulting part-time.    , lives with wife.    Non-smoker. Occasional drink.        Past surgical history that includes epidydmal mass removal, benign.        Social History:    Previous worked as [employer].    Now consulting part-time.    , lives with wife.    Non-smoker. Occasional drink.         family history includes Lung Cancer in his father; Parkinsonism in his cousin and  "maternal aunt.            Family History:  Family History   Problem Relation Age of Onset     Lung Cancer Father      Parkinsonism Cousin         paternal side     Parkinsonism Maternal Aunt        Physical Exam:  The patient's  height is 1.756 m (5' 9.13\") and weight is 97.5 kg (214 lb 14.4 oz). His blood pressure is 119/66 and his pulse is 82. His oxygen saturation is 95%.    UPDRS Values 5/15/2019   Time: 5:30 PM   Medication On   R Brain DBS: On   L Brain DBS: On   Speech 1   Facial Expression 0   Rigidity Neck 1   Rigidity RUE 1   Rigidity LUE 0   Rigidity RLE 0   Rigidity LLE 0   Finger Taps R 0   Finger Taps L 1   Hand Mvt R 0   Hand Mvt L 0   Pron-/Supinate R 0   Pron-/Supinate L 0   Toe Tap R 1   Toe Tap L 1   Leg Agility R 0   Leg Agility L 0   Arise From Chair 0   Gait 0   Gait Freezing 0   Postural Stability 0   Posture 0   Global Spont Mvt 0   Postural Tremor RUE 0   Postural Tremor LUE 1   Kinetic Tremor RUE 0   Kinetic Tremor LUE 1   Rest Tremor RUE 0   Rest Tremor LUE 1   Rest Tremor RLE 0   Rest Tremor LLE 0   Rest Tremor Lip/Jaw 0   Rest Tremor Constancy 1   Total Right 2   Total Left 5   Axial Total 2   Total 10       Procedure: DBS Programming    Lead(s):    Left Right   DBS Target    STN STN      DBS Lead Type [employer]     Lead Implant Date 12/14/17 5/22/18         IPG(s):   1 2   IPG [employer]ercise          IPG Implant Date 12/22/17          Location Left chest          Battery (V) Charging well         Full Impedence/Currents Check: No problems.    Initial Settings:  Left Brain 4-Bear       Contacts  C+;3-         Amplitude (mA)  2.8         Pulse Width ( s)  60         Frequency (Hz)  139           Right Brain 4-Bear       Contacts C+;11-(60%);14-(40%)          Amplitude (mA)  3.3         Pulse Width ( s)  60         Frequency (Hz)  139           Initial program selected:    4-Bear    Programming notes:  No changes today    Final " Settings:  Left Brain 4-Bear   Program C Program D   Contacts  C+;3-         Amplitude (mA)  2.8         Pulse Width ( s)  60         Frequency (Hz)  139           Right Brain 4-Bear   Program C Program D   Contacts C+;11-(60%);14-(40%)          Amplitude (mA)  3.3         Pulse Width ( s)  60         Frequency (Hz)  139         Final Program selected:    4-Bear       Impression:  Kwame Lin is a 63 year right-handed man with tremor predominant Parkinson disease with symptom onset in 2007 with right hand tremor status post left STN DBS (12/2017) and right STN DBS (5/2018). Overall well-treated with some very mild left hand tremor that is not bothersome.    On exam today did not have significant hypophonia or dysarthria. Suspect he has some fluctuating hypophonia related to his PD and he is already doing the best treatment: Loud therapy. Unclear if his racing heart symptom is related to wearing-off or separate cardiac issue. Combination of wearing-off with dehydration is a possibility. Pulse was regular today. Advised discussing with PCP as well.    Recommendations:   -no change to Sinemet or DBS  -increase clonazepam to 1mg for RBD    Follow-up 2-3 months    40 minute visit of which >50% was spent on counseling and coordination of care.    Ian Yuan MD  Movement Disorders Fellow

## 2019-05-15 NOTE — NURSING NOTE
Chief Complaint   Patient presents with     DBS Programming     P RETURN - 2-3 MONTH FOLLOW UP DBS PROGRAMMING WITH DR. LAZARO Yi, EMT

## 2019-05-15 NOTE — PATIENT INSTRUCTIONS
Start taking 1mg of clonazepam at night.    I didn't change your DBS today.    Let your PCP know about your racing heart but stay well hydrated for now.

## 2019-05-27 NOTE — PROGRESS NOTES
NAME: Kwame Lin  MR#: [id number]  YOB: 1955  DATE OF EXAM: 5/8/2019    Neuropsychology Laboratory  [facility]  [address], [facility] [address]  [place]  [address] [phone]    NEUROPSYCHOLOGICAL EVALUATION    RELEVANT HISTORY AND REASON FOR REFERRAL    Kwame Lin is a 64-year-old, right-handed  with 18 years of formal education. Information was obtained via interview with the patient and his wife, and review of his medical records, including a prior neuropsychological evaluation. Mr. Lin has a history of idiopathic Parkinson s disease diagnosed around 2009, with symptom onset in 2007. Ultimately, he underwent bilateral STN DBS surgery (left STN in 12/2017, right STN in 5/2018). He reports excellent benefit from the surgery, but is noticing speech changes including hypophonia and possibly dysarthria. He was referred for neuropsychological evaluation by Clarita Borges M.D. in one year follow-up, for further characterization of any cognitive difficulties and to evaluate mood, in order to assist with treatment planning.     Mr. Lin first underwent a neuropsychological evaluation under my direction on 9/28/2017, as part of the presurgical protocol. Please refer to the report from that date for full details regarding his history and the findings of the evaluation. Briefly, results indicated performance that fell within normal limits across cognitive domains, in the average to above average range. He endorsed a minimal level of depressive symptomatology and mild symptoms of anxiety.    CLINICAL INTERVIEW FINDINGS    Upon interview, Mr. Lin and his wife stated that he has had excellent tremor control since his surgery. In fact, they described the results as like a miracle. He notices that his voice is softer and raspier. When he is off of his medications he has some slurring. He has felt that he is not as sharp with directions since his right  "brain surgery, although this seems to have improved. He misplaces items such as his phone, so he tries to keep it in one spot. He has not been repeating questions or stories. Occasionally it takes him a few seconds to think of a word. He denied difficulty with attention or concentration or any changes in his decision-making abilities. He is no less organized than he used to be.    Mr. Lin lives with his wife, who recently retired. He manages their finances, apparenty without difficulty. He manages his own medications, keeping a spreadsheet and using an alarm on his phone and watch. He is able to tell if he is late with the Parkinson's medications as he feels headachy and has a full feeling, and concentration is harder for 20 to 30 minutes until he is  on.  He also gets a cramp in his leg which reminds him to take the medications. He does not ever take an extra dose of the medication. His wife does much of the driving, but he has not had any difficulty in this regard. He does most of the cooking, and denied any difficulty with this. He handles his personal cares independently. He continues to work one day a week in [employer], and work is going well.    Mr. Lin denied any history of psychiatric illness. His mood is good, although he has had some stressors recently with the deaths of friends and a cousin. His close friend who had ataxia , and this was difficult for him. He is looking forward to his grandchildren coming this summer. He does not feel depressed, hopeless, or helpless. He noted that if he reads about Parkinson's disease he really thinks about it so he prefers not to read about it. He is feeling anxious today, but overall has not been feeling particularly anxious. Sleep is restless, and he sleeps apart from his wife because he has so many movements, standing up and wandering around, \"like a tornado.\" He uses the CPAP regularly, however. When he naps during the day he has jerking movements. He " wakes up on purpose in the middle of the night to take a dose of his medications and he sleeps better after that. His appetite has been good and he has gained 20 pounds since having knee surgery, and since his tremor is less. He stated that his medications make them hungry. His energy level is fairly good. Because of the weather and his allergies it has been harder to exercise for a few weeks, but he plans on getting back into that. His interest level is good. He denied visual or auditory hallucinations. He denied suicidal ideation or any history of attempts to commit suicide.    Mr. Lin rarely drinks any alcohol, perhaps having one alcoholic beverage a month at the most. He had a glass of wine at a first communion recently and his heart was racing. He denied illicit drug use or tobacco use. He has not been gambling. He endorsed compulsive thoughts involving sex, eating, and performing tasks or hobbies as well as taking his Parkinson's medications, and sometimes has compulsive behaviors relating to taking his Parkinson's medications and eating.     Since his last evaluation, in addition to DBS surgery, Mr. Lin has undergone knee surgery which was quite difficult for him and took three months to recover. He rarely experiences headaches. He stated that he has random pains, such as in his arm. He noted that his granddaughter is eight months old and when he was holding her for a long time his arm became sore.    PAST MEDICAL HISTORY: Medical records indicate a history of anxiety disorder, REM sleep behavior disorder, obstructive sleep apnea, osteoarthritis, Parkinson s disease, hyperlipidemia, restless leg syndrome, and hypothyroidism.    CURRENT MEDICATIONS:  Include acetaminophen, carbidopa-levodopa (Sinemet), [place] thyroid, citalopram, clonazepam, coenzyme Q10, diclofenac, melatonin, ondansetron, magnesium citrate, and probiotics.    FAMILY MEDICAL HISTORY:  Significant for a paternal first cousin and a  maternal aunt with Parkinson s disease, paternal cousins with dementia in their 70s, a mother with cerebrovascular disease, and depression on his father s side of the family.    BEHAVIORAL OBSERVATIONS    During the evaluation, Mr. Lin was pleasant, cooperative, and seemed to understand the instructions. He was alert and oriented to person, place, and time. Slight tremors were observed clinically in his left hand, and he tended to fidget. Mood was euthymic. Speech was fluent, with normal articulation, volume, and rate. Spontaneous conversation was present and appropriate. Internal performance validity measures fell within normal limits. Mr. Lin appeared to put forth consistent effort, and the results are believed to accurately reflect his current level of functioning.    MEASURES ADMINISTERED    The following measures were administered by a trained psychometrist, under the direct supervision of a licensed psychologist.    Subtests of the Wechsler Adult Intelligence Scale-4; subtests of the Wechsler Memory Scale-Revised; Glasgow Verbal Learning Test-Revised, Form 1; Brief Visual Memory Test - Revised, Form 2; Halsey Naming Test; D-KEFS Verbal Fluency, Standard; Trail Making Test; Stroop; Wisconsin Card Sorting Test - 64; Castellanos Judgement of Line Orientation Form V; Clock Drawing; Dementia Rating Scale - 2 (DRS-2) Alternate Form; Schulte Depression Inventory-2 (BDI-2), HAM-D, HAM-A, QUIP, PDQ-39, ESS.     RESULTS AND INTERPRETATION    Overall intellectual functioning was estimated to fall in the high average range, consistent with premorbid estimates based on single word reading abilities administered at his prior evaluation. Performance on a screening measure of dementia was average (DRS-2 = 139/144).    Confrontation naming was above average for his age and level of education. Verbal abstract reasoning was high average for his age. Ability to comprehend and articulate responses to complex social situations  was superior. Letter fluency was average. Generative naming to category and switching fluency were above average.    Attention span was average for his age. Divided attention was mildly impaired on a timed, visuomotor sequencing task, and was average on an untimed, auditory sequencing task. Performance on a measure of distractibility was average. Psychomotor processing speed was average.    Basic visual perception, including matching lines and angles, was high average for his age and level of education. Construction of a clock fell within normal limits, and the tremor that was noted at his baseline on this task was absent. Nonverbal deductive reasoning was average.    Novel problem-solving, including the ability to generate strategies and solutions, fell within normal limits for his age and level of education.    Immediate recall of verbal narrative material was high average, with high average recall following a 30 minute delay. On a multiple trial list learning task, immediate recall was high average, with average recall following a 25 minute delay. Immediate recall of visual material was average, with retention that fell within normal limits following a 25 minute delay.    On the BDI-2, Mr. Lin endorsed a minimal level of depressive symptomatology. Specifically, he acknowledged that he has failed more than he should have, he is more critical of himself than he used to be, and he cries more than he used to. He has less energy than he used to have, sleeps somewhat more than usual, and gets tired or fatigued more easily. His appetite is somewhat greater than usual. He is less interested in sex than he used to be. He finds it more difficult to make decisions. He denied significant apathy on a questionnaire.     IMPRESSIONS AND RECOMMENDATIONS    Current results indicate performance that falls within normal limits across cognitive domains, in the average to above average range. There is some variability in  attention. He endorsed a minimal level of depressive symptomatology and denied experiencing apathy.     Compared to his 9/28/2017 evaluation, he has had improvements in memory for visual information, and in expressive language. Phonemic fluency has declined, but semantic fluency and switching fluency have improved. There is increased variability in complex attentional processing, although this remains almost entirely within normal limits. Performance otherwise remains stable across cognitive domains.    This pattern of performance does not reflect dementia at this time, and is notable for improvements across some cognitive domains since his baseline evaluation prior to surgery, with the exceptions of phonemic fluency and complex attention. Perhaps this reflects a subtle decline in executive functioning. Notably, he is bothered by subtle difficulty with word-finding. Naming has improved, but he may be noticing the slight decline in phonemic fluency.     In terms of daily functioning, Mr. Lin is not experiencing cognitive difficulties that might interfere with his ability to actively participate in treatment or to manage his instrumental activities of daily living independently. If he has difficulty keeping track of more than one task at a time, he may find it helpful to mindfully complete one task before beginning another. When this is not possible, perhaps he would benefit from jotting down a note to remind himself of what he has finished and what he has yet to complete. He may benefit from structure and routine.    Results may serve as an updated baseline of his neurocognitive functioning, and the evaluation may be repeated in the future for comparison should a change in mental status occur.    .  Vanessa Lanier, Ph.D., ABPP  Licensed Psychologist, LP [id number]  Board Certified in Clinical Neuropsychology    Time spent: 65 minutes neurobehavioral status exam including interview, clinical assessment by licensed and  board-certified neuropsychologist (CPT 49462). 60 minutes (1 unit) neuropsychological testing evaluation by licensed and board-certified neuropsychologist, including integration of patient data, interpretation of standardized test results and clinical data, clinical decision-making, treatment planning, report, and interactive feedback to the patient, first hour (CPT 41631). 95 minutes (2 units) of neuropsychological testing evaluation by licensed and board-certified neuropsychologist, including integration of patient data, interpretation of standardized test results and clinical data, clinical decision-making, treatment planning, report, and interactive feedback to the patient, subsequent hours (CPT 61081). 30 minutes of neuropsychological test administration and scoring by technician, first 30 minutes (CPT 19011). 153 additional minutes (5 units) neuropsychological test administration and scoring by technician, subsequent 30 minutes (CPT 37125). ICD-10 Diagnoses: G20; F06.8.

## 2019-05-27 NOTE — PROGRESS NOTES
Name: Kwame Lin MRN: [id number]  : 1955  KING: 2019  Staff: YUE Tech: JULIEN Age: 64  Sex: Male Hand: Right   Educ: 18  Occupation:   Vision:  ?with correction / ?without correction  Hearing:  ?with correction / ?without correction    WAIS-IV     Raw SSa    Similarities  29 12     Comprehension  32 15     Letter Num. Seq. 19 10     Digit Span  26 10     Matrix Reasoning 16 11          WMS-Revised  Raw    MAS     Info & Orientation 14       LM I   29 12     LM II   25 13     LM % retention  86       SINGH VERBAL LEARNING TEST - REVISED  Form   1  Raw T     Trial 1   8     Trial 2   11     Trial 3   11     Total 1-3  30 58     Learning  3     Delayed Recall  10 53     Percent Retention 91 50     True Positives  12     Discrimination Index 11 52     False Positives  1  BRIEF VISUAL MEMORY TEST - REVISED  Form   2  Raw                   T     Trial 1   3     Trial 2   7     Trial 3   10     Total 1-3  20 47     Learning  7 68     Delay   8 49     Percent Retention 80 >16th%ile     True Positives  6     Discrimination Index 6 >16th%ile     False Positives  0    BOSTON NAMING TEST   Score: 60 MAS: 14  91%ile                    [ 0   w/o cues        0   w/phonemic cues]     D-KEFS VERBAL FLUENCY Standard        Raw  Age Scaled     Letter Fluency  40                   11     Category Fluency 49                   16       Switching Fluency              Total Correct 16                   14              Switching [id number]            14    CLOCK DRAWING     Command   3/3                 TRAIL MAKING TEST    Raw         Err  MAS  %ile   A 31            1               10             50   B 123        1               6             9    STROOP   Raw + Cristian  =   Total          MAS %ile    Word 85 +     --  = 85 6 9   Color  61 +     --= 61 8 25       Color/Word  36 +    --= 36 10 50     WCST (64 cards)    Raw   T/%ile   Categories: 5 >16   #Persev. Err.: 4 >80   Concept.  Resp.: 55 67   FTMS:  0    BARRIENTOS JUDGMENT OF LINE ORIENTATION Form H   Raw: 28  MAS: 13  84%ile:    DEMENTIA RATING SCALE - 2 Alternate       Raw       MAS            Raw      MAS  Attention  37  13        Concept   37           10  Init/Psv  37  11  Memory   22       7  Construct 6  10   Total     139/144     10     BDI-II:  9   APATHY SCALE:        5   ESS  RBDSQ  PDQ-39  QUIP    MAS = [place] Older Adult Normative Study Age & Education Adj. Scaled Score

## 2019-10-11 ENCOUNTER — OFFICE VISIT (OUTPATIENT)
Dept: NEUROLOGY | Facility: CLINIC | Age: 64
End: 2019-10-11
Payer: COMMERCIAL

## 2019-10-11 VITALS
HEIGHT: 69 IN | DIASTOLIC BLOOD PRESSURE: 67 MMHG | SYSTOLIC BLOOD PRESSURE: 112 MMHG | OXYGEN SATURATION: 95 % | BODY MASS INDEX: 31.25 KG/M2 | HEART RATE: 77 BPM | RESPIRATION RATE: 16 BRPM | WEIGHT: 211 LBS

## 2019-10-11 DIAGNOSIS — Z96.89 S/P DEEP BRAIN STIMULATOR PLACEMENT: Primary | ICD-10-CM

## 2019-10-11 DIAGNOSIS — G20.A1 PARKINSON'S DISEASE (H): ICD-10-CM

## 2019-10-11 DIAGNOSIS — G47.52 RBD (REM BEHAVIORAL DISORDER): ICD-10-CM

## 2019-10-11 RX ORDER — CARBIDOPA AND LEVODOPA 25; 100 MG/1; MG/1
TABLET ORAL
Qty: 450 TABLET | Refills: 3 | Status: SHIPPED | OUTPATIENT
Start: 2019-10-11 | End: 2021-01-25

## 2019-10-11 RX ORDER — CLONAZEPAM 1 MG/1
1 TABLET ORAL AT BEDTIME
Qty: 30 TABLET | Refills: 5 | Status: SHIPPED | OUTPATIENT
Start: 2019-10-11 | End: 2020-01-13

## 2019-10-11 ASSESSMENT — MOVEMENT DISORDERS SOCIETY - UNIFIED PARKINSONS DISEASE RATING SCALE (MDS-UPDRS)
HOBBIES_AND_OTHER_ACTIVITIES: SLIGHT: I AM A BIT SLOW BUT DO THESE ACTIVITIES EASILY.
TURNING_IN_BED: NORMAL: NOT AT ALL (NO PROBLEMS).
EATING_TASKS: NORMAL: NOT AT ALL (NO PROBLEMS).
FREEZING: NORMAL: NOT AT ALL (NO PROBLEMS).
TREMOR: NORMAL: NOT AT ALL (NO PROBLEMS).
SPEECH: MODERATE: MY SPEECH IS UNCLEAR ENOUGH THAT OTHERS ASK ME TO REPEAT MYSELF EVERY DAY EVEN THOUGH MOST OF MY SPEECH IS UNDERSTOOD.
WALKING_AND_BALANCE: NORMAL: NOT AT ALL (NO PROBLEMS).
HYGIENE: NORMAL: NOT AT ALL (NO PROBLEMS).
GETTING_OUT_OF_BED_CAR_DEEP_CHAIR: NORMAL: NOT AT ALL (NO PROBLEMS).
CHEWING_AND_SWALLOWING: NORMAL: NO PROBLEMS.
TOTAL_SCORE: 4
SALIVA_AND_DROOLING: NORMAL: NOT AT ALL (NO PROBLEMS).
DRESSING: NORMAL: NOT AT ALL (NO PROBLEMS).
HANDWRITING: NORMAL: NOT AT ALL (NO PROBLEMS).

## 2019-10-11 ASSESSMENT — MIFFLIN-ST. JEOR: SCORE: 1729.53

## 2019-10-11 ASSESSMENT — UNIFIED PARKINSONS DISEASE RATING SCALE (UPDRS)
RIGIDITY_RLE: NORMAL
TOETAPPING_LEFT: SLIGHT: ANY OF THE FOLLOWING: A) THE REGULAR RHYTHM IS BROKEN WITH ONE WITH ONE OR TWO INTERRUPTIONS OR HESITATIONS OF THE MOVEMENT B) SLIGHT SLOWING C) THE AMPLITUDE DECREMENTS NEAR THE END OF THE 10 MOVEMENTS.
TOTAL_SCORE: 2
SPONTANEITY_OF_MOVEMENT: 0: NORMAL.  NO PROBLEMS.
LEG_AGILITY_LEFT: NORMAL
AMPLITUDE_LIP_JAW: NORMAL: NO TREMOR.
PRONATION_SUPINATION_RIGHT: NORMAL
TOTAL_SCORE_LEFT: 3
RIGIDITY_RUE: SLIGHT: RIGIDITY ONLY DETECTED WITH ACTIVATION MANEUVER.
RIGIDITY_LLE: NORMAL
AMPLITUDE_LUE: NORMAL: NO TREMOR.
AMPLITUDE_LLE: NORMAL: NO TREMOR.
GAIT: NORMAL
AXIAL_SCORE: 2
FINGER_TAPPING_LEFT: NORMAL
LEG_AGILITY_RIGHT: NORMAL
HANDMOVEMENTS_LEFT: NORMAL
POSTURE: 0 NORMAL, NO PROBLEMS
HANDMOVEMENTS_RIGHT: NORMAL
ARISING_CHAIR: NORMAL: ABLE TO ARISE QUICKLY WITHOUT HESITATION.
FINGER_TAPPING_RIGHT: NORMAL
SPEECH: SLIGHT: LOSS OF MODULATION, DICTION OR VOLUME, BUT STILL ALL WORDS EASY TO UNDERSTAND.
POSTURAL_STABILITY: NORMAL:  RECOVERS WITH ONE OR TWO STEPS.
RIGIDITY_LUE: NORMAL
FREEZING_GAIT: NORMAL
AMPLITUDE_RLE: NORMAL: NO TREMOR.
AMPLITUDE_RUE: NORMAL: NO TREMOR.
PRONATION_SUPINATION_LEFT: NORMAL
RIGIDITY_NECK: SLIGHT: RIGIDITY ONLY DETECTED WITH ACTIVATION MANEUVER.
FACIAL_EXPRESSION: NORMAL.
CONSTANCY_TREMOR_ATREST: NORMAL: NO TREMOR.
PARKINSONS_MEDS: ON
TOTAL_SCORE: 7
TOETAPPING_RIGHT: SLIGHT: ANY OF THE FOLLOWING: A) THE REGULAR RHYTHM IS BROKEN WITH ONE WITH ONE OR TWO INTERRUPTIONS OR HESITATIONS OF THE MOVEMENT B) SLIGHT SLOWING C) THE AMPLITUDE DECREMENTS NEAR THE END OF THE 10 MOVEMENTS.

## 2019-10-11 ASSESSMENT — PAIN SCALES - GENERAL: PAINLEVEL: NO PAIN (0)

## 2019-10-11 NOTE — PATIENT INSTRUCTIONS
October 11, 2019    Dear Javier Kwame CHIU Nickiterrance,    Thank you for coming today.  During your visit, we have discussed the following:     __  Your DBS electrical system function (impedance) is normal.     __  Your DBS current on both sides have been increased by 0.1 mA to improve wearing off leg cramps.    __  If the wearing off cramps don't improve, you can take your Sinemet 25/100 mg every 3.5 hours.     __  Okay to experiment if Clonazepam is exacerbating your dram enactment or improving it and send a [employer] message.    __  You can also try a different band of Melatonin.       To contact our clinic, you may call [phone] or use Giggzo message.     It's good to see you!     АННА Flores, CNP  [facility]

## 2019-10-11 NOTE — PROGRESS NOTES
"MOVEMENT DISORDERS CLINIC    PATIENT: Kwame Lin    : 1955    SULEMA: 2019    REASON FOR VISIT: Parkinson's disease (PD) follow up deep brain stimulation (DBS) programming optimization.     HPI: Mr. Kwame Lin is a 64 year old right - handed  male who came to the [facility] neurology clinic accompanied by his wife, for a follow up visit.      Patient has a Hx of PD since .  He is s/p bilateral Subthalamic Nucleus (STN) DBS using the [employer] device; Left side on 2017 and Right side on 2018.  Patient and his wife are very pleased with the outcome.      Patient has been programmed by Dr. Borges.  He is seen for a f/u visit and DBS programming optimization in her absence.     Since surgery, he is doing \"really good.\"  He feels guilty for feeling as good as he does compared to other patients.     When he wears off, he gets cramping outside (lateral aspect) of his calf bilaterally.  He hasn't been using the long acting at night as it wasn't working.  He instead uses the short-acting Sinemet on average once in the middle of the night.      Patient hasn't made any DBS programming changes.  Patient asked if his DBS current/amplitude could be bumped up by \"one click\" to improve wearing off leg cramps.    PD Medications 8:30-9 am 12:30-1 pm 4:30-5 pm 8:30-9 pm PRN   Sinemet 25/100 mg  1 1 1 1 1 - 2 in the middle of the night     He works one day a week, as a .     He goes to the [employer] twice a week, which a Parkinson's Voice Project through [facility].  The program is funded by a peng and is free of charge.  He has been doing speech therapy and physical therapy that incorporates cognitive activities like playing jeopardy.  At home he uses a stationary bike.  He also goes for walks.  He has no falls.     His wife retied in May.  \"We're enjoying life together.\"    He has REM behavior disorder. He has been taking Clonazepam 1 " "mg at HS. he is not sure if clonazepam is helping his dream enactment or exacerbating it.  He is experimenting with the dose and stopping it to see if he has more restful night compared to when he is taking it.  He has guard rail that he got after a surgery.  He is taking Melatonin 5 mg 2 tabs at night.    He is interested in Biofeedback, Electrodermal screening.    MEDICATIONS:   Medication Sig     acetaminophen (TYLENOL) 325 MG tablet Take 325-650 mg by mouth nightly as needed for mild pain     carbidopa-levodopa (SINEMET)  MG tablet Take 1 tab every 4 hours 5 times per day.     citalopram (CELEXA) 10 MG tablet Take 10 mg by mouth every morning      clonazePAM (KLONOPIN) 1 MG tablet Take 1 tablet (1 mg) by mouth At Bedtime     Coenzyme Q10 300 MG CAPS Take 300 mg by mouth every morning     magnesium 100 MG TABS Take 100 tablets by mouth 2 times daily (before meals)     MELATONIN PO Take 2 mg by mouth At Bedtime     Misc Natural Products (RED WINE EXTRACT) CAPS Take by mouth daily (with lunch)      multivitamin, therapeutic with minerals (MULTI-VITAMIN) TABS tablet Take 1 tablet by mouth every morning     Probiotic Product (ACIDOPHILUS/GOAT MILK) CAPS Take 1 capsule by mouth At Bedtime      thyroid (ARMOUR THYROID) 60 MG tablet Take 60 mg by mouth every morning        ALLERGIES: Bactrim; Codeine; Ketorolac; Morphine; Nalbuphine; Nsaids; Penicillins; Seasonal allergies; Sulfa drugs; and Toradol    PHYSICAL EXAM:    VITAL SIGNS:  Blood pressure 112/67, pulse 77, resp. rate 16, height 1.756 m (5' 9.13\"), weight 95.7 kg (211 lb), SpO2 95 %., Body mass index is 31.04 kg/m .    GENERAL:  Mr. Lin is a pleasant  male who is well-groomed and well-developed sitting comfortably in the exam room without any distress.  Affect is appropriate.    MOVEMENT DISORDERS ASSESSMENT: (Last Sinemet was about 4 hrs ago)  UPDRS Values 10/11/2019   Time: 11:34 AM   Medication On   R Brain DBS: On   L Brain DBS: On " "  Speech 1   Facial Expression 0   Rigidity Neck 1   Rigidity RUE 1   Rigidity LUE 0   Rigidity RLE 0   Rigidity LLE 0   Finger Taps R 0   Finger Taps L 0   Hand Mvt R 0   Hand Mvt L 0   Pron-/Supinate R 0   Pron-/Supinate L 0   Toe Tap R 1   Toe Tap L 1   Leg Agility R 0   Leg Agility L 0   Arise From Chair 0   Gait 0   Gait Freezing 0   Postural Stability 0   Posture 0   Global Spont Mvt 0   Postural Tremor RUE 0   Postural Tremor LUE 1   Kinetic Tremor RUE 0   Kinetic Tremor LUE 1   Rest Tremor RUE 0   Rest Tremor LUE 0   Rest Tremor RLE 0   Rest Tremor LLE 0   Rest Tremor Lip/Jaw 0   Rest Tremor Constancy 0   Total Right 2   Total Left 3   Axial Total 2   Total 7     Dyskinesias:  Slight body dyskinesias that become mild with mental activation.     DBS Interrogation & Analysis:    It took a long time to connect with the [employer].  Zohreh Packer, [employer], Rep contacted to resolve the issue.     Implanted generator:  Left chest [employer]   Lead placement:  Bilateral Subthalamic Nucleus (STN).  Generator placement date:  12/22/2017    Patient has 4 DBS programs.  3 INACTIVE and 1 ACTIVE.  See print out for details.     Left Brain --  ACTIVE Program is Labeled as \"Program - 4: 4-Bear\"    Lead placement date:  12/14/2017     C +, 3 -  Amplitude:  2.8 mA  PW:  60 msec  Rate:  139 Hz    Electrode Impedance Check:    Left Lead: No Problems Found.       Right Brain   Lead placement date:  5/22/2018    C +, 11 (40%) -, 14 (60%)-  Amplitude:  3.3 mA  PW:  60 msec  Rate:  139 Hz    Electrode Impedance Check:    Right Lead: No Problems Found.      See scanned report for impedance details.    DBS PROGRAMMING  Left STN  Increased from 2.8 to 2.9 mA  Right STN  Increased from 3.3 to 3.4 mA     ASSESSMENT:    1)  Parkinson's Disease:  Patient has a 10 year history of PD.  Motor symptoms are well controlled on current DBS therapy and medication.  He has slight wearing off leg cramps " which are not too bothersome.    2)  S/p Bilateral STN DBS lead implantation:  Normal impedance and battery life.  DBS programming completed to improve wearing off associated leg cramps.     3)  REM Behavior Disorder:  Ongoing issues.       PLAN:    __  If the wearing off cramps don't improve, may take your Sinemet 25/100 mg every 3.5 hours.     __  He was given permission to experiment if Clonazepam is exacerbating your dram enactment or improving it and send a [employer] message.    __  He was informed to try a different band of Melatonin.       Will return to our clinic in 3 - 4 months or sooner as needed.    The total amount of time spent with the patient was 90 minutes; 60 min in DBS programming and 30 min in addressing PD, wearing off leg cramps, & RBD.  Over 50% of the time was spent in counseling & coordination of care.      АННА Flores,  CNP  [facility]    10:43 AM  12:12 PM

## 2019-10-11 NOTE — NURSING NOTE
Chief Complaint   Patient presents with     Parkinson     [facility] RETURN MOVEMENT DISORDER F/U       Gabriel Henriquez, EMT

## 2019-10-17 ENCOUNTER — OFFICE VISIT (OUTPATIENT)
Dept: NEUROLOGY | Facility: CLINIC | Age: 64
End: 2019-10-17
Payer: COMMERCIAL

## 2019-10-17 VITALS
HEART RATE: 70 BPM | SYSTOLIC BLOOD PRESSURE: 129 MMHG | DIASTOLIC BLOOD PRESSURE: 70 MMHG | BODY MASS INDEX: 31.48 KG/M2 | OXYGEN SATURATION: 94 % | WEIGHT: 214 LBS

## 2019-10-17 DIAGNOSIS — G20.A1 PARKINSON'S DISEASE (H): ICD-10-CM

## 2019-10-17 DIAGNOSIS — Z96.89 S/P DEEP BRAIN STIMULATOR PLACEMENT: Primary | ICD-10-CM

## 2019-10-17 ASSESSMENT — MOVEMENT DISORDERS SOCIETY - UNIFIED PARKINSONS DISEASE RATING SCALE (MDS-UPDRS)
HANDWRITING: SLIGHT: MY WRITING IS SLOW, CLUMSY OR UNEVEN, BUT ALL WORDS ARE CLEAR.
SPEECH: MODERATE: MY SPEECH IS UNCLEAR ENOUGH THAT OTHERS ASK ME TO REPEAT MYSELF EVERY DAY EVEN THOUGH MOST OF MY SPEECH IS UNDERSTOOD.
CHEWING_AND_SWALLOWING: NORMAL: NO PROBLEMS.
TREMOR: NORMAL: NOT AT ALL (NO PROBLEMS).
TOTAL_SCORE: 5
HYGIENE: NORMAL: NOT AT ALL (NO PROBLEMS).
FREEZING: NORMAL: NOT AT ALL (NO PROBLEMS).
DRESSING: NORMAL: NOT AT ALL (NO PROBLEMS).
TURNING_IN_BED: NORMAL: NOT AT ALL (NO PROBLEMS).
WALKING_AND_BALANCE: SLIGHT: I AM SLIGHTLY SLOW OR MAY DRAG A LEG.  I NEVER USE A WALKING AID.
SALIVA_AND_DROOLING: NORMAL: NOT AT ALL (NO PROBLEMS).
HOBBIES_AND_OTHER_ACTIVITIES: NORMAL:  NOT AT ALL (NO PROBLEMS).
GETTING_OUT_OF_BED_CAR_DEEP_CHAIR: NORMAL: NOT AT ALL (NO PROBLEMS).
EATING_TASKS: NORMAL: NOT AT ALL (NO PROBLEMS).

## 2019-10-17 ASSESSMENT — PAIN SCALES - GENERAL: PAINLEVEL: NO PAIN (0)

## 2019-10-17 NOTE — NURSING NOTE
Chief Complaint   Patient presents with     RECHECK     [facility] RETURN MOVEMENT DISORDER       Lynne Armstrong, EMT

## 2019-10-17 NOTE — PROGRESS NOTES
"MOVEMENT DISORDERS CLINIC    PATIENT: Kwame Lin    : 1955    SULEMA: 2019    REASON FOR VISIT: 4 simple deep brain stimulation programming.     HPI: Mr. Kwame Lin is a 64 year old right - handed  male who came to the [facility] neurology clinic accompanied by his wife for a follow up visit.  I saw patient in clinic a few days ago for DBS programming optimization.  Since his last visit, he has sent Rosalind messages requesting for a wider range on his patient controller.  He is here to accomplish his request.    PHYSICAL EXAM:    VITAL SIGNS:  Blood pressure 129/70, pulse 70, weight 97.1 kg (214 lb), SpO2 94 %. Body mass index is 31.48 kg/m .    DBS Interrogation & Analysis:    It took almost 15 -20 minutes to connect with generator.  It kept giving  looking of stimulator,  and miscellaneous  retrieving  messages.     Implanted generator:  Left chest Vercise [employer]   Lead placement:  Bilateral Subthalamic Nucleus (STN).  Generator placement date:   2017     Patient has 4 DBS programs.  3 INACTIVE and 1 ACTIVE.  See print out for details.      Left Brain --  ACTIVE Program is Labeled as \"Program - 4: 4-Bear\"  Lead placement date:  2017      C +, 3 -  Amplitude:  2.8 mA  PW:  60 msec  Rate:  139 Hz     Patient :  Upper Limit: 3.3 mA  Lower Limit: 2.5 mA     Electrode Impedance Check:    Left Lead: No Problems Found.       Right Brain   Lead placement date:  2018     C +, 11 (40%) -, 14 (60%)-  Amplitude:  3.3 mA  PW:  60 msec  Rate:  139 Hz     Patient :  Upper Limit: 4.0 mA  Lower Limit: 3.3 mA     Electrode Impedance Check:    Right Lead: No Problems Found.       See scanned report for impedance details.     DBS PROGRAMMING  Left STN  Patient controller adjusted:    Upper Limit: 3.3 mA  Lower Limit: 2.0 mA     Right STN  Patient controller adjusted:  Upper Limit: 4.0 mA  Lower Limit: 2.5 mA    The total time spent with the patient in DBS " programming was 35 minutes.    АННА Flores, CNP  [facility]    12:20 PM  12:45 PM

## 2019-11-26 NOTE — PROGRESS NOTES
Department of Neurology  Movement Disorders Division   DBS Follow-up Note    Patient: Kwame Lin  MRN: [id number]   : 1955   Date of Visit: 10/3/2018    Diagnosis: Parkinson disease  DBS Target(s): Bilateral STN   Date(s) of DBS Lead Placement:   Left STN 2017, right STN 2018  Date(s) of IPG Placement:   Left chest 2017      Chief Complaint:  Kwame Lin is a 63 year right-handed man with tremor predominant Parkinson disease with symptom onset in  with right hand tremor status post left STN DBS (2017) and right STN DBS (2018).    Interval History:  His last programming changes were made 10/3/18 by Dr. Borges. He was having increased right leg dragging because his left brain settings had been previously changed. He was changed back from C+2-3- to C+3- and current decreased from 3.3 to 2.8. His Sinemet dose was also increased from 0.5 to 1 to help with possible cognitive effects of not having enough levodopa.    Was then seen by Dr. Judge and noting left foot dyskinesias with wearing-off, mostly at 2:30am. A C/L dose was added overnight and his clonazepam was increased due to RBD.    Today he reports that the initial Sinemet dose increase helped with cognition. He is happy with that. He also feels like his right leg isn't dragging anymore.    In terms of what he calls dyskinesia, he notes that when he is doing certain exercises his left leg wants to turn in. He can demonstrate when moving from a standing position. This is always present. He reports he turned down his stimulator and it improved. But when we look at his settings today it appears he actually did not decreased the left body settings but instead the right body settings are back up at the level they were before they were fixed by Dr. Borges for the right leg dragging.    In addition his wife reports he is still acting out his dreams. He admits he has been taking the clonazepam only about 10% of the time due  Gastroenterology to new pain medications and complicated medication schedule due to knee replacement recently.    Still has mild left hand tremor but it is not bothersome.    Review of Systems:  Other than that noted at the end of this note, the remainder of 12 systems reviewed were negative.    Medications:  Current Outpatient Medications   Medication Sig Dispense Refill     acetaminophen (TYLENOL) 325 MG tablet Take 325-650 mg by mouth nightly as needed for mild pain       carbidopa-levodopa (SINEMET)  MG per tablet Take 1 tablet by mouth 4 times daily 360 tablet 3     carbidopa-levodopa (SINEMET)  MG per tablet 3 tablets At Bedtime Extended release 90 tablet      citalopram (CELEXA) 10 MG tablet Take 10 mg by mouth every morning        clonazePAM (KLONOPIN) 0.5 MG tablet Take 1 mg by mouth At Bedtime        Coenzyme Q10 300 MG CAPS Take 300 mg by mouth every morning       diclofenac (VOLTAREN) 1 % GEL topical gel Apply qid prn pain       hydrOXYzine (VISTARIL) 25 MG capsule TAKE ONE TO TWO CAPSULES BY MOUTH EVERY 6 HOURS AS NEEDED FOR PAIN  0     magnesium 100 MG TABS Take 100 tablets by mouth 2 times daily (before meals)       MELATONIN PO Take 2 mg by mouth At Bedtime       Misc Natural Products (RED WINE EXTRACT) CAPS Take by mouth daily (with lunch)        multivitamin, therapeutic with minerals (MULTI-VITAMIN) TABS tablet Take 1 tablet by mouth every morning       ondansetron (ZOFRAN ODT) 4 MG ODT tab Take 1-2 tablets (4-8 mg) by mouth every 8 hours as needed for nausea 20 tablet 1     ondansetron (ZOFRAN) 4 MG tablet TAKE ONE TABLET BY MOUTH EVERY 6 HOURS AS NEEDED FOR NAUSEA  0     oxyCODONE-acetaminophen (PERCOCET) 5-325 MG tablet TAKE ONE TO TWO TABLETS BY MOUTH EVERY 4 HOURS AS NEEDED FOR PAIN *CAUTION: OPIOID. RISK OF OVERDOSE AND ADDICTION.  0     Probiotic Product (ACIDOPHILUS/GOAT MILK) CAPS At Bedtime        thyroid (ARMOUR THYROID) 60 MG tablet daily       thyroid (ARMOUR THYROID) 60 MG tablet Take 60  mg by mouth every morning        traMADol (ULTRAM) 50 MG tablet Take 1 tablet (50 mg) by mouth every 6 hours as needed for severe pain 25 tablet 0         PD Medications                   7:30AM 12PM 4PM 8PM 10:30PM 2:30AM        Carbidopa/levodopa IR 25/100                                           1 1 1 1   1      Carbidopa/levodopa CR 25/100                                    1      Clonazepam 0.5mg                                        2     Melatonin ER 3 mg              4     - Continue daily speech exercises and encouraged regular exercise       Allergies: is allergic to bactrim; codeine; ketorolac; morphine; nalbuphine; nsaids; penicillins; seasonal allergies; sulfa drugs; and toradol.    Past Medical History:  Past Medical History:   Diagnosis Date     Anosmia 10/12/2017     Anxiety      Depressed mood 10/12/2017     Hypothyroid      Parkinson disease (H)      Parkinsons disease (H)      PONV (postoperative nausea and vomiting)      RBD (REM behavioral disorder) 10/12/2017       Past Surgical History:  Past Surgical History:   Procedure Laterality Date     ARTHROSCOPY KNEE Right     twice     ARTHROSCOPY KNEE Right     left     epidydmal mass removal, benign       IMPLANT DEEP BRAIN STIMULATION GENERATOR / BATTERY Left 12/22/2017    Procedure: IMPLANT DEEP BRAIN STIMULATION GENERATOR / BATTERY;  Deep Brain Stimulator Placement, Phase II, Placement Of Deep Brain Stimulator Generator/Battery Over The Left Chest Wall;  Surgeon: Arpan Huynh MD;  Location: [facility] OR     OPTICAL TRACKING SYSTEM INSERTION DEEP BRAIN STIMULATION Left 12/14/2017    Procedure: OPTICAL TRACKING SYSTEM INSERTION DEEP BRAIN STIMULATION;  Stealth Assisted Left Deep Brain Stimulator Placement, Phase I, Placement Of Left Side Deep Brain Stimulator Electrode, Target Left Subthalamic Nucleus With Microelectrode Recording;  Surgeon: Arpan Huynh MD;  Location: [facility] OR     OPTICAL TRACKING SYSTEM INSERTION DEEP BRAIN  "STIMULATION Right 5/22/2018    Procedure: OPTICAL TRACKING SYSTEM INSERTION DEEP BRAIN STIMULATION;  Stealth Assisted Right Deep Brain Stimulator Placement, Phase I And II Combined, Placement Of Right Deep Brain Stimulator Electrode, Target Right Subthalamic Nucleus With Microelectrode Recording And Connection To Previous Implanted Generator/Battery;  Surgeon: Arpan Huynh MD;  Location:  OR       Social History:  Social History     Social History     Marital status:      Spouse name: N/A     Number of children: N/A     Years of education: N/A     Occupational History     Small business start consultant      Social History Main Topics     Smoking status: Never Smoker     Smokeless tobacco: Never Used     Alcohol use 0.6 - 1.2 oz/week     1 - 2 Cans of beer per week      Comment: MONTHLY     Drug use: No     Sexual activity: Yes     Partners: Female     Other Topics Concern     None     Social History Narrative    Previous worked as [employer].    Now consulting part-time.    , lives with wife.    Non-smoker. Occasional drink.        Past surgical history that includes epidydmal mass removal, benign.        Social History:    Previous worked as [employer].    Now consulting part-time.    , lives with wife.    Non-smoker. Occasional drink.         family history includes Lung Cancer in his father; Parkinsonism in his cousin and maternal aunt.            Family History:  Family History   Problem Relation Age of Onset     Lung Cancer Father      Parkinsonism Cousin         paternal side     Parkinsonism Maternal Aunt        Physical Exam:  The patient's  height is 1.753 m (5' 9\") and weight is 91.2 kg (201 lb). His blood pressure is 108/70 and his pulse is 78. His oxygen saturation is 97%.      UPDRS Values 12/19/2018   Time: 3:30 PM   Medication On   R Brain DBS: On   L Brain DBS: On   Speech 1 "   Facial Expression 0   Rigidity Neck 0   Rigidity RUE 1   Rigidity LUE 0   Rigidity RLE 0   Rigidity LLE 0   Finger Taps R 0   Finger Taps L 0   Hand Mvt R 0   Hand Mvt L 0   Pron-/Supinate R 0   Pron-/Supinate L 0   Toe Tap R 1   Toe Tap L 1   Leg Agility R 0   Leg Agility L 0   Arise From Chair 0   Gait 0   Gait Freezing 0   Postural Stability 0   Posture 0   Global Spont Mvt 0   Postural Tremor RUE 0   Postural Tremor LUE 1   Kinetic Tremor RUE 0   Kinetic Tremor LUE 1   Rest Tremor RUE 0   Rest Tremor LUE 0   Rest Tremor RLE 0   Rest Tremor LLE 0   Rest Tremor Lip/Jaw 0   Rest Tremor Constancy 0   Total Right 2   Total Left 3   Axial Total 1   Total 6   Slightly toes in both right and left feet with gait. Is limping though due to knee replacement.    Procedure: DBS Programming    Lead(s):    Left Right   DBS Target    STN STN      DBS Lead Type Long Play Vercise Long Play Vercise     Lead Implant Date 12/14/17 5/22/18         IPG(s):   1 2   IPG [employer] Vercise          IPG Implant Date 12/22/17          Location Left chest          Battery (V) Charging well         Full Impedence/Currents Check: No problems.    Initial Settings:  Left Brain 4-Bear   Program C Program D   Contacts  C+;3-         Amplitude (mA)  3.3         Pulse Width ( s)  60         Frequency (Hz)  139         Patient Control (min/max) 2.5/3.3  /   /   /      Right Brain 4-Bear   Program C Program D   Contacts C+;11-(60%);14-(40%)          Amplitude (mA)  4.0         Pulse Width ( s)  60         Frequency (Hz)  139         Patient Control (min/max) 3.3/4.0   /   /   /      Initial program selected:    4-Bear    Right Hemisphere Monopolar Review    Contacts Amplitude    mA PW   (usec) Rate  (Hz) Assessment Adverse Effects   C+;11-(60%);14-(40%) 3 60 139 Slight thumb tremor, ? Worse (subjectively better per patient) Left leg not turning in on standing anymore     4   Subjectively worse tremor per patient Left leg  turning in   C+;11-(30%);14-(70%) 4   Tremor slightly worse resolved     4.5   Tremor same ok    5   Tremor slightly worse? ok     5.5   same ok     6   same ? Knee turning in    C+;11-(60%);14-(40%) 3   Tremor same Knee ok,  Feels like left foot toeing in with gait slightly   C+;11-(30%);14-(70%) 4    ?toeing in worse     5    ?worse     0    Better, but toeing in still there                                                                                                                                                                                                                                                                              Final Settings:  Left Brain 4-Bear   Program C Program D   Contacts  C+;3-         Amplitude (mA)  3.3         Pulse Width ( s)  60         Frequency (Hz)  139           Right Brain 4-Bear   Program C Program D   Contacts C+;11-(60%);14-(40%)          Amplitude (mA)  3.3         Pulse Width ( s)  60         Frequency (Hz)  139           Final Program selected:    4-Bear       Impression:  Kwame Lin is a 63 year right-handed man with tremor predominant Parkinson disease with symptom onset in 2007 with right hand tremor status post left STN DBS (12/2017) and right STN DBS (5/2018). Overall he is doing quite well. Wonder if the turning in of the left leg is a dystonia. Attempted to increase current distribution to more dorsal right brain contact to suppress more but did not appear to be as effective as simply backing down on current to his current right brain program. Decreased current for this to 3.3. Also it appears at some point his current for his left brain was increased at some point from his last appointment with Dr. Borges. He does seem to drag his right leg slightly, so will have him turn this back down in a couple weeks to see if this also helps his gait.    Told him to be compliant with clonazepam for RBD treatment. Will not change dose as he has not been  compliant.      Recommendations:   I decreased your settings on your RIGHT brain LEFT body DBS to help with your left knee turning in.  See how this goes for a couple of weeks.    Your LEFT brain RIGHT body settings got turned up at some point since you were last seen by Dr. Borges. It was at 2.8 and got turned up to 3.3. Try turning this back down to 2.8 after a couple of weeks and see if this helps your walking as well.      25 minutes spent in this visit discussing PD-related symptoms including RBD and treatment, 50% of which was counseling and coordination of care  50 minutes spent DBS programming    Follow-up 1 month    Ian Yuan MD  Movement Disorders Fellow

## 2020-01-10 ASSESSMENT — MOVEMENT DISORDERS SOCIETY - UNIFIED PARKINSONS DISEASE RATING SCALE (MDS-UPDRS)
DRESSING: NORMAL: NOT AT ALL (NO PROBLEMS).
GETTING_OUT_OF_BED_CAR_DEEP_CHAIR: NORMAL: NOT AT ALL (NO PROBLEMS).
CHEWING_AND_SWALLOWING: NORMAL: NO PROBLEMS.
EATING_TASKS: NORMAL: NOT AT ALL (NO PROBLEMS).
WALKING_AND_BALANCE: SLIGHT: I AM SLIGHTLY SLOW OR MAY DRAG A LEG.  I NEVER USE A WALKING AID.
TREMOR: NORMAL: NOT AT ALL (NO PROBLEMS).
TURNING_IN_BED: SLIGHT: I HAVE A BIT OF TROUBLE TURNING, BUT I DO NOT NEED ANY HELP.
TOTAL_SCORE: 5
FREEZING: SLIGHTLY: I BRIEFLY FREEZE BUT I CAN EASILY START WALKING AGAIN. I DO NOT NEED HELP FROM SOMEONE ELSE OR A WALKING AID (CANE OR WALKER) BECAUSE OF FREEZING.
HOBBIES_AND_OTHER_ACTIVITIES: NORMAL:  NOT AT ALL (NO PROBLEMS).
HYGIENE: NORMAL: NOT AT ALL (NO PROBLEMS).
SALIVA_AND_DROOLING: NORMAL: NOT AT ALL (NO PROBLEMS).
SPEECH: MILD: MY SPEECH CAUSES PEOPLE TO ASK ME TO OCCASIONALLY REPEAT MYSELF, BUT NOT EVERYDAY.
HANDWRITING: NORMAL: NOT AT ALL (NO PROBLEMS).

## 2020-01-13 ENCOUNTER — OFFICE VISIT (OUTPATIENT)
Dept: NEUROLOGY | Facility: CLINIC | Age: 65
End: 2020-01-13
Payer: COMMERCIAL

## 2020-01-13 VITALS — BODY MASS INDEX: 31.48 KG/M2 | HEIGHT: 69 IN

## 2020-01-13 DIAGNOSIS — G20.A1 PARKINSON'S DISEASE (H): ICD-10-CM

## 2020-01-13 DIAGNOSIS — Z96.89 S/P DEEP BRAIN STIMULATOR PLACEMENT: ICD-10-CM

## 2020-01-13 DIAGNOSIS — G47.52 RBD (REM BEHAVIORAL DISORDER): Primary | ICD-10-CM

## 2020-01-13 RX ORDER — CLONAZEPAM 0.5 MG/1
TABLET ORAL
Qty: 90 TABLET | Refills: 1 | Status: SHIPPED | OUTPATIENT
Start: 2020-01-13 | End: 2021-08-13

## 2020-01-13 RX ORDER — CITALOPRAM HYDROBROMIDE 10 MG/1
20 TABLET ORAL EVERY MORNING
COMMUNITY
Start: 2020-01-13 | End: 2021-09-10

## 2020-01-13 ASSESSMENT — UNIFIED PARKINSONS DISEASE RATING SCALE (UPDRS)
TOTAL_SCORE_LEFT: 7
POSTURAL_STABILITY: NORMAL:  RECOVERS WITH ONE OR TWO STEPS.
CONSTANCY_TREMOR_ATREST: NORMAL: NO TREMOR.
TOTAL_SCORE: 4
AMPLITUDE_RLE: NORMAL: NO TREMOR.
TOTAL_SCORE: 14
RIGIDITY_LLE: SLIGHT: RIGIDITY ONLY DETECTED WITH ACTIVATION MANEUVER.
AXIAL_SCORE: 3
FINGER_TAPPING_LEFT: NORMAL
FINGER_TAPPING_RIGHT: NORMAL
LEG_AGILITY_LEFT: SLIGHT: ANY OF THE FOLLOWING: A) THE REGULAR RHYTHM IS BROKEN WITH ONE WITH ONE OR TWO INTERRUPTIONS OR HESITATIONS OF THE MOVEMENT B) SLIGHT SLOWING C) THE AMPLITUDE DECREMENTS NEAR THE END OF THE 10 MOVEMENTS.
GAIT: NORMAL
ARISING_CHAIR: NORMAL: ABLE TO ARISE QUICKLY WITHOUT HESITATION.
AMPLITUDE_LLE: NORMAL: NO TREMOR.
PARKINSONS_MEDS: ON
FREEZING_GAIT: NORMAL
TOETAPPING_LEFT: SLIGHT: ANY OF THE FOLLOWING: A) THE REGULAR RHYTHM IS BROKEN WITH ONE WITH ONE OR TWO INTERRUPTIONS OR HESITATIONS OF THE MOVEMENT B) SLIGHT SLOWING C) THE AMPLITUDE DECREMENTS NEAR THE END OF THE 10 MOVEMENTS.
PRONATION_SUPINATION_RIGHT: NORMAL
AMPLITUDE_RUE: NORMAL: NO TREMOR.
RIGIDITY_RLE: NORMAL
SPONTANEITY_OF_MOVEMENT: 0: NORMAL.  NO PROBLEMS.
SPEECH: SLIGHT: LOSS OF MODULATION, DICTION OR VOLUME, BUT STILL ALL WORDS EASY TO UNDERSTAND.
AMPLITUDE_LUE: NORMAL: NO TREMOR.
RIGIDITY_RUE: SLIGHT: RIGIDITY ONLY DETECTED WITH ACTIVATION MANEUVER.
RIGIDITY_LUE: NORMAL
PRONATION_SUPINATION_LEFT: SLIGHT: ANY OF THE FOLLOWING: A) THE REGULAR RHYTHM IS BROKEN WITH ONE WITH ONE OR TWO INTERRUPTIONS OR HESITATIONS OF THE MOVEMENT B) SLIGHT SLOWING C) THE AMPLITUDE DECREMENTS NEAR THE END OF THE 10 MOVEMENTS.
LEG_AGILITY_RIGHT: SLIGHT: ANY OF THE FOLLOWING: A) THE REGULAR RHYTHM IS BROKEN WITH ONE WITH ONE OR TWO INTERRUPTIONS OR HESITATIONS OF THE MOVEMENT B) SLIGHT SLOWING C) THE AMPLITUDE DECREMENTS NEAR THE END OF THE 10 MOVEMENTS.
AMPLITUDE_LIP_JAW: NORMAL: NO TREMOR.
FACIAL_EXPRESSION: SLIGHT: MINIMAL MASKED FACIES MANIFESTED ONLY BY DECREASED FREQUENCY OF BLINKING.
TOETAPPING_RIGHT: NORMAL
RIGIDITY_NECK: SLIGHT: RIGIDITY ONLY DETECTED WITH ACTIVATION MANEUVER.
HANDMOVEMENTS_RIGHT: SLIGHT: ANY OF THE FOLLOWING: A) THE REGULAR RHYTHM IS BROKEN WITH ONE WITH ONE OR TWO INTERRUPTIONS OR HESITATIONS OF THE MOVEMENT B) SLIGHT SLOWING C) THE AMPLITUDE DECREMENTS NEAR THE END OF THE 10 MOVEMENTS.
HANDMOVEMENTS_LEFT: SLIGHT: ANY OF THE FOLLOWING: A) THE REGULAR RHYTHM IS BROKEN WITH ONE WITH ONE OR TWO INTERRUPTIONS OR HESITATIONS OF THE MOVEMENT B) SLIGHT SLOWING C) THE AMPLITUDE DECREMENTS NEAR THE END OF THE 10 MOVEMENTS.
POSTURE: 0 NORMAL, NO PROBLEMS

## 2020-01-13 ASSESSMENT — PAIN SCALES - GENERAL: PAINLEVEL: NO PAIN (0)

## 2020-01-13 NOTE — PROGRESS NOTES
"MOVEMENT DISORDERS CLINIC    PATIENT: Kwame Lin    : 1955    SULEMA: 2020    REASON FOR VISIT:  Parkinson's disease (PD) follow up and deep brain stimulation and analysis.     HPI: Mr. Kwame Lin is a 64 year old right - handed  male who came to the [facility] neurology clinic accompanied by his wife,  for a follow up visit.  I saw him last in the clinic on 10/17/2019 for a routine f/u visit.  He is s/p bilateral Subthalamic Nucleus DBS lead implantation using [employer] device; Left side on 2017 and Right side on 2918.    Today, the biggest concern his wife brought up is his dream enactment.  One morning, he complained of knee pain.  They didn't know what happened until they saw a hole on the bedroom wall.  Patient didn't recall the event.  He had bruise in his knee and hip.  His wife reports that he has jumped out of bed twice in his sleep.  In his dreams, \"he is saving someone.\"  It usually happens between 3 - 5:30 am.  His wife sleeps in a different room and is very concerned about him.  He was taking Clonazepam 1 mg, but felt like it was exacerbating his RBD.  He is no longer taking it.  He is taking Melatonin 10 mg.    Last night, they were at a hotel and his wife reported that he was \"very active.\"    He takes Sinemet 25/100 mg 1 tab every 4 hours 5 x per day.  He takes the first dose around 8 - 9 am and sets his alarm every 4 hours.  Motor symptoms are stable.      He continues working once a week.  He is staying physically and socially active.  He exercises regularly.    Mood is okay.  About a month ago, his PCP recently increased his Citalopram from 10 mg to 20 mg.  He has done a sleep study through [facility] and has seen a pulmonologist, but not a sleep specialist.       MEDICATIONS:   Outpatient Medications Marked as Taking for the 20 encounter (Office Visit) with Navjot Juarez APRN CNP   Medication Sig     acetaminophen " "(TYLENOL) 325 MG tablet Take 325-650 mg by mouth nightly as needed for mild pain     carbidopa-levodopa (SINEMET)  MG tablet Take 1 tab every 4 hours 5 times per day.     citalopram (CELEXA) 10 MG tablet Take 2 tablets (20 mg) by mouth every morning     clonazePAM (KLONOPIN) 0.5 MG tablet Take 1/2 to 1 tablets at bedtime to help with dream enactment.     Coenzyme Q10 300 MG CAPS Take 300 mg by mouth every morning     magnesium 100 MG TABS Take 100 tablets by mouth 2 times daily (before meals)     MELATONIN PO Take 2 mg by mouth At Bedtime     Misc Natural Products (RED WINE EXTRACT) CAPS Take by mouth daily (with lunch)      multivitamin, therapeutic with minerals (MULTI-VITAMIN) TABS tablet Take 1 tablet by mouth every morning     Probiotic Product (ACIDOPHILUS/GOAT MILK) CAPS Take 1 capsule by mouth At Bedtime      thyroid (ARMOUR THYROID) 60 MG tablet Take 60 mg by mouth every morning          ALLERGIES: Bactrim; Codeine; Ketorolac; Morphine; Nalbuphine; NSAIDs; Penicillins; Seasonal allergies; Sulfa drugs; and Toradol      PHYSICAL EXAM:    VITAL SIGNS:  Height 1.756 m (5' 9.13\")., Body mass index is 31.48 kg/m .    GENERAL:  Mr. Lin is a pleasant  male who is well-groomed and well-developed sitting comfortably in the exam room without any distress.  Affect is appropriate.    DBS Interrogation & Analysis:      Implanted generator:  Left chest [employer]   Lead placement:  Bilateral Subthalamic Nucleus (STN).  Generator placement date:   12/22/2017     Patient has 4 DBS programs.  3 INACTIVE and 1 ACTIVE.  See print out for details.      Left Brain --  ACTIVE Program is Labeled as \"Program - 4: 4-Bear\"  Lead placement date:  12/14/2017      C +, 3 -  Amplitude:  2.8 mA  PW:  60 msec  Rate:  139 Hz    Range:  2.0 - 3.3 mA     Electrode Impedance Check:    Left Lead: No Problems Found.       Right Brain   Lead placement date:  5/22/2018     C +, 11 (40%) -, 14 " (60%)-  Amplitude:  3.3 mA  PW:  60 msec  Rate:  139 Hz    Range:  2.5 - 4.0 mA     Electrode Impedance Check:    Right Lead: No Problems Found.       See scanned report for impedance details.     DBS PROGRAMMING  Unused programs deleted.    MOVEMENT DISORDERS ASSESSMENT:  MDS UPDRS III (Last Sinemet was about 3 hrs ago)  UPDRS Values 1/13/2020   Time: 11:14 AM   Medication On   R Brain DBS: On   L Brain DBS: On   Speech 1   Facial Expression 1   Rigidity Neck 1   Rigidity RUE 1   Rigidity LUE 0   Rigidity RLE 0   Rigidity LLE 1   Finger Taps R 0   Finger Taps L 0   Hand Mvt R 1   Hand Mvt L 1   Pron-/Supinate R 0   Pron-/Supinate L 1   Toe Tap R 0   Toe Tap L 1   Leg Agility R 1   Leg Agility L 1   Arise From Chair 0   Gait 0   Gait Freezing 0   Postural Stability 0   Posture 0   Global Spont Mvt 0   Postural Tremor RUE 0   Postural Tremor LUE 1   Kinetic Tremor RUE 1   Kinetic Tremor LUE 1   Rest Tremor RUE 0   Rest Tremor LUE 0   Rest Tremor RLE 0   Rest Tremor LLE 0   Rest Tremor Lip/Jaw 0   Rest Tremor Constancy 0   Total Right 4   Total Left 7   Axial Total 3   Total 14     Dyskinesias:  Absent.     ASSESSMENT:    1)  Parkinson's Disease:  Stable.    2)  S/p Bilateral STN DBS lead implantation:  Normal impedance.  Patient has been stable on current programs for over a year.  Unused programs deleted.     3)  REM Behavior Disorder:  Has recently worsened.  He has stopped taking Clonazepam 1 mg as he thought it was aggravating RBD.  He is taking Melatonin, although he didn't take it last night.      PLAN    __  Discussed about RBD and the importance of safety such as putting bed against the wall, putting pillows, using sleeping bag, and bed rails.      __  Referral to Sleep Medicine for sleep study.  Patient opted to be seen in [place].  He was given an option to see Dr. Marie.     __  Will restart a low dose Clonazepam 0.5 mg 1/2 tablet at bedtime.  May increase the dose to 1 tablet.     __  Continue to say  active exercising.     __  Will return to see Dr. Borges or myself in 6 months.   May return sooner as needed.     The total amount of time spent with the patient was 50 minutes; 30 min in DBS interrogation & analysis and programming, and 20 min in addressing PD and RBD.  Over 50% of the time was spent in counseling & coordination of care.     АННА Flores,  CNP  [facility]    10:41 AM  11:30 AM

## 2020-01-13 NOTE — PATIENT INSTRUCTIONS
January 13, 2020    Dear  Kwame CHIU Marthachanel,    Thank you for coming today.  During your visit, we have discussed the following:     __ Your DBS electrical system function (impedance) is normal.     __  Restart Clonazepam 0.5 mg 1/2 tablet at bedtime to help with dream enactment.  You may increase the dose to 1 tablet.    __  Referral to Sleep Medicine for sleep study.      __  Continue to say active exercising.     __  Return to see Dr. Borges or myself in 6 months.     To contact our clinic, you may call [phone] or use "[employer] message.     It's good to see you!      АННА Flores, CNP  [facility]

## 2020-01-13 NOTE — NURSING NOTE
Chief Complaint   Patient presents with     RECHECK     [facility] FOLLOW UP      Zakiya Encarnacion MA

## 2020-03-11 ENCOUNTER — HEALTH MAINTENANCE LETTER (OUTPATIENT)
Age: 65
End: 2020-03-11

## 2020-08-19 ASSESSMENT — MOVEMENT DISORDERS SOCIETY - UNIFIED PARKINSONS DISEASE RATING SCALE (MDS-UPDRS)
SALIVA_AND_DROOLING: NORMAL: NOT AT ALL (NO PROBLEMS).
SPEECH: MODERATE: MY SPEECH IS UNCLEAR ENOUGH THAT OTHERS ASK ME TO REPEAT MYSELF EVERY DAY EVEN THOUGH MOST OF MY SPEECH IS UNDERSTOOD.
TOTAL_SCORE: 4
EATING_TASKS: NORMAL: NOT AT ALL (NO PROBLEMS).
GETTING_OUT_OF_BED_CAR_DEEP_CHAIR: NORMAL: NOT AT ALL (NO PROBLEMS).
HOBBIES_AND_OTHER_ACTIVITIES: NORMAL:  NOT AT ALL (NO PROBLEMS).
HANDWRITING: NORMAL: NOT AT ALL (NO PROBLEMS).
TURNING_IN_BED: SLIGHT: I HAVE A BIT OF TROUBLE TURNING, BUT I DO NOT NEED ANY HELP.
HYGIENE: NORMAL: NOT AT ALL (NO PROBLEMS).
TREMOR: NORMAL: NOT AT ALL (NO PROBLEMS).
FREEZING: NORMAL: NOT AT ALL (NO PROBLEMS).
DRESSING: NORMAL: NOT AT ALL (NO PROBLEMS).
WALKING_AND_BALANCE: NORMAL: NOT AT ALL (NO PROBLEMS).
CHEWING_AND_SWALLOWING: NORMAL: NO PROBLEMS.

## 2020-08-19 ASSESSMENT — ENCOUNTER SYMPTOMS
NECK PAIN: 0
BACK PAIN: 1
JOINT SWELLING: 0
MUSCLE CRAMPS: 0
LIGHT-HEADEDNESS: 0
STIFFNESS: 1
SYNCOPE: 0
SLEEP DISTURBANCES DUE TO BREATHING: 0
HYPERTENSION: 0
MUSCLE WEAKNESS: 1
EXERCISE INTOLERANCE: 0
HYPOTENSION: 1
PALPITATIONS: 0
MYALGIAS: 1
LEG PAIN: 1
ARTHRALGIAS: 1
ORTHOPNEA: 0

## 2020-09-02 ENCOUNTER — OFFICE VISIT (OUTPATIENT)
Dept: NEUROLOGY | Facility: CLINIC | Age: 65
End: 2020-09-02
Payer: COMMERCIAL

## 2020-09-02 VITALS — DIASTOLIC BLOOD PRESSURE: 72 MMHG | OXYGEN SATURATION: 96 % | SYSTOLIC BLOOD PRESSURE: 113 MMHG | HEART RATE: 79 BPM

## 2020-09-02 DIAGNOSIS — G20.A1 PARKINSON DISEASE (H): Primary | ICD-10-CM

## 2020-09-02 DIAGNOSIS — R49.8 HYPOPHONIA: ICD-10-CM

## 2020-09-02 RX ORDER — CITALOPRAM HYDROBROMIDE 20 MG/1
1 TABLET ORAL DAILY
COMMUNITY
Start: 2020-05-15 | End: 2021-09-10

## 2020-09-02 RX ORDER — CLONAZEPAM 1 MG/1
1-2 TABLET ORAL AT BEDTIME
COMMUNITY
End: 2020-09-15

## 2020-09-02 ASSESSMENT — UNIFIED PARKINSONS DISEASE RATING SCALE (UPDRS)
TOETAPPING_RIGHT: NORMAL
FACIAL_EXPRESSION: SLIGHT: MINIMAL MASKED FACIES MANIFESTED ONLY BY DECREASED FREQUENCY OF BLINKING.
AMPLITUDE_LLE: NORMAL: NO TREMOR.
RIGIDITY_NECK: NORMAL
FREEZING_GAIT: NORMAL
TOTAL_SCORE_LEFT: 8
POSTURE: 0 NORMAL, NO PROBLEMS
AMPLITUDE_RUE: NORMAL: NO TREMOR.
AMPLITUDE_RLE: NORMAL: NO TREMOR.
HANDMOVEMENTS_LEFT: SLIGHT: ANY OF THE FOLLOWING: A) THE REGULAR RHYTHM IS BROKEN WITH ONE WITH ONE OR TWO INTERRUPTIONS OR HESITATIONS OF THE MOVEMENT B) SLIGHT SLOWING C) THE AMPLITUDE DECREMENTS NEAR THE END OF THE 10 MOVEMENTS.
HANDMOVEMENTS_RIGHT: SLIGHT: ANY OF THE FOLLOWING: A) THE REGULAR RHYTHM IS BROKEN WITH ONE WITH ONE OR TWO INTERRUPTIONS OR HESITATIONS OF THE MOVEMENT B) SLIGHT SLOWING C) THE AMPLITUDE DECREMENTS NEAR THE END OF THE 10 MOVEMENTS.
POSTURAL_STABILITY: NORMAL:  RECOVERS WITH ONE OR TWO STEPS.
MOVEMENTS_INTERFERE_WITH_RATINGS: NO
RIGIDITY_RUE: SLIGHT: RIGIDITY ONLY DETECTED WITH ACTIVATION MANEUVER.
CONSTANCY_TREMOR_ATREST: NORMAL: NO TREMOR.
PRONATION_SUPINATION_RIGHT: SLIGHT: ANY OF THE FOLLOWING: A) THE REGULAR RHYTHM IS BROKEN WITH ONE WITH ONE OR TWO INTERRUPTIONS OR HESITATIONS OF THE MOVEMENT B) SLIGHT SLOWING C) THE AMPLITUDE DECREMENTS NEAR THE END OF THE 10 MOVEMENTS.
RIGIDITY_LLE: NORMAL
AMPLITUDE_LIP_JAW: NORMAL: NO TREMOR.
RIGIDITY_LUE: SLIGHT: RIGIDITY ONLY DETECTED WITH ACTIVATION MANEUVER.
RIGIDITY_RLE: NORMAL
LEG_AGILITY_LEFT: SLIGHT: ANY OF THE FOLLOWING: A) THE REGULAR RHYTHM IS BROKEN WITH ONE WITH ONE OR TWO INTERRUPTIONS OR HESITATIONS OF THE MOVEMENT B) SLIGHT SLOWING C) THE AMPLITUDE DECREMENTS NEAR THE END OF THE 10 MOVEMENTS.
FINGER_TAPPING_LEFT: MILD: ANY OF THE FOLLOWING: A) 3 TO 5 INTERRUPTIONS DURING TAPPING B) MILD SLOWING C) THE AMPLITUDE DECREMENTS MIDWAY IN THE 10-MOVEMENT SEQUENCE
TOTAL_SCORE: 6
PARKINSONS_MEDS: ON
LEG_AGILITY_RIGHT: SLIGHT: ANY OF THE FOLLOWING: A) THE REGULAR RHYTHM IS BROKEN WITH ONE WITH ONE OR TWO INTERRUPTIONS OR HESITATIONS OF THE MOVEMENT B) SLIGHT SLOWING C) THE AMPLITUDE DECREMENTS NEAR THE END OF THE 10 MOVEMENTS.
ARISING_CHAIR: NORMAL: ABLE TO ARISE QUICKLY WITHOUT HESITATION.
TOETAPPING_LEFT: MILD: ANY OF THE FOLLOWING: A) 3 TO 5 INTERRUPTIONS DURING TAPPING B) MILD SLOWING C) THE AMPLITUDE DECREMENTS MIDWAY IN THE 10-MOVEMENT SEQUENCE
GAIT: NORMAL
DYSKINESIAS_PRESENT: NO
AXIAL_SCORE: 2
SPEECH: SLIGHT: LOSS OF MODULATION, DICTION OR VOLUME, BUT STILL ALL WORDS EASY TO UNDERSTAND.
AMPLITUDE_LUE: NORMAL: NO TREMOR.
PRONATION_SUPINATION_LEFT: NORMAL
TOTAL_SCORE: 16
SPONTANEITY_OF_MOVEMENT: 0: NORMAL.  NO PROBLEMS.
FINGER_TAPPING_RIGHT: MILD: ANY OF THE FOLLOWING: A) 3 TO 5 INTERRUPTIONS DURING TAPPING B) MILD SLOWING C) THE AMPLITUDE DECREMENTS MIDWAY IN THE 10-MOVEMENT SEQUENCE

## 2020-09-02 ASSESSMENT — PAIN SCALES - GENERAL: PAINLEVEL: NO PAIN (0)

## 2020-09-02 NOTE — PATIENT INSTRUCTIONS
1. I recommend that you try increasing your first dose of carbidopa/levodopa 25/100 to 1.5 tabs, and continue with 1 tab for the remaining doses.  If you find this helpful to reduce wearing off before your second dose of Sinemet, we could always consider increasing additional doses to 1.5 tabs per dose as needed.        PD Medications                   8 12 4 8 11-12   Sinemet IR  25/100     (q4hr)                                            1.5 1 1 1 1   Citalopram 20mg                         1       Clonazepam 1mg                               1/2-1   Melatonin          2. I am inquiring with Carmella Moraes, PharmD about the new apomorphine sublingual film medication, so that we have a better sense of insurance coverage before considering a trial of this for OFF periods.    3. I did not make any changes to your DBS today. When we tried to increase your right body setting by 0.2mA, this made your balance a bit worse, so we turned it back down.     4. Follow-up in 3-4 months

## 2020-09-02 NOTE — PROGRESS NOTES
Department of Neurology  Movement Disorders Division   DBS Follow-up Note    Patient: Kwame Lin  MRN: [id number]   : 1955   Date of Visit: 2020    Diagnosis: Parkinson disease  DBS Target(s): Bilateral STN   Date(s) of DBS Lead Placement:   Left STN 2017, right STN 2018  Date(s) of IPG Placement:   Left chest 2017      Chief Complaint:  Kwame Lin is a 65 year old man with Parkinson disease with symptom onset in 2007 status post left STN DBS (2017) and right STN DBS (2018) who returns to clinic for follow up.    Interval History:  Overall he is doing well. He and his wife are still are amazed how dramatic a change in their lives DBS brought about. For the good.    Issues  Speech is not as good ever since the second lead placement.   He is going to speech therapy (Shakeel Christopher) through the [facility]. He can do it daily online.     Clonazepam - helpful for vivid dreaming. Also started CPAP 1 year ago and doing better.     Issues he wants to address today:  1. Possibility of apomorphine (new formulation - advertized by [employer])  2. Balance - not bad, but has to watch out if he gets up too quickly. Slightly worse than last year.   3. Speech worse ever since 2nd side   4. Gait  5. Sleep - better with clonazepam and melatonin  6. Cranial-sacral treatment    Parkinson Disease Motor Symptom Review:    Motor fluctuations:       Wearing off:  Wearing off, with wearing off with cramps or shooting pain of cramping in the calf. Before nearly every dose of medication. Wearing off after 3.5 hours.   Freezing of gait: None. Some shuffling, particularly with wearing off. In closed spaces it's worse.   Tremor: Slight in thumb. None. Aside from one time when his battery charge went way time. 1411     Review of Systems:  Other than that noted at the end of this note, the remainder of 12 systems reviewed were negative.    Medications:  Current Outpatient Medications    Medication Sig Dispense Refill     acetaminophen (TYLENOL) 325 MG tablet Take 325-650 mg by mouth nightly as needed for mild pain       carbidopa-levodopa (SINEMET)  MG tablet Take 1 tab every 4 hours 5 times per day. 450 tablet 3     citalopram (CELEXA) 10 MG tablet Take 2 tablets (20 mg) by mouth every morning       citalopram (CELEXA) 20 MG tablet Take 1 tablet by mouth daily       clonazePAM (KLONOPIN) 0.5 MG tablet Take 1/2 to 1 tablets at bedtime to help with dream enactment. 90 tablet 1     clonazePAM (KLONOPIN) 1 MG tablet Take 1-2 mg by mouth At Bedtime       MELATONIN PO Take 2 mg by mouth At Bedtime       Misc Natural Products (RED WINE EXTRACT) CAPS Take by mouth daily (with lunch)        multivitamin, therapeutic with minerals (MULTI-VITAMIN) TABS tablet Take 1 tablet by mouth every morning       Probiotic Product (ACIDOPHILUS/GOAT MILK) CAPS Take 1 capsule by mouth At Bedtime        thyroid (ARMOUR THYROID) 60 MG tablet Take 60 mg by mouth every morning        Coenzyme Q10 300 MG CAPS Take 300 mg by mouth every morning       magnesium 100 MG TABS Take 100 tablets by mouth 2 times daily (before meals)            PD Medications                   8 12 4 8 11-12   Sinemet IR  25/100     (q4hr)                                            1 1 1 1 1   Citalopram 20mg                         1       Clonazepam 1mg                               1/2-1   Melatonin        Clonazepam - helpful for vivid dreaming. Also started CPAP 1 year ago and doing better.     Allergies: is allergic to bactrim; codeine; ketorolac; morphine; nalbuphine; nsaids; penicillins; seasonal allergies; sulfa drugs; and toradol.    Past Medical History:  Past Medical History:   Diagnosis Date     Anosmia 10/12/2017     Anxiety      Depressed mood 10/12/2017     Hypothyroid      Parkinson disease (H)      Parkinsons disease (H)      PONV (postoperative nausea and vomiting)      RBD (REM behavioral disorder) 10/12/2017       Past  Surgical History:  Past Surgical History:   Procedure Laterality Date     ARTHROSCOPY KNEE Right     twice     ARTHROSCOPY KNEE Right     left     epidydmal mass removal, benign       IMPLANT DEEP BRAIN STIMULATION GENERATOR / BATTERY Left 12/22/2017    Procedure: IMPLANT DEEP BRAIN STIMULATION GENERATOR / BATTERY;  Deep Brain Stimulator Placement, Phase II, Placement Of Deep Brain Stimulator Generator/Battery Over The Left Chest Wall;  Surgeon: Arpan Huynh MD;  Location: [facility] OR     OPTICAL TRACKING SYSTEM INSERTION DEEP BRAIN STIMULATION Left 12/14/2017    Procedure: OPTICAL TRACKING SYSTEM INSERTION DEEP BRAIN STIMULATION;  Stealth Assisted Left Deep Brain Stimulator Placement, Phase I, Placement Of Left Side Deep Brain Stimulator Electrode, Target Left Subthalamic Nucleus With Microelectrode Recording;  Surgeon: Arpan Huynh MD;  Location: [facility] OR     OPTICAL TRACKING SYSTEM INSERTION DEEP BRAIN STIMULATION Right 5/22/2018    Procedure: OPTICAL TRACKING SYSTEM INSERTION DEEP BRAIN STIMULATION;  Stealth Assisted Right Deep Brain Stimulator Placement, Phase I And II Combined, Placement Of Right Deep Brain Stimulator Electrode, Target Right Subthalamic Nucleus With Microelectrode Recording And Connection To Previous Implanted Generator/Battery;  Surgeon: Arpan Huynh MD;  Location: [facility] OR       Social History:  Social History     Socioeconomic History     Marital status:      Spouse name: None     Number of children: None     Years of education: None     Highest education level: None   Occupational History     Occupation: Small business start consultant   Social Needs     Financial resource strain: None     Food insecurity     Worry: None     Inability: None     Transportation needs     Medical: None     Non-medical: None   Tobacco Use     Smoking status: Never Smoker     Smokeless tobacco: Never Used   Substance and Sexual Activity     Alcohol use: Yes     Alcohol/week: 1.0 -  2.0 standard drinks     Types: 1 - 2 Cans of beer per week     Frequency: Monthly or less     Drinks per session: 1 or 2     Comment: MONTHLY     Drug use: No     Sexual activity: Yes     Partners: Female   Lifestyle     Physical activity     Days per week: None     Minutes per session: None     Stress: None   Relationships     Social connections     Talks on phone: None     Gets together: None     Attends Christian service: None     Active member of club or organization: None     Attends meetings of clubs or organizations: None     Relationship status: None     Intimate partner violence     Fear of current or ex partner: None     Emotionally abused: None     Physically abused: None     Forced sexual activity: None   Other Topics Concern     Parent/sibling w/ CABG, MI or angioplasty before 65F 55M? Not Asked   Social History Narrative    Previous worked as [employer].    Now consulting part-time.    , lives with wife.    Non-smoker. Occasional drink.        Past surgical history that includes epidydmal mass removal, benign.        Social History:    Previous worked as [employer].    Now consulting part-time.    , lives with wife.    Non-smoker. Occasional drink.         family history includes Lung Cancer in his father; Parkinsonism in his cousin and maternal aunt.            Family History:  Family History   Problem Relation Age of Onset     Lung Cancer Father      Parkinsonism Cousin         paternal side     Parkinsonism Maternal Aunt        Physical Exam:  The patient's  blood pressure is 113/72 and his pulse is 79. His oxygen saturation is 96%.      Incisions: Well-healed     Neurological Examination:   He is alert and oriented and has fluent speech without dysarthria and is able to provide an interval medical history  Extraocular movements are full. He has normal smooth pursuits and saccades. His face is  symmetric with equal activation.   UPDRS Values 9/2/2020   Time: 2:11 PM   Medication On   R Brain DBS: On   L Brain DBS: On   Dyskinesia (LID) No   Did LID interfere No   Speech 1   Facial Expression 1   Rigidity Neck 0   Rigidity RUE 1   Rigidity LUE 1   Rigidity RLE 0   Rigidity LLE 0   Finger Taps R 2   Finger Taps L 2   Hand Mvt R 1   Hand Mvt L 1   Pron-/Supinate R 1   Pron-/Supinate L 0   Toe Tap R 0   Toe Tap L 2   Leg Agility R 1   Leg Agility L 1   Arise From Chair 0   Gait 0   Gait Freezing 0   Postural Stability 0   Posture 0   Global Spont Mvt 0   Postural Tremor RUE 0   Postural Tremor LUE 1   Kinetic Tremor RUE 0   Kinetic Tremor LUE 0   Rest Tremor RUE 0   Rest Tremor LUE 0   Rest Tremor RLE 0   Rest Tremor LLE 0   Rest Tremor Lip/Jaw 0   Rest Tremor Constancy 0   Total Right 6   Total Left 8   Axial Total 2   Total 16       Procedure: DBS Programming    Lead(s):     Left Right   DBS Target    STN STN      DBS Lead Type [employer] Vercise     Lead Implant Date 12/14/17 5/22/18          IPG(s):    1 2   IPG [employer]          IPG Implant Date 12/22/17          Location Left chest          Battery (V) Charging well          Full Impedence/Currents Check: No problems.      Initial Settings:  Left Brain 4-Bear Program B Program C Program D    A1 L STN      Contacts  C+;3-         Amplitude (mA)  2.8         Pulse Width ( s) 60          Frequency (Hz)  139         Max/ Min 3.3/2.0  /   /   /      Right Brain 4-Bear Program B Program C Program D    A2 R STN      Contacts  C+;11-(60%), 14-(40%)         Amplitude (V) 3.3          Pulse Width ( s)  60         Frequency (Hz)  139         Max/Min 4.0/2.5  /   /   /        Final Program selected:    4- Bear (no changes made)       Impression:   Kwame Lin is a 65 year right-handed man with tremor predominant Parkinson disease with symptom onset in 2007 with right hand tremor status post left STN DBS  (12/2017) and right STN DBS (5/2018). Current issues: wearing off.    Recommendations:   1. I recommend that you try increasing your first dose of carbidopa/levodopa 25/100 to 1.5 tabs, and continue with 1 tab for the remaining doses.  If you find this helpful to reduce wearing off before your second dose of Sinemet, we could always consider increasing additional doses to 1.5 tabs per dose as needed.        PD Medications                   8 12 4 8 11-12   Sinemet IR  25/100     (q4hr)                                            1.5 1 1 1 1   Citalopram 20mg                         1       Clonazepam 1mg                               1/2-1   Melatonin          2. I am inquiring with Raquel DelvalleD about the new apomorphine sublingual film medication, so that we have a better sense of insurance coverage before considering a trial of this for OFF periods.    3. I did not make any changes to your DBS today. When we tried to increase your right body setting by 0.2mA, this made your balance a bit worse, so we turned it back down.     4. Follow-up in 3-4 months    Time spent with patient: Greater than 50% of this *45 minute visit was spent in counseling and coordination of care related to the above issues.     Time spent for separate DBS programming: analyzed without reprogramming.    Clarita Borges MD    of Neurology       Answers for HPI/ROS submitted by the patient on 8/19/2020   General Symptoms: No  Skin Symptoms: No  HENT Symptoms: No  EYE SYMPTOMS: No  HEART SYMPTOMS: Yes  LUNG SYMPTOMS: No  INTESTINAL SYMPTOMS: No  URINARY SYMPTOMS: No  REPRODUCTIVE SYMPTOMS: Yes  SKELETAL SYMPTOMS: Yes  BLOOD SYMPTOMS: No  NERVOUS SYSTEM SYMPTOMS: No  MENTAL HEALTH SYMPTOMS: No  Chest pain or pressure: No  Fast or irregular heartbeat: No  Pain in legs with walking: Yes  Trouble breathing while lying down: No  Fingers or toes appear blue: No  High blood pressure: No  Low blood pressure: Yes  Fainting:  No  Murmurs: No  Pacemaker: No  Varicose veins: No  Edema or swelling: No  Wake up at night with shortness of breath: No  Light-headedness: No  Exercise intolerance: No  Back pain: Yes  Muscle aches: Yes  Neck pain: No  Swollen joints: No  Joint pain: Yes  Bone pain: No  Muscle cramps: No  Muscle weakness: Yes  Joint stiffness: Yes  Bone fracture: No  Scrotal pain or swelling: No  Erectile dysfunction: Yes  Penile discharge: No  Genital ulcers: No  Reduced libido: No

## 2020-09-02 NOTE — LETTER
2020       RE: Kwame Lin  [address] [place] [address]     Dear Colleague,    Thank you for referring your patient, Kwame Lin, to the [facility] NEUROLOGY at [facility] Please see a copy of my visit note below.    Department of Neurology  Movement Disorders Division   DBS Follow-up Note    Patient: Kwame Lin  MRN: [id number]   : 1955   Date of Visit: 2020    Diagnosis: Parkinson disease  DBS Target(s): Bilateral STN   Date(s) of DBS Lead Placement:   Left STN 2017, right STN 2018  Date(s) of IPG Placement:   Left chest 2017      Chief Complaint:  Kwame Lin is a 65 year old man with Parkinson disease with symptom onset in  status post left STN DBS (2017) and right STN DBS (2018) who returns to clinic for follow up.    Interval History:  Overall he is doing well. He and his wife are still are amazed how dramatic a change in their lives DBS brought about. For the good.    Issues  Speech is not as good ever since the second lead placement.   He is going to speech therapy (Shakeel Christopher) through the [facility]. He can do it daily online.     Clonazepam - helpful for vivid dreaming. Also started CPAP 1 year ago and doing better.     Issues he wants to address today:  1. Possibility of apomorphine (new formulation - advertized by [facility])  2. Balance - not bad, but has to watch out if he gets up too quickly. Slightly worse than last year.   3. Speech worse ever since 2nd side   4. Gait  5. Sleep - better with clonazepam and melatonin  6. Cranial-sacral treatment    Parkinson Disease Motor Symptom Review:    Motor fluctuations:       Wearing off:  Wearing off, with wearing off with cramps or shooting pain of cramping in the calf. Before nearly every dose of medication. Wearing off after 3.5 hours.   Freezing of gait: None. Some shuffling, particularly with wearing off. In closed spaces it's  worse.   Tremor: Slight in thumb. None. Aside from one time when his battery charge went way time. 1411     Review of Systems:  Other than that noted at the end of this note, the remainder of 12 systems reviewed were negative.    Medications:  Current Outpatient Medications   Medication Sig Dispense Refill     acetaminophen (TYLENOL) 325 MG tablet Take 325-650 mg by mouth nightly as needed for mild pain       carbidopa-levodopa (SINEMET)  MG tablet Take 1 tab every 4 hours 5 times per day. 450 tablet 3     citalopram (CELEXA) 10 MG tablet Take 2 tablets (20 mg) by mouth every morning       citalopram (CELEXA) 20 MG tablet Take 1 tablet by mouth daily       clonazePAM (KLONOPIN) 0.5 MG tablet Take 1/2 to 1 tablets at bedtime to help with dream enactment. 90 tablet 1     clonazePAM (KLONOPIN) 1 MG tablet Take 1-2 mg by mouth At Bedtime       MELATONIN PO Take 2 mg by mouth At Bedtime       Misc Natural Products (RED WINE EXTRACT) CAPS Take by mouth daily (with lunch)        multivitamin, therapeutic with minerals (MULTI-VITAMIN) TABS tablet Take 1 tablet by mouth every morning       Probiotic Product (ACIDOPHILUS/GOAT MILK) CAPS Take 1 capsule by mouth At Bedtime        thyroid (ARMOUR THYROID) 60 MG tablet Take 60 mg by mouth every morning        Coenzyme Q10 300 MG CAPS Take 300 mg by mouth every morning       magnesium 100 MG TABS Take 100 tablets by mouth 2 times daily (before meals)            PD Medications                   8 12 4 8 11-12   Sinemet IR  25/100     (q4hr)                                            1 1 1 1 1   Citalopram 20mg                         1       Clonazepam 1mg                               1/2-1   Melatonin        Clonazepam - helpful for vivid dreaming. Also started CPAP 1 year ago and doing better.     Allergies: is allergic to bactrim; codeine; ketorolac; morphine; nalbuphine; nsaids; penicillins; seasonal allergies; sulfa drugs; and toradol.    Past Medical History:  Past  Medical History:   Diagnosis Date     Anosmia 10/12/2017     Anxiety      Depressed mood 10/12/2017     Hypothyroid      Parkinson disease (H)      Parkinsons disease (H)      PONV (postoperative nausea and vomiting)      RBD (REM behavioral disorder) 10/12/2017       Past Surgical History:  Past Surgical History:   Procedure Laterality Date     ARTHROSCOPY KNEE Right     twice     ARTHROSCOPY KNEE Right     left     epidydmal mass removal, benign       IMPLANT DEEP BRAIN STIMULATION GENERATOR / BATTERY Left 12/22/2017    Procedure: IMPLANT DEEP BRAIN STIMULATION GENERATOR / BATTERY;  Deep Brain Stimulator Placement, Phase II, Placement Of Deep Brain Stimulator Generator/Battery Over The Left Chest Wall;  Surgeon: Arpan Huynh MD;  Location: [facility] OR     OPTICAL TRACKING SYSTEM INSERTION DEEP BRAIN STIMULATION Left 12/14/2017    Procedure: OPTICAL TRACKING SYSTEM INSERTION DEEP BRAIN STIMULATION;  Stealth Assisted Left Deep Brain Stimulator Placement, Phase I, Placement Of Left Side Deep Brain Stimulator Electrode, Target Left Subthalamic Nucleus With Microelectrode Recording;  Surgeon: Arpan Huynh MD;  Location: [facility] OR     OPTICAL TRACKING SYSTEM INSERTION DEEP BRAIN STIMULATION Right 5/22/2018    Procedure: OPTICAL TRACKING SYSTEM INSERTION DEEP BRAIN STIMULATION;  Stealth Assisted Right Deep Brain Stimulator Placement, Phase I And II Combined, Placement Of Right Deep Brain Stimulator Electrode, Target Right Subthalamic Nucleus With Microelectrode Recording And Connection To Previous Implanted Generator/Battery;  Surgeon: Arpan Huynh MD;  Location: [facility] OR       Social History:  Social History     Socioeconomic History     Marital status:      Spouse name: None     Number of children: None     Years of education: None     Highest education level: None   Occupational History     Occupation: Small business start consultant   Social Needs     Financial resource strain: None      Food insecurity     Worry: None     Inability: None     Transportation needs     Medical: None     Non-medical: None   Tobacco Use     Smoking status: Never Smoker     Smokeless tobacco: Never Used   Substance and Sexual Activity     Alcohol use: Yes     Alcohol/week: 1.0 - 2.0 standard drinks     Types: 1 - 2 Cans of beer per week     Frequency: Monthly or less     Drinks per session: 1 or 2     Comment: MONTHLY     Drug use: No     Sexual activity: Yes     Partners: Female   Lifestyle     Physical activity     Days per week: None     Minutes per session: None     Stress: None   Relationships     Social connections     Talks on phone: None     Gets together: None     Attends [employer] service: None     Active member of club or organization: None     Attends meetings of clubs or organizations: None     Relationship status: None     Intimate partner violence     Fear of current or ex partner: None     Emotionally abused: None     Physically abused: None     Forced sexual activity: None   Other Topics Concern     Parent/sibling w/ CABG, MI or angioplasty before 65F 55M? Not Asked   Social History Narrative    Previous worked as [employer] [place].    Now consulting part-time.    , lives with wife.    Non-smoker. Occasional drink.        Past surgical history that includes epidydmal mass removal, benign.        Social History:    Previous worked as [employer] [place].    Now consulting part-time.    , lives with wife.    Non-smoker. Occasional drink.         family history includes Lung Cancer in his father; Parkinsonism in his cousin and maternal aunt.            Family History:  Family History   Problem Relation Age of Onset     Lung Cancer Father      Parkinsonism Cousin         paternal side     Parkinsonism Maternal Aunt        Physical Exam:  The patient's  blood pressure is 113/72 and his pulse is 79. His oxygen saturation is  96%.      Incisions: Well-healed     Neurological Examination:   He is alert and oriented and has fluent speech without dysarthria and is able to provide an interval medical history  Extraocular movements are full. He has normal smooth pursuits and saccades. His face is symmetric with equal activation.   UPDRS Values 9/2/2020   Time: 2:11 PM   Medication On   R Brain DBS: On   L Brain DBS: On   Dyskinesia (LID) No   Did LID interfere No   Speech 1   Facial Expression 1   Rigidity Neck 0   Rigidity RUE 1   Rigidity LUE 1   Rigidity RLE 0   Rigidity LLE 0   Finger Taps R 2   Finger Taps L 2   Hand Mvt R 1   Hand Mvt L 1   Pron-/Supinate R 1   Pron-/Supinate L 0   Toe Tap R 0   Toe Tap L 2   Leg Agility R 1   Leg Agility L 1   Arise From Chair 0   Gait 0   Gait Freezing 0   Postural Stability 0   Posture 0   Global Spont Mvt 0   Postural Tremor RUE 0   Postural Tremor LUE 1   Kinetic Tremor RUE 0   Kinetic Tremor LUE 0   Rest Tremor RUE 0   Rest Tremor LUE 0   Rest Tremor RLE 0   Rest Tremor LLE 0   Rest Tremor Lip/Jaw 0   Rest Tremor Constancy 0   Total Right 6   Total Left 8   Axial Total 2   Total 16       Procedure: DBS Programming    Lead(s):     Left Right   DBS Target    STN STN      DBS Lead Type [employer]     Lead Implant Date 12/14/17 5/22/18          IPG(s):    1 2   IPG [employer]          IPG Implant Date 12/22/17          Location Left chest          Battery (V) Charging well          Full Impedence/Currents Check: No problems.      Initial Settings:  Left Brain 4-Bear Program B Program C Program D    A1 L STN      Contacts  C+;3-         Amplitude (mA)  2.8         Pulse Width ( s) 60          Frequency (Hz)  139         Max/ Min 3.3/2.0  /   /   /      Right Brain 4-Bear Program B Program C Program D    A2 R STN      Contacts  C+;11-(60%), 14-(40%)         Amplitude (V) 3.3          Pulse Width ( s)  60         Frequency (Hz)  139          Max/Min 4.0/2.5  /   /   /        Final Program selected:    4- Bear (no changes made)       Impression:   Kwame Lin is a 65 year right-handed man with tremor predominant Parkinson disease with symptom onset in 2007 with right hand tremor status post left STN DBS (12/2017) and right STN DBS (5/2018). Current issues: wearing off.    Recommendations:   1. I recommend that you try increasing your first dose of carbidopa/levodopa 25/100 to 1.5 tabs, and continue with 1 tab for the remaining doses.  If you find this helpful to reduce wearing off before your second dose of Sinemet, we could always consider increasing additional doses to 1.5 tabs per dose as needed.        PD Medications                   8 12 4 8 11-12   Sinemet IR  25/100     (q4hr)                                            1.5 1 1 1 1   Citalopram 20mg                         1       Clonazepam 1mg                               1/2-1   Melatonin          2. I am inquiring with Carmella Moraes PharmD about the new apomorphine sublingual film medication, so that we have a better sense of insurance coverage before considering a trial of this for OFF periods.    3. I did not make any changes to your DBS today. When we tried to increase your right body setting by 0.2mA, this made your balance a bit worse, so we turned it back down.     4. Follow-up in 3-4 months    Time spent with patient: Greater than 50% of this *45 minute visit was spent in counseling and coordination of care related to the above issues.     Time spent for separate DBS programming: analyzed without reprogramming.    Clarita Borges MD    of Neurology       Answers for HPI/ROS submitted by the patient on 8/19/2020   General Symptoms: No  Skin Symptoms: No  HENT Symptoms: No  EYE SYMPTOMS: No  HEART SYMPTOMS: Yes  LUNG SYMPTOMS: No  INTESTINAL SYMPTOMS: No  URINARY SYMPTOMS: No  REPRODUCTIVE SYMPTOMS: Yes  SKELETAL SYMPTOMS: Yes  BLOOD SYMPTOMS: No  NERVOUS SYSTEM  SYMPTOMS: No  MENTAL HEALTH SYMPTOMS: No  Chest pain or pressure: No  Fast or irregular heartbeat: No  Pain in legs with walking: Yes  Trouble breathing while lying down: No  Fingers or toes appear blue: No  High blood pressure: No  Low blood pressure: Yes  Fainting: No  Murmurs: No  Pacemaker: No  Varicose veins: No  Edema or swelling: No  Wake up at night with shortness of breath: No  Light-headedness: No  Exercise intolerance: No  Back pain: Yes  Muscle aches: Yes  Neck pain: No  Swollen joints: No  Joint pain: Yes  Bone pain: No  Muscle cramps: No  Muscle weakness: Yes  Joint stiffness: Yes  Bone fracture: No  Scrotal pain or swelling: No  Erectile dysfunction: Yes  Penile discharge: No  Genital ulcers: No  Reduced libido: No

## 2020-09-12 ENCOUNTER — TELEPHONE (OUTPATIENT)
Dept: NEUROLOGY | Facility: CLINIC | Age: 65
End: 2020-09-12

## 2020-09-12 DIAGNOSIS — G47.52 REM SLEEP BEHAVIOR DISORDER: Primary | ICD-10-CM

## 2020-09-12 RX ORDER — CLONAZEPAM 1 MG/1
1 TABLET ORAL AT BEDTIME
Qty: 5 TABLET | Refills: 0 | Status: SHIPPED | OUTPATIENT
Start: 2020-09-12 | End: 2021-06-09

## 2020-09-12 NOTE — TELEPHONE ENCOUNTER
Patient called due to being out of clonazepam which he takes for dream reenactment behavior.  States he called into clinic last week but pharmacy still hasn't gotten refill.  I stated I will order him short prescription to get him to early next week, but due to being controlled substance will have to call into clinic on Monday to get more long term refills.  Will route to his primary neurology team.     Andressa Jimenes, DO  Neurology

## 2020-11-03 ENCOUNTER — TELEPHONE (OUTPATIENT)
Dept: NEUROLOGY | Facility: CLINIC | Age: 65
End: 2020-11-03

## 2020-11-03 NOTE — TELEPHONE ENCOUNTER
[facility] Call Center    Phone Message    May a detailed message be left on voicemail: yes     Reason for Call: Other:     Pt has an appt today and he is wondering if it would be ok for a dry needling technique to be done.  Please call him back to discuss.         Action Taken: Message routed to:  [facility] ([facility]): Neurology    Travel Screening: Not Applicable

## 2020-11-03 NOTE — TELEPHONE ENCOUNTER
"Called patient back and let him know that \"dry needling\" is safe as long as it is not near any component of his Deep Brain Stimulation system. He stated he is having it done on his low back.    I reminded him that he can always call the number on his 's card if/when in doubt about anything.  "

## 2020-12-13 ENCOUNTER — TRANSFERRED RECORDS (OUTPATIENT)
Dept: HEALTH INFORMATION MANAGEMENT | Facility: CLINIC | Age: 65
End: 2020-12-13

## 2020-12-15 NOTE — PROGRESS NOTES
Department of Neurology  Movement Disorders Division   DBS Follow-up Note    Patient: Kwame Lin  MRN: [id number]   : 1955   Date of Visit: 12/15/2020    Diagnosis: Parkinson disease  DBS Target(s): Bilateral STN   Date(s) of DBS Lead Placement:   Left STN 2017, right STN 2018  Date(s) of IPG Placement:   Left chest 2017      Chief Complaint:  Kwame Lin is a 65 year old man with Parkinson disease with symptom onset in 2007 status post left STN DBS (2017) and right STN DBS (2018) who returns to clinic for follow up.     Interval History:  Last clinic visit on 2020 , wearing off was addressed by increasing  the morning dose of CD/LD from 1 tab to 1.5 tab.     Diff sleeping , up all night, thyroid dysfunction, increased the thyroid medication, sleeping better     Speech - SLP Loud Croud , will resume et the end of January , raspy voice , if slows down then it is oK, if he  rushes the more difficulties with speech , speech changes started after the second surgery     Gait - is better now, ambulates 2 miles a day , no falls , no walking aids     Wearing off- still with cramps in the calves 0.5 hrs before the next dose , also with chest pain /anxiety type feeling     New event: The other day BP low 80s, HR high 160s  , happens every 2 months , though he was dehydrated,  resolved by drinking fluids     Constipation on probiotic every day    Very pleased with the results after DBS surgery .      Review of Systems:  Other than that noted at the end of this note, the remainder of 12 systems reviewed were negative.    Medications:  Current Outpatient Medications   Medication Sig Dispense Refill     acetaminophen (TYLENOL) 325 MG tablet Take 325-650 mg by mouth nightly as needed for mild pain       carbidopa-levodopa (SINEMET)  MG tablet Take 1 tab every 4 hours 5 times per day. 450 tablet 3     citalopram (CELEXA) 10 MG tablet Take 2 tablets (20 mg) by mouth every morning        citalopram (CELEXA) 20 MG tablet Take 1 tablet by mouth daily       clonazePAM (KLONOPIN) 0.5 MG tablet Take 1/2 to 1 tablets at bedtime to help with dream enactment. 90 tablet 1     clonazePAM (KLONOPIN) 1 MG tablet TAKE 1 TABLET BY MOUTH AT BEDTIME 30 tablet 0     clonazePAM (KLONOPIN) 1 MG tablet Take 1 tablet (1 mg) by mouth At Bedtime 5 tablet 0     MELATONIN PO Take 2 mg by mouth At Bedtime       Misc Natural Products (RED WINE EXTRACT) CAPS Take by mouth daily (with lunch)        multivitamin, therapeutic with minerals (MULTI-VITAMIN) TABS tablet Take 1 tablet by mouth every morning       Probiotic Product (ACIDOPHILUS/GOAT MILK) CAPS Take 1 capsule by mouth At Bedtime        thyroid (ARMOUR THYROID) 60 MG tablet Take 60 mg by mouth every morning         PD Medications                   8 12 4 8 11-12   Sinemet IR  25/100     (q4hr)                                            1-1.5 1 1 1 1   Citalopram 20mg                         1           Clonazepam 1mg                                   1/2-1   Melatonin 3 mg - 1 hr before bed time              Clonazepam - helpful for vivid dreaming. Also started CPAP 1 year ago and doing better.   [place] Thyroid 90 mcg every morning     Allergies: is allergic to bactrim; codeine; ketorolac; morphine; nalbuphine; nsaids; penicillins; seasonal allergies; sulfa drugs; and toradol.    Past Medical History:  Past Medical History:   Diagnosis Date     Anosmia 10/12/2017     Anxiety      Depressed mood 10/12/2017     Hypothyroid      Parkinson disease (H)      Parkinsons disease (H)      PONV (postoperative nausea and vomiting)      RBD (REM behavioral disorder) 10/12/2017       Past Surgical History:  Past Surgical History:   Procedure Laterality Date     ARTHROSCOPY KNEE Right     twice     ARTHROSCOPY KNEE Right     left     epidydmal mass removal, benign       IMPLANT DEEP BRAIN STIMULATION GENERATOR / BATTERY Left 12/22/2017    Procedure: IMPLANT DEEP BRAIN STIMULATION  GENERATOR / BATTERY;  Deep Brain Stimulator Placement, Phase II, Placement Of Deep Brain Stimulator Generator/Battery Over The Left Chest Wall;  Surgeon: Arpan Huynh MD;  Location: [facility] OR     OPTICAL TRACKING SYSTEM INSERTION DEEP BRAIN STIMULATION Left 12/14/2017    Procedure: OPTICAL TRACKING SYSTEM INSERTION DEEP BRAIN STIMULATION;  Stealth Assisted Left Deep Brain Stimulator Placement, Phase I, Placement Of Left Side Deep Brain Stimulator Electrode, Target Left Subthalamic Nucleus With Microelectrode Recording;  Surgeon: Arpan Huynh MD;  Location: [facility] OR     OPTICAL TRACKING SYSTEM INSERTION DEEP BRAIN STIMULATION Right 5/22/2018    Procedure: OPTICAL TRACKING SYSTEM INSERTION DEEP BRAIN STIMULATION;  Stealth Assisted Right Deep Brain Stimulator Placement, Phase I And II Combined, Placement Of Right Deep Brain Stimulator Electrode, Target Right Subthalamic Nucleus With Microelectrode Recording And Connection To Previous Implanted Generator/Battery;  Surgeon: Arpan Huynh MD;  Location: [facility] OR       Social History:  Social History     Socioeconomic History     Marital status:      Spouse name: Not on file     Number of children: Not on file     Years of education: Not on file     Highest education level: Not on file   Occupational History     Occupation: Small business start consultant   Social Needs     Financial resource strain: Not on file     Food insecurity     Worry: Not on file     Inability: Not on file     Transportation needs     Medical: Not on file     Non-medical: Not on file   Tobacco Use     Smoking status: Never Smoker     Smokeless tobacco: Never Used   Substance and Sexual Activity     Alcohol use: Yes     Alcohol/week: 1.0 - 2.0 standard drinks     Types: 1 - 2 Cans of beer per week     Frequency: Monthly or less     Drinks per session: 1 or 2     Comment: MONTHLY     Drug use: No     Sexual activity: Yes     Partners: Female   Lifestyle     Physical  "activity     Days per week: Not on file     Minutes per session: Not on file     Stress: Not on file   Relationships     Social connections     Talks on phone: Not on file     Gets together: Not on file     Attends Quaker service: Not on file     Active member of club or organization: Not on file     Attends meetings of clubs or organizations: Not on file     Relationship status: Not on file     Intimate partner violence     Fear of current or ex partner: Not on file     Emotionally abused: Not on file     Physically abused: Not on file     Forced sexual activity: Not on file   Other Topics Concern     Parent/sibling w/ CABG, MI or angioplasty before 65F 55M? Not Asked   Social History Narrative    Previous worked as [employer]    Now consulting part-time.    , lives with wife.    Non-smoker. Occasional drink.        Past surgical history that includes epidydmal mass removal, benign.        Social History:    Previous worked as [employer]    Now consulting part-time.    , lives with wife.    Non-smoker. Occasional drink.         family history includes Lung Cancer in his father; Parkinsonism in his cousin and maternal aunt.            Family History:  Family History   Problem Relation Age of Onset     Lung Cancer Father      Parkinsonism Cousin         paternal side     Parkinsonism Maternal Aunt        Physical Exam:  /74   Pulse 85   Resp 16   Ht 1.753 m (5' 9\")   Wt 98.9 kg (218 lb)   SpO2 96%   BMI 32.19 kg/m    Neurological Examination:   He is alert and oriented and has fluent speech without dysarthria and is able to provide an interval medical history.    UPDRS Values 12/17/2020   Time: 11:14 AM   Medication On   R Brain DBS: On   L Brain DBS: On   Dyskinesia (LID) No   Did LID interfere No   Speech 1   Facial Expression 1   Rigidity Neck 0   Rigidity RUE 1   Rigidity LUE 0   Rigidity RLE 0 "   Rigidity LLE 0   Finger Taps R 1   Finger Taps L 0   Hand Mvt R 0   Hand Mvt L 0   Pron-/Supinate R 1   Pron-/Supinate L 1   Toe Tap R 1   Toe Tap L 1   Leg Agility R 1   Leg Agility L 1   Arise From Chair 0   Gait 0   Gait Freezing 0   Postural Stability 0   Posture 0   Global Spont Mvt 0   Postural Tremor RUE 0   Postural Tremor LUE 1   Kinetic Tremor RUE 0   Kinetic Tremor LUE 0   Rest Tremor RUE 0   Rest Tremor LUE 0   Rest Tremor RLE 0   Rest Tremor LLE 0   Rest Tremor Lip/Jaw 0   Rest Tremor Constancy 1   Total Right 5   Total Left 4   Axial Total 2   Total 12     Gait: lucius unassisted with arms crossed over the chest, normal stride length and pace, slightly decreased arm swing on the left, left shoulder down .        Procedure: DBS Programming     Lead(s):     Left Right   DBS Target    STN STN      DBS Lead Type [employer] V[email or web address] Vercise     Lead Implant Date 12/14/17 5/22/18          IPG(s):    1 2   IPG [employer] Vercise          IPG Implant Date 12/22/17          Location Left chest          Battery (V) Charging well  3.91 V          Full Impedence/Currents Check: No problems. ( report sent to be  scanned in Epic)         Initial Settings:  Left Brain 4-Bear Program B Program C Program D     A1 L STN         Contacts  C+;3-         Amplitude (mA)  2.8         Pulse Width ( s) 60          Frequency (Hz)  139         Max/ Min 3.3/2.0  /   /   /       Right Brain 4-Bear Program B Program C Program D     A2 R STN         Contacts  C+;11-(60%), 14-(40%)         Amplitude (V) 3.3          Pulse Width ( s)  60         Frequency (Hz)  139         Max/Min 4.0/2.5  /   /   /          Final Program selected:    4- Bear (no changes made)       Impression:  Kwame Lin is a 65 year right-handed man with tremor predominant Parkinson disease with symptom onset in 2007 with right hand tremor status post left STN DBS (12/2017) and right STN DBS (5/2018). Issue addressed today was  wearing off 30 min before the next dose of medication with calves tightening     Recommendations:     1. For wearing off recommend increasing the carbidopa/levodopa from 1 tab to 1.5 tabs .    2. Recommend getting in touch with PCP regarding the episodic increase in heart rate and low BP .    3. We checked DBS today and no issues found, continue charging .     4. If wearing off remains an issue then we'll start Razagiline next clinic visit.      Time spent with patient: Greater than 50% of this 60 minute visit was spent in counseling and coordination of care related to PD.    Time spent for separate DBS programming: 15 minutes      Stephany Parham MD  Movement Disorders Fellow    Patient seen and dicussed with Dr. Borges.        Answers for HPI/ROS submitted by the patient on 12/14/2020   General Symptoms: No  Skin Symptoms: No  HENT Symptoms: No  EYE SYMPTOMS: No  HEART SYMPTOMS: No  LUNG SYMPTOMS: No  INTESTINAL SYMPTOMS: No  URINARY SYMPTOMS: No  REPRODUCTIVE SYMPTOMS: No  SKELETAL SYMPTOMS: No  BLOOD SYMPTOMS: No  NERVOUS SYSTEM SYMPTOMS: No  MENTAL HEALTH SYMPTOMS: No

## 2020-12-17 ENCOUNTER — OFFICE VISIT (OUTPATIENT)
Dept: NEUROLOGY | Facility: CLINIC | Age: 65
End: 2020-12-17
Payer: MEDICARE

## 2020-12-17 VITALS
DIASTOLIC BLOOD PRESSURE: 74 MMHG | HEART RATE: 85 BPM | OXYGEN SATURATION: 96 % | SYSTOLIC BLOOD PRESSURE: 123 MMHG | BODY MASS INDEX: 32.29 KG/M2 | WEIGHT: 218 LBS | RESPIRATION RATE: 16 BRPM | HEIGHT: 69 IN

## 2020-12-17 DIAGNOSIS — G20.A1 PARKINSON DISEASE (H): Primary | ICD-10-CM

## 2020-12-17 PROCEDURE — 99205 OFFICE O/P NEW HI 60 MIN: CPT | Mod: 25

## 2020-12-17 PROCEDURE — 95983 ALYS BRN NPGT PRGRMG 15 MIN: CPT

## 2020-12-17 RX ORDER — SILDENAFIL CITRATE 20 MG/1
20 TABLET ORAL
COMMUNITY

## 2020-12-17 RX ORDER — CLINDAMYCIN HCL 300 MG
CAPSULE ORAL
COMMUNITY
Start: 2020-12-10

## 2020-12-17 RX ORDER — HYDROXYZINE HYDROCHLORIDE 25 MG/1
25 TABLET, FILM COATED ORAL
COMMUNITY
Start: 2020-12-09 | End: 2021-09-10

## 2020-12-17 ASSESSMENT — UNIFIED PARKINSONS DISEASE RATING SCALE (UPDRS)
HANDMOVEMENTS_LEFT: NORMAL
HANDMOVEMENTS_RIGHT: NORMAL
CONSTANCY_TREMOR_ATREST: SLIGHT: TREMOR AT REST IS PRESENT  25% OF THE ENTIRE EXAMINATION PERIOD.
POSTURAL_STABILITY: NORMAL:  RECOVERS WITH ONE OR TWO STEPS.
PARKINSONS_MEDS: ON
RIGIDITY_RUE: SLIGHT: RIGIDITY ONLY DETECTED WITH ACTIVATION MANEUVER.
AMPLITUDE_RLE: NORMAL: NO TREMOR.
LEG_AGILITY_RIGHT: SLIGHT: ANY OF THE FOLLOWING: A) THE REGULAR RHYTHM IS BROKEN WITH ONE WITH ONE OR TWO INTERRUPTIONS OR HESITATIONS OF THE MOVEMENT B) SLIGHT SLOWING C) THE AMPLITUDE DECREMENTS NEAR THE END OF THE 10 MOVEMENTS.
DYSKINESIAS_PRESENT: NO
SPEECH: SLIGHT: LOSS OF MODULATION, DICTION OR VOLUME, BUT STILL ALL WORDS EASY TO UNDERSTAND.
FINGER_TAPPING_RIGHT: SLIGHT: ANY OF THE FOLLOWING: A) THE REGULAR RHYTHM IS BROKEN WITH ONE WITH ONE OR TWO INTERRUPTIONS OR HESITATIONS OF THE MOVEMENT B) SLIGHT SLOWING C) THE AMPLITUDE DECREMENTS NEAR THE END OF THE 10 MOVEMENTS.
RIGIDITY_RLE: NORMAL
RIGIDITY_LUE: NORMAL
AMPLITUDE_LIP_JAW: NORMAL: NO TREMOR.
TOTAL_SCORE: 12
LEG_AGILITY_LEFT: SLIGHT: ANY OF THE FOLLOWING: A) THE REGULAR RHYTHM IS BROKEN WITH ONE WITH ONE OR TWO INTERRUPTIONS OR HESITATIONS OF THE MOVEMENT B) SLIGHT SLOWING C) THE AMPLITUDE DECREMENTS NEAR THE END OF THE 10 MOVEMENTS.
GAIT: NORMAL
SPONTANEITY_OF_MOVEMENT: 0: NORMAL.  NO PROBLEMS.
ARISING_CHAIR: NORMAL: ABLE TO ARISE QUICKLY WITHOUT HESITATION.
POSTURE: 0 NORMAL, NO PROBLEMS
RIGIDITY_NECK: NORMAL
AXIAL_SCORE: 2
FREEZING_GAIT: NORMAL
RIGIDITY_LLE: NORMAL
PRONATION_SUPINATION_LEFT: SLIGHT: ANY OF THE FOLLOWING: A) THE REGULAR RHYTHM IS BROKEN WITH ONE WITH ONE OR TWO INTERRUPTIONS OR HESITATIONS OF THE MOVEMENT B) SLIGHT SLOWING C) THE AMPLITUDE DECREMENTS NEAR THE END OF THE 10 MOVEMENTS.
PRONATION_SUPINATION_RIGHT: SLIGHT: ANY OF THE FOLLOWING: A) THE REGULAR RHYTHM IS BROKEN WITH ONE WITH ONE OR TWO INTERRUPTIONS OR HESITATIONS OF THE MOVEMENT B) SLIGHT SLOWING C) THE AMPLITUDE DECREMENTS NEAR THE END OF THE 10 MOVEMENTS.
TOTAL_SCORE: 5
TOETAPPING_LEFT: SLIGHT: ANY OF THE FOLLOWING: A) THE REGULAR RHYTHM IS BROKEN WITH ONE WITH ONE OR TWO INTERRUPTIONS OR HESITATIONS OF THE MOVEMENT B) SLIGHT SLOWING C) THE AMPLITUDE DECREMENTS NEAR THE END OF THE 10 MOVEMENTS.
TOTAL_SCORE_LEFT: 4
AMPLITUDE_LUE: NORMAL: NO TREMOR.
AMPLITUDE_LLE: NORMAL: NO TREMOR.
MOVEMENTS_INTERFERE_WITH_RATINGS: NO
AMPLITUDE_RUE: NORMAL: NO TREMOR.
TOETAPPING_RIGHT: SLIGHT: ANY OF THE FOLLOWING: A) THE REGULAR RHYTHM IS BROKEN WITH ONE WITH ONE OR TWO INTERRUPTIONS OR HESITATIONS OF THE MOVEMENT B) SLIGHT SLOWING C) THE AMPLITUDE DECREMENTS NEAR THE END OF THE 10 MOVEMENTS.
FACIAL_EXPRESSION: SLIGHT: MINIMAL MASKED FACIES MANIFESTED ONLY BY DECREASED FREQUENCY OF BLINKING.
FINGER_TAPPING_LEFT: NORMAL

## 2020-12-17 ASSESSMENT — PAIN SCALES - GENERAL: PAINLEVEL: NO PAIN (0)

## 2020-12-17 ASSESSMENT — MIFFLIN-ST. JEOR: SCORE: 1754.22

## 2020-12-17 NOTE — NURSING NOTE
Chief Complaint   Patient presents with     RECHECK     [facility] RETURN MOVEMENT DISORDER - DBS     [place]

## 2020-12-17 NOTE — PATIENT INSTRUCTIONS
1. For wearing off recommend increasing the carbidopa/levodopa form 1 tab to 1.5 tabs .    2. Recommend getting in touch with PCP regarding the episodic increase in heart shanika and low BP .    3. We checked DBS today and no issues found, continue charging .

## 2020-12-27 ENCOUNTER — HEALTH MAINTENANCE LETTER (OUTPATIENT)
Age: 65
End: 2020-12-27

## 2021-01-24 ENCOUNTER — MYC MEDICAL ADVICE (OUTPATIENT)
Dept: NEUROLOGY | Facility: CLINIC | Age: 66
End: 2021-01-24

## 2021-01-24 DIAGNOSIS — G20.A1 PARKINSON'S DISEASE (H): ICD-10-CM

## 2021-01-25 RX ORDER — CARBIDOPA AND LEVODOPA 25; 100 MG/1; MG/1
TABLET ORAL
Qty: 675 TABLET | Refills: 3 | Status: SHIPPED | OUTPATIENT
Start: 2021-01-25 | End: 2021-12-08

## 2021-04-25 ENCOUNTER — HEALTH MAINTENANCE LETTER (OUTPATIENT)
Age: 66
End: 2021-04-25

## 2021-05-14 ENCOUNTER — MYC MEDICAL ADVICE (OUTPATIENT)
Dept: NEUROLOGY | Facility: CLINIC | Age: 66
End: 2021-05-14

## 2021-05-21 ENCOUNTER — TELEPHONE (OUTPATIENT)
Dept: NEUROLOGY | Facility: CLINIC | Age: 66
End: 2021-05-21

## 2021-05-21 NOTE — TELEPHONE ENCOUNTER
Received emails from representative Kathryn Dover regarding the warranty replacement of the [employer] Deep Brain Stimulation battery:      Kathryn Dover <[email or web address]>  Fri 5/21/2021 7:09 AM  Reply  Reply all  Good Morning Fatmata,  Happy Friday! Can you please let me know the status of the request to replace an older Vercise with a MRI conditional Genus under warranty? We will need two questions answered in order to proceed to honor the request. If the request is no longer valid can you please let me know that as well? I will need to document that below.  Thank you Fatmata, have a good weekend!    Kathryn Dover  [employer]  DBS Therapy Consultant  [phone]  From: Sharmin Mckee <[email or web address]>  Sent: Thursday, May 20, 2021 11:09 PM  To: Kathryn Dover  Cc: Anna Saldana  Subject: Warranty Justification     Complaint Details  Complaint #:     [id number]    Gautam Peralta,    We require the following information:     Second Request: The response is due on: 27 May 2021.  1.Confirm alternative imaging methods to MRI have been evaluated and not recommended by the physician.  2. Medical necessity of MRI.    Complaint Summary: It was reported that  requesting a consideration for a replacement under our Warranty. [facility] is inquiring if a OG Vercise patient that is ineligible for MRI can have their device explanted, then implanted with our Genus system under warranty.    Anna Saldana  DBS Patient Care Technician  [employer]  Neuromodulation  [email or web address]  DBS Tech Support: [phone]  DBS Patient Info: [phone]  [address] [place] [address]  [email or web address]     Sharmin Mckee   II  Complaint Management Center  [employer] Neuromodulation  Www[email or web address]  --------------------------------  Patient reports his knee hurts a little but he's trying to put off surgery. He does not know if it is joint or  muscle related and that is why an MRI would be helpful. He has no immediate plans at this moment to proceed. Right now he is looking at homes in the [facility] to move here.    Writer will send rep email stating that no action is required at this time.

## 2021-06-09 ENCOUNTER — OFFICE VISIT (OUTPATIENT)
Dept: NEUROLOGY | Facility: CLINIC | Age: 66
End: 2021-06-09
Payer: COMMERCIAL

## 2021-06-09 VITALS
HEART RATE: 75 BPM | OXYGEN SATURATION: 96 % | BODY MASS INDEX: 31.6 KG/M2 | SYSTOLIC BLOOD PRESSURE: 127 MMHG | WEIGHT: 214 LBS | DIASTOLIC BLOOD PRESSURE: 80 MMHG | RESPIRATION RATE: 16 BRPM

## 2021-06-09 DIAGNOSIS — M25.561 CHRONIC PAIN OF RIGHT KNEE: ICD-10-CM

## 2021-06-09 DIAGNOSIS — G20.B1 DYSKINESIA DUE TO PARKINSON'S DISEASE (H): ICD-10-CM

## 2021-06-09 DIAGNOSIS — G89.29 CHRONIC PAIN OF RIGHT KNEE: ICD-10-CM

## 2021-06-09 DIAGNOSIS — G47.52 RBD (REM BEHAVIORAL DISORDER): ICD-10-CM

## 2021-06-09 DIAGNOSIS — G20.A1 PARKINSON'S DISEASE (H): Primary | ICD-10-CM

## 2021-06-09 DIAGNOSIS — Z96.89 S/P DEEP BRAIN STIMULATOR PLACEMENT: ICD-10-CM

## 2021-06-09 PROCEDURE — 95984 ALYS BRN NPGT PRGRMG ADDL 15: CPT | Performed by: NURSE PRACTITIONER

## 2021-06-09 PROCEDURE — 95983 ALYS BRN NPGT PRGRMG 15 MIN: CPT | Performed by: NURSE PRACTITIONER

## 2021-06-09 PROCEDURE — 99215 OFFICE O/P EST HI 40 MIN: CPT | Performed by: NURSE PRACTITIONER

## 2021-06-09 PROCEDURE — 99417 PROLNG OP E/M EACH 15 MIN: CPT | Performed by: NURSE PRACTITIONER

## 2021-06-09 ASSESSMENT — UNIFIED PARKINSONS DISEASE RATING SCALE (UPDRS)
PRONATION_SUPINATION_LEFT: SLIGHT: ANY OF THE FOLLOWING: A) THE REGULAR RHYTHM IS BROKEN WITH ONE WITH ONE OR TWO INTERRUPTIONS OR HESITATIONS OF THE MOVEMENT B) SLIGHT SLOWING C) THE AMPLITUDE DECREMENTS NEAR THE END OF THE 10 MOVEMENTS.
SPEECH: SLIGHT: LOSS OF MODULATION, DICTION OR VOLUME, BUT STILL ALL WORDS EASY TO UNDERSTAND.
RIGIDITY_NECK: NORMAL
LEG_AGILITY_RIGHT: SLIGHT: ANY OF THE FOLLOWING: A) THE REGULAR RHYTHM IS BROKEN WITH ONE WITH ONE OR TWO INTERRUPTIONS OR HESITATIONS OF THE MOVEMENT B) SLIGHT SLOWING C) THE AMPLITUDE DECREMENTS NEAR THE END OF THE 10 MOVEMENTS.
DYSKINESIAS_PRESENT: YES
MOVEMENTS_INTERFERE_WITH_RATINGS: NO
FINGER_TAPPING_RIGHT: SLIGHT: ANY OF THE FOLLOWING: A) THE REGULAR RHYTHM IS BROKEN WITH ONE WITH ONE OR TWO INTERRUPTIONS OR HESITATIONS OF THE MOVEMENT B) SLIGHT SLOWING C) THE AMPLITUDE DECREMENTS NEAR THE END OF THE 10 MOVEMENTS.
RIGIDITY_RLE: NORMAL
FINGER_TAPPING_LEFT: NORMAL
FACIAL_EXPRESSION: SLIGHT: MINIMAL MASKED FACIES MANIFESTED ONLY BY DECREASED FREQUENCY OF BLINKING.
POSTURE: 0 NORMAL, NO PROBLEMS
PARKINSONS_MEDS: ON
TOETAPPING_RIGHT: NORMAL
TOETAPPING_LEFT: SLIGHT: ANY OF THE FOLLOWING: A) THE REGULAR RHYTHM IS BROKEN WITH ONE WITH ONE OR TWO INTERRUPTIONS OR HESITATIONS OF THE MOVEMENT B) SLIGHT SLOWING C) THE AMPLITUDE DECREMENTS NEAR THE END OF THE 10 MOVEMENTS.
ARISING_CHAIR: NORMAL: ABLE TO ARISE QUICKLY WITHOUT HESITATION.
HANDMOVEMENTS_LEFT: SLIGHT: ANY OF THE FOLLOWING: A) THE REGULAR RHYTHM IS BROKEN WITH ONE WITH ONE OR TWO INTERRUPTIONS OR HESITATIONS OF THE MOVEMENT B) SLIGHT SLOWING C) THE AMPLITUDE DECREMENTS NEAR THE END OF THE 10 MOVEMENTS.
AMPLITUDE_LLE: NORMAL: NO TREMOR.
AMPLITUDE_LUE: NORMAL: NO TREMOR.
AMPLITUDE_LIP_JAW: NORMAL: NO TREMOR.
POSTURAL_STABILITY: NORMAL:  RECOVERS WITH ONE OR TWO STEPS.
LEG_AGILITY_LEFT: SLIGHT: ANY OF THE FOLLOWING: A) THE REGULAR RHYTHM IS BROKEN WITH ONE WITH ONE OR TWO INTERRUPTIONS OR HESITATIONS OF THE MOVEMENT B) SLIGHT SLOWING C) THE AMPLITUDE DECREMENTS NEAR THE END OF THE 10 MOVEMENTS.
RIGIDITY_RUE: NORMAL
AMPLITUDE_RLE: NORMAL: NO TREMOR.
TOTAL_SCORE: 12
SPONTANEITY_OF_MOVEMENT: 0: NORMAL.  NO PROBLEMS.
TOTAL_SCORE_LEFT: 6
RIGIDITY_LUE: NORMAL
PRONATION_SUPINATION_RIGHT: SLIGHT: ANY OF THE FOLLOWING: A) THE REGULAR RHYTHM IS BROKEN WITH ONE WITH ONE OR TWO INTERRUPTIONS OR HESITATIONS OF THE MOVEMENT B) SLIGHT SLOWING C) THE AMPLITUDE DECREMENTS NEAR THE END OF THE 10 MOVEMENTS.
HANDMOVEMENTS_RIGHT: SLIGHT: ANY OF THE FOLLOWING: A) THE REGULAR RHYTHM IS BROKEN WITH ONE WITH ONE OR TWO INTERRUPTIONS OR HESITATIONS OF THE MOVEMENT B) SLIGHT SLOWING C) THE AMPLITUDE DECREMENTS NEAR THE END OF THE 10 MOVEMENTS.
FREEZING_GAIT: NORMAL
RIGIDITY_LLE: NORMAL
TOTAL_SCORE: 4
AMPLITUDE_RUE: NORMAL: NO TREMOR.
GAIT: NORMAL
CONSTANCY_TREMOR_ATREST: NORMAL: NO TREMOR.
AXIAL_SCORE: 2

## 2021-06-09 ASSESSMENT — PAIN SCALES - GENERAL: PAINLEVEL: NO PAIN (0)

## 2021-06-09 NOTE — PATIENT INSTRUCTIONS
Dear Mr. Puente ARAM Delia,    Thank you for coming today.  During your visit, we have discussed the following:     __ Your DBS electrical system function (impedance) is normal.   __ No DBS programming changes.   __ Monitor when you get dyskinesias the most.  If it is in the morings, mid-day, or evenings.  Once you figure out when you get the dyskinesias, you can try lowering your Sinemet for that dose from 1.5 to 1 tab to improve dyskinesias.  __  Try Clonazepam 0.5 mg at bedtime to help with dream enactment. Please let me know if this doesn't help.   __  When you are ready your call and schedule the orthopedic provider to address the Right knee pain.   __  Return in 6 months.    To contact our clinic, you may call [phone] or use [employer] message.     It's good to see you!        АННА Flores, CNP  [facility]

## 2021-06-09 NOTE — PROGRESS NOTES
ASSESSMENT:    Parkinson's Disease:  Wearing off tremors are improved but he is getting dyskinesias. He is unsure when he has bothersome dyskinesias.     Dyskinesias due to PD:  Sometimes bothersome.     S/p Bilateral STN DBS lead implantation:  Normal impedance and battery life. No programming changes were made today.    RBD:  Needs improvement. There is a safety concern due to dream enactment.    Chronic Right Knee Pain:  Ongoing issue.       PLAN:    __  Discussed the balance between improving tremors without worsening dyskinesias.    __  He was encouraged to monitor when he gets dyskinesias the most (mornings, mid-day, or evenings.)  Once he finds a pattern, he was encouraged to try reducing his Sinemet dose from 1.5 to 1 tab and keep the rest of his doses at 1.5 tabs.     __  Discussed the importance of controlling RBD. Discussed how Melatonin's different brands have different effect.  He was encouraged to try a different band.  In the mean time, he'll go back to taking Clonazepam 0.5 mg at HS.  He still has pills at home.     __  Will continue to stay physically active and do the voice exercises.     __  Referral to see Orthopedic provider to address right knee pain.     Will return to our clinic in 6 months or sooner as needed.        MOVEMENT DISORDERS CLINIC           PATIENT: Kwame Lin    : 1955    SULEMA: 2021    REASON FOR VISIT: Parkinson's disease (PD) and deep brain stimulation (DBS) interrogation and analysis follow up.    HPI: Mr. Kwame Lin is a 66 year old  male who came to the [facility] neurology clinic accompanied by his wife for a follow up visit. He was last seen in the clinic on 2021 by Dr. Parham (Fellow) and Dr. Borges (Attending) for a routine f/u visit.  During that visit, his Carbidopa/Levodopa (CD/LD) was increased from 1 tab to 1.5 tabs 4 - 5 x per day.    Records Reviewed:  Last PCP's note on 2020 - Annual Exam.   Labs from 2020, BMI, TSH  & CBC    Pt and his wife have sold their house and will be moving to [place].    From PD stand point. He is doing good except for increased dyskinesias.  Since his Sinemet was increased to improve tremors,     PD Medications 8 am 12 pm 4 pm 8 pm 11 pm   Sinemet 25/100 mg  1.5 1.5 1.5 1.5 1.5     He has soft speech.  He has continued doing the Speak Out Loud Zoom voice exercise 1 - 2 times a week.    His dream enactment has gotten worse.  His wife expressed her concern.  She is sleeping in a different room because of his REM behavior disorder (RBD.) He had her in a headlock position. About 3 days ago, he acted out his dreams and hit the closet door. They have used a guard rail, but found it to be more dangerous as he had more bruises from hitting it. He hasn't been taking Clonazepam 0.5 mg at .  He didn't get benefit from Melatonin 15 mg.  He hasn't tried a different brand.     Right knee pain from basket ball surgery has been exacerbated.  Once he moves to [place], he would like to address the pain.       ALLERGIES: No clinical screening - see comments, Bactrim, Codeine, Ketorolac, Morphine, Nalbuphine, Penicillins, Seasonal allergies, Sulfa drugs, Toradol, and Nsaids    MEDICATIONS:   Outpatient Medications Marked as Taking for the 6/9/21 encounter (Office Visit) with Navjot Juarez APRN CNP   Medication Sig     acetaminophen (TYLENOL) 325 MG tablet Take 325-650 mg by mouth nightly as needed for mild pain     carbidopa-levodopa (SINEMET)  MG tablet Take 1.5 tab every 4 hrs 5 times a day ( 8 am, noon, 4 pm, 8 pm, 11 pm).     citalopram (CELEXA) 10 MG tablet Take 2 tablets (20 mg) by mouth every morning     MELATONIN PO Take 2 mg by mouth At Bedtime     Misc Natural Products (RED WINE EXTRACT) CAPS Take by mouth daily (with lunch)      multivitamin, therapeutic with minerals (MULTI-VITAMIN) TABS tablet Take 1 tablet by mouth every morning     Probiotic Product (ACIDOPHILUS/GOAT MILK) CAPS Take 1  capsule by mouth At Bedtime      sildenafil (REVATIO) 20 MG tablet Take 20 mg by mouth     thyroid (ARMOUR THYROID) 60 MG tablet Take 60 mg by mouth every morning        PHYSICAL EXAM:    DBS Interrogation & Analysis:    Implanted generator:  Left chest [employer]   Lead target:  Bilateral Subthalamic Nucleus (STN).  Left Chest Generator placement date:  12/22/2017    Left Brain  Lead placement date:  12/14/2017    C +, 3 - (100%)  Amplitude:  2.8 mA  PW:  60 msec  Rate:  139 Hz    Patient :  Upper Limit: 3.3 mA  Lower Limit: 2.0 mA    Electrode Impedance Check:    Left Lead: No Problems Found.       Right Brain   Lead placement date:  5/22/2018    C +, 11 - (60%), 14 - (40%)   Amplitude:  3.3 mA  PW:  60 msec  Rate:  139 Hz    Patient :  Upper Limit: 4.0 mA  Lower Limit: 2.5 mA    Electrode Impedance Check:    Right Lead: No Problems Found.      See scanned report for impedance details.      VITAL SIGNS:  Blood pressure 127/80, pulse 75, resp. rate 16, weight 97.1 kg (214 lb), SpO2 96 %. Body mass index is 31.6 kg/m .    GENERAL:  Mr. Lin is a pleasant  male who is well-groomed and well-developed sitting comfortably in the exam room without any distress.  Affect is appropriate.    MOVEMENT DISORDERS ASSESSMENT:  MDS UPDRS III (Last Sinemet was about 3 hrs ago)  UPDRS Values 6/9/2021   Time: 11:46 AM   Medication On   R Brain DBS: On   L Brain DBS: On   Dyskinesia (LID) Yes   Did LID interfere No   Speech 1   Facial Expression 1   Rigidity Neck 0   Rigidity RUE 0   Rigidity LUE 0   Rigidity RLE 0   Rigidity LLE 0   Finger Taps R 1   Finger Taps L 0   Hand Mvt R 1   Hand Mvt L 1   Pron-/Supinate R 1   Pron-/Supinate L 1   Toe Tap R 0   Toe Tap L 1   Leg Agility R 1   Leg Agility L 1   Arise From Chair 0   Gait 0   Gait Freezing 0   Postural Stability 0   Posture 0   Global Spont Mvt 0   Postural Tremor RUE 0   Postural Tremor LUE 1   Kinetic Tremor RUE 0   Kinetic  Tremor LUE 1   Rest Tremor RUE 0   Rest Tremor LUE 0   Rest Tremor RLE 0   Rest Tremor LLE 0   Rest Tremor Lip/Jaw 0   Rest Tremor Constancy 0   Total Right 4   Total Left 6   Axial Total 2   Total 12       Today I spent 72 minutes caring for the patient. 30 minutes was spent in DBS interrogation and analysis /programming.  42 minutes was spent with reviewing records, meeting with the patient, answering questions, examining, placing referral, and documentation.    АННА Flores,  CNP  [facility]

## 2021-06-14 ENCOUNTER — MYC MEDICAL ADVICE (OUTPATIENT)
Dept: NEUROLOGY | Facility: CLINIC | Age: 66
End: 2021-06-14

## 2021-08-11 ENCOUNTER — MYC MEDICAL ADVICE (OUTPATIENT)
Dept: NEUROLOGY | Facility: CLINIC | Age: 66
End: 2021-08-11

## 2021-08-12 NOTE — TELEPHONE ENCOUNTER
Thank you, Masha.    I wonder if he is experiencing a wearing off dyskinesias?    Since he has reduced his Sinemet, may be it might be worth trying his DBS and increasing by 0.2 mA. He can increase one side today, and wait for 3 days and increase the 2nd side and see if that improves his symptoms.    For dream enactment, has he been taking the Clonazepam 0.5 mg at HS consistently?  If so, I would be happy to increase the dose to 1 mg.

## 2021-08-13 DIAGNOSIS — G20.A1 PARKINSON'S DISEASE (H): ICD-10-CM

## 2021-08-13 DIAGNOSIS — G47.52 RBD (REM BEHAVIORAL DISORDER): ICD-10-CM

## 2021-08-13 RX ORDER — CLONAZEPAM 0.5 MG/1
TABLET ORAL
Qty: 90 TABLET | Refills: 1 | Status: SHIPPED | OUTPATIENT
Start: 2021-08-13 | End: 2021-12-08

## 2021-09-06 ENCOUNTER — MYC MEDICAL ADVICE (OUTPATIENT)
Dept: NEUROLOGY | Facility: CLINIC | Age: 66
End: 2021-09-06

## 2021-09-09 NOTE — PROGRESS NOTES
Kwame is a 66 year old who is being evaluated via a billable video visit.      How would you like to obtain your AVS? MyChart  If the video visit is dropped, the invitation should be resent by: Text to cell phone: [phone]  Will anyone else be joining your video visit? No      Video Start Time: 11:07 AM  Video-Visit Details    Type of service:  Video Visit    Video End Time:12:41 PM    Originating Location (pt. Location): Home    Distant Location (provider location):  [facility] [place]     Platform used for Video Visit: [facility]      Sleep Consultation:    Date on this visit: 9/10/2021    Kwame Lin is sent by No ref. provider found for a sleep consultation regarding RBD.    Primary Physician: Silvina Armenta ARAM Caceresterrance reports difficulty falling asleep and concerns about the CPAP recall. His medical history is significant for Parkinson's disorder with RBD (s/p DBS), anxiety/depression, hypothyroidism, RLS, DIPIKA.     HST through [facility] on 7/18/2018 showed: AHI 12.1/hr, supine AHI 20.3/hr, non-supine AHI 6.7 and 9/hr on right and left respectively. His O2 hugo was 80%, baseline was 92%. He spent 11.4 minutes below 90% SpO2, some of which appears to be artifact of the probe getting zunilda.    He has a [employer] Dreamstation CPAP that is about 3 years old (he guesses). He obtained it through [facility]. He is not sure if they are still in network. He thinks his pressure settings are 5-20 cm. He uses a full face mask. He does not think he snores with it. He does find CPAP helpful. He does not have much problem with mask leak. If it starts to leak, he will replace the mask. He does get a dry mouth. He does use the humidifier, unsure what it is set to. He thinks it does run out of water overnight. He denies morning headaches. He stopped using the CPAP for a little bit when he first was notified of the recall. He found it was hard to stay asleep without it. He has not used a SoClean  and has not had symptoms associated with the recall. He has registered his machine with [facility]. Kwame does have a regular bed partner.  He starts the night on his back and wakes on his side.     A download is not available.       Kwame goes to bed at 12:00 AM during the week. If he misses that bedtime, he may not fall asleep until 6 AM. That happened twice in the last 2 weeks. Pain in his right knee can interfere with falling asleep. Sometimes he will jerk as he is just falling asleep. He wakes up at 8:00 AM (ranges 7-8:30 AM) with an alarm. He falls asleep in 15-20 minutes on a good night.  Kwame has difficulty falling asleep. He recently started 25 mg trazodone and it seemed to help at first, but sometimes he does not think it does. He missed it one night and felt a vague chest pain.  He takes 5 mg melatonin. With CPAP, he generally does not wake up in the night.  On weekends, his sleep schedule is the same.  Patient gets an average of 6-7 hours of sleep per night. Ideally, he would get about 8 hours.    He saw a sleep psychologist with [facility] in [place] ([facility]) a few years ago. He felt Kwame needed about 7.7-8 hours of sleep.    Patient does watch TV in bed (trying to keep that to a minimum) and does not use electronics in bed and read in bed. He does sometimes have a hard time shutting off his mind. Last night, he was thinking about submitting an invoice.    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 He lives with his wife. They moved to [place] in June. They moved from [place] after 40+ years. He likes the new place. He lives with his wife.     Kwmae has an urge to move and walk or stretch most nights. Sometimes it can last all night. Sometimes Tylenol helps. He has a history of anemia, low hemoglobin and hematocrit lately. His ferritin was 107 in 2018. He has not been on an iron supplement since maybe 2016, as far as he can recall. Kwame denies any bruxism, sleep walking,  sleep paralysis, cataplexy and hypnogogic/hypnopompic hallucinations.   He was prescribed clonazepam 0.5 mg. He filled that on 8/13/21 per [place] , but he is not taking it. He thought he was taking it for RLS but that it was not effective. The prescription states it was for dream enactment. He is a very active sleeper, moving, kicking, some vocalizations, sometimes falls out of bed. His wife has to sleep in another room. He often pulls the CPAP off the table. He dreams about trying to rescue someone or being attacked. He thinks he has movements most nights. He tried side-rails, but he felt he was bruising himself more. He also tried a big U-shaped pillow, which helped a little. He has also tried strapping himself in bed with a padded strap that goes all the way around the mattress. He has tried up to 10 mg melatonin and it did not seem to help.      Kwame has difficulty breathing through his nose, denies claustrophobia, reflux at night and heartburn.  He says he does not notice much difference with citalopram in his anxiety and depression.    Kwame denies any significant weight change in the last couple of years.  Patient describes themself as a night person.  He would prefer to go to sleep at 12:00 AM and wake up at 8:00 AM.  Patient's [place] Sleepiness score 2/24 inconsistent with daytime sleepiness.      Kwame had been napping about 30 minutes most days. In the last month, he has been trying to avoid naps. He does get tired in the late afternoon. He takes no inadvertant naps.  He denies dozing while driving.  Patient was counseled on the importance of driving while alert, to pull over if drowsy, or nap before getting into the vehicle if sleepy.  He uses no caffeine.     Allergies:    Allergies   Allergen Reactions     No Clinical Screening - See Comments Itching     ENVIRONMENTAL: Oak trees     Bactrim Hives     Codeine Nausea and Vomiting     Ketorolac Nausea and Vomiting     Morphine      Nalbuphine      Other  reaction(s): Unknown Reaction     Penicillins Hives     Seasonal Allergies Itching     Sulfa Drugs      Toradol Nausea and Vomiting     Nsaids Other (See Comments)     Advil, Ibuprofen are ok       Medications:    Current Outpatient Medications   Medication Sig Dispense Refill     acetaminophen (TYLENOL) 325 MG tablet Take 325-650 mg by mouth nightly as needed for mild pain       carbidopa-levodopa (SINEMET)  MG tablet Take 1.5 tab every 4 hrs 5 times a day ( 8 am, noon, 4 pm, 8 pm, 11 pm). 675 tablet 3     citalopram (CELEXA) 10 MG tablet Take 2 tablets (20 mg) by mouth every morning       citalopram (CELEXA) 20 MG tablet Take 1 tablet by mouth daily       clindamycin (CLEOCIN) 300 MG capsule TAKE 2 CAPSULES BY MOUTH FOR 1 DOSE 1 HOUR PRIOR TO DENTAL APPOINTMENTS       clonazePAM (KLONOPIN) 0.5 MG tablet Take 1/2 to 1 tablets at bedtime to help with dream enactment. 90 tablet 1     hydrOXYzine (ATARAX) 25 MG tablet Take 25 mg by mouth       MELATONIN PO Take 2 mg by mouth At Bedtime       Misc Natural Products (RED WINE EXTRACT) CAPS Take by mouth daily (with lunch)        multivitamin, therapeutic with minerals (MULTI-VITAMIN) TABS tablet Take 1 tablet by mouth every morning       Probiotic Product (ACIDOPHILUS/GOAT MILK) CAPS Take 1 capsule by mouth At Bedtime        sildenafil (REVATIO) 20 MG tablet Take 20 mg by mouth       thyroid (ARMOUR THYROID) 60 MG tablet Take 60 mg by mouth every morning        traZODone (DESYREL) 50 MG tablet Take 1/2 tab orally at bedtime. If not effective, take 1 tab. 30 tablet 3       Problem List:  Patient Active Problem List    Diagnosis Date Noted     Gait difficulty 11/06/2018     Priority: Medium     Sexual dysfunction 11/05/2018     Priority: Medium     DIPIKA (obstructive sleep apnea) 08/17/2018     Priority: Medium     Overview:   8-2018 CPAP  [place]       Osteoarthritis of both knees 06/25/2018     Priority: Medium     S/P deep brain stimulator placement  05/22/2018     Priority: Medium     Depressed mood 10/12/2017     Priority: Medium     RBD (REM behavioral disorder) 10/12/2017     Priority: Medium     Anxiety disorder 06/02/2017     Priority: Medium     Age-related nuclear cataract, bilateral 03/29/2017     Priority: Medium     Dry eye syndrome of bilateral lacrimal glands 03/29/2017     Priority: Medium     Vitreous degeneration, right eye 03/29/2017     Priority: Medium     Parkinson disease (H) 08/17/2015     Priority: Medium     Parkinsonian tremor (H) 05/06/2013     Priority: Medium     Restless leg syndrome 08/19/2011     Priority: Medium     Hypothyroidism 05/15/2009     Priority: Medium     Overview:   Epic   Overview:   Epic           Past Medical/Surgical History:  Past Medical History:   Diagnosis Date     Anosmia 10/12/2017     Anxiety      Depressed mood 10/12/2017     Hypothyroid      Parkinson disease (H)      Parkinsons disease (H)      PONV (postoperative nausea and vomiting)      RBD (REM behavioral disorder) 10/12/2017     Past Surgical History:   Procedure Laterality Date     ARTHROSCOPY KNEE Right     twice     ARTHROSCOPY KNEE Right     left     epidydmal mass removal, benign       IMPLANT DEEP BRAIN STIMULATION GENERATOR / BATTERY Left 12/22/2017    Procedure: IMPLANT DEEP BRAIN STIMULATION GENERATOR / BATTERY;  Deep Brain Stimulator Placement, Phase II, Placement Of Deep Brain Stimulator Generator/Battery Over The Left Chest Wall;  Surgeon: Arpan Huynh MD;  Location: [facility] OR     OPTICAL TRACKING SYSTEM INSERTION DEEP BRAIN STIMULATION Left 12/14/2017    Procedure: OPTICAL TRACKING SYSTEM INSERTION DEEP BRAIN STIMULATION;  Stealth Assisted Left Deep Brain Stimulator Placement, Phase I, Placement Of Left Side Deep Brain Stimulator Electrode, Target Left Subthalamic Nucleus With Microelectrode Recording;  Surgeon: Arpan Huynh MD;  Location: [facility] OR     OPTICAL TRACKING SYSTEM INSERTION DEEP BRAIN STIMULATION Right  5/22/2018    Procedure: OPTICAL TRACKING SYSTEM INSERTION DEEP BRAIN STIMULATION;  Stealth Assisted Right Deep Brain Stimulator Placement, Phase I And II Combined, Placement Of Right Deep Brain Stimulator Electrode, Target Right Subthalamic Nucleus With Microelectrode Recording And Connection To Previous Implanted Generator/Battery;  Surgeon: Arpan Huynh MD;  Location:  OR       Social History:  Social History     Socioeconomic History     Marital status:      Spouse name: Not on file     Number of children: Not on file     Years of education: Not on file     Highest education level: Not on file   Occupational History     Occupation: Small business start consultant   Tobacco Use     Smoking status: Never Smoker     Smokeless tobacco: Never Used   Substance and Sexual Activity     Alcohol use: Yes     Alcohol/week: 1.0 - 2.0 standard drinks     Types: 1 - 2 Cans of beer per week     Comment: MONTHLY     Drug use: No     Sexual activity: Yes     Partners: Female   Other Topics Concern     Parent/sibling w/ CABG, MI or angioplasty before 65F 55M? Not Asked   Social History Narrative    Previous worked as [employer].    Now consulting part-time.    , lives with wife.    Non-smoker. Occasional drink.        Past surgical history that includes epidydmal mass removal, benign.        Social History:    Previous worked as [employer].    Now consulting part-time.    , lives with wife.    Non-smoker. Occasional drink.         family history includes Lung Cancer in his father; Parkinsonism in his cousin and maternal aunt.          Social Determinants of Health     Financial Resource Strain:      Difficulty of Paying Living Expenses:    Food Insecurity:      Worried About Running Out of Food in the Last Year:      Ran Out of Food in the Last Year:    Transportation Needs:      Lack of Transportation (Medical):       Lack of Transportation (Non-Medical):    Physical Activity:      Days of Exercise per Week:      Minutes of Exercise per Session:    Stress:      Feeling of Stress :    Social Connections:      Frequency of Communication with Friends and Family:      Frequency of Social Gatherings with Friends and Family:      Attends [employer] Services:      Active Member of Clubs or Organizations:      Attends Club or Organization Meetings:      Marital Status:    Intimate Partner Violence:      Fear of Current or Ex-Partner:      Emotionally Abused:      Physically Abused:      Sexually Abused:        Family History:  Family History   Problem Relation Age of Onset     Lung Cancer Father      Parkinsonism Cousin         paternal side     Parkinsonism Maternal Aunt        Review of Systems:  A complete review of systems reviewed by me is negative with the exeption of what has been mentioned in the history of present illness.  CONSTITUTIONAL: NEGATIVE for weight gain/loss, fever, chills, sweats or night sweats, drug allergies.  EYES: NEGATIVE for changes in vision, blind spots, double vision.  ENT: NEGATIVE for ear pain, sore throat, sinus pain, post-nasal drip, runny nose, bloody nose  ENT:  POSITIVE for  Had a cold a week ago, better now  CARDIAC: NEGATIVE for fast heartbeats, chest pain, breathlessness when lying flat, swollen legs or swollen feet.  CARDIAC:  POSITIVE for  fluttering in chest and chest pressure- one night when he missed the trazodone  NEUROLOGIC: NEGATIVE headaches, weakness or numbness in the arms or legs.  PULMONARY: NEGATIVE SOB at rest, SOB with activity, productive cough, coughing up blood, wheezing or whistling when breathing.    PULMONARY:  POSITIVE for  dry cough  GASTROINTESTINAL: NEGATIVE for nausea or vomitting, loose or watery stools, fat or grease in stools, constipation, abdominal pain, bowel movements black in color or blood noted.  GENITOURINARY: NEGATIVE for pain during urination, blood in  "urine, urinating more frequently than usual, irregular menstrual periods.  MUSCULOSKELETAL: NEGATIVE for muscle pain, bone or joint pain, swollen joints.  MUSCULOSKELETAL:  POSITIVE for  right knee pain and other travelling pains    Physical Examination:  Patient reported vitals: Ht: 5'10\". Wt: 210 pounds. BMI 30.1.        [place] Total Score 9/10/2021   Total score - [place] 2       ADAM Total Score: 21 (09/10/21 1100)    GENERAL APPEARANCE: healthy, alert, no distress and cooperative  EYES: Eyes grossly normal to inspection and wearing glasses  HENT: oropharynx crowded and tongue base enlarged and sits above occlusal plane  NECK: no asymmetry, masses, or scars  RESP: no respiratory distress, cough or wheeze  Mallampati Class: IV.  Tonsillar Stage: not visualized.    Impression/Plan:    (G47.33) DIPIKA (obstructive sleep apnea)  (primary encounter diagnosis)  Comment: mild DIPIKA diagnosed at [facility] in 2018, AHI 12/hr. He has benefited from CPAP. He has questions about the recall. I do not have a download. He does get dry mouth and the humidifier runs out, which makes me suspect mask leak. He uses a full face mask. He has registered his machine and has no symptoms related to the recall.  Plan:  Continue auto CPAP 5-20 cm until [facility] can replace the machine. A prescription was written for new supplies, will be sent to [facility]. We reviewed recommendations for cleaning and replacing supplies. We will try to get a download from [facility]. He was encouraged to contact [facility] and make sure they can continue to service him as a patient with his current insurance.      (G47.52) RBD (REM behavioral disorder)  Comment: Kwame has regular dream enactment behavior. His wife has to sleep in another room. He has fallen out of bed and bruised himself. He has tried side rails, but it sounds like they were not padded and he bruised himself more. He was prescribed clonazepam 0.5 mg but thought it was for restless legs. When it did " not help with RLS, he stopped it. He has tried 10 mg melatonin but it was not helpful.   Plan: Resume clonazepam 0.5 mg. We talked about safety precautions to take regarding his     (G25.81) Restless leg syndrome  Comment: nightly symptoms, when he gets into bed, sometimes can last most of the night.  Ferritin was 107 in 2018, but hemoglobin and hematocrit were low in December.  Plan: Recheck ferritin. He was encouraged to establish care with a primary provider and he was given contact information to schedule.  Start with 100 mg at bedtime. May increase by 100 mg every 5 days as needed. If 600 mg is reached without benefit, wean off the medication. Call once 300 mg is reached and I can prescribe a 300 mg capsule. We discussed possible side effects including weight gain, swelling and depression. Discontinue the medication if side effects occur.  Start clonazepam for a week before starting gabapentin, to know what each medication is doing for you.      (G47.00) Insomnia, unspecified type  Comment: Kwame has been having difficulty falling asleep was on 25 mg trazodone. Has not felt it helped lately. Leg restlessness, discomfort and jerking seem to be interfere with sleep initiation more than anything.  Plan:  Try to limit time in bed to 12 AM to 7:30 AM. Stay physically active in the daytime. Stretch before bed and in the morning. Discontinue trazodone. We will discuss stimulus control if still having problem sleeping     He will follow up with me in approximately 1-2 months (next available).           107 minutes were spent on the date of the encounter doing chart review, history and exam, documentation and further activities as noted above.  Bennett Goltz, PA-C     CC: Navjot Juarez APRN CNP

## 2021-09-10 ENCOUNTER — VIRTUAL VISIT (OUTPATIENT)
Dept: SLEEP MEDICINE | Facility: CLINIC | Age: 66
End: 2021-09-10
Payer: COMMERCIAL

## 2021-09-10 DIAGNOSIS — G25.81 RESTLESS LEG SYNDROME: ICD-10-CM

## 2021-09-10 DIAGNOSIS — G47.33 OSA (OBSTRUCTIVE SLEEP APNEA): Primary | ICD-10-CM

## 2021-09-10 DIAGNOSIS — G47.52 RBD (REM BEHAVIORAL DISORDER): ICD-10-CM

## 2021-09-10 DIAGNOSIS — G47.00 INSOMNIA, UNSPECIFIED TYPE: ICD-10-CM

## 2021-09-10 DIAGNOSIS — E61.1 LOW IRON: ICD-10-CM

## 2021-09-10 PROCEDURE — 99205 OFFICE O/P NEW HI 60 MIN: CPT | Mod: 95 | Performed by: PHYSICIAN ASSISTANT

## 2021-09-10 PROCEDURE — 99417 PROLNG OP E/M EACH 15 MIN: CPT | Mod: 95 | Performed by: PHYSICIAN ASSISTANT

## 2021-09-10 RX ORDER — GABAPENTIN 100 MG/1
CAPSULE ORAL
Qty: 90 CAPSULE | Refills: 0 | Status: SHIPPED | OUTPATIENT
Start: 2021-09-10 | End: 2022-01-05

## 2021-09-10 RX ORDER — CITALOPRAM HYDROBROMIDE 10 MG/1
10 TABLET ORAL EVERY MORNING
COMMUNITY
Start: 2021-05-07 | End: 2022-03-15

## 2021-09-10 NOTE — PATIENT INSTRUCTIONS
Start clonazepam 0.5 mg by mouth at bedtime to reduce dream enactment. Give that at least a week to see how that works and how you tolerate it before starting gabapentin for restless legs.    Start with 100 mg at bedtime. May increase by 100 mg every 5 days as needed. If 600 mg is reached without benefit, wean off the medication. Call once 300 mg is reached and I can prescribe a 300 mg capsule. We discussed possible side effects including weight gain, swelling and depression. Discontinue the medication if side effects occur.    Stop trazodone.     Limit time in bed to between 12 AM and 7:30 AM. Get bright light for 30 minutes upon awakening and avoid light exposure in the hour before bed.

## 2021-09-15 ENCOUNTER — LAB (OUTPATIENT)
Dept: LAB | Facility: CLINIC | Age: 66
End: 2021-09-15
Payer: COMMERCIAL

## 2021-09-15 DIAGNOSIS — G25.81 RESTLESS LEG SYNDROME: ICD-10-CM

## 2021-09-15 DIAGNOSIS — E61.1 LOW IRON: ICD-10-CM

## 2021-09-15 PROCEDURE — 82728 ASSAY OF FERRITIN: CPT

## 2021-09-15 PROCEDURE — 36415 COLL VENOUS BLD VENIPUNCTURE: CPT

## 2021-09-16 LAB — FERRITIN SERPL-MCNC: 113 NG/ML (ref 26–388)

## 2021-09-23 ENCOUNTER — VIRTUAL VISIT (OUTPATIENT)
Dept: NEUROLOGY | Facility: CLINIC | Age: 66
End: 2021-09-23
Payer: COMMERCIAL

## 2021-09-23 DIAGNOSIS — G47.9 SLEEP DISTURBANCES: ICD-10-CM

## 2021-09-23 DIAGNOSIS — G47.00 INSOMNIA, UNSPECIFIED TYPE: Primary | ICD-10-CM

## 2021-09-23 DIAGNOSIS — M25.561 CHRONIC PAIN OF RIGHT KNEE: ICD-10-CM

## 2021-09-23 DIAGNOSIS — G89.29 CHRONIC PAIN OF RIGHT KNEE: ICD-10-CM

## 2021-09-23 DIAGNOSIS — G20.A1 PARKINSON'S DISEASE (H): ICD-10-CM

## 2021-09-23 PROCEDURE — 99213 OFFICE O/P EST LOW 20 MIN: CPT | Mod: 95 | Performed by: NURSE PRACTITIONER

## 2021-09-23 ASSESSMENT — MOVEMENT DISORDERS SOCIETY - UNIFIED PARKINSONS DISEASE RATING SCALE (MDS-UPDRS)
SALIVA_AND_DROOLING: NORMAL: NOT AT ALL (NO PROBLEMS).
CHEWING_AND_SWALLOWING: NORMAL: NO PROBLEMS.
HYGIENE: NORMAL: NOT AT ALL (NO PROBLEMS).
GETTING_OUT_OF_BED_CAR_DEEP_CHAIR: NORMAL: NOT AT ALL (NO PROBLEMS).
WALKING_AND_BALANCE: SLIGHT: I AM SLIGHTLY SLOW OR MAY DRAG A LEG.  I NEVER USE A WALKING AID.
EATING_TASKS: NORMAL: NOT AT ALL (NO PROBLEMS).
SPEECH: MILD: MY SPEECH CAUSES PEOPLE TO ASK ME TO OCCASIONALLY REPEAT MYSELF, BUT NOT EVERYDAY.
HANDWRITING: NORMAL: NOT AT ALL (NO PROBLEMS).
FREEZING: SLIGHTLY: I BRIEFLY FREEZE, BUT I CAN EASILY START WALKING AGAIN. I DO NOT NEED HELP FROM SOMEONE ELSE OR A WALKING AID (CANE OR WALKER) BECAUSE OF FREEZING.
TURNING_IN_BED: NORMAL: NOT AT ALL (NO PROBLEMS).
TOTAL_SCORE: 4
TREMOR: NORMAL: NOT AT ALL (NO PROBLEMS).
HOBBIES_AND_OTHER_ACTIVITIES: NORMAL:  NOT AT ALL (NO PROBLEMS).
DRESSING: NORMAL: NOT AT ALL (NO PROBLEMS).

## 2021-09-23 NOTE — PROGRESS NOTES
Kwame is a 66 year old who is being evaluated via a billable video visit.      How would you like to obtain your AVS? Mychart  If the video visit is dropped, the invitation should be resent by: [email or web address]  [phone]      Video-Visit Details    Type of service:  Video Visit    Originating Location (pt. Location): Home    Distant Location (provider location):  [facility] [place]     Platform used for Video Visit: [employer]    Video Start Time: 10:48 am  Video End Time:11:01 AM    ------------------------------------------------------------------------------------------------------------------------------------------------------------------      ASSESSMENT/PLAN:    1)  Parkinson's Disease:  Stable. Will continue with current antiparkinsonian medications.      2)  Sleep Problems:  This is an ongoing problem due to sleep apnea, REM behavior disorder, insomnia, and restless leg syndrome. He was seen by a sleep specialist. He'll continue with the recommendations made by Bennett Goltz, PA-C.    3)  Chronic Right Knee Pain:  Pain has been bothersome and affecting gait. Patient was reminded that he has the referral to see orthopedic provider. He was encouraged to call the clinic and set up an appointment.     He has a follow up with me in December.  Patient may contact us anytime as needed.          MOVEMENT DISORDERS CLINIC           PATIENT: Kwame Lin    : 1955    DATE: 2021    REASON FOR VISIT:  Sleep problems follow up.      He has met with Bennett Goltz, PA-C on 9/10/2021 for evaluation of sleep problems. The plan was to resume CPAP, Clonazepam 0.5 mg at HS; start Gabapentin with a titration schedule; and discontinue trazodone. Pt asked if he should stop the Clonazepam since he is sleeping well.     He reports sleep has improved.  He is falling asleep without difficulty.  He is staying asleep. He is practicing good sleep hygiene.     He has talked to  "Fatmata Quintanilla, DORINDA about getting his [employer] battery with the newer version that is compatible with MRI. He is talking to the [employer] support line to get a confirmation.     The only thing that is bothering him is difficulty walking.  \"My knee hurts.\" He has a Hx of chronic Rt knee pain.     Tremors and dyskinesias are manageable.       MEDICATIONS:   Outpatient Medications Marked as Taking for the 9/23/21 encounter (Virtual Visit) with Navjot Juarez APRN CNP   Medication Sig     acetaminophen (TYLENOL) 325 MG tablet Take 325-650 mg by mouth nightly as needed for mild pain     carbidopa-levodopa (SINEMET)  MG tablet Take 1.5 tab every 4 hrs 5 times a day ( 8 am, noon, 4 pm, 8 pm, 11 pm).     citalopram (CELEXA) 10 MG tablet Take 10 mg by mouth every morning     clindamycin (CLEOCIN) 300 MG capsule TAKE 2 CAPSULES BY MOUTH FOR 1 DOSE 1 HOUR PRIOR TO DENTAL APPOINTMENTS     clonazePAM (KLONOPIN) 0.5 MG tablet Take 1/2 to 1 tablets at bedtime to help with dream enactment.     gabapentin (NEURONTIN) 100 MG capsule Take 1 capsule by mouth before bed. May increase by 1 capsule every week as needed to max of 600 mg     MELATONIN PO Take 2 mg by mouth At Bedtime     multivitamin, therapeutic with minerals (MULTI-VITAMIN) TABS tablet Take 1 tablet by mouth every morning     sildenafil (REVATIO) 20 MG tablet Take 20 mg by mouth     thyroid (ARMOUR THYROID) 60 MG tablet Take 60 mg by mouth every morning      I spent 22 minutes caring for the patient today including video time, reviewing records, answering questions, and documentation.    АННА Flores,  CNP  [facility]       "

## 2021-09-23 NOTE — Clinical Note
2021       RE: Kwame Lin  [address]  [facility] [address]     Dear Colleague,    Thank you for referring your patient, Kwame Lin, to the [facility]UROLOG[facility]INIC [place] at [facility]. Please see a copy of my visit note below.    Kwame is a 66 year old who is being evaluated via a billable video visit.      How would you like to obtain your AVS? Mychart  If the video visit is dropped, the invitation should be resent by: [email or web address]  [phone]  {If patient encounters technical issues they should call [phone]}    Video-Visit Details    Type of service:  Video Visit    Originating Location (pt. Location): Home    Distant Location (provider location):  [facility] NEUROLOGY [facility]     Platform used for Video Visit: [email or web address] Start Time: 10:48 am  Video End Time:11:01 AM    -------------------------------------------------------------------------------------------------------------------------------------      ASSESSMENT:    1)  Parkinson's Disease:  Patient has a *** year history of PD.      2)  ***:      3)  Chronic Right Knee Pain:  REminded that he was provided with a Referral to see Orthopedic provider to address right knee pain.      4)  ***:       PLAN:    __  ***    __  ***    __  ***    __  Schedulers to call and set up his next visit - *** months.  Patient may contact us anytime as needed.         MOVEMENT DISORDERS CLINIC           PATIENT: Kwame Lin    : 1955    DATE: 2021    REASON FOR VISIT: *** follow up.    After a review of the patient s situation, this visit was changed from an in-person visit to a video visit to reduce the risk of COVID-19 exposure.      He has bet with      Falling asleep.  Staying to a sleep routine.    Participate in study.    Talking to Fatmata Quintanilla -- 2nd version to replace for MRI.     He could     The only thing  "that is bothering him is difficulty walking.  \"My knee hurts.\" He has difficulty with walking.       MEDICATIONS:   Outpatient Medications Marked as Taking for the 9/23/21 encounter (Virtual Visit) with Navjot Juarez APRN CNP   Medication Sig     acetaminophen (TYLENOL) 325 MG tablet Take 325-650 mg by mouth nightly as needed for mild pain     carbidopa-levodopa (SINEMET)  MG tablet Take 1.5 tab every 4 hrs 5 times a day ( 8 am, noon, 4 pm, 8 pm, 11 pm).     citalopram (CELEXA) 10 MG tablet Take 10 mg by mouth every morning     clindamycin (CLEOCIN) 300 MG capsule TAKE 2 CAPSULES BY MOUTH FOR 1 DOSE 1 HOUR PRIOR TO DENTAL APPOINTMENTS     clonazePAM (KLONOPIN) 0.5 MG tablet Take 1/2 to 1 tablets at bedtime to help with dream enactment.     gabapentin (NEURONTIN) 100 MG capsule Take 1 capsule by mouth before bed. May increase by 1 capsule every week as needed to max of 600 mg     MELATONIN PO Take 2 mg by mouth At Bedtime     multivitamin, therapeutic with minerals (MULTI-VITAMIN) TABS tablet Take 1 tablet by mouth every morning     sildenafil (REVATIO) 20 MG tablet Take 20 mg by mouth     thyroid (ARMOUR THYROID) 60 MG tablet Take 60 mg by mouth every morning      traZODone (DESYREL) 50 MG tablet Take 1/2 tab orally at bedtime. If not effective, take 1 tab.     I spent *** minutes caring for the patient today including video time, reviewing records, answering questions, examining***, refilling/ordering medication, *** and documentation.    АННА Flores,  CNP  [facility]          10:46 AM          Again, thank you for allowing me to participate in the care of your patient.      Sincerely,    АННА Jones CNP    "

## 2021-09-28 NOTE — PATIENT INSTRUCTIONS
Dear Mr. Kwame Lin,    Here is your Video Visit Summary: -       ASSESSMENT/PLAN:    1)  Parkinson's Disease:  Stable. Will continue with current antiparkinsonian medications.      2)  Sleep Problems:  This is an ongoing problem due to sleep apnea, REM behavior disorder, insomnia, and restless leg syndrome. He was seen by a sleep specialist. He'll continue with the recommendations made by Bennett Goltz, PA-C.    3)  Chronic Right Knee Pain:  Pain has been bothersome and affecting gait. Patient was reminded that he has the referral to see orthopedic provider. He was encouraged to call the clinic and set up an appointment.     He has a follow up with me in December.  Patient may contact us anytime as needed.      For questions, you may send us a Edevate message or call [phone]    Fax number: [phone]    АННА Flores, CNP  [facility]

## 2021-10-09 ENCOUNTER — HEALTH MAINTENANCE LETTER (OUTPATIENT)
Age: 66
End: 2021-10-09

## 2021-11-14 ENCOUNTER — MYC MEDICAL ADVICE (OUTPATIENT)
Dept: SLEEP MEDICINE | Facility: CLINIC | Age: 66
End: 2021-11-14
Payer: COMMERCIAL

## 2021-11-14 DIAGNOSIS — G47.33 OSA (OBSTRUCTIVE SLEEP APNEA): Primary | ICD-10-CM

## 2021-11-19 NOTE — TELEPHONE ENCOUNTER
[facility] to recalled [facility].  Bennett Goltz, PA-C

## 2021-11-26 NOTE — TELEPHONE ENCOUNTER
DIAGNOSIS: Chronic pain of right knee / Navjot Juarez APRN CNP in [facility] NEUR[facility]/ no images   APPOINTMENT DATE: 12.2.21   NOTES STATUS DETAILS   OFFICE NOTE from referring provider Internal 9.23.21 [facility]  6.9.21 1.13.20   OFFICE NOTE from other specialist Internal 12.11.17 Dr Silvina Armenta,   10.2.17   DISCHARGE SUMMARY from hospital N/A    DISCHARGE REPORT from the ER N/A    OPERATIVE REPORT N/A    EMG report N/A    MEDICATION LIST Internal    MRI PACS 12.7.17 R knee, L knee, [facility]  3.4.99 R knee, L knee (report only)   DEXA (osteoporosis/bone health) N/A    CT SCAN N/A    XRAYS (IMAGES & REPORTS) PACS 10.2.17 B knees,      Action 11.26.21 7:55 AM VALERIE   Action Taken Faxed request to  [phone]      Action 11.30.21 12:34 PM VALERIE   Action Taken Faxed 2nd request     Action 12.2.21 10:03 AM VALERIE   Action Taken Called [facility] to request image

## 2021-11-29 DIAGNOSIS — M25.561 RIGHT KNEE PAIN: Primary | ICD-10-CM

## 2021-12-02 ENCOUNTER — ANCILLARY PROCEDURE (OUTPATIENT)
Dept: GENERAL RADIOLOGY | Facility: CLINIC | Age: 66
End: 2021-12-02
Payer: COMMERCIAL

## 2021-12-02 ENCOUNTER — OFFICE VISIT (OUTPATIENT)
Dept: ORTHOPEDICS | Facility: CLINIC | Age: 66
End: 2021-12-02
Payer: COMMERCIAL

## 2021-12-02 ENCOUNTER — PRE VISIT (OUTPATIENT)
Dept: ORTHOPEDICS | Facility: CLINIC | Age: 66
End: 2021-12-02
Payer: COMMERCIAL

## 2021-12-02 DIAGNOSIS — M17.11 PRIMARY OSTEOARTHRITIS OF RIGHT KNEE: Primary | ICD-10-CM

## 2021-12-02 DIAGNOSIS — G20.A1 PARKINSON DISEASE (H): ICD-10-CM

## 2021-12-02 DIAGNOSIS — Z96.89 S/P DEEP BRAIN STIMULATOR PLACEMENT: ICD-10-CM

## 2021-12-02 PROCEDURE — 99203 OFFICE O/P NEW LOW 30 MIN: CPT | Performed by: FAMILY MEDICINE

## 2021-12-02 PROCEDURE — 73562 X-RAY EXAM OF KNEE 3: CPT | Mod: RT | Performed by: RADIOLOGY

## 2021-12-02 NOTE — PROGRESS NOTES
Sports Medicine Clinic Visit    PCP: Renetta Hooks ARAM Lin is a 66 year old male who is seen  in consultation at the request of Dr. Juarez presenting with R knee pain.     chronic right knee pain/ djd.  H/o previous right knee arthroscopy weightbearing pain about the medial and lateral aspect of the knee.  No radicular discomfort into the lower leg.      H/o parkinson's dz, with deep brain stimulator 2017    H/o left total knee arthoroplasty on 12/06/18 by Dr. Woo at [facility].  prior to his knee arthroplasty he had cortisone injections in his left knee.  He did not find them helpful.  He also had an  brace for his left knee.          XR Knees Bilat AP W Rt/Lt Lat/Sun/PA      Narrative    XR KNEES BILAT AP W RT/LT LAT/SUN/PA   10/2/2017 11:45 AM     INDICATION: Bilateral knee pain.       COMPARISON: 08/31/2012, 08/17/2015     FINDINGS:   Right knee: Moderate narrowing of the medial compartment with joint line   osteophyte formation. Trace joint effusion with no acute fracture.     Left knee: Advanced degenerative narrowing of the medial compartment with   genu varus deformity and tricompartmental osteophyte formation. This has   progressed since 2015. No fracture. Small joint effusion.                MR Knee Rt WO IV Cont 12/7/2017 [facility]      Narrative    ---------------------- Original Report ---------------------     EXAM:  MRI OF THE RIGHT KNEE WITHOUT CONTRAST     CLINICAL INFORMATION       Patient History:  Male, 62 years old, presents with   bilateral knee pain with decreased range of motion.     Right Knee Surgery:  None.       Provider History:  Right knee pain with crepitus.       TECHNICAL INFORMATION         Protocol:  MRI of the right knee was performed using a   combination of T1-weighted, Proton Density, T2-weighted   and/or STIR images.         Sedation/Analgesia:  None.       COMPARISON:  None.    ____________________________________________________________   _______     CONCLUSION     1.  Moderate osteoarthritis of the medial compartment of the   knee.   2.  Mild osteoarthritis of the lateral compartment.   3.  The articular cartilage of the patellofemoral   compartment is grossly preserved.   4.  Only a small amount of the medial meniscus body and   posterior horn remain, likely reflecting previous partial   medial meniscectomy.  Correlate with any previous surgery.   5.  Focal inner margin tearing of the middle third body of   the lateral meniscus.   6.  No acute ligament injury.   7.  Small to moderately sized knee joint effusion.   ____________________________________________________________         PMH:  Past Medical History:   Diagnosis Date     Anosmia 10/12/2017     Anxiety      Depressed mood 10/12/2017     Hypothyroid      Parkinson disease (H)      Parkinsons disease (H)      PONV (postoperative nausea and vomiting)      RBD (REM behavioral disorder) 10/12/2017       Past Surgical History:   Procedure Laterality Date     ARTHOSCOPY, KNEE, SURGICAL, IM   RIGHT X 2 LEFT X 2     cystourethroscopy w/ fulguration & disruption of post-urethral valves     LEFT ARM LIPOMA     REMOVAL OF EPIDIDYMAL TUMOR       Active problem list:  Patient Active Problem List   Diagnosis     Anxiety disorder     Hypothyroidism     Parkinson disease (H)     Parkinsonian tremor (H)     Restless leg syndrome     Depressed mood     RBD (REM behavioral disorder)     S/P deep brain stimulator placement     Age-related nuclear cataract, bilateral     Dry eye syndrome of bilateral lacrimal glands     Osteoarthritis of both knees     Vitreous degeneration, right eye     Gait difficulty     DIPIKA (obstructive sleep apnea)     Sexual dysfunction       FH:  Family History   Problem Relation Age of Onset     Lung Cancer Father      Parkinsonism Cousin         paternal side     Parkinsonism Maternal Aunt        SH:  Social History      Socioeconomic History     Marital status:      Spouse name: Not on file     Number of children: Not on file     Years of education: Not on file     Highest education level: Not on file   Occupational History     Occupation: Small business start consultant   Tobacco Use     Smoking status: Never Smoker     Smokeless tobacco: Never Used   Substance and Sexual Activity     Alcohol use: Yes     Alcohol/week: 1.0 - 2.0 standard drink     Types: 1 - 2 Cans of beer per week     Comment: MONTHLY     Drug use: No     Sexual activity: Yes     Partners: Female   Other Topics Concern     Parent/sibling w/ CABG, MI or angioplasty before 65F 55M? Not Asked   Social History Narrative    Previous worked as [employer].    Now consulting part-time.    , lives with wife.    Non-smoker. Occasional drink.        Past surgical history that includes epidydmal mass removal, benign.        Social History:    Previous worked as [employer].    Now consulting part-time.    , lives with wife.    Non-smoker. Occasional drink.         family history includes Lung Cancer in his father; Parkinsonism in his cousin and maternal aunt.          Social Determinants of Health     Financial Resource Strain: Not on file   Food Insecurity: Not on file   Transportation Needs: Not on file   Physical Activity: Not on file   Stress: Not on file   Social Connections: Not on file   Intimate Partner Violence: Not on file   Housing Stability: Not on file       MEDS:  See EMR, reviewed  ALL:  See EMR, reviewed    REVIEW OF SYSTEMS:  CONSTITUTIONAL:NEGATIVE for fever, chills, change in weight  INTEGUMENTARY/SKIN: NEGATIVE for worrisome rashes, moles or lesions  EYES: NEGATIVE for vision changes or irritation  ENT/MOUTH: NEGATIVE for ear, mouth and throat problems  RESP:NEGATIVE for significant cough or SOB  BREAST: NEGATIVE for masses, tenderness or  discharge  CV: NEGATIVE for chest pain, palpitations or peripheral edema  GI: NEGATIVE for nausea, abdominal pain, heartburn, or change in bowel habits  :NEGATIVE for frequency, dysuria, or hematuria  :NEGATIVE for frequency, dysuria, or hematuria  NEURO: NEGATIVE for weakness, dizziness or paresthesias  ENDOCRINE: NEGATIVE for temperature intolerance, skin/hair changes  HEME/ALLERGY/IMMUNE: NEGATIVE for bleeding problems  PSYCHIATRIC: NEGATIVE for changes in mood or affect          MR [place] Lt WO IV Cont 12/7/2017      Narrative    ---------------------- Original Report ---------------------     EXAM:  MRI OF THE LEFT KNEE WITHOUT CONTRAST     CLINICAL INFORMATION       Patient History:  Male, 62 years old, presents with   bilateral knee pain for one year.  No recent injury.     Left Knee Surgery:  Arthroscopy for torn meniscus       Provider History:  Left knee pain.  Osteoarthritis and   instability.  Patient has Parkinson's disease.     TECHNICAL INFORMATION         Protocol:  MRI of the left knee was performed using a   combination of T1-weighted, Proton Density, T2-weighted   and/or STIR images.         Sedation/Analgesia:  None.       COMPARISON:  None.   ____________________________________________________________   _______     CONCLUSION     1.  Advanced osteoarthritis of the medial compartment.   2.  Mild osteoarthritic changes of the lateral and   patellofemoral compartments.   3.  Longitudinal vertical oriented tear of the far posterior   horn of lateral meniscus.   4.  Postoperative changes compatible with medial   meniscectomy with only a very small amount of remnant   meniscal tissue in the body and posterior horn.   5.  Chronic full-thickness disruption of the ACL with   atrophy of the fibers.   6.  Chronic mild sprain of medial collateral ligament.   7.  Moderately sized knee joint effusion.           Objective: The right knee reveals no effusion.  I can flex and extend it fully.  No swelling in  the popliteal space.  Nontender in the calf.  Mild tenderness over the medial joint line and lateral joint line.  Nontender over the pes anserine bursa or distal IT band.  Adequate range of motion at the hip.  Overlying skin is intact.  Appropriate in conversation and affect.      Went over x-rays of the right knee that show tricompartmental DJD with severe changes in the medial joint space and moderate to severe changes in the lateral joint space.    Assessment right-sided knee DJD.  Parkinson's disease.  Deep brain stimulator.    Plan: We discussed conservative care such as cortisone injection, Synvisc injection,  brace, over-the-counter pain medicine such as Tylenol and nonsteroidal anti-inflammatories and their side effects, physical therapy.  We discussed referral to a surgeon for knee replacement consultation.  Patient is wanting to avoid knee surgery for now.  He declines injections.  He would like to see physical therapy and try an  brace.  Referral placed and he will follow-up as needed.

## 2021-12-02 NOTE — LETTER
12/2/2021      RE: Kwame Lin  [address]  [facility] [address]       Sports Medicine Clinic Visit    PCP: Renetta Hooks    Kwame Lin is a 66 year old male who is seen  in consultation at the request of Dr. Juarez presenting with R knee pain.     chronic right knee pain/ djd.  H/o previous right knee arthroscopy weightbearing pain about the medial and lateral aspect of the knee.  No radicular discomfort into the lower leg.      H/o parkinson's dz, with deep brain stimulator 2017    H/o left total knee arthoroplasty on 12/06/18 by Dr. Woo at [facility].  prior to his knee arthroplasty he had cortisone injections in his left knee.  He did not find them helpful.  He also had an  brace for his left knee.          XR Knees Bilat AP W Rt/Lt Lat/Sun/PA      Narrative    XR KNEES BILAT AP W RT/LT LAT/SUN/PA   10/2/2017 11:45 AM     INDICATION: Bilateral knee pain.       COMPARISON: 08/31/2012, 08/17/2015     FINDINGS:   Right knee: Moderate narrowing of the medial compartment with joint line   osteophyte formation. Trace joint effusion with no acute fracture.     Left knee: Advanced degenerative narrowing of the medial compartment with   genu varus deformity and tricompartmental osteophyte formation. This has   progressed since 2015. No fracture. Small joint effusion.                MR Knee Rt WO IV Cont 12/7/2017 [facility]      Narrative    ---------------------- Original Report ---------------------     EXAM:  MRI OF THE RIGHT KNEE WITHOUT CONTRAST     CLINICAL INFORMATION       Patient History:  Male, 62 years old, presents with   bilateral knee pain with decreased range of motion.     Right Knee Surgery:  None.       Provider History:  Right knee pain with crepitus.       TECHNICAL INFORMATION         Protocol:  MRI of the right knee was performed using a   combination of T1-weighted, Proton Density, T2-weighted   and/or STIR images.          Sedation/Analgesia:  None.       COMPARISON:  None.   ____________________________________________________________   _______     CONCLUSION     1.  Moderate osteoarthritis of the medial compartment of the   knee.   2.  Mild osteoarthritis of the lateral compartment.   3.  The articular cartilage of the patellofemoral   compartment is grossly preserved.   4.  Only a small amount of the medial meniscus body and   posterior horn remain, likely reflecting previous partial   medial meniscectomy.  Correlate with any previous surgery.   5.  Focal inner margin tearing of the middle third body of   the lateral meniscus.   6.  No acute ligament injury.   7.  Small to moderately sized knee joint effusion.   ____________________________________________________________         PMH:  Past Medical History:   Diagnosis Date     Anosmia 10/12/2017     Anxiety      Depressed mood 10/12/2017     Hypothyroid      Parkinson disease (H)      Parkinsons disease (H)      PONV (postoperative nausea and vomiting)      RBD (REM behavioral disorder) 10/12/2017       Past Surgical History:   Procedure Laterality Date     ARTHOSCOPY, KNEE, SURGICAL, IM   RIGHT X 2 LEFT X 2     cystourethroscopy w/ fulguration & disruption of post-urethral valves     LEFT ARM LIPOMA     REMOVAL OF EPIDIDYMAL TUMOR       Active problem list:  Patient Active Problem List   Diagnosis     Anxiety disorder     Hypothyroidism     Parkinson disease (H)     Parkinsonian tremor (H)     Restless leg syndrome     Depressed mood     RBD (REM behavioral disorder)     S/P deep brain stimulator placement     Age-related nuclear cataract, bilateral     Dry eye syndrome of bilateral lacrimal glands     Osteoarthritis of both knees     Vitreous degeneration, right eye     Gait difficulty     DIPIKA (obstructive sleep apnea)     Sexual dysfunction       FH:  Family History   Problem Relation Age of Onset     Lung Cancer Father      Parkinsonism Cousin         paternal side      Parkinsonism Maternal Aunt        SH:  Social History     Socioeconomic History     Marital status:      Spouse name: Not on file     Number of children: Not on file     Years of education: Not on file     Highest education level: Not on file   Occupational History     Occupation: Small business start consultant   Tobacco Use     Smoking status: Never Smoker     Smokeless tobacco: Never Used   Substance and Sexual Activity     Alcohol use: Yes     Alcohol/week: 1.0 - 2.0 standard drink     Types: 1 - 2 Cans of beer per week     Comment: MONTHLY     Drug use: No     Sexual activity: Yes     Partners: Female   Other Topics Concern     Parent/sibling w/ CABG, MI or angioplasty before 65F 55M? Not Asked   Social History Narrative    Previous worked as [employer].    Now consulting part-time.    , lives with wife.    Non-smoker. Occasional drink.        Past surgical history that includes epidydmal mass removal, benign.        Social History:    Previous worked as [employer].    Now consulting part-time.    , lives with wife.    Non-smoker. Occasional drink.         family history includes Lung Cancer in his father; Parkinsonism in his cousin and maternal aunt.          Social Determinants of Health     Financial Resource Strain: Not on file   Food Insecurity: Not on file   Transportation Needs: Not on file   Physical Activity: Not on file   Stress: Not on file   Social Connections: Not on file   Intimate Partner Violence: Not on file   Housing Stability: Not on file       MEDS:  See EMR, reviewed  ALL:  See EMR, reviewed    REVIEW OF SYSTEMS:  CONSTITUTIONAL:NEGATIVE for fever, chills, change in weight  INTEGUMENTARY/SKIN: NEGATIVE for worrisome rashes, moles or lesions  EYES: NEGATIVE for vision changes or irritation  ENT/MOUTH: NEGATIVE for ear, mouth and throat problems  RESP:NEGATIVE for significant cough or  SOB  BREAST: NEGATIVE for masses, tenderness or discharge  CV: NEGATIVE for chest pain, palpitations or peripheral edema  GI: NEGATIVE for nausea, abdominal pain, heartburn, or change in bowel habits  :NEGATIVE for frequency, dysuria, or hematuria  :NEGATIVE for frequency, dysuria, or hematuria  NEURO: NEGATIVE for weakness, dizziness or paresthesias  ENDOCRINE: NEGATIVE for temperature intolerance, skin/hair changes  HEME/ALLERGY/IMMUNE: NEGATIVE for bleeding problems  PSYCHIATRIC: NEGATIVE for changes in mood or affect           Knee Lt WO IV Cont 12/7/2017      Narrative    ---------------------- Original Report ---------------------     EXAM:  MRI OF THE LEFT KNEE WITHOUT CONTRAST     CLINICAL INFORMATION       Patient History:  Male, 62 years old, presents with   bilateral knee pain for one year.  No recent injury.     Left Knee Surgery:  Arthroscopy for torn meniscus       Provider History:  Left knee pain.  Osteoarthritis and   instability.  Patient has Parkinson's disease.     TECHNICAL INFORMATION         Protocol:  MRI of the left knee was performed using a   combination of T1-weighted, Proton Density, T2-weighted   and/or STIR images.         Sedation/Analgesia:  None.       COMPARISON:  None.   ____________________________________________________________   _______     CONCLUSION     1.  Advanced osteoarthritis of the medial compartment.   2.  Mild osteoarthritic changes of the lateral and   patellofemoral compartments.   3.  Longitudinal vertical oriented tear of the far posterior   horn of lateral meniscus.   4.  Postoperative changes compatible with medial   meniscectomy with only a very small amount of remnant   meniscal tissue in the body and posterior horn.   5.  Chronic full-thickness disruption of the ACL with   atrophy of the fibers.   6.  Chronic mild sprain of medial collateral ligament.   7.  Moderately sized knee joint effusion.           Objective: The right knee reveals no effusion.   I can flex and extend it fully.  No swelling in the popliteal space.  Nontender in the calf.  Mild tenderness over the medial joint line and lateral joint line.  Nontender over the pes anserine bursa or distal IT band.  Adequate range of motion at the hip.  Overlying skin is intact.  Appropriate in conversation and affect.      Went over x-rays of the right knee that show tricompartmental DJD with severe changes in the medial joint space and moderate to severe changes in the lateral joint space.    Assessment right-sided knee DJD.  Parkinson's disease.  Deep brain stimulator.    Plan: We discussed conservative care such as cortisone injection, Synvisc injection,  brace, over-the-counter pain medicine such as Tylenol and nonsteroidal anti-inflammatories and their side effects, physical therapy.  We discussed referral to a surgeon for knee replacement consultation.  Patient is wanting to avoid knee surgery for now.  He declines injections.  He would like to see physical therapy and try an  brace.  Referral placed and he will follow-up as needed.        Aravind Norman MD

## 2021-12-08 ENCOUNTER — OFFICE VISIT (OUTPATIENT)
Dept: NEUROLOGY | Facility: CLINIC | Age: 66
End: 2021-12-08
Payer: COMMERCIAL

## 2021-12-08 VITALS
RESPIRATION RATE: 16 BRPM | HEART RATE: 70 BPM | OXYGEN SATURATION: 98 % | DIASTOLIC BLOOD PRESSURE: 84 MMHG | SYSTOLIC BLOOD PRESSURE: 130 MMHG

## 2021-12-08 DIAGNOSIS — G89.29 CHRONIC PAIN OF RIGHT KNEE: ICD-10-CM

## 2021-12-08 DIAGNOSIS — Z96.89 S/P DEEP BRAIN STIMULATOR PLACEMENT: ICD-10-CM

## 2021-12-08 DIAGNOSIS — M25.561 CHRONIC PAIN OF RIGHT KNEE: ICD-10-CM

## 2021-12-08 DIAGNOSIS — G47.52 RBD (REM BEHAVIORAL DISORDER): ICD-10-CM

## 2021-12-08 DIAGNOSIS — G20.A1 PARKINSON'S DISEASE (H): Primary | ICD-10-CM

## 2021-12-08 DIAGNOSIS — G20.B1 DYSKINESIA DUE TO PARKINSON'S DISEASE (H): ICD-10-CM

## 2021-12-08 PROCEDURE — 99213 OFFICE O/P EST LOW 20 MIN: CPT | Mod: 25 | Performed by: NURSE PRACTITIONER

## 2021-12-08 PROCEDURE — 95970 ALYS NPGT W/O PRGRMG: CPT | Performed by: NURSE PRACTITIONER

## 2021-12-08 RX ORDER — CLONAZEPAM 0.5 MG/1
TABLET ORAL
Qty: 90 TABLET | Refills: 1 | Status: SHIPPED | OUTPATIENT
Start: 2021-12-08 | End: 2022-07-08

## 2021-12-08 RX ORDER — CARBIDOPA AND LEVODOPA 25; 100 MG/1; MG/1
TABLET ORAL
Qty: 675 TABLET | Refills: 3 | Status: SHIPPED | OUTPATIENT
Start: 2021-12-08 | End: 2022-12-06

## 2021-12-08 RX ORDER — ATORVASTATIN CALCIUM 20 MG/1
TABLET, FILM COATED ORAL
COMMUNITY
Start: 2021-10-14

## 2021-12-08 ASSESSMENT — UNIFIED PARKINSONS DISEASE RATING SCALE (UPDRS)
TOTAL_SCORE: 7
LEG_AGILITY_RIGHT: NORMAL
SPONTANEITY_OF_MOVEMENT: 0: NORMAL.  NO PROBLEMS.
RIGIDITY_RLE: NORMAL
LEG_AGILITY_LEFT: SLIGHT: ANY OF THE FOLLOWING: A) THE REGULAR RHYTHM IS BROKEN WITH ONE WITH ONE OR TWO INTERRUPTIONS OR HESITATIONS OF THE MOVEMENT B) SLIGHT SLOWING C) THE AMPLITUDE DECREMENTS NEAR THE END OF THE 10 MOVEMENTS.
DYSKINESIAS_PRESENT: YES
TOTAL_SCORE_LEFT: 4
POSTURAL_STABILITY: NORMAL:  RECOVERS WITH ONE OR TWO STEPS.
GAIT: NORMAL
CONSTANCY_TREMOR_ATREST: NORMAL: NO TREMOR.
TOETAPPING_LEFT: SLIGHT: ANY OF THE FOLLOWING: A) THE REGULAR RHYTHM IS BROKEN WITH ONE WITH ONE OR TWO INTERRUPTIONS OR HESITATIONS OF THE MOVEMENT B) SLIGHT SLOWING C) THE AMPLITUDE DECREMENTS NEAR THE END OF THE 10 MOVEMENTS.
RIGIDITY_RUE: NORMAL
AMPLITUDE_LIP_JAW: NORMAL: NO TREMOR.
FINGER_TAPPING_LEFT: SLIGHT: ANY OF THE FOLLOWING: A) THE REGULAR RHYTHM IS BROKEN WITH ONE WITH ONE OR TWO INTERRUPTIONS OR HESITATIONS OF THE MOVEMENT B) SLIGHT SLOWING C) THE AMPLITUDE DECREMENTS NEAR THE END OF THE 10 MOVEMENTS.
PRONATION_SUPINATION_LEFT: NORMAL
HANDMOVEMENTS_LEFT: NORMAL
AMPLITUDE_LLE: NORMAL: NO TREMOR.
AMPLITUDE_RUE: NORMAL: NO TREMOR.
POSTURE: 0 NORMAL, NO PROBLEMS
AMPLITUDE_RLE: NORMAL: NO TREMOR.
MOVEMENTS_INTERFERE_WITH_RATINGS: NO
FINGER_TAPPING_RIGHT: NORMAL
TOTAL_SCORE: 0
SPEECH: SLIGHT: LOSS OF MODULATION, DICTION OR VOLUME, BUT STILL ALL WORDS EASY TO UNDERSTAND.
FACIAL_EXPRESSION: SLIGHT: MINIMAL MASKED FACIES MANIFESTED ONLY BY DECREASED FREQUENCY OF BLINKING.
HANDMOVEMENTS_RIGHT: NORMAL
ARISING_CHAIR: SLIGHT: ARISING IS SLOWER THAN NORMAL, OR MAY NEED MORE THAN ONE ATTEMPT, OR MAY NEED TO MOVE FORWARD IN THE CHAIR TO ARISE.  NO NEED TO USE THE ARMS OF THE CHAIR.
RIGIDITY_LUE: NORMAL
PRONATION_SUPINATION_RIGHT: NORMAL
RIGIDITY_LLE: NORMAL
FREEZING_GAIT: NORMAL
AXIAL_SCORE: 3
AMPLITUDE_LUE: NORMAL: NO TREMOR.
PARKINSONS_MEDS: ON
TOETAPPING_RIGHT: NORMAL
RIGIDITY_NECK: NORMAL

## 2021-12-08 ASSESSMENT — PAIN SCALES - GENERAL: PAINLEVEL: NO PAIN (0)

## 2021-12-08 NOTE — PATIENT INSTRUCTIONS
- No changes were made to your DBS programming today  - Continue taking your current medications: Sinemet, clonazepam, and gabapentin. I've renewed these medications today

## 2021-12-08 NOTE — PROGRESS NOTES
ASSESSMENT:    Parkinson's Disease:  Controlled with DBS and current medications, except for some dyskinesias and balance problems.    Dyskinesias due to PD:  Present and not bothersome.    S/p Bilateral STN DBS lead implantation:  Normal impedance and battery life. No programming changes were made today.    RBD:  Improved.     Chronic Right Knee Pain:  Ongoing issue, following with ortho.       PLAN:    __  No changes made to DBS programming today.    __  Continue current medication regimen. Rx refilled.     Pertinent Medications  8 am 12 pm 4 pm 8 pm 11 pm   Sinemet 25/100 mg  1.5 1.5 1.5 1.5 1.5   Clonazepam 0.5 mg     1   Gabapentin 100 mg      1     __  Continue to stay physically active and do the voice exercises.     __  Since he was hesitant getting steroid injections in his Rt knee, he was encouraged to try it.    Return to our clinic in 6 months or sooner as needed.        MOVEMENT DISORDERS CLINIC           PATIENT: Kwame Lin    : 1955    SULEMA: 2021    REASON FOR VISIT: Parkinson's disease (PD) follow up and deep brain stimulation (DBS) interrogation and analysis.    HPI: Mr. Kwame Lin is a 66 year old  male who came to the [facility] neurology clinic who came by himself for a follow up visit. He was last seen in the clinic on 2021 for a routine f/u visit and via virtual visit on 2021 for sleep issues.      He has overall been doing well - states his motor symptoms are almost non-existent. He does have very mild wearing off with carbidopa/levodopa in between doses. He feels a general malaise or gets aching joints. No tremor, dystonia with wearing off. His dyskinesias have improved and do not bother him. They mostly happen if medication is wearing off or if he takes a lot. No falls, although does feel a bit off balance and is very careful with walking. He thinks most of the imbalance is due to his right knee pain. He is following with ortho for this. He is  hesitant getting steroid injection.     Regarding sleep, he has been trying to keep a steady schedule and this has been helping.   His dream enactment is improved, but he's not sure why. Since moving to his new house, he has not woken up on the floor.     He has been focusing more on his voice and as long as he does this, he does not have a problem. He recently worked with speech therapist. Very rarely has difficulty with swallowing water but again hasn't had this in a long time with focusing. No hallucinations.    Pertinent Medications  8 am 12 pm 4 pm 8 pm 11 pm   Sinemet 25/100 mg  1.5 1.5 1.5 1.5 1.5   Clonazepam 0.5 mg     1   Melatonin 2 mg     discontinued   Gabapentin 100 mg      1       MEDICATIONS:   Outpatient Medications Marked as Taking for the 6/9/21 encounter (Office Visit) with Navjot Juarez APRN CNP   Medication Sig     acetaminophen (TYLENOL) 325 MG tablet Take 325-650 mg by mouth nightly as needed for mild pain     carbidopa-levodopa (SINEMET)  MG tablet Take 1.5 tab every 4 hrs 5 times a day ( 8 am, noon, 4 pm, 8 pm, 11 pm).     citalopram (CELEXA) 10 MG tablet Take 2 tablets (20 mg) by mouth every morning     MELATONIN PO Take 2 mg by mouth At Bedtime     Misc Natural Products (RED WINE EXTRACT) CAPS Take by mouth daily (with lunch)      multivitamin, therapeutic with minerals (MULTI-VITAMIN) TABS tablet Take 1 tablet by mouth every morning     Probiotic Product (ACIDOPHILUS/GOAT MILK) CAPS Take 1 capsule by mouth At Bedtime      sildenafil (REVATIO) 20 MG tablet Take 20 mg by mouth     thyroid (ARMOUR THYROID) 60 MG tablet Take 60 mg by mouth every morning        DBS Interrogation & Analysis:    Implanted generator:  Left chest [employer] Versice   Lead target:  Bilateral Subthalamic Nucleus (STN)  Left Chest Generator placement date:  12/22/2017    Program: 4-Bear    Left Brain   Lead placement date:  12/14/2017    C +, 3 - (100%)  Amplitude:  2.8 mA  PW:  60 msec  Rate:   139 Hz    Patient :  Upper Limit: 3.3 mA  Lower Limit: 2.0 mA    Electrode Impedance Check:    Left Lead: No Problems Found.       Right Brain   Lead placement date:  5/22/2018    C +, 11 - (60%), 14 - (40%)   Amplitude:  3.3 mA  PW:  60 msec  Rate:  139 Hz    Patient :  Upper Limit: 4.0 mA  Lower Limit: 2.5 mA    Electrode Impedance Check:    Right Lead: No Problems Found.      See scanned report for impedance details.    PHYSICAL EXAM:  VITAL SIGNS:  /84   Pulse 70   Resp 16   SpO2 98%      GENERAL:  Mr. Lin is a pleasant  male who is well-groomed and well-developed sitting comfortably in the exam room without any distress.  Affect is appropriate.    MOVEMENT DISORDERS ASSESSMENT:  MDS UPDRS III (Last Sinemet was 9 AM)  UPDRS Values 12/8/2021   Time: 11:35 AM   Medication On   R Brain DBS: On   L Brain DBS: On   Dyskinesia (LID) Yes   Did LID interfere No   Speech 1   Facial Expression 1   Rigidity Neck 0   Rigidity RUE 0   Rigidity LUE 0   Rigidity RLE 0   Rigidity LLE 0   Finger Taps R 0   Finger Taps L 1   Hand Mvt R 0   Hand Mvt L 0   Pron-/Supinate R 0   Pron-/Supinate L 0   Toe Tap R 0   Toe Tap L 1   Leg Agility R 0   Leg Agility L 1   Arise From Chair 1   Gait 0   Gait Freezing 0   Postural Stability 0   Posture 0   Global Spont Mvt 0   Postural Tremor RUE 0   Postural Tremor LUE 1   Kinetic Tremor RUE 0   Kinetic Tremor LUE 0   Rest Tremor RUE 0   Rest Tremor LUE 0   Rest Tremor RLE 0   Rest Tremor LLE 0   Rest Tremor Lip/Jaw 0   Rest Tremor Constancy 0   Total Right 0   Total Left 4   Axial Total 3   Total 7     Patient and plan was examined & discussed with АННА Barbour, CNP.    Emma Goodrich MD  Movement Disorder Fellow    Patient was seen with Dr. Goodrich.  41 minutes was spent caring for the patient. 20 minutes was spent in DBS programming.  21 minutes was spent with reviewing records, meeting with the patient, answering questions, examining,  refilling/ordering medication,  and documentation.    Kymberly Juarez APRN, CNP  [facility]

## 2021-12-08 NOTE — Clinical Note
2021       RE: Kwame Lin  [address]  [facility] [address]     Dear Colleague,    Thank you for referring your patient, Kwame Lin, to the [facility] [place] at [facility]. Please see a copy of my visit note below.      ASSESSMENT:    Parkinson's Disease:  He believes is well-controlled with DBS and current medications.    Dyskinesias due to PD:  Improved, he is not bothered by these.    S/p Bilateral STN DBS lead implantation:  Normal impedance and battery life. No programming changes were made today.    RBD:  Needs improvement. There is a safety concern due to dream enactment.    Chronic Right Knee Pain:  Ongoing issue, following with ortho.       PLAN:    __  No changes made to DBS programming today  __  Continue current medication regimen:    Pertinent Medications  8 am 12 pm 4 pm 8 pm 11 pm   Sinemet 25/100 mg  1.5 1.5 1.5 1.5 1.5   Clonazepam 0.5 mg     1   Gabapentin 100 mg      1     __  Continue to stay physically active and do the voice exercises.     Return to our clinic in 6 months or sooner as needed.        MOVEMENT DISORDERS CLINIC           PATIENT: Kwame Lin    : 1955    SULEMA: 2021    REASON FOR VISIT: Parkinson's disease (PD) and deep brain stimulation (DBS) interrogation and analysis follow up.    HPI: Mr. Kwame Lin is a 66 year old  male who came to the [facility] neurology clinic unaccompanied for a follow up visit. He was last seen in the clinic on 2021 for a routine f/u visit and via telemedicine on 2021 for sleep issues.      Records Reviewed:  He has overall been doing well - states his motor symptoms are almost non-existent. He does have very mild wearing off with carbidopa/levodopa in between doses. He feels a general malaise or gets aching joints. No tremor, dystonia with wearing off. His dyskinesias have improved and do not bother him. They  mostly happen if medication is wearing off or if he takes a lot. No falls, although does feel a bit off balance and is very careful with walking. He thinks most of the imbalance is walking is due to his right knee pain. He is following with ortho for this. Regarding sleep, he has been trying to keep a steady schedule and this has been helping. His dream enactment is improved, but he's not sure why. Since moving to his new house, he has not woken up on the floor. He has been focusing more on his voice and as long as he does this, he does not have a problem. He recently worked with SLP. Very rarely has difficulty with swallowing water but again hasn't had this in a long time with focusing. No hallucinations.    UPDRS Part II  Last was 4 on 9/23/2021    Pertinent Medications  8 am 12 pm 4 pm 8 pm 11 pm   Sinemet 25/100 mg  1.5 1.5 1.5 1.5 1.5   Clonazepam 0.5 mg     1   Melatonin 2 mg     discontinued   Gabapentin 100 mg      1       ALLERGIES: No clinical screening - see comments, Bactrim, Codeine, Ketorolac, Morphine, Nalbuphine, Penicillins, Seasonal allergies, Sulfa drugs, Toradol, and Nsaids    MEDICATIONS:   Outpatient Medications Marked as Taking for the 6/9/21 encounter (Office Visit) with Navjot Juarez APRN CNP   Medication Sig     acetaminophen (TYLENOL) 325 MG tablet Take 325-650 mg by mouth nightly as needed for mild pain     carbidopa-levodopa (SINEMET)  MG tablet Take 1.5 tab every 4 hrs 5 times a day ( 8 am, noon, 4 pm, 8 pm, 11 pm).     citalopram (CELEXA) 10 MG tablet Take 2 tablets (20 mg) by mouth every morning     MELATONIN PO Take 2 mg by mouth At Bedtime     Misc Natural Products (RED WINE EXTRACT) CAPS Take by mouth daily (with lunch)      multivitamin, therapeutic with minerals (MULTI-VITAMIN) TABS tablet Take 1 tablet by mouth every morning     Probiotic Product (ACIDOPHILUS/GOAT MILK) CAPS Take 1 capsule by mouth At Bedtime      sildenafil (REVATIO) 20 MG tablet Take 20 mg by  mouth     thyroid (ARMOUR THYROID) 60 MG tablet Take 60 mg by mouth every morning        PHYSICAL EXAM:    DBS Interrogation & Analysis:    Implanted generator:  Left chest [employer] Versice   Lead target:  Bilateral Subthalamic Nucleus (STN).  Left Chest Generator placement date:  12/22/2017    Program: 4-Bear    Left Brain   Lead placement date:  12/14/2017    C +, 3 - (100%)  Amplitude:  2.8 mA  PW:  60 msec  Rate:  139 Hz    Patient :  Upper Limit: 3.3 mA  Lower Limit: 2.0 mA    Electrode Impedance Check:    Left Lead: No Problems Found.       Right Brain   Lead placement date:  5/22/2018    C +, 11 - (60%), 14 - (40%)   Amplitude:  3.3 mA  PW:  60 msec  Rate:  139 Hz    Patient :  Upper Limit: 4.0 mA  Lower Limit: 2.5 mA    Electrode Impedance Check:    Right Lead: No Problems Found.      See scanned report for impedance details.      VITAL SIGNS:  /84   Pulse 70   Resp 16   SpO2 98%      GENERAL:  Mr. Lin is a pleasant  male who is well-groomed and well-developed sitting comfortably in the exam room without any distress.  Affect is appropriate.    MOVEMENT DISORDERS ASSESSMENT:  MDS UPDRS III (Last Sinemet was 9 AM)  UPDRS Values 12/8/2021   Time: 11:35 AM   Medication On   R Brain DBS: On   L Brain DBS: On   Dyskinesia (LID) Yes   Did LID interfere No   Speech 1   Facial Expression 1   Rigidity Neck 0   Rigidity RUE 0   Rigidity LUE 0   Rigidity RLE 0   Rigidity LLE 0   Finger Taps R 0   Finger Taps L 1   Hand Mvt R 0   Hand Mvt L 0   Pron-/Supinate R 0   Pron-/Supinate L 0   Toe Tap R 0   Toe Tap L 1   Leg Agility R 0   Leg Agility L 1   Arise From Chair 1   Gait 0   Gait Freezing 0   Postural Stability 0   Posture 0   Global Spont Mvt 0   Postural Tremor RUE 0   Postural Tremor LUE 1   Kinetic Tremor RUE 0   Kinetic Tremor LUE 0   Rest Tremor RUE 0   Rest Tremor LUE 0   Rest Tremor RLE 0   Rest Tremor LLE 0   Rest Tremor Lip/Jaw 0   Rest Tremor Constancy 0    Total Right 0   Total Left 4   Axial Total 3   Total 7     Last 12 on 6/9/2021    Patient and plan was examined & discussed with NOHEMY Barbour, ILEANA.    Emma Goodrich MD  Movement Disorder Fellow        Again, thank you for allowing me to participate in the care of your patient.      Sincerely,    АННА Jones CNP

## 2022-01-03 DIAGNOSIS — G25.81 RESTLESS LEG SYNDROME: ICD-10-CM

## 2022-01-05 RX ORDER — GABAPENTIN 100 MG/1
CAPSULE ORAL
Qty: 90 CAPSULE | Refills: 1 | Status: SHIPPED | OUTPATIENT
Start: 2022-01-05 | End: 2022-09-06

## 2022-01-06 ENCOUNTER — THERAPY VISIT (OUTPATIENT)
Dept: PHYSICAL THERAPY | Facility: CLINIC | Age: 67
End: 2022-01-06
Payer: COMMERCIAL

## 2022-01-06 DIAGNOSIS — G20.A1 PARKINSON DISEASE (H): Primary | ICD-10-CM

## 2022-01-06 DIAGNOSIS — M17.11 PRIMARY OSTEOARTHRITIS OF RIGHT KNEE: ICD-10-CM

## 2022-01-06 PROCEDURE — 97162 PT EVAL MOD COMPLEX 30 MIN: CPT | Mod: GP | Performed by: PHYSICAL THERAPIST

## 2022-01-06 PROCEDURE — 97110 THERAPEUTIC EXERCISES: CPT | Mod: GP | Performed by: PHYSICAL THERAPIST

## 2022-01-06 ASSESSMENT — ACTIVITIES OF DAILY LIVING (ADL)
HOW_WOULD_YOU_RATE_THE_OVERALL_FUNCTION_OF_YOUR_KNEE_DURING_YOUR_USUAL_DAILY_ACTIVITIES?: NEARLY NORMAL
SWELLING: THE SYMPTOM AFFECTS MY ACTIVITY MODERATELY
WEAKNESS: THE SYMPTOM AFFECTS MY ACTIVITY MODERATELY
HOW_WOULD_YOU_RATE_THE_CURRENT_FUNCTION_OF_YOUR_KNEE_DURING_YOUR_USUAL_DAILY_ACTIVITIES_ON_A_SCALE_FROM_0_TO_100_WITH_100_BEING_YOUR_LEVEL_OF_KNEE_FUNCTION_PRIOR_TO_YOUR_INJURY_AND_0_BEING_THE_INABILITY_TO_PERFORM_ANY_OF_YOUR_USUAL_DAILY_ACTIVITIES?: 50
STIFFNESS: THE SYMPTOM AFFECTS MY ACTIVITY MODERATELY
PAIN: THE SYMPTOM AFFECTS MY ACTIVITY MODERATELY
KNEE_ACTIVITY_OF_DAILY_LIVING_SCORE: 52.86
WALK: ACTIVITY IS FAIRLY DIFFICULT
KNEEL ON THE FRONT OF YOUR KNEE: ACTIVITY IS SOMEWHAT DIFFICULT
RAW_SCORE: 37
GO UP STAIRS: ACTIVITY IS SOMEWHAT DIFFICULT
SIT WITH YOUR KNEE BENT: ACTIVITY IS SOMEWHAT DIFFICULT
AS_A_RESULT_OF_YOUR_KNEE_INJURY,_HOW_WOULD_YOU_RATE_YOUR_CURRENT_LEVEL_OF_DAILY_ACTIVITY?: NEARLY NORMAL
RISE FROM A CHAIR: ACTIVITY IS SOMEWHAT DIFFICULT
KNEE_ACTIVITY_OF_DAILY_LIVING_SUM: 37
STAND: ACTIVITY IS SOMEWHAT DIFFICULT
LIMPING: THE SYMPTOM AFFECTS MY ACTIVITY SEVERELY
GIVING WAY, BUCKLING OR SHIFTING OF KNEE: I DO NOT HAVE THE SYMPTOM
SQUAT: ACTIVITY IS SOMEWHAT DIFFICULT
GO DOWN STAIRS: ACTIVITY IS SOMEWHAT DIFFICULT

## 2022-01-06 NOTE — PROGRESS NOTES
[facility]    OUTPATIENT Physical Therapy ORTHOPEDIC EVALUATION  PLAN OF TREATMENT FOR OUTPATIENT REHABILITATION  (COMPLETE FOR INITIAL CLAIMS ONLY)  Patient's Last Name, First Name, M.I.  YOB: 1955  Kwame Lin    Provider s Name:  [facility]   Medical Record No.  [id number]   Start of Care Date:  01/06/22   Onset Date:   12/02/21   Type:     _X__PT   ___OT Medical Diagnosis:    Encounter Diagnoses   Name Primary?     Parkinson disease (H) Yes     Primary osteoarthritis of right knee         Treatment Diagnosis:  R knee OA        Goals:     01/06/22 0500   Body Part   Goals listed below are for R knee   Goal #1   Goal #1 ambulation   Previous Functional Level No restrictions   Current Functional Level Blocks patient can walk   Performance Level 1/2   STG Target Performance [place]ocks patient will be able to  walk   Performance Level 2-3   Rationale for safe outdoor household ambulation;for safe community ambulation   Due Date 01/28/22    LTG Target Performance Miles patient will be able to  walk   Performance Level 1   Rationale for safe community ambulation;to promote a healthy and active lifestyle   Due Date 02/18/22       Therapy Frequency:  1x/week  Predicted Duration of Therapy Intervention:  4 weeks    Flip Ariza, PT                 I CERTIFY THE NEED FOR THESE SERVICES FURNISHED UNDER        THIS PLAN OF TREATMENT AND WHILE UNDER MY CARE     (Physician attestation of this document indicates review and certification of the therapy plan).                       Certification Date From:  01/06/22   Certification Date To:  04/05/22    Referring Provider:  Aravind Norman    Initial Assessment        See Epic Evaluation SOC Date: 01/06/22

## 2022-01-06 NOTE — PROGRESS NOTES
Physical Therapy Initial Evaluation  Subjective:  Patient is referred with c/o R medial knee pain related to medial joint space narrowing due to OA. PMH includes L TKA in December 2018 and Parkinson's Disease. DBS has reduced tremor significantly. He notes increased pain in R knee with walking more than 1/2 block. He has noted gait deviation due to Parkinson's. MD visit 12/2/21.    The history is provided by the patient.   Therapist Generated HPI Evaluation         Type of problem:  Right knee.    This is a chronic condition.  Condition occurred with:  Degenerative joint disease.  Where condition occurred: for unknown reasons.  Patient reports pain:  Medial and in the joint.  Pain is described as aching and is intermittent.  Pain is worse during the day.  Since onset symptoms are gradually worsening.  Associated symptoms:  Buckling/giving out, loss of motion/stiffness and loss of strength. Symptoms are exacerbated by kneeling, bending/squatting and walking  and relieved by activity/movement.  Special tests included:  X-ray.    Restrictions due to condition include:  Working in normal job without restrictions.  Barriers include:  None as reported by patient.    Patient Health History  Kwame Lin being seen for R knee pain.          Pain is reported as 1/10 on pain scale.  General health as reported by patient is good.  Pertinent medical history includes: implanted device, thyroid problems and osteoarthritis.   Red flags:  None as reported by patient.     Surgeries include:  Orthopedic surgery (L TKA, DBS).    Current medications:  High blood pressure medication and thyroid medication.                                           Objective:    Gait:  Typical Parkinson's type gait pattern but no c/o R knee pain in clinic.                                                          Knee Evaluation:  ROM:  Strength wnl knee: 4/5 hip abduction B.    PROM    Hyperextension: Left: 5   Right:   Extension: Left:   Right:   7  Flexion: Left: 138    Right:  143      Strength:     Extension:  Left: 5/5   Pain:      Right: 4+/5    Pain:+  Flexion:  Left: 5/5   Pain:      Right: 5/5   Pain:    Quad Set Left: WNL    Pain:   Quad Set Right: Fair    Pain:  Ligament Testing:  Not Assessed                Special Tests: Not Assessed      Palpation:      Right knee tenderness present at:  Medial Joint Line  Edema:  Not Assessed    Mobility Testing:  Normal            Functional Testing:  not assessed                  General     ROS    Assessment/Plan:    Patient is a 66 year old male with right side knee complaints.    Patient has the following significant findings with corresponding treatment plan.                Diagnosis 1:  R knee OA  Pain -  hot/cold therapy, self management, education and home program  Decreased ROM/flexibility - manual therapy, therapeutic exercise and home program  Decreased strength - therapeutic exercise, therapeutic activities and home program  Impaired gait - gait training and home program  Impaired muscle performance - neuro re-education and home program  Decreased function - therapeutic activities and home program    Therapy Evaluation Codes:   1) History comprised of:   Personal factors that impact the plan of care:      Time since onset of symptoms.    Comorbidity factors that impact the plan of care are:      Implanted device, Osteoarthritis and thyroid problems, Parkinson's.     Medications impacting care: High blood pressure and thyroid meds.  2) Examination of Body Systems comprised of:   Body structures and functions that impact the plan of care:      Knee.   Activity limitations that impact the plan of care are:      Sports, Squatting/kneeling and Walking.  3) Clinical presentation characteristics are:   Evolving/Changing.  4) Decision-Making    Moderate complexity using standardized patient assessment instrument and/or measureable assessment of functional outcome.  Cumulative Therapy Evaluation is:  Moderate complexity.    Previous and current functional limitations:  (See Goal Flow Sheet for this information)    Short term and Long term goals: (See Goal Flow Sheet for this information)     Communication ability:  Patient appears to be able to clearly communicate and understand verbal and written communication and follow directions correctly.  Treatment Explanation - The following has been discussed with the patient:   RX ordered/plan of care  Anticipated outcomes  Possible risks and side effects  This patient would benefit from PT intervention to resume normal activities.   Rehab potential is good.    Frequency:  1 X week, once daily  Duration:  for 4 weeks  Discharge Plan:  Achieve all LTG.  Independent in home treatment program.  Reach maximal therapeutic benefit.    Please refer to the daily flowsheet for treatment today, total treatment time and time spent performing 1:1 timed codes.

## 2022-01-06 NOTE — TELEPHONE ENCOUNTER
Gabapentin 100 mg caps refilled, #90 caps for 90 days with 1 refill.  He wrote back stating he is taking 100 mg and is falling asleep and staying asleep much better.  Bennett Goltz, PA-C

## 2022-01-07 ENCOUNTER — TELEPHONE (OUTPATIENT)
Dept: NEUROLOGY | Facility: CLINIC | Age: 67
End: 2022-01-07
Payer: COMMERCIAL

## 2022-01-07 NOTE — TELEPHONE ENCOUNTER
Writer contacted Barbara Sellers, research coordinator. Barbara will follow up with patient to reschedule appt in Dr. Paredes's research lab    Soo Norwood, RN, MPH  Research Nurse, Movement Disorders

## 2022-01-07 NOTE — TELEPHONE ENCOUNTER
Health Call Center    Phone Message    May a detailed message be left on voicemail: yes     Reason for Call: Appointment Intake    Patient called to cancel his movement disorder study appointment with  on Tuesday 1-11-22.Writer unable to assist with this request. Patient asking if someone from  care team can call him back to assist to reschedule his appointment? Call back number is [phone]          Action Taken: Message routed to:  [facility] [place] [place] [facility] [place]    [place] Screening: Not Applicable

## 2022-02-07 PROBLEM — M17.11 PRIMARY OSTEOARTHRITIS OF RIGHT KNEE: Status: RESOLVED | Noted: 2022-01-06 | Resolved: 2022-02-07

## 2022-03-07 ENCOUNTER — MYC MEDICAL ADVICE (OUTPATIENT)
Dept: NEUROLOGY | Facility: CLINIC | Age: 67
End: 2022-03-07
Payer: COMMERCIAL

## 2022-03-14 NOTE — PROGRESS NOTES
"Video-Visit Details  The patient has been notified of following:     \"After a review of the patient s situation, this visit was changed from an in-person visit to a video visit to reduce the risk of COVID-19 exposure.   The patient is being evaluated via a billable video visit.\"    \"This video visit will be conducted via a call between you and your physician/provider. We have found that certain health care needs can be provided without the need for an in-person physical exam.  This service lets us provide the care you need with a video conversation.  If a prescription is necessary we can send it directly to your pharmacy.  If lab work is needed we can place an order for that and you can then stop by our lab to have the test done at a later time.    If during the course of the call the physician/provider feels a video visit is not appropriate, you will not be charged for this service.\"     Patient has given verbal consent for Video visit? Yes    Patient would like the video invitation sent by: Yotomo    Type of service:  Video Visit    Video Start Time: 2:34 PM    Video End Time (time video stopped): 2:57 PM    Duration: 23 min    Originating Location (pt. Location): Home    Distant Location (provider location):  [facility] [place]     Mode of Communication:  Video Conference via [employer]  -----------------------------------------------------------------------------------------------------------------------  Department of Neurology  Movement Disorders Division   DBS Follow-up Note    Patient: Kwame Lin  MRN: [id number]   : 1955   Date of Visit: 3/15/2022    Diagnosis: Parkinson disease  DBS Target(s): Bilateral STN   Date(s) of DBS Lead Placement: Left 2017, R 2018  Date(s) of IPG Placement: L chest 2017    Chief Complaint:  Kwame Lin is a 66 year old male who returns to clinic for follow up of PD status post bilateral STN DBS.       Interval " History:  He is having more dyskinesias in his right side and slurring of his speech.  This is worse under stress and coincides with wearing off.  He has never been on amantadine.    Moved to [place] and has a new PCP.  They have switched him from Celexa to Paxil.  He feels that this has made him less emotional and prone to tears.    Parkinson Disease Motor Symptom Review:  Motor fluctuations:     Dyskinesia: see above  Wearing off: yes, after 3.5hr       Parkinson's Disease Non-motor Symptom Review:  Sleep disturbances - improvement of dream enactment, difficulty falling asleep         Relevant Medications  8 am 12 pm 4 pm 8 pm 11 pm   Sinemet 25/100mg  1.5 1.5 1.5 1.5 1.5   Clonazepam 0.5mg         1   Gabapentin 100mg          1       Review of Systems:  Other than that noted at the end of this note, the remainder of 12 systems reviewed were negative.    Medications:  Current Outpatient Medications   Medication Sig Dispense Refill     acetaminophen (TYLENOL) 325 MG tablet Take 325-650 mg by mouth nightly as needed for mild pain       atorvastatin (LIPITOR) 20 MG tablet        carbidopa-levodopa (SINEMET)  MG tablet Take 1.5 tab every 4 hrs 5 times a day ( 8 am, noon, 4 pm, 8 pm, 11 pm). 675 tablet 3     citalopram (CELEXA) 10 MG tablet Take 10 mg by mouth every morning       clindamycin (CLEOCIN) 300 MG capsule TAKE 2 CAPSULES BY MOUTH FOR 1 DOSE 1 HOUR PRIOR TO DENTAL APPOINTMENTS       clonazePAM (KLONOPIN) 0.5 MG tablet Take 1/2 to 1 tablets at bedtime to help with dream enactment. 90 tablet 1     gabapentin (NEURONTIN) 100 MG capsule Take 1 capsule by mouth before bed. 90 capsule 1     multivitamin, therapeutic with minerals (MULTI-VITAMIN) TABS tablet Take 1 tablet by mouth every morning       sildenafil (REVATIO) 20 MG tablet Take 20 mg by mouth       thyroid (ARMOUR THYROID) 60 MG tablet Take 60 mg by mouth every morning           Allergies: is allergic to no clinical screening - see comments,  bactrim, codeine, ketorolac, morphine, nalbuphine, penicillins, seasonal allergies, sulfa drugs, toradol, and nsaids.    Past Medical History:  Past Medical History:   Diagnosis Date     Anosmia 10/12/2017     Anxiety      Depressed mood 10/12/2017     Hypothyroid      Parkinson disease (H)      Parkinsons disease (H)      PONV (postoperative nausea and vomiting)      RBD (REM behavioral disorder) 10/12/2017       Past Surgical History:  Past Surgical History:   Procedure Laterality Date     ARTHROSCOPY KNEE Right     twice     ARTHROSCOPY KNEE Right     left     epidydmal mass removal, benign       IMPLANT DEEP BRAIN STIMULATION GENERATOR / BATTERY Left 12/22/2017    Procedure: IMPLANT DEEP BRAIN STIMULATION GENERATOR / BATTERY;  Deep Brain Stimulator Placement, Phase II, Placement Of Deep Brain Stimulator Generator/Battery Over The Left Chest Wall;  Surgeon: Arpan Huynh MD;  Location: [facility] OR     OPTICAL TRACKING SYSTEM INSERTION DEEP BRAIN STIMULATION Left 12/14/2017    Procedure: OPTICAL TRACKING SYSTEM INSERTION DEEP BRAIN STIMULATION;  Stealth Assisted Left Deep Brain Stimulator Placement, Phase I, Placement Of Left Side Deep Brain Stimulator Electrode, Target Left Subthalamic Nucleus With Microelectrode Recording;  Surgeon: Arpan Huynh MD;  Location: [facility] OR     OPTICAL TRACKING SYSTEM INSERTION DEEP BRAIN STIMULATION Right 5/22/2018    Procedure: OPTICAL TRACKING SYSTEM INSERTION DEEP BRAIN STIMULATION;  Stealth Assisted Right Deep Brain Stimulator Placement, Phase I And II Combined, Placement Of Right Deep Brain Stimulator Electrode, Target Right Subthalamic Nucleus With Microelectrode Recording And Connection To Previous Implanted Generator/Battery;  Surgeon: Arpan Huynh MD;  Location: [facility] OR     ORTHOPEDIC SURGERY Left 2018    knee replacement       Social History:  Social History     Socioeconomic History     Marital status:      Spouse name: Not on file     Number  of children: Not on file     Years of education: Not on file     Highest education level: Not on file   Occupational History     Occupation: Small business start consultant   Tobacco Use     Smoking status: Never Smoker     Smokeless tobacco: Never Used   Substance and Sexual Activity     Alcohol use: Yes     Alcohol/week: 1.0 - 2.0 standard drink     Types: 1 - 2 Cans of beer per week     Comment: MONTHLY     Drug use: No     Sexual activity: Yes     Partners: Female   Other Topics Concern     Parent/sibling w/ CABG, MI or angioplasty before 65F 55M? Not Asked   Social History Narrative    Previous worked as [employer].    Now consulting part-time.    , lives with wife.    Non-smoker. Occasional drink.        Past surgical history that includes epidydmal mass removal, benign.        Social History:    Previous worked as [employer].    Now consulting part-time.    , lives with wife.    Non-smoker. Occasional drink.         family history includes Lung Cancer in his father; Parkinsonism in his cousin and maternal aunt.          Social Determinants of Health     Financial Resource Strain: Not on file   Food Insecurity: Not on file   Transportation Needs: Not on file   Physical Activity: Not on file   Stress: Not on file   Social Connections: Not on file   Intimate Partner Violence: Not on file   Housing Stability: Not on file       Family History:  Family History   Problem Relation Age of Onset     Lung Cancer Father      Parkinsonism Cousin         paternal side     Parkinsonism Maternal Aunt        Physical Exam:  The patient's  vitals were not taken for this visit.    Moderate truncal dyskinesias and intermittent shoulder shrugging        Procedure: DBS Interrogation & Programming  Lead(s):    Left Right   DBS Target STN STN   DBS Lead Type 8 ring contacts 8 ring contacts   Lead Implant Date 12/14/2017 5/22/2018      IPG(s):   1   IPG Vercise   IPG Implant Date 12/22/2017   Location Left chest   Battery (V)      Full Impedence/Currents Check: No issues identified.    Initial Settings:  Left Brain Program A Program B Program C Program D   Contacts C+3-      Amplitude (mA) 2.8 [2.0-3.3]      Pulse Width ( s) 60      Frequency (Hz) 139      Impedence/ Currents  /   /   /   /      Right Brain Program A Program B Program C Program D   Contacts C+11- (60%), 14-(40)      Amplitude (V) 3.3 [2.5-4.0]      Pulse Width ( s) 60      Frequency (Hz) 139      Impedence/ Currents  /   /   /   /      Final Program selected:    A       Assessment/Plan:  Kwame Lin is a 66 year old male with PD s/p bilateral STN DBS who returns for follow-up.  He has wearing off dyskinesias.     - AM light exposure for delayed sleep phase  - No programming changes today  - Start morning amantadine 100mg.  Can add dose at noon if needed after 2 weeks  - F/u with Kymberly already scheduled for June   - Could consider adding a more dorsal contact (contacts 5, 6, or 7) to LSTN for dyskinesia control (see Katerina note on 1/12/2018)    Anaid Watson MD  Movement Disorders Fellow    Attending Attestation:    34 minutes spent on the date of the encounter doing chart review, history and exam, documentation and further activities as noted above    I, Clarita Borges MD, was with the movement disorder fellow on this 23 minute video visit and agree with the fellow's findings and plan of care as documented in the note.      Clarita Borges MD    of Neurology

## 2022-03-15 ENCOUNTER — VIRTUAL VISIT (OUTPATIENT)
Dept: NEUROLOGY | Facility: CLINIC | Age: 67
End: 2022-03-15
Payer: COMMERCIAL

## 2022-03-15 DIAGNOSIS — G20.B1 DYSKINESIA DUE TO PARKINSON'S DISEASE (H): ICD-10-CM

## 2022-03-15 DIAGNOSIS — G20.A1 PARKINSON DISEASE (H): Primary | ICD-10-CM

## 2022-03-15 PROCEDURE — 99214 OFFICE O/P EST MOD 30 MIN: CPT | Mod: 95 | Performed by: PSYCHIATRY & NEUROLOGY

## 2022-03-15 RX ORDER — PAROXETINE 10 MG/1
10 TABLET, FILM COATED ORAL DAILY
COMMUNITY
Start: 2022-01-18

## 2022-03-15 NOTE — PATIENT INSTRUCTIONS
Let's add a medication called amantadine for your wearing off and dyskinesias:  1.  Start amantadine 1 pill in the morning.  2.  If you still have dyskinesias after 2 weeks, add 1 pill of amantadine at noon.      Try getting 30 minutes of direct sunlight when you wake up in the morning.  This may help you to fall asleep earlier in the day.

## 2022-03-15 NOTE — PROGRESS NOTES
Kwame is a 66 year old who is being evaluated via a billable video visit.      How would you like to obtain your AVS? Mychart  If the video visit is dropped, the invitation should be resent by: [phone]    Video-Visit Details    Type of service:  Video Visit      Originating Location (pt. Location): Home    Distant Location (provider location):  [facility] [place]     Platform used for Video Visit: RoomReveal

## 2022-03-15 NOTE — LETTER
3/15/2022       RE: Kwame Lin  [address]  [facility] [address]     Dear Colleague,    Thank you for referring your patient, Kwame Lin, to the [facility] [place] at [facility]. Please see a copy of my visit note below.      Department of Neurology  Movement Disorders Division   DBS Follow-up Note    Patient: Kwame Lin  MRN: [id number]   : 1955   Date of Visit: 3/15/2022    Diagnosis: Parkinson disease  DBS Target(s): Bilateral STN   Date(s) of DBS Lead Placement: Left 2017, R 2018  Date(s) of IPG Placement: L chest 2017    Chief Complaint:  Kwame Lin is a 66 year old male who returns to clinic for follow up of PD status post bilateral STN DBS.       Interval History:  He is having more dyskinesias in his right side and slurring of his speech.  This is worse under stress and coincides with wearing off.  He has never been on amantadine.    Moved to [place] and has a new PCP.  They have switched him from Celexa to Paxil.  He feels that this has made him less emotional and prone to tears.    Parkinson Disease Motor Symptom Review:  Motor fluctuations:     Dyskinesia: see above  Wearing off: yes, after 3.5hr       Parkinson's Disease Non-motor Symptom Review:  Sleep disturbances - improvement of dream enactment, difficulty falling asleep         Relevant Medications  8 am 12 pm 4 pm 8 pm 11 pm   Sinemet 25/100mg  1.5 1.5 1.5 1.5 1.5   Clonazepam 0.5mg         1   Gabapentin 100mg          1       Review of Systems:  Other than that noted at the end of this note, the remainder of 12 systems reviewed were negative.    Medications:  Current Outpatient Medications   Medication Sig Dispense Refill     acetaminophen (TYLENOL) 325 MG tablet Take 325-650 mg by mouth nightly as needed for mild pain       atorvastatin (LIPITOR) 20 MG tablet        carbidopa-levodopa (SINEMET)  MG tablet  Take 1.5 tab every 4 hrs 5 times a day ( 8 am, noon, 4 pm, 8 pm, 11 pm). 675 tablet 3     citalopram (CELEXA) 10 MG tablet Take 10 mg by mouth every morning       clindamycin (CLEOCIN) 300 MG capsule TAKE 2 CAPSULES BY MOUTH FOR 1 DOSE 1 HOUR PRIOR TO DENTAL APPOINTMENTS       clonazePAM (KLONOPIN) 0.5 MG tablet Take 1/2 to 1 tablets at bedtime to help with dream enactment. 90 tablet 1     gabapentin (NEURONTIN) 100 MG capsule Take 1 capsule by mouth before bed. 90 capsule 1     multivitamin, therapeutic with minerals (MULTI-VITAMIN) TABS tablet Take 1 tablet by mouth every morning       sildenafil (REVATIO) 20 MG tablet Take 20 mg by mouth       thyroid (ARMOUR THYROID) 60 MG tablet Take 60 mg by mouth every morning           Allergies: is allergic to no clinical screening - see comments, bactrim, codeine, ketorolac, morphine, nalbuphine, penicillins, seasonal allergies, sulfa drugs, toradol, and nsaids.    Past Medical History:  Past Medical History:   Diagnosis Date     Anosmia 10/12/2017     Anxiety      Depressed mood 10/12/2017     Hypothyroid      Parkinson disease (H)      Parkinsons disease (H)      PONV (postoperative nausea and vomiting)      RBD (REM behavioral disorder) 10/12/2017       Past Surgical History:  Past Surgical History:   Procedure Laterality Date     ARTHROSCOPY KNEE Right     twice     ARTHROSCOPY KNEE Right     left     epidydmal mass removal, benign       IMPLANT DEEP BRAIN STIMULATION GENERATOR / BATTERY Left 12/22/2017    Procedure: IMPLANT DEEP BRAIN STIMULATION GENERATOR / BATTERY;  Deep Brain Stimulator Placement, Phase II, Placement Of Deep Brain Stimulator Generator/Battery Over The Left Chest Wall;  Surgeon: Arpan Huynh MD;  Location: [facility] OR     OPTICAL TRACKING SYSTEM INSERTION DEEP BRAIN STIMULATION Left 12/14/2017    Procedure: OPTICAL TRACKING SYSTEM INSERTION DEEP BRAIN STIMULATION;  Stealth Assisted Left Deep Brain Stimulator Placement, Phase I, Placement Of  Left Side Deep Brain Stimulator Electrode, Target Left Subthalamic Nucleus With Microelectrode Recording;  Surgeon: Arpan Huynh MD;  Location: [facility] OR     OPTICAL TRACKING SYSTEM INSERTION DEEP BRAIN STIMULATION Right 5/22/2018    Procedure: OPTICAL TRACKING SYSTEM INSERTION DEEP BRAIN STIMULATION;  Stealth Assisted Right Deep Brain Stimulator Placement, Phase I And II Combined, Placement Of Right Deep Brain Stimulator Electrode, Target Right Subthalamic Nucleus With Microelectrode Recording And Connection To Previous Implanted Generator/Battery;  Surgeon: Arpan Huynh MD;  Location: [facility] OR     ORTHOPEDIC SURGERY Left 2018    knee replacement       Social History:  Social History     Socioeconomic History     Marital status:      Spouse name: Not on file     Number of children: Not on file     Years of education: Not on file     Highest education level: Not on file   Occupational History     Occupation: Small business start consultant   Tobacco Use     Smoking status: Never Smoker     Smokeless tobacco: Never Used   Substance and Sexual Activity     Alcohol use: Yes     Alcohol/week: 1.0 - 2.0 standard drink     Types: 1 - 2 Cans of beer per week     Comment: MONTHLY     Drug use: No     Sexual activity: Yes     Partners: Female   Other Topics Concern     Parent/sibling w/ CABG, MI or angioplasty before 65F 55M? Not Asked   Social History Narrative    Previous worked as [employer].    Now consulting part-time.    , lives with wife.    Non-smoker. Occasional drink.        Past surgical history that includes epidydmal mass removal, benign.        Social History:    Previous worked as [employer].    Now consulting part-time.    , lives with wife.    Non-smoker. Occasional drink.         family history includes Lung Cancer in his father; Parkinsonism in his cousin and maternal aunt.           Social Determinants of Health     Financial Resource Strain: Not on file   Food Insecurity: Not on file   Transportation Needs: Not on file   Physical Activity: Not on file   Stress: Not on file   Social Connections: Not on file   Intimate Partner Violence: Not on file   Housing Stability: Not on file       Family History:  Family History   Problem Relation Age of Onset     Lung Cancer Father      Parkinsonism Cousin         paternal side     Parkinsonism Maternal Aunt        Physical Exam:  The patient's  vitals were not taken for this visit.    Moderate truncal dyskinesias and intermittent shoulder shrugging        Procedure: DBS Interrogation & Programming  Lead(s):    Left Right   DBS Target STN STN   DBS Lead Type 8 ring contacts 8 ring contacts   Lead Implant Date 12/14/2017 5/22/2018     IPG(s):   1   IPG Vercise   IPG Implant Date 12/22/2017   Location Left chest   Battery (V)      Full Impedence/Currents Check: No issues identified.    Initial Settings:  Left Brain Program A Program B Program C Program D   Contacts C+3-      Amplitude (mA) 2.8 [2.0-3.3]      Pulse Width ( s) 60      Frequency (Hz) 139      Impedence/ Currents  /   /   /   /      Right Brain Program A Program B Program C Program D   Contacts C+11- (60%), 14-(40)      Amplitude (V) 3.3 [2.5-4.0]      Pulse Width ( s) 60      Frequency (Hz) 139      Impedence/ Currents  /   /   /   /      Final Program selected:    A       Assessment/Plan:  Kwame Lin is a 66 year old male with PD s/p bilateral STN DBS who returns for follow-up.  He has wearing off dyskinesias.     - AM light exposure for delayed sleep phase  - No programming changes today  - Start morning amantadine 100mg.  Can add dose at noon if needed after 2 weeks  - F/u with Kymberly already scheduled for June   - Could consider adding a more dorsal contact (contacts 5, 6, or 7) to LSTN for dyskinesia control (see Katerina note on 1/12/2018)    Anaid Watson MD  Movement  Disorders Fellow    Attending Attestation:    34 minutes spent on the date of the encounter doing chart review, history and exam, documentation and further activities as noted above    I, Clarita Borges MD, was with the movement disorder fellow on this 23 minute video visit and agree with the fellow's findings and plan of care as documented in the note.      Clarita Borges MD    of Neurology

## 2022-03-16 NOTE — TELEPHONE ENCOUNTER
Spoke to the patient and apologized to him for writing the incorrect date. Offered the patient an appointment on 4/14 at 8:30. The patient was scheduled for 4/14 at 8:30 with Dr. Borges.   O-T Advancement Flap Text: The defect edges were debeveled with a #15 scalpel blade.  Given the location of the defect, shape of the defect and the proximity to free margins an O-T advancement flap was deemed most appropriate.  Using a sterile surgical marker, an appropriate advancement flap was drawn incorporating the defect and placing the expected incisions within the relaxed skin tension lines where possible.    The area thus outlined was incised deep to adipose tissue with a #15 scalpel blade.  The skin margins were undermined to an appropriate distance in all directions utilizing iris scissors.

## 2022-03-24 ENCOUNTER — TELEPHONE (OUTPATIENT)
Dept: NEUROLOGY | Facility: CLINIC | Age: 67
End: 2022-03-24
Payer: COMMERCIAL

## 2022-03-24 NOTE — TELEPHONE ENCOUNTER
Spoke to the patient and informed him his 6/20/22 appointment with ILEANA Traylor needs to be rescheduled since she will not be in the clinic that day. The patient asked that the writer call him back.

## 2022-03-25 NOTE — TELEPHONE ENCOUNTER
Tried to reach the patient again but he did not answer and his voicemail box was full. Sent a [employer] message to the patient to call the writer back to reschedule his 6/22/22 appointment.

## 2022-04-11 NOTE — PROGRESS NOTES
Department of Neurology  Movement Disorders Division   DBS Follow-up Note    Patient: Kwame Lin  MRN: [id number]   : 1955   Date of Visit: 2022    Diagnosis: Parkinson disease  DBS Target(s): Bilateral STN   Date(s) of DBS Lead Placement: Left 2017, R 2018  Date(s) of IPG Placement: L chest 2017  Device: [employer]    Chief Complaint:  Kwame Lin is a 67 year old male who returns to clinic for follow up of PD status post bilateral STN DBS.  He was last seen 3/15/2022 at which time amantadine was discussed.    Interval History:  Having trouble falling asleep.  Falls asleep after 3am.  Sleeps between 5-6 hours.      DBS working well.  Charging every few weeks.    Parkinson Disease Motor Symptom Review:  Motor fluctuations: wearing off  Dyskinesia: noticeable under stress, in his neck and arms, less so int he legs, wearing off phenomenon, noticeable when he walks  Wearing off: yes, not with every dose but happening every day, usually in evening before 11pm, dyskinesias and pain  Freezing of gait: denies  Dystonia: leg cramp while wearing off  Tremor: not often  Rigidity: yes, in legs  Bradykinesia: more shuffling     Parkinson's Disease Non-motor Symptom Review:  Psychiatric disturbances - less emotional on Paxil but having noticing cognitive symptom  Cognitive impairment - see above  Sleep disturbances - yes, see above; on Klonopin, wearing a restraining belt for active dreaming, RLS improved  GI symptoms - loose stools, started probiotic  Urinary symptoms - denies   Balance - little imbalance, no falls  Pain - when wearing off  Autonomic dysfunction - denies orthostasis, gets lightheaded and palpitations when exerting himself  Hallucinations - denies  Speech - speech less clear  Swallowing - occasional dysphagia  Salivation - ben drooling      Relevant Medications  8 am 12 pm 4 pm 8 pm 11 pm   Sinemet 25/100mg  1.5 1.5 1.5 1.5 1.5   Clonazepam 0.5mg         1    Gabapentin 100mg          1       Review of Systems:  Other than that noted at the end of this note, the remainder of 12 systems reviewed were negative.    Medications:  Current Outpatient Medications   Medication Sig Dispense Refill     acetaminophen (TYLENOL) 325 MG tablet Take 325-650 mg by mouth nightly as needed for mild pain       atorvastatin (LIPITOR) 20 MG tablet        carbidopa-levodopa (SINEMET)  MG tablet Take 1.5 tab every 4 hrs 5 times a day ( 8 am, noon, 4 pm, 8 pm, 11 pm). 675 tablet 3     clindamycin (CLEOCIN) 300 MG capsule TAKE 2 CAPSULES BY MOUTH FOR 1 DOSE 1 HOUR PRIOR TO DENTAL APPOINTMENTS       clonazePAM (KLONOPIN) 0.5 MG tablet Take 1/2 to 1 tablets at bedtime to help with dream enactment. 90 tablet 1     gabapentin (NEURONTIN) 100 MG capsule Take 1 capsule by mouth before bed. 90 capsule 1     multivitamin w/minerals (THERA-VIT-M) tablet Take 1 tablet by mouth every morning       PARoxetine (PAXIL) 10 MG tablet Take 10 mg by mouth daily       sildenafil (REVATIO) 20 MG tablet Take 20 mg by mouth       thyroid (ARMOUR) 60 MG tablet Take 60 mg by mouth every morning           Allergies: is allergic to no clinical screening - see comments, bactrim, codeine, ketorolac, morphine, nalbuphine, penicillins, seasonal allergies, sulfa drugs, toradol, and nsaids.    Past Medical History:  Past Medical History:   Diagnosis Date     Anosmia 10/12/2017     Anxiety      Depressed mood 10/12/2017     Hypothyroid      Parkinson disease (H)      Parkinsons disease (H)      PONV (postoperative nausea and vomiting)      RBD (REM behavioral disorder) 10/12/2017       Past Surgical History:  Past Surgical History:   Procedure Laterality Date     ARTHROSCOPY KNEE Right     twice     ARTHROSCOPY KNEE Right     left     epidydmal mass removal, benign       IMPLANT DEEP BRAIN STIMULATION GENERATOR / BATTERY Left 12/22/2017    Procedure: IMPLANT DEEP BRAIN STIMULATION GENERATOR / BATTERY;  Deep Brain  Stimulator Placement, Phase II, Placement Of Deep Brain Stimulator Generator/Battery Over The Left Chest Wall;  Surgeon: Arpan Huynh MD;  Location: [facility] OR     OPTICAL TRACKING SYSTEM INSERTION DEEP BRAIN STIMULATION Left 12/14/2017    Procedure: OPTICAL TRACKING SYSTEM INSERTION DEEP BRAIN STIMULATION;  Stealth Assisted Left Deep Brain Stimulator Placement, Phase I, Placement Of Left Side Deep Brain Stimulator Electrode, Target Left Subthalamic Nucleus With Microelectrode Recording;  Surgeon: Arpan Huynh MD;  Location: [facility] OR     OPTICAL TRACKING SYSTEM INSERTION DEEP BRAIN STIMULATION Right 5/22/2018    Procedure: OPTICAL TRACKING SYSTEM INSERTION DEEP BRAIN STIMULATION;  Stealth Assisted Right Deep Brain Stimulator Placement, Phase I And II Combined, Placement Of Right Deep Brain Stimulator Electrode, Target Right Subthalamic Nucleus With Microelectrode Recording And Connection To Previous Implanted Generator/Battery;  Surgeon: Arpan Huynh MD;  Location: [facility] OR     ORTHOPEDIC SURGERY Left 2018    knee replacement       Social History:  Social History     Socioeconomic History     Marital status:    Occupational History     Occupation: Small business start consultant   Tobacco Use     Smoking status: Never Smoker     Smokeless tobacco: Never Used   Substance and Sexual Activity     Alcohol use: Yes     Alcohol/week: 1.0 - 2.0 standard drink     Types: 1 - 2 Cans of beer per week     Comment: MONTHLY     Drug use: No     Sexual activity: Yes     Partners: Female   Social History Narrative    Previous worked as [employer].    Now consulting part-time.    , lives with wife.    Non-smoker. Occasional drink.        Past surgical history that includes epidydmal mass removal, benign.        Social History:    Previous worked as [employer].    Now consulting part-time.    , lives with  wife.    Non-smoker. Occasional drink.         family history includes Lung Cancer in his father; Parkinsonism in his cousin and maternal aunt.            Family History:  Family History   Problem Relation Age of Onset     Lung Cancer Father      Parkinsonism Cousin         paternal side     Parkinsonism Maternal Aunt        Physical Exam:  The patient's  blood pressure is 119/80 and his pulse is 86. His respiration is 16 and oxygen saturation is 95%.    Neurological Examination:   Last medication dose: 6:30am  Gait: left foot inversion  UPDRS Values 4/14/2022   Time: 8:49 AM   Medication On   R Brain DBS: On   L Brain DBS: On   Dyskinesia (LID) Yes   Did LID interfere No   Speech 1   Facial Expression 0   Rigidity Neck 0   Rigidity RUE 1   Rigidity LUE 0   Rigidity RLE 0   Rigidity LLE 0   Finger Taps R 1   Finger Taps L 1   Hand Mvt R 0   Hand Mvt L 0   Pron-/Supinate R 0   Pron-/Supinate L 1   Toe Tap R 2   Toe Tap L 3   Leg Agility R 0   Leg Agility L 1   Arise From Chair 1   Gait 1   Gait Freezing 0   Postural Stability 0   Posture 0   Global Spont Mvt 0   Postural Tremor RUE 1   Postural Tremor LUE 1   Kinetic Tremor RUE 0   Kinetic Tremor LUE 1   Rest Tremor RUE 0   Rest Tremor LUE 0   Rest Tremor RLE 0   Rest Tremor LLE 0   Rest Tremor Lip/Jaw 0   Rest Tremor Constancy 0   Total Right 5   Total Left 8   Axial Total 3   Total 16   Prior 7 on 12/8/2021        Procedure: DBS Interrogation & Programming  Lead(s):    Left Right   DBS Target STN STN   DBS Lead Type 8 ring contacts 8 ring contacts   Lead Implant Date 12/14/2017 5/22/2018      IPG(s):    1   IPG Vercise   IPG Implant Date 12/22/2017   Location Left chest   Battery (V)  3.96V       Full Impedence/Currents Check: No issues identified.    Initial Settings:  Left Brain 4-Bear   Contacts C+3-   Amplitude (mA) 2.8 [2.0-3.3]   Pulse Width ( s) 60   Frequency (Hz) 139      Right Brain 4-Bear   Contacts C+11- (60%), 14-(40)   Amplitude (V) 3.3 [2.5-4.0]    Pulse Width ( s) 60   Frequency (Hz) 139     Initial program selected: 4-Bear    Final Settings:  Left Brain 1-Moose7 3-Moose 6 4-Bear   Contacts C+3- (55%), 7- (45%) C+3- (55%), 6- (45%) C+3-   Amplitude (mA) 5.0 5.0 2.8   Pulse Width ( s) 60 60 60   Frequency (Hz) 139 139 139     Right Brain 1-Moose7 3-Moose 6 4-Bear   Contacts C+11-(60%), 14- (40%) C+11-(60%), 14- (40%) C+11-(60%), 14- (40%)   Amplitude (mA) 3.3 [2.5-4.0] 3.3 [2.5-4.0] 3.3 [2.5-4.0]   Pulse Width ( s) 60 60 60   Frequency (Hz) 139 139 139     Final Program selected: 3-Moose 6      Assessment/Plan:  Kwame Lin is a 67 year old male who returns to clinic for follow up of PD status post bilateral STN DBS.  He is having a lot of difficulty sleeping due to delayed sleep phase and RBD.  They have also noticed some cognitive changes since switching from citalopram to paroxetine.  Programming as above for bothersome dyskinesias.  Interested in getting an MRI compatible IPG.    Next R STN programming options would be to 1st increase amplitude or 2nd move proximal stimulation to contact 15.    - Restart melatonin  - Change paroxetine to sertraline  - Encouraged fluid intake  - Encouraged PT exercises  - RTC June 28, 2022 1hr DBS programming        Anaid Watson MD  Movement Disorders Fellow    Neurology Attending Attestation:     I, Clarita Borges MD, personally saw this patient with our Movement Disorders Fellow and agree with the fellow's findings and plan of care as documented in the movement disorder fellow's note. I personally performed salient aspects of the history and neurological examination.     I personally reviewed the vital signs, medications, and labs. I personally viewed the imaging, and agree with the interpretation documented by the fellow.    I personally performed or supervised all procedures.    55 minutes spent on the date of the encounter doing chart review, history and exam, documentation and further activities as  noted above    Additional time spent for separate DBS programmin minutes    Clarita Borges MD    of Neurology

## 2022-04-14 ENCOUNTER — OFFICE VISIT (OUTPATIENT)
Dept: NEUROLOGY | Facility: CLINIC | Age: 67
End: 2022-04-14
Payer: COMMERCIAL

## 2022-04-14 VITALS
RESPIRATION RATE: 16 BRPM | OXYGEN SATURATION: 95 % | SYSTOLIC BLOOD PRESSURE: 119 MMHG | DIASTOLIC BLOOD PRESSURE: 80 MMHG | HEART RATE: 86 BPM

## 2022-04-14 DIAGNOSIS — G20.A1 PARKINSON DISEASE (H): Primary | ICD-10-CM

## 2022-04-14 DIAGNOSIS — G47.52 RBD (REM BEHAVIORAL DISORDER): ICD-10-CM

## 2022-04-14 DIAGNOSIS — G20.B1 DYSKINESIA DUE TO PARKINSON'S DISEASE (H): ICD-10-CM

## 2022-04-14 PROCEDURE — 95984 ALYS BRN NPGT PRGRMG ADDL 15: CPT | Performed by: PSYCHIATRY & NEUROLOGY

## 2022-04-14 PROCEDURE — 95983 ALYS BRN NPGT PRGRMG 15 MIN: CPT | Performed by: PSYCHIATRY & NEUROLOGY

## 2022-04-14 PROCEDURE — 99215 OFFICE O/P EST HI 40 MIN: CPT | Mod: 25 | Performed by: PSYCHIATRY & NEUROLOGY

## 2022-04-14 ASSESSMENT — UNIFIED PARKINSONS DISEASE RATING SCALE (UPDRS)
PARKINSONS_MEDS: ON
MOVEMENTS_INTERFERE_WITH_RATINGS: NO
RIGIDITY_LLE: NORMAL
PRONATION_SUPINATION_RIGHT: NORMAL
TOTAL_SCORE: 16
AXIAL_SCORE: 3
FACIAL_EXPRESSION: NORMAL.
TOTAL_SCORE: 5
TOETAPPING_RIGHT: MILD: ANY OF THE FOLLOWING: A) 3 TO 5 INTERRUPTIONS DURING TAPPING B) MILD SLOWING C) THE AMPLITUDE DECREMENTS MIDWAY IN THE 10-MOVEMENT SEQUENCE
RIGIDITY_RLE: NORMAL
TOTAL_SCORE_LEFT: 8
HANDMOVEMENTS_RIGHT: NORMAL
AMPLITUDE_LIP_JAW: NORMAL: NO TREMOR.
LEG_AGILITY_RIGHT: NORMAL
ARISING_CHAIR: SLIGHT: ARISING IS SLOWER THAN NORMAL, OR MAY NEED MORE THAN ONE ATTEMPT, OR MAY NEED TO MOVE FORWARD IN THE CHAIR TO ARISE.  NO NEED TO USE THE ARMS OF THE CHAIR.
HANDMOVEMENTS_LEFT: NORMAL
AMPLITUDE_LUE: NORMAL: NO TREMOR.
SPONTANEITY_OF_MOVEMENT: 0: NORMAL.  NO PROBLEMS.
POSTURE: 0 NORMAL, NO PROBLEMS
FINGER_TAPPING_LEFT: SLIGHT: ANY OF THE FOLLOWING: A) THE REGULAR RHYTHM IS BROKEN WITH ONE WITH ONE OR TWO INTERRUPTIONS OR HESITATIONS OF THE MOVEMENT B) SLIGHT SLOWING C) THE AMPLITUDE DECREMENTS NEAR THE END OF THE 10 MOVEMENTS.
RIGIDITY_RUE: SLIGHT: RIGIDITY ONLY DETECTED WITH ACTIVATION MANEUVER.
AMPLITUDE_RUE: NORMAL: NO TREMOR.
FREEZING_GAIT: NORMAL
GAIT: SLIGHT: INDEPENDENT WALKING WITH MINOR GAIT IMPAIRMENT.
DYSKINESIAS_PRESENT: YES
POSTURAL_STABILITY: NORMAL:  RECOVERS WITH ONE OR TWO STEPS.
PRONATION_SUPINATION_LEFT: SLIGHT: ANY OF THE FOLLOWING: A) THE REGULAR RHYTHM IS BROKEN WITH ONE WITH ONE OR TWO INTERRUPTIONS OR HESITATIONS OF THE MOVEMENT B) SLIGHT SLOWING C) THE AMPLITUDE DECREMENTS NEAR THE END OF THE 10 MOVEMENTS.
SPEECH: SLIGHT: LOSS OF MODULATION, DICTION OR VOLUME, BUT STILL ALL WORDS EASY TO UNDERSTAND.
AMPLITUDE_RLE: NORMAL: NO TREMOR.
CONSTANCY_TREMOR_ATREST: NORMAL: NO TREMOR.
FINGER_TAPPING_RIGHT: SLIGHT: ANY OF THE FOLLOWING: A) THE REGULAR RHYTHM IS BROKEN WITH ONE WITH ONE OR TWO INTERRUPTIONS OR HESITATIONS OF THE MOVEMENT B) SLIGHT SLOWING C) THE AMPLITUDE DECREMENTS NEAR THE END OF THE 10 MOVEMENTS.
RIGIDITY_LUE: NORMAL
LEG_AGILITY_LEFT: SLIGHT: ANY OF THE FOLLOWING: A) THE REGULAR RHYTHM IS BROKEN WITH ONE WITH ONE OR TWO INTERRUPTIONS OR HESITATIONS OF THE MOVEMENT B) SLIGHT SLOWING C) THE AMPLITUDE DECREMENTS NEAR THE END OF THE 10 MOVEMENTS.
RIGIDITY_NECK: NORMAL
AMPLITUDE_LLE: NORMAL: NO TREMOR.
TOETAPPING_LEFT: MODERATE: ANY OF THE FOLLOWING:  A) MORE THAN 5 INTERRUPTIONS OR AT LEAST ONE LONGER ARREST (FREEZE) IN ONGOING MOVEMENT  B) MODERATE SLOWING C) THE AMPLITUDE DECREMENTS STARTING AFTER THE FIRST MOVEMENT.

## 2022-04-14 ASSESSMENT — PAIN SCALES - GENERAL: PAINLEVEL: NO PAIN (0)

## 2022-04-14 NOTE — PATIENT INSTRUCTIONS
We created two new programs for your DBS.  You went home on 3-MOOSE 6.  Your old settings are 4- BEAR and the other new program is 1-MOOSE 7.    Let us know after 1 week if this helps your dyskinesias.

## 2022-04-14 NOTE — LETTER
2022       RE: Kwame Lin  [address]  [facility] [place] [address]     Dear Colleague,    Thank you for referring your patient, Kwame Lin, to the [facility] [place] at [facility]. Please see a copy of my visit note below.    Department of Neurology  Movement Disorders Division   DBS Follow-up Note    Patient: Kwame Lin  MRN: [id number]   : 1955   Date of Visit: 2022    Diagnosis: Parkinson disease  DBS Target(s): Bilateral STN   Date(s) of DBS Lead Placement: Left 2017, R 2018  Date(s) of IPG Placement: L chest 2017  Device: [employer]    Chief Complaint:  Kwame Lin is a 67 year old male who returns to clinic for follow up of PD status post bilateral STN DBS.  He was last seen 3/15/2022 at which time amantadine was discussed.    Interval History:  Having trouble falling asleep.  Falls asleep after 3am.  Sleeps between 5-6 hours.      DBS working well.  Charging every few weeks.    Parkinson Disease Motor Symptom Review:  Motor fluctuations: wearing off  Dyskinesia: noticeable under stress, in his neck and arms, less so int he legs, wearing off phenomenon, noticeable when he walks  Wearing off: yes, not with every dose but happening every day, usually in evening before 11pm, dyskinesias and pain  Freezing of gait: denies  Dystonia: leg cramp while wearing off  Tremor: not often  Rigidity: yes, in legs  Bradykinesia: more shuffling     Parkinson's Disease Non-motor Symptom Review:  Psychiatric disturbances - less emotional on Paxil but having noticing cognitive symptom  Cognitive impairment - see above  Sleep disturbances - yes, see above; on Klonopin, wearing a restraining belt for active dreaming, RLS improved  GI symptoms - loose stools, started probiotic  Urinary symptoms - denies   Balance - little imbalance, no falls  Pain - when wearing off  Autonomic  dysfunction - denies orthostasis, gets lightheaded and palpitations when exerting himself  Hallucinations - denies  Speech - speech less clear  Swallowing - occasional dysphagia  Salivation - ben drooling      Relevant Medications  8 am 12 pm 4 pm 8 pm 11 pm   Sinemet 25/100mg  1.5 1.5 1.5 1.5 1.5   Clonazepam 0.5mg         1   Gabapentin 100mg          1       Review of Systems:  Other than that noted at the end of this note, the remainder of 12 systems reviewed were negative.    Medications:  Current Outpatient Medications   Medication Sig Dispense Refill     acetaminophen (TYLENOL) 325 MG tablet Take 325-650 mg by mouth nightly as needed for mild pain       atorvastatin (LIPITOR) 20 MG tablet        carbidopa-levodopa (SINEMET)  MG tablet Take 1.5 tab every 4 hrs 5 times a day ( 8 am, noon, 4 pm, 8 pm, 11 pm). 675 tablet 3     clindamycin (CLEOCIN) 300 MG capsule TAKE 2 CAPSULES BY MOUTH FOR 1 DOSE 1 HOUR PRIOR TO DENTAL APPOINTMENTS       clonazePAM (KLONOPIN) 0.5 MG tablet Take 1/2 to 1 tablets at bedtime to help with dream enactment. 90 tablet 1     gabapentin (NEURONTIN) 100 MG capsule Take 1 capsule by mouth before bed. 90 capsule 1     multivitamin w/minerals (THERA-VIT-M) tablet Take 1 tablet by mouth every morning       PARoxetine (PAXIL) 10 MG tablet Take 10 mg by mouth daily       sildenafil (REVATIO) 20 MG tablet Take 20 mg by mouth       thyroid (ARMOUR) 60 MG tablet Take 60 mg by mouth every morning           Allergies: is allergic to no clinical screening - see comments, bactrim, codeine, ketorolac, morphine, nalbuphine, penicillins, seasonal allergies, sulfa drugs, toradol, and nsaids.    Past Medical History:  Past Medical History:   Diagnosis Date     Anosmia 10/12/2017     Anxiety      Depressed mood 10/12/2017     Hypothyroid      Parkinson disease (H)      Parkinsons disease (H)      PONV (postoperative nausea and vomiting)      RBD (REM behavioral disorder) 10/12/2017       Past  Surgical History:  Past Surgical History:   Procedure Laterality Date     ARTHROSCOPY KNEE Right     twice     ARTHROSCOPY KNEE Right     left     epidydmal mass removal, benign       IMPLANT DEEP BRAIN STIMULATION GENERATOR / BATTERY Left 12/22/2017    Procedure: IMPLANT DEEP BRAIN STIMULATION GENERATOR / BATTERY;  Deep Brain Stimulator Placement, Phase II, Placement Of Deep Brain Stimulator Generator/Battery Over The Left Chest Wall;  Surgeon: Arpan Huynh MD;  Location: [facility] OR     OPTICAL TRACKING SYSTEM INSERTION DEEP BRAIN STIMULATION Left 12/14/2017    Procedure: OPTICAL TRACKING SYSTEM INSERTION DEEP BRAIN STIMULATION;  Stealth Assisted Left Deep Brain Stimulator Placement, Phase I, Placement Of Left Side Deep Brain Stimulator Electrode, Target Left Subthalamic Nucleus With Microelectrode Recording;  Surgeon: Arpan Huynh MD;  Location: [facility] OR     OPTICAL TRACKING SYSTEM INSERTION DEEP BRAIN STIMULATION Right 5/22/2018    Procedure: OPTICAL TRACKING SYSTEM INSERTION DEEP BRAIN STIMULATION;  Stealth Assisted Right Deep Brain Stimulator Placement, Phase I And II Combined, Placement Of Right Deep Brain Stimulator Electrode, Target Right Subthalamic Nucleus With Microelectrode Recording And Connection To Previous Implanted Generator/Battery;  Surgeon: Arpan Huynh MD;  Location: [facility] OR     ORTHOPEDIC SURGERY Left 2018    knee replacement       Social History:  Social History     Socioeconomic History     Marital status:    Occupational History     Occupation: Small business start consultant   Tobacco Use     Smoking status: Never Smoker     Smokeless tobacco: Never Used   Substance and Sexual Activity     Alcohol use: Yes     Alcohol/week: 1.0 - 2.0 standard drink     Types: 1 - 2 Cans of beer per week     Comment: MONTHLY     Drug use: No     Sexual activity: Yes     Partners: Female   Social History Narrative    Previous worked as [employer]    Now consulting part-time.    , lives with wife.    Non-smoker. Occasional drink.        Past surgical history that includes epidydmal mass removal, benign.        Social History:    Previous worked as [employer].    Now consulting part-time.    , lives with wife.    Non-smoker. Occasional drink.         family history includes Lung Cancer in his father; Parkinsonism in his cousin and maternal aunt.            Family History:  Family History   Problem Relation Age of Onset     Lung Cancer Father      Parkinsonism Cousin         paternal side     Parkinsonism Maternal Aunt        Physical Exam:  The patient's  blood pressure is 119/80 and his pulse is 86. His respiration is 16 and oxygen saturation is 95%.    Neurological Examination:   Last medication dose: 6:30am  Gait: left foot inversion  UPDRS Values 4/14/2022   Time: 8:49 AM   Medication On   R Brain DBS: On   L Brain DBS: On   Dyskinesia (LID) Yes   Did LID interfere No   Speech 1   Facial Expression 0   Rigidity Neck 0   Rigidity RUE 1   Rigidity LUE 0   Rigidity RLE 0   Rigidity LLE 0   Finger Taps R 1   Finger Taps L 1   Hand Mvt R 0   Hand Mvt L 0   Pron-/Supinate R 0   Pron-/Supinate L 1   Toe Tap R 2   Toe Tap L 3   Leg Agility R 0   Leg Agility L 1   Arise From Chair 1   Gait 1   Gait Freezing 0   Postural Stability 0   Posture 0   Global Spont Mvt 0   Postural Tremor RUE 1   Postural Tremor LUE 1   Kinetic Tremor RUE 0   Kinetic Tremor LUE 1   Rest Tremor RUE 0   Rest Tremor LUE 0   Rest Tremor RLE 0   Rest Tremor LLE 0   Rest Tremor Lip/Jaw 0   Rest Tremor Constancy 0   Total Right 5   Total Left 8   Axial Total 3   Total 16   Prior 7 on 12/8/2021        Procedure: DBS Interrogation & Programming  Lead(s):    Left Right   DBS Target STN STN   DBS Lead Type 8 ring contacts 8 ring contacts   Lead Implant Date 12/14/2017 5/22/2018      IPG(s):    1   IPG Vercise   IPG Implant Date  12/22/2017   Location Left chest   Battery (V)  3.96V       Full Impedence/Currents Check: No issues identified.    Initial Settings:  Left Brain 4-Bear   Contacts C+3-   Amplitude (mA) 2.8 [2.0-3.3]   Pulse Width ( s) 60   Frequency (Hz) 139      Right Brain 4-Bear   Contacts C+11- (60%), 14-(40)   Amplitude (V) 3.3 [2.5-4.0]   Pulse Width ( s) 60   Frequency (Hz) 139     Initial program selected: 4-Bear    Final Settings:  Left Brain 1-Moose7 3-Moose 6 4-Bear   Contacts C+3- (55%), 7- (45%) C+3- (55%), 6- (45%) C+3-   Amplitude (mA) 5.0 5.0 2.8   Pulse Width ( s) 60 60 60   Frequency (Hz) 139 139 139     Right Brain 1-Moose7 3-Moose 6 4-Bear   Contacts C+11-(60%), 14- (40%) C+11-(60%), 14- (40%) C+11-(60%), 14- (40%)   Amplitude (mA) 3.3 [2.5-4.0] 3.3 [2.5-4.0] 3.3 [2.5-4.0]   Pulse Width ( s) 60 60 60   Frequency (Hz) 139 139 139     Final Program selected: 3-Moose 6      Assessment/Plan:  Kwame Lin is a 67 year old male who returns to clinic for follow up of PD status post bilateral STN DBS.  He is having a lot of difficulty sleeping due to delayed sleep phase and RBD.  They have also noticed some cognitive changes since switching from citalopram to paroxetine.  Programming as above for bothersome dyskinesias.  Interested in getting an MRI compatible IPG.    Next R STN programming options would be to 1st increase amplitude or 2nd move proximal stimulation to contact 15.    - Restart melatonin  - Change paroxetine to sertraline  - Encouraged fluid intake  - Encouraged PT exercises  - RTC June 28, 2022 1hr DBS programming        Anaid Watson MD  Movement Disorders Fellow    Neurology Attending Attestation:     IClarita MD, personally saw this patient with our Movement Disorders Fellow and agree with the fellow's findings and plan of care as documented in the movement disorder fellow's note. I personally performed salient aspects of the history and neurological examination.     I personally  reviewed the vital signs, medications, and labs. I personally viewed the imaging, and agree with the interpretation documented by the fellow.    I personally performed or supervised all procedures.    55 minutes spent on the date of the encounter doing chart review, history and exam, documentation and further activities as noted above    Additional time spent for separate DBS programmin minutes      Clarita Borges MD    of Neurology

## 2022-05-03 ENCOUNTER — MYC MEDICAL ADVICE (OUTPATIENT)
Dept: ORTHOPEDICS | Facility: CLINIC | Age: 67
End: 2022-05-03
Payer: COMMERCIAL

## 2022-05-03 DIAGNOSIS — G20.A1 PARKINSON DISEASE (H): Primary | ICD-10-CM

## 2022-05-03 DIAGNOSIS — M17.11 PRIMARY OSTEOARTHRITIS OF RIGHT KNEE: ICD-10-CM

## 2022-05-09 NOTE — TELEPHONE ENCOUNTER
Per Dr. Norman, I called the patient to inform him Dr. Norman is OK with placing a PT referral. I just wanted to ask if Pramod Looney is within [facility] network or if he needed an external referral faxed somewhere. Kwame reports that she works within [facility] in [place], he would like to work with Pramod to improve his gait. PT order was placed. All questions were answered at this time. Helena Field, ATC on 5/9/2022 at 9:16 AM

## 2022-05-17 ENCOUNTER — HOSPITAL ENCOUNTER (OUTPATIENT)
Dept: PHYSICAL THERAPY | Facility: CLINIC | Age: 67
Discharge: HOME | End: 2022-05-17
Payer: COMMERCIAL

## 2022-05-17 DIAGNOSIS — Z74.09 IMPAIRED FUNCTIONAL MOBILITY, BALANCE, GAIT, AND ENDURANCE: ICD-10-CM

## 2022-05-17 DIAGNOSIS — G20.A1 PARKINSON DISEASE (H): Primary | ICD-10-CM

## 2022-05-17 PROCEDURE — 97162 PT EVAL MOD COMPLEX 30 MIN: CPT | Mod: GP | Performed by: PHYSICAL THERAPIST

## 2022-05-17 PROCEDURE — 97110 THERAPEUTIC EXERCISES: CPT | Mod: GP | Performed by: PHYSICAL THERAPIST

## 2022-05-18 NOTE — PROGRESS NOTES
05/17/22 1400   Quick Adds   Quick Adds Certification   Type of Visit Initial OP PT Evaluation   General Information   Start of Care Date 05/17/22   Referring Physician Aravind Norman MD   Orders Evaluate and Treat as Indicated   Order Date 05/09/22   Medical Diagnosis Parkinson's Disease   Onset of illness/injury or Date of Surgery 05/09/22  (date of order)   Surgical/Medical history reviewed Yes   Pertinent history of current problem (include personal factors and/or comorbidities that impact the POC) Kwame presents with PD (started showing symptoms in 2007) status post bilateral STN DBS. Had DBS in 2017 (L side) and again in 2018 (R side) which greatly helped reduce his tremor and dyskinesias. Has history of L knee surgery and R knee pain. Pt takes sinemet every 4 hours, has off times 30 min before/after meds. Does note that he is having some sleep disturbances. Notes no issues with freezing. Also has difficulty with walking, getting up from lower surfaces (has knee pain). Recently moved to [place] to be closer to family and grandchildren. Wants to focus on improving his gait.   Patient role/Employment history Employed  (works part time)   [facility] [place]   Patient/Family Goals Statement Improve walking   Fall Risk Screen   Fall screen completed by PT   Have you fallen 2 or more times in the past year? No   Have you fallen and had an injury in the past year? No   Abuse Screen (yes response referral indicated)   Feels Unsafe at Home or Work/School no   Feels Threatened by Someone no   Does Anyone Try to Keep You From Having Contact with Others or Doing Things Outside Your Home? no   Physical Signs of Abuse Present no   Pain   Pain comments Pt has history of B knee pain. Underwent L TKA but does not want to go through R TKA   Cognitive Status Examination   Orientation orientation to person, place and time   Integumentary   Integumentary No deficits were  identified   Posture   Posture Forward head position   Range of Motion (ROM)   ROM Comment mild stiffness noted in trunk and limited R knee extension   Strength   Strength Comments Mild functional weakness with 30 sec sit to stand   Bed Mobility   Bed Mobility Comments Independent   Transfer Skills   Transfer Comments Independent   Gait   Gait Comments Ambulates with reduced heel strike on R side, slightly forward flexed posture, increased knee flexion in stance, toes in   Gait Special Tests   Gait Special Tests FUNCTIONAL GAIT ASSESSMENT   Gait Special Tests 25 Foot Timed Walk   Comments Gait speed: 1.06 m/s   Gait Special Tests Functional Gait Assessment Score out of 30   Score out of 30 23   Balance   Balance Comments 360 turn: 5 steps R and L; MiniBest: 25/28   Balance Special Tests   Balance Special Tests Sit to stand reps   Balance Special Tests Sit to Stand Reps in 30 Seconds   Reps in 30 seconds 12   Height 17   Comments Age group norm: 16 reps   Sensory Examination   Sensory Perception no deficits were identified   Muscle Tone   Muscle Tone Comments stiffness through spine   Planned Therapy Interventions   Planned Therapy Interventions balance training;gait training;motor coordination training;neuromuscular re-education;ROM;strengthening;stretching;transfer training;manual therapy   Clinical Impression   Criteria for Skilled Therapeutic Interventions Met yes, treatment indicated   PT Diagnosis Impaired mobility   Influenced by the following impairments weakness, instability, limited ROM, posture   Functional limitations due to impairments difficulty with community mobility, transfers from all surfaces   Clinical Presentation Evolving/Changing   Clinical Presentation Rationale fluctuating presentation based on med on/off times   Clinical Decision Making (Complexity) Moderate complexity   Therapy Frequency 2 times/Week   Predicted Duration of Therapy Intervention (days/wks) 90 days   Risk & Benefits of  therapy have been explained Yes   Patient, Family & other staff in agreement with plan of care Yes   GOALS   PT Eval Goals 1;2;3;4   Goal 1   Goal Identifier Gait speed   Goal Description The pt will improve self selected gait speed to >1.2 mph in order to improve community mobility and ease with crossing streets   Goal Progress 1.06 at baseline   Target Date 08/14/22   Goal 2   Goal Identifier FGA   Goal Description The pt will improve score to >25/30 on the FGA due to improvements in dynamic balance, thereby improving his ability to play with grandchildren   Goal Progress 23 at baseline   Target Date 08/14/22   Goal 3   Goal Identifier 30 sec sit to stand   Goal Description The pt will improve 30 sec sit to stand to 14 reps, thereby improving ability to transfer from a variety of surfaces   Goal Progress 12 at baseline   Target Date 08/14/22   Goal 4   Goal Identifier HEP   Goal Description The pt will be independent with a HEP in order to improve ability to self manage symptoms   Target Date 08/14/22   Total Evaluation Time   PT Eval, Moderate Complexity Minutes (64940) 35   Therapy Certification   Certification date from 05/17/22   Certification date to 08/14/22   Medical Diagnosis Parkinson's Disease

## 2022-05-18 NOTE — ADDENDUM NOTE
Encounter addended by: Inge Morales, PT on: 5/18/2022 8:00 AM   Actions taken: Flowsheet accepted, Clinical Note Signed, Document created, Document edited

## 2022-05-18 NOTE — PROGRESS NOTES
[facility]                                                                                   OUTPATIENT PHYSICAL THERAPY FUNCTIONAL EVALUATION  PLAN OF TREATMENT FOR OUTPATIENT REHABILITATION  (COMPLETE FOR INITIAL CLAIMS ONLY)  Patient's Last Name, First Name, M.I.  YOB: 1955  Kwame Lin     Provider's Name   [facility]   Medical Record No.  [id number]     Start of Care Date:  05/17/22   Onset Date:  05/09/22 (date of order)   Type:     _X__PT   ____OT  ____SLP Medical Diagnosis:  Parkinson's Disease     PT Diagnosis:  Impaired mobility Visits from SOC:  1                              __________________________________________________________________________________  Plan of Treatment/Functional Goals:  balance training, gait training, motor coordination training, neuromuscular re-education, ROM, strengthening, stretching, transfer training, manual therapy           GOALS  Gait speed  The pt will improve self selected gait speed to >1.2 mph in order to improve community mobility and ease with crossing streets  08/14/22    FGA  The pt will improve score to >25/30 on the FGA due to improvements in dynamic balance, thereby improving his ability to play with grandchildren  08/14/22    30 sec sit to stand  The pt will improve 30 sec sit to stand to 14 reps, thereby improving ability to transfer from a variety of surfaces  08/14/22    HEP  The pt will be independent with a HEP in order to improve ability to self manage symptoms  08/14/22                   Therapy Frequency:  2 times/Week (2-3 times/week)   Predicted Duration of Therapy Intervention:  90 days    Inge Morales, PT                                    I CERTIFY THE NEED FOR THESE SERVICES FURNISHED UNDER        THIS PLAN OF TREATMENT AND WHILE UNDER MY CARE     (Physician co-signature of this  document indicates review and certification of the therapy plan).                Certification Date From:  05/17/22   Certification Date To:  08/14/22    Referring Provider:  Aravind Norman MD    Initial Assessment  See Epic Evaluation- Start of Care Date: 05/17/22

## 2022-05-21 ENCOUNTER — HEALTH MAINTENANCE LETTER (OUTPATIENT)
Age: 67
End: 2022-05-21

## 2022-06-02 ENCOUNTER — HOSPITAL ENCOUNTER (OUTPATIENT)
Dept: PHYSICAL THERAPY | Facility: CLINIC | Age: 67
Discharge: HOME | End: 2022-06-02
Attending: FAMILY MEDICINE
Payer: COMMERCIAL

## 2022-06-02 DIAGNOSIS — M17.11 PRIMARY OSTEOARTHRITIS OF RIGHT KNEE: ICD-10-CM

## 2022-06-02 DIAGNOSIS — G20.A1 PARKINSON DISEASE (H): ICD-10-CM

## 2022-06-02 DIAGNOSIS — Z74.09 IMPAIRED FUNCTIONAL MOBILITY, BALANCE, GAIT, AND ENDURANCE: Primary | ICD-10-CM

## 2022-06-02 PROCEDURE — 97116 GAIT TRAINING THERAPY: CPT | Mod: GP | Performed by: PHYSICAL THERAPIST

## 2022-06-02 PROCEDURE — 97110 THERAPEUTIC EXERCISES: CPT | Mod: GP | Performed by: PHYSICAL THERAPIST

## 2022-06-02 PROCEDURE — 97112 NEUROMUSCULAR REEDUCATION: CPT | Mod: GP | Performed by: PHYSICAL THERAPIST

## 2022-06-13 ENCOUNTER — HOSPITAL ENCOUNTER (OUTPATIENT)
Dept: PHYSICAL THERAPY | Facility: CLINIC | Age: 67
Discharge: HOME | End: 2022-06-13
Payer: COMMERCIAL

## 2022-06-13 DIAGNOSIS — Z74.09 IMPAIRED FUNCTIONAL MOBILITY, BALANCE, GAIT, AND ENDURANCE: ICD-10-CM

## 2022-06-13 DIAGNOSIS — G20.A1 PARKINSON DISEASE (H): Primary | ICD-10-CM

## 2022-06-13 DIAGNOSIS — M17.11 PRIMARY OSTEOARTHRITIS OF RIGHT KNEE: ICD-10-CM

## 2022-06-13 PROCEDURE — 97110 THERAPEUTIC EXERCISES: CPT | Mod: GP | Performed by: PHYSICAL THERAPIST

## 2022-06-13 PROCEDURE — 97116 GAIT TRAINING THERAPY: CPT | Mod: GP | Performed by: PHYSICAL THERAPIST

## 2022-06-22 ENCOUNTER — HOSPITAL ENCOUNTER (OUTPATIENT)
Dept: PHYSICAL THERAPY | Facility: CLINIC | Age: 67
Discharge: HOME | End: 2022-06-22
Payer: COMMERCIAL

## 2022-06-22 DIAGNOSIS — G20.A1 PARKINSON DISEASE (H): Primary | ICD-10-CM

## 2022-06-22 DIAGNOSIS — Z74.09 IMPAIRED FUNCTIONAL MOBILITY, BALANCE, GAIT, AND ENDURANCE: ICD-10-CM

## 2022-06-22 PROCEDURE — 97110 THERAPEUTIC EXERCISES: CPT | Mod: GP | Performed by: PHYSICAL THERAPIST

## 2022-06-22 PROCEDURE — 97112 NEUROMUSCULAR REEDUCATION: CPT | Mod: GP | Performed by: PHYSICAL THERAPIST

## 2022-06-23 NOTE — PROGRESS NOTES
Department of Neurology  Movement Disorders Division   DBS Follow-up Note    Patient: Kwame Lin  MRN: [id number]   : 1955   Date of Visit: 2022    Diagnosis: Parkinson disease  DBS Target(s): Bilateral STN   Date(s) of DBS Lead Placement: Left 2017, R 2018  Date(s) of IPG Placement: L chest 2017  Device: [employer]      Chief Complaint:  Kwame Lin is a 67 year old male who returns to clinic for follow up of PD status post bilateral STN DBS.       Interval History:  He has a lot less dyskinesia on the new program; however his speech is worse with stuttering and slurring.  He is also doing SLP over Zoom but there was a 5 week break.  This has been somewhat helpful.  He is working with PT on his gait.  He felt this also worsened after the programming change but is improving with therapy.  He must be more conscious of it.  No falls.    Still having trouble with sleep onset insomnia.  Stay up to 3-4 am.  He did not start melatonin because he did not find it helpful previously.    He has also heard about LD decreasing vitamin B6.  He is also concerned about slight weight gain after DBS.      Parkinson Disease Motor Symptom Review:  Dyskinesia - hardly having any  Dystonia - slight right foot inversion and toe curling  Wearing off - after 3.5 hr, kicks in 30-45 min    Parkinson's Disease Non-motor Symptom Review:  Psychiatric disturbances - mood is more stable  Cognitive impairment - denies  Sleep disturbances - insomnia, see above, still acting out his dreams, RLS well controlled, snores but using his CPAP  GI symptoms - denies constipation  Urinary symptoms - denies  Balance - good  Pain - slight in right leg  Autonomic dysfunction - lightheaded when playing golf  Hallucinations - denies  Speech - slightly worse, see above  Swallowing - some dysphagia  Salivation - denies sialorrhea    UPDRS Part II  2.1 Speech (P) 2 (P) Mild: My speech causes people to ask me to  occasionally repeat myself, but not everyday.   2.2 Saliva and drooling (P) 0 (P) Normal: Not at all (no problems).   2.3 Chewing and swallowing (P) 0 (P) Normal: No problems.   2.4 Eating tasks (P) 0 (P) Normal: Not at all (no problems).   2.5 Dressing (P) 0 (P) Normal: Not at all (no problems).   2.6 Hygiene (P) 0 (P) Normal: Not at all (no problems).   2.7 Handwriting (P) 2 (P) Mild: Some words are unclear and difficult to read.   2.8 Doing hobbies and other activities (P) 1 (P) Slight: I am a bit slow but do these activities easily.   2.9 Turning in bed (P) 0 (P) Normal: Not at all (no problems).   2.10 Tremor (P) 0 (P) Normal: Not at all (no problems).   2.11 Getting out of bed  (P) 0 (P) Normal: Not at all (no problems).   2.12 Walking and balance  (P) 1 (P) Slight: I am slightly slow or may drag a leg.  I never use a walking aid.   2.13 Freezing (P) 0 (P) Normal: Not at all (no problems).   Total (P) 6    Prior: 4 on 9/23/2021        Relevant Medications  8 am 12 pm 4 pm 8 pm 11 pm   Sinemet 25/100mg  1.5 1.5 1.5 1.5 1.5   Clonazepam 0.5mg         1   Paroxetine 10mg 1       Gabapentin 100mg          1   Last taken: 5:30am      Medications:  Current Outpatient Medications   Medication Sig Dispense Refill     acetaminophen (TYLENOL) 325 MG tablet Take 325-650 mg by mouth nightly as needed for mild pain       atorvastatin (LIPITOR) 20 MG tablet        carbidopa-levodopa (SINEMET)  MG tablet Take 1.5 tab every 4 hrs 5 times a day ( 8 am, noon, 4 pm, 8 pm, 11 pm). 675 tablet 3     clindamycin (CLEOCIN) 300 MG capsule TAKE 2 CAPSULES BY MOUTH FOR 1 DOSE 1 HOUR PRIOR TO DENTAL APPOINTMENTS       clonazePAM (KLONOPIN) 0.5 MG tablet Take 1/2 to 1 tablets at bedtime to help with dream enactment. 90 tablet 1     gabapentin (NEURONTIN) 100 MG capsule Take 1 capsule by mouth before bed. 90 capsule 1     multivitamin w/minerals (THERA-VIT-M) tablet Take 1 tablet by mouth every morning       PARoxetine (PAXIL) 10 MG  tablet Take 10 mg by mouth daily       sildenafil (REVATIO) 20 MG tablet Take 20 mg by mouth       thyroid (ARMOUR) 60 MG tablet Take 60 mg by mouth every morning           Review of Systems:  Other than that noted at the end of this note, the remainder of 12 systems reviewed were negative.    Allergies: is allergic to no clinical screening - see comments, bactrim, codeine, ketorolac, morphine, nalbuphine, penicillins, seasonal allergies, sulfa drugs, toradol, and nsaids.    Past Medical History:  Past Medical History:   Diagnosis Date     Anosmia 10/12/2017     Anxiety      Depressed mood 10/12/2017     Hypothyroid      Parkinson disease (H)      Parkinsons disease (H)      PONV (postoperative nausea and vomiting)      RBD (REM behavioral disorder) 10/12/2017       Past Surgical History:  Past Surgical History:   Procedure Laterality Date     ARTHROSCOPY KNEE Right     twice     ARTHROSCOPY KNEE Right     left     epidydmal mass removal, benign       IMPLANT DEEP BRAIN STIMULATION GENERATOR / BATTERY Left 12/22/2017    Procedure: IMPLANT DEEP BRAIN STIMULATION GENERATOR / BATTERY;  Deep Brain Stimulator Placement, Phase II, Placement Of Deep Brain Stimulator Generator/Battery Over The Left Chest Wall;  Surgeon: Arpan Huynh MD;  Location: [facility] OR     OPTICAL TRACKING SYSTEM INSERTION DEEP BRAIN STIMULATION Left 12/14/2017    Procedure: OPTICAL TRACKING SYSTEM INSERTION DEEP BRAIN STIMULATION;  Stealth Assisted Left Deep Brain Stimulator Placement, Phase I, Placement Of Left Side Deep Brain Stimulator Electrode, Target Left Subthalamic Nucleus With Microelectrode Recording;  Surgeon: Arpan Huynh MD;  Location: [facility] OR     OPTICAL TRACKING SYSTEM INSERTION DEEP BRAIN STIMULATION Right 5/22/2018    Procedure: OPTICAL TRACKING SYSTEM INSERTION DEEP BRAIN STIMULATION;  Stealth Assisted Right Deep Brain Stimulator Placement, Phase I And II Combined, Placement Of Right Deep Brain Stimulator Electrode,  Target Right Subthalamic Nucleus With Microelectrode Recording And Connection To Previous Implanted Generator/Battery;  Surgeon: Arpan Huynh MD;  Location: [facility] OR     ORTHOPEDIC SURGERY Left 2018    knee replacement       Social History:  Social History     Socioeconomic History     Marital status:    Occupational History     Occupation: Small business start consultant   Tobacco Use     Smoking status: Never Smoker     Smokeless tobacco: Never Used   Substance and Sexual Activity     Alcohol use: Yes     Alcohol/week: 1.0 - 2.0 standard drink     Types: 1 - 2 Cans of beer per week     Comment: MONTHLY     Drug use: No     Sexual activity: Yes     Partners: Female   Social History Narrative    Previous worked as [employer].    Now consulting part-time.    , lives with wife.    Non-smoker. Occasional drink.        Past surgical history that includes epidydmal mass removal, benign.        Social History:    Previous worked as [employer].    Now consulting part-time.    , lives with wife.    Non-smoker. Occasional drink.         family history includes Lung Cancer in his father; Parkinsonism in his cousin and maternal aunt.            Family History:  Family History   Problem Relation Age of Onset     Lung Cancer Father      Parkinsonism Cousin         paternal side     Parkinsonism Maternal Aunt          Physical Exam:  The patient's  blood pressure is 124/80 and his pulse is 75. His respiration is 16 and oxygen saturation is 96%.       Neurological Examination:   UPDRS Values 6/28/2022   Time: 7:28 AM   Medication On   R Brain DBS: On   L Brain DBS: On   Dyskinesia (LID) Yes   Did LID interfere No   Speech 1   Facial Expression 0   Rigidity Neck 0   Rigidity RUE 1   Rigidity LUE 1   Rigidity RLE 0   Rigidity LLE 0   Finger Taps R 0   Finger Taps L 0   Hand Mvt R 1   Hand Mvt L 0   Pron-/Supinate R 0    Pron-/Supinate L 1   Toe Tap R 1   Toe Tap L 3   Leg Agility R 0   Leg Agility L 1   Arise From Chair 1   Gait 1   Gait Freezing 0   Postural Stability 0   Posture 0   Global Spont Mvt 0   Postural Tremor RUE 1   Postural Tremor LUE 1   Kinetic Tremor RUE 0   Kinetic Tremor LUE 0   Rest Tremor RUE 0   Rest Tremor LUE 0   Rest Tremor RLE 0   Rest Tremor LLE 0   Rest Tremor Lip/Jaw 0   Rest Tremor Constancy 0   Total Right 4   Total Left 7   Axial Total 3   Total 14   Prior: 16 on 4/14/2022      Procedure: DBS Interrogation & Programming  Lead(s):    Left Right   DBS Target STN STN   DBS Lead Type 8 ring contacts 8 ring contacts   Lead Implant Date 12/14/2017 5/22/2018      IPG(s):    1   IPG Vercise   IPG Implant Date 12/22/2017   Location Left chest   Battery (V)  3.91V     Impedance Check: No problems found.  See scanned report for impedance details.    1-Moose7 Left Brain         Right Brain       Initial Final Initial Final    Inactive Inactive Inactive Inactive   Amplitude (mA) 5.0 No change 3.3 [2.5-4.0] No change   Pulse width (usec) 60   60     Freq (Hz) 139   139     Contacts: C+3- (55%), 7- (45%)   C+11-(60%), 14- (40%)        3-Moose 6 Left Brain         Right Brain       Initial Final Initial Final    Active Active Active Active   Amplitude (mA) 5.0 5.0 3.3 [2.5-4.0] 3.3 [2.5-4.0]   Pulse width (usec) 60 60 60 60   Freq (Hz) 139 139 139 139   Contacts: C+3- (55%), 6- (45%) C+3- (55%), 6- (45%) C+11-(60%), 14- (40%) C+11-(60%), 14- (40%)        4-Bear Left Brain         Right Brain       Initial Final Initial Final    Inactive Inactive Inactive Inactive   Amplitude (mA) 2.8 No change 3.3 [2.5-4.0] No change   Pulse width (usec) 60   60     Freq (Hz) 139   139     Contacts: C+3-   C+11-(60%), 14- (40%)           Assessment/Plan:  Kwame Lin is a 67 year old male who returns to clinic for follow up of PD status post bilateral STN DBS.  He is having insomnia so will refer for CBT-I.  He is also having  wearing off so will add entacapone.    - CBT-I referral  - Add entacapone 5x daily  - Check B6 level  - No progamming changes  - Continue medications as is  - RTC 3 months, 1 hr DBS programming      Anaid Watson MD  Movement Disorders Fellow       42 minutes spent on the date of the encounter doing chart review, history and exam, documentation and further activities as noted above.     Additional time spent for separate DBS programmin min DBS analyzed without reprogramming.

## 2022-06-27 ASSESSMENT — MOVEMENT DISORDERS SOCIETY - UNIFIED PARKINSONS DISEASE RATING SCALE (MDS-UPDRS)
TURNING_IN_BED: NORMAL: NOT AT ALL (NO PROBLEMS).
TOTAL_SCORE: 6
DRESSING: NORMAL: NOT AT ALL (NO PROBLEMS).
SALIVA_AND_DROOLING: NORMAL: NOT AT ALL (NO PROBLEMS).
TREMOR: NORMAL: NOT AT ALL (NO PROBLEMS).
HANDWRITING: MILD: SOME WORDS ARE UNCLEAR AND DIFFICULT TO READ.
SPEECH: MILD: MY SPEECH CAUSES PEOPLE TO ASK ME TO OCCASIONALLY REPEAT MYSELF, BUT NOT EVERYDAY.
WALKING_AND_BALANCE: SLIGHT: I AM SLIGHTLY SLOW OR MAY DRAG A LEG.  I NEVER USE A WALKING AID.
CHEWING_AND_SWALLOWING: NORMAL: NO PROBLEMS.
GETTING_OUT_OF_BED_CAR_DEEP_CHAIR: NORMAL: NOT AT ALL (NO PROBLEMS).
FREEZING: NORMAL: NOT AT ALL (NO PROBLEMS).
HYGIENE: NORMAL: NOT AT ALL (NO PROBLEMS).
EATING_TASKS: NORMAL: NOT AT ALL (NO PROBLEMS).
HOBBIES_AND_OTHER_ACTIVITIES: SLIGHT: I AM A BIT SLOW BUT DO THESE ACTIVITIES EASILY.

## 2022-06-28 ENCOUNTER — LAB (OUTPATIENT)
Dept: LAB | Facility: CLINIC | Age: 67
End: 2022-06-28

## 2022-06-28 ENCOUNTER — OFFICE VISIT (OUTPATIENT)
Dept: NEUROLOGY | Facility: CLINIC | Age: 67
End: 2022-06-28
Payer: COMMERCIAL

## 2022-06-28 VITALS
HEART RATE: 75 BPM | RESPIRATION RATE: 16 BRPM | OXYGEN SATURATION: 96 % | DIASTOLIC BLOOD PRESSURE: 80 MMHG | SYSTOLIC BLOOD PRESSURE: 124 MMHG

## 2022-06-28 DIAGNOSIS — G47.00 INSOMNIA, UNSPECIFIED TYPE: ICD-10-CM

## 2022-06-28 DIAGNOSIS — G20.A1 PARKINSON DISEASE (H): Primary | ICD-10-CM

## 2022-06-28 DIAGNOSIS — G20.A1 PARKINSON DISEASE (H): ICD-10-CM

## 2022-06-28 DIAGNOSIS — Z96.89 S/P DEEP BRAIN STIMULATOR PLACEMENT: ICD-10-CM

## 2022-06-28 PROCEDURE — 95970 ALYS NPGT W/O PRGRMG: CPT

## 2022-06-28 PROCEDURE — 36415 COLL VENOUS BLD VENIPUNCTURE: CPT | Performed by: PATHOLOGY

## 2022-06-28 PROCEDURE — 99000 SPECIMEN HANDLING OFFICE-LAB: CPT | Performed by: PATHOLOGY

## 2022-06-28 PROCEDURE — 84207 ASSAY OF VITAMIN B-6: CPT | Mod: 90 | Performed by: PATHOLOGY

## 2022-06-28 PROCEDURE — 99215 OFFICE O/P EST HI 40 MIN: CPT | Mod: 25

## 2022-06-28 RX ORDER — ENTACAPONE 200 MG/1
200 TABLET ORAL
Qty: 150 TABLET | Refills: 11 | Status: SHIPPED | OUTPATIENT
Start: 2022-06-28 | End: 2023-07-25

## 2022-06-28 ASSESSMENT — UNIFIED PARKINSONS DISEASE RATING SCALE (UPDRS)
PRONATION_SUPINATION_RIGHT: NORMAL
AMPLITUDE_LUE: NORMAL: NO TREMOR.
AMPLITUDE_RUE: NORMAL: NO TREMOR.
HANDMOVEMENTS_RIGHT: SLIGHT: ANY OF THE FOLLOWING: A) THE REGULAR RHYTHM IS BROKEN WITH ONE WITH ONE OR TWO INTERRUPTIONS OR HESITATIONS OF THE MOVEMENT B) SLIGHT SLOWING C) THE AMPLITUDE DECREMENTS NEAR THE END OF THE 10 MOVEMENTS.
LEG_AGILITY_RIGHT: NORMAL
GAIT: SLIGHT: INDEPENDENT WALKING WITH MINOR GAIT IMPAIRMENT.
AXIAL_SCORE: 3
RIGIDITY_LLE: NORMAL
AMPLITUDE_LIP_JAW: NORMAL: NO TREMOR.
SPEECH: SLIGHT: LOSS OF MODULATION, DICTION OR VOLUME, BUT STILL ALL WORDS EASY TO UNDERSTAND.
LEG_AGILITY_LEFT: SLIGHT: ANY OF THE FOLLOWING: A) THE REGULAR RHYTHM IS BROKEN WITH ONE WITH ONE OR TWO INTERRUPTIONS OR HESITATIONS OF THE MOVEMENT B) SLIGHT SLOWING C) THE AMPLITUDE DECREMENTS NEAR THE END OF THE 10 MOVEMENTS.
RIGIDITY_NECK: NORMAL
FINGER_TAPPING_LEFT: NORMAL
TOTAL_SCORE: 14
DYSKINESIAS_PRESENT: YES
SPONTANEITY_OF_MOVEMENT: 0: NORMAL.  NO PROBLEMS.
HANDMOVEMENTS_LEFT: NORMAL
FREEZING_GAIT: NORMAL
AMPLITUDE_LLE: NORMAL: NO TREMOR.
AMPLITUDE_RLE: NORMAL: NO TREMOR.
TOTAL_SCORE: 4
RIGIDITY_LUE: SLIGHT: RIGIDITY ONLY DETECTED WITH ACTIVATION MANEUVER.
TOETAPPING_RIGHT: SLIGHT: ANY OF THE FOLLOWING: A) THE REGULAR RHYTHM IS BROKEN WITH ONE WITH ONE OR TWO INTERRUPTIONS OR HESITATIONS OF THE MOVEMENT B) SLIGHT SLOWING C) THE AMPLITUDE DECREMENTS NEAR THE END OF THE 10 MOVEMENTS.
MOVEMENTS_INTERFERE_WITH_RATINGS: NO
PARKINSONS_MEDS: ON
ARISING_CHAIR: SLIGHT: ARISING IS SLOWER THAN NORMAL, OR MAY NEED MORE THAN ONE ATTEMPT, OR MAY NEED TO MOVE FORWARD IN THE CHAIR TO ARISE.  NO NEED TO USE THE ARMS OF THE CHAIR.
RIGIDITY_RLE: NORMAL
TOTAL_SCORE_LEFT: 7
CONSTANCY_TREMOR_ATREST: NORMAL: NO TREMOR.
FINGER_TAPPING_RIGHT: NORMAL
FACIAL_EXPRESSION: NORMAL.
POSTURE: 0 NORMAL, NO PROBLEMS
TOETAPPING_LEFT: MODERATE: ANY OF THE FOLLOWING:  A) MORE THAN 5 INTERRUPTIONS OR AT LEAST ONE LONGER ARREST (FREEZE) IN ONGOING MOVEMENT  B) MODERATE SLOWING C) THE AMPLITUDE DECREMENTS STARTING AFTER THE FIRST MOVEMENT.
PRONATION_SUPINATION_LEFT: SLIGHT: ANY OF THE FOLLOWING: A) THE REGULAR RHYTHM IS BROKEN WITH ONE WITH ONE OR TWO INTERRUPTIONS OR HESITATIONS OF THE MOVEMENT B) SLIGHT SLOWING C) THE AMPLITUDE DECREMENTS NEAR THE END OF THE 10 MOVEMENTS.
RIGIDITY_RUE: SLIGHT: RIGIDITY ONLY DETECTED WITH ACTIVATION MANEUVER.
POSTURAL_STABILITY: NORMAL:  RECOVERS WITH ONE OR TWO STEPS.

## 2022-06-28 ASSESSMENT — PAIN SCALES - GENERAL: PAINLEVEL: NO PAIN (0)

## 2022-06-28 NOTE — PATIENT INSTRUCTIONS
For weight gain:  Work with your primary doctor to make sure your cholesterol is good and keep active.    For insomnia:  I placed a referral for CBT-I with sleep psychology.    For wearing off:  Add entacapone 200mg to each dose of Sinemet.        No changes to your DBS today.  You are still on 3-Moose 6.

## 2022-06-28 NOTE — NURSING NOTE
Chief Complaint   Patient presents with     RECHECK     DBS PROGRAMMING - 2 mo. F/u     Ryan De La Rosa

## 2022-06-30 ENCOUNTER — MYC MEDICAL ADVICE (OUTPATIENT)
Dept: NEUROLOGY | Facility: CLINIC | Age: 67
End: 2022-06-30

## 2022-06-30 LAB — PYRIDOXAL PHOS SERPL-SCNC: 47.8 NMOL/L

## 2022-07-05 ENCOUNTER — VIRTUAL VISIT (OUTPATIENT)
Dept: SLEEP MEDICINE | Facility: CLINIC | Age: 67
End: 2022-07-05
Attending: STUDENT IN AN ORGANIZED HEALTH CARE EDUCATION/TRAINING PROGRAM
Payer: COMMERCIAL

## 2022-07-05 VITALS — HEIGHT: 69 IN | BODY MASS INDEX: 29.62 KG/M2 | WEIGHT: 200 LBS

## 2022-07-05 DIAGNOSIS — F51.04 CHRONIC INSOMNIA: Primary | ICD-10-CM

## 2022-07-05 DIAGNOSIS — G47.52 RBD (REM BEHAVIORAL DISORDER): ICD-10-CM

## 2022-07-05 DIAGNOSIS — G47.33 OSA (OBSTRUCTIVE SLEEP APNEA): ICD-10-CM

## 2022-07-05 DIAGNOSIS — G25.81 RESTLESS LEG SYNDROME: ICD-10-CM

## 2022-07-05 PROCEDURE — 90791 PSYCH DIAGNOSTIC EVALUATION: CPT | Mod: 95 | Performed by: PSYCHOLOGIST

## 2022-07-05 ASSESSMENT — SLEEP AND FATIGUE QUESTIONNAIRES
HOW LIKELY ARE YOU TO NOD OFF OR FALL ASLEEP WHILE WATCHING TV: SLIGHT CHANCE OF DOZING
HOW LIKELY ARE YOU TO NOD OFF OR FALL ASLEEP WHILE SITTING AND READING: SLIGHT CHANCE OF DOZING
HOW LIKELY ARE YOU TO NOD OFF OR FALL ASLEEP WHILE LYING DOWN TO REST IN THE AFTERNOON WHEN CIRCUMSTANCES PERMIT: HIGH CHANCE OF DOZING
HOW LIKELY ARE YOU TO NOD OFF OR FALL ASLEEP IN A CAR, WHILE STOPPED FOR A FEW MINUTES IN TRAFFIC: WOULD NEVER DOZE
HOW LIKELY ARE YOU TO NOD OFF OR FALL ASLEEP WHEN YOU ARE A PASSENGER IN A CAR FOR AN HOUR WITHOUT A BREAK: WOULD NEVER DOZE
HOW LIKELY ARE YOU TO NOD OFF OR FALL ASLEEP WHILE SITTING AND TALKING TO SOMEONE: WOULD NEVER DOZE
HOW LIKELY ARE YOU TO NOD OFF OR FALL ASLEEP WHILE SITTING QUIETLY AFTER LUNCH WITHOUT ALCOHOL: MODERATE CHANCE OF DOZING
HOW LIKELY ARE YOU TO NOD OFF OR FALL ASLEEP WHILE SITTING INACTIVE IN A PUBLIC PLACE: WOULD NEVER DOZE

## 2022-07-05 ASSESSMENT — PAIN SCALES - GENERAL: PAINLEVEL: NO PAIN (0)

## 2022-07-05 NOTE — PROGRESS NOTES
Kwame is a 67 year old who is being evaluated via a billable video visit.      How would you like to obtain your AVS? MyChart  If the video visit is dropped, the invitation should be resent by: Text to cell phone: [phone]  Will anyone else be joining your video visit? Eloisa  Juan Mcnair        Video-Visit Details    Video Start Time: 2:40 PM    Type of service:  Video Visit    Video End Time:3:20 PM    Originating Location (pt. Location): Home    Distant Location (provider location):  [facility] SLEEP [facility]     Platform used for Video Visit: [facility]     SLEEP MEDICINE CONSULTATION  Sleep Psychology  2022    Name: Kwame Lin MRN# [id number]   Age: 67 year old YOB: 1955     Date of Consultation: 2022  Consultation is requested by: Anaid Watson MD  [address] [place] [id number]  Primary care provider: Renetta Hooks    Reason for Sleep Consultation     Kwame Lin is a 67 year old male seen today for a behavioral sleep medicine consultation because of insomnia    Assessment and Plan     Sleep Diagnoses:       Chronic insomnia  Restless leg syndrome  DIPIKA (obstructive sleep apnea)  RBD (REM behavioral disorder)    Clinical Impressions:    Kwame Lin was seen for a sleep psychology consultation and possible behavioral sleep intervention and treatment.  History and clinical presentation is consistent with complex, multifactored insomnia associated with restless leg syndrome, Parkinson's disease, obstructive sleep apnea and REM behavior disorder.  There are clear possible targets for brief behavioral therapy for insomnia including extended time in bed and establishing a sleep window that strengthens of circadian timing of sleep in individuals that appears to have mild delayed sleep phase tendency.  Patient also has some resistant/apprehension about use of CPAP nightly.    Recommendations and Counselin.  Initiate brief behavioral  therapy for insomnia focusing on reducing time in bed from 11 hours to a sleep window of 8 hours and a sleep schedule of the 1 AM-9 AM.    2.  Ensure patient gets exposure to bright ambient light in the morning for 30-60 minutes    3.  Establish a relaxing bedtime routine and avoid going to bed until feeling sleepy.    4.  Prepare PAP therapy equipment at bedside and place mask on before reclining in the bed.    5.  Continue to use RBD strap for safety.    6.  Follow physician's recommendations around use of gabapentin for restless legs and clonazepam for RBD.    Kwame was provided information about the pathophysiology of insomnia, psychophysiological factors contributing to the onset and maintenance of insomnia and how co-occurring medical conditions and intrinsic sleep disorders can affect sleep.  Treatment options were discussed  as applicable to patient specific sleep concerns and symptoms. The benefits and potential early side effects of treatment including increased daytime sleepiness were discussed.     Patient was advised to consult with their prescribing provider around use of or changes to prescription sleep medication.  Patient was counseled on the importance of avoiding driving if drowsy.    Follow-up: 4 weeks     History of Present Sleep Complaint     Kwame Lin is a 67 year old year old male with a relevant medical history of  Parkinson's Disease, REM Behavior Disorder, Restless Leg Syndrome  who presents with a ten year history of sleep initiation insomnia.    He reports sleep initation is made worse because of use of CPAP and challenges adjusting to CPAP.     There is dream enactment behavior that has persisted with getting out of bed.  Wife is concerned about vivid dreams and dream enactment.  They purchased a night strap which has helped him stay planted in the bed at night.     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 No    Weekends/Non-work Days/All Other Days  Usually gets into bed at Between 11:00am and 12:00am   Takes patient about 3 - 4hours hours to fall asleep  Patient is usually up at 10:00am  Uses alarm? No    Sleep Need  Patient gets  6 hours sleep on average   Patient thinks He needs about 8 to 9 hours sleep      Kwame ARAM Lin prefers to sleep in this position(s): Side, Head Elevated   Patient states they do the following activities in bed: Read    Naps  Patient takes a purposeful nap 3-4 times a week and naps are usually 30-60 minutes in duration  He feels better after a nap: Yes  He dozes off unintentionally  0  days per week  Patient has had a driving accident or near-miss due to sleepiness/drowsiness? No    SLEEP DISRUPTIONS:    Breathing/Snoring  Patient snores:Yes  Other people complain about His snoring: Yes  Patient has been told He stops breathing in His sleep: Yes  He has issues with any of the following: Morning mouth dryness, Stuffy nose when you wake up    Movement:  Patient gets pain, discomfort, with an urge to move: Yes  It happens when He is resting: Yes  It happens more at night:Yes  Patient has been told He kicks His legs at night:Yes     Behaviors in Sleep:  Kwame Lin has experienced the following behaviors while sleeping: Sleep-talking, Kicking or punching, Night terrors (screaming,yelling or acting afraid but not recalling event)  He has experienced sudden muscle weakness during the day: No    SLEEP HYGIENE:    Behaviors  affecting Sleep   Uses computers or electronics within an hour before bedtime, Extends time in bed longer if after poor night of sleep, Worries or thinks about sleep during the day    Sleep Environment:    Bedroom is quiet, comfortable and dark, Sleeps alone    Caffeine, Alcohol and Other Substances:  Number of caffeinated beverages(coffee, tea, soda, energy drinks) that patient consumes  per day: 0  Last caffeine use is usually: n/a  List of any prescribed or over the counter stimulants that patient takes: none  List of any prescribed or over the counter sleep medication patient takes: Tylenol as needed, Advil as needed. Advil works well and I usually fall asleep.  List of previous sleep medications that patients have tried: I am currently using Gabapentine and clonazopam, along with Levedopa-cabadopa  Patient drinks alcohol to help them sleep: No  Patient drinks alcohol near bedtime: No    FAMILY HISTORY OF SLEEP DISORDERS  Patient has a family member been diagnosed with a sleep disorder: No            PREVIOUS SLEEP EVALUATIONS:    See EMR, previous sleep study completed at [facility] in 2018.    SCALES      EPWORTH SLEEPINESS SCALE    Likeliness of dozing or falling  asleep:    Sitting and reading: Slight chance of dozing    Watching TV: Slight chance of dozing    Sitting, inactive in a public place (e.g. a theatre or a meeting): Would never doze    As a passenger in a car for an hour without a break: Would never doze    Lying down to rest in the afternoon when circumstances permit: High chance of dozing    Sitting and talking to someone: Would never doze    Sitting quietly after a lunch without alcohol: Moderate chance of dozing    In a car, while stopped for a few minutes in traffic: Would never doze    Total score - [place]: 7      INSOMNIA SEVERITY INDEX (ADAM)      The Insomnia Severity Index measures the severity of insomnia symptoms over the past two weeks.    1. Difficulty falling asleep:  "Severe    2. Difficulty staying asleep: None    3. Problems waking up too early: None    4. How SATISFIED/DISSATISFIED are youwith your CURRENT sleep pattern? Very Dissatisfied    5. How NOTICEABLE to others do you think your sleep problem is in terms of impairing the quality of your life? Somewhat      6. How WORRIED/DISTRESSED are you about your sleep problem? Much    7. To what extent do you consider your sleep problem to INTERFERE with your daily functioning (e.g. daytime fatigue, mood, ability to functon at work/daily chores, concentration, memory, mood, etc.) CURRENTLY?   Very Much Interfering     ADAM Total Score: 16    Guidelines for Scoring/Interpretation:     0-7 = No clinically significant insomnia   8-14 = Subthreshold insomnia   15-21 = Clinical insomnia (moderate severity)  22-28 = Clinical insomnia (severe)      STOP BANG     1. Snoring: Do you snore loudly (louder than talking or loud enough to be heard through closed doors)?  No    2. Tired: Do you often feel tired, fatigued, or sleepy during daytime?  Yes    3. Observed: Has anyone observed you stop breathing during your sleep?  No    4. Blood pressure: Do you have or are you being treated for high blood pressure?  No    5. BMI: BMI more than 35 kg/m2?  No    6. Age: Age over 50 yr old?  Yes    7. Neck circumference: Neck circumference greater than 40 cm?  No    8. Gender: Gender male?  Yes    STOP-BANG Total Score: 3        Vitals     Ht 1.753 m (5' 9\")   Wt 90.7 kg (200 lb)   BMI 29.53 kg/m       Medical History     Allergies:    Allergies   Allergen Reactions     No Clinical Screening - See Comments Itching     ENVIRONMENTAL: Oak trees     Bactrim Hives     Codeine Nausea and Vomiting     Ketorolac Nausea and Vomiting     Morphine      Nalbuphine      Other reaction(s): Unknown Reaction     Penicillins Hives     Seasonal Allergies Itching     Sulfa Drugs      Toradol Nausea and Vomiting     Nsaids Other (See Comments)     Advil, Ibuprofen are " ok       Medications:    Current Outpatient Medications   Medication Sig Dispense Refill     acetaminophen (TYLENOL) 325 MG tablet Take 325-650 mg by mouth nightly as needed for mild pain       atorvastatin (LIPITOR) 20 MG tablet        carbidopa-levodopa (SINEMET)  MG tablet Take 1.5 tab every 4 hrs 5 times a day ( 8 am, noon, 4 pm, 8 pm, 11 pm). 675 tablet 3     clindamycin (CLEOCIN) 300 MG capsule TAKE 2 CAPSULES BY MOUTH FOR 1 DOSE 1 HOUR PRIOR TO DENTAL APPOINTMENTS       clonazePAM (KLONOPIN) 0.5 MG tablet Take 1/2 to 1 tablets at bedtime to help with dream enactment. 90 tablet 1     entacapone (COMTAN) 200 MG tablet Take 1 tablet (200 mg) by mouth 5 times daily 150 tablet 11     gabapentin (NEURONTIN) 100 MG capsule Take 1 capsule by mouth before bed. 90 capsule 1     multivitamin w/minerals (THERA-VIT-M) tablet Take 1 tablet by mouth every morning       PARoxetine (PAXIL) 10 MG tablet Take 10 mg by mouth daily       sildenafil (REVATIO) 20 MG tablet Take 20 mg by mouth       thyroid (ARMOUR) 60 MG tablet Take 60 mg by mouth every morning          Problem List:  Patient Active Problem List    Diagnosis Date Noted     Gait difficulty 11/06/2018     Priority: Medium     Sexual dysfunction 11/05/2018     Priority: Medium     DIPIKA (obstructive sleep apnea) 08/17/2018     Priority: Medium     Overview:   8-2018 CPAP  [place]       Osteoarthritis of both knees 06/25/2018     Priority: Medium     S/P deep brain stimulator placement 05/22/2018     Priority: Medium     Depressed mood 10/12/2017     Priority: Medium     RBD (REM behavioral disorder) 10/12/2017     Priority: Medium     Anxiety disorder 06/02/2017     Priority: Medium     Age-related nuclear cataract, bilateral 03/29/2017     Priority: Medium     Dry eye syndrome of bilateral lacrimal glands 03/29/2017     Priority: Medium     Vitreous degeneration, right eye 03/29/2017     Priority: Medium     Parkinson disease (H) 08/17/2015     Priority:  Medium     Parkinsonian tremor (H) 05/06/2013     Priority: Medium     Restless leg syndrome 08/19/2011     Priority: Medium     Hypothyroidism 05/15/2009     Priority: Medium     Overview:   Epic   Overview:   [facility]           Past Medical/Surgical History:  Past Medical History:   Diagnosis Date     Anosmia 10/12/2017     Anxiety      Depressed mood 10/12/2017     Hypothyroid      Parkinson disease (H)      Parkinsons disease (H)      PONV (postoperative nausea and vomiting)      RBD (REM behavioral disorder) 10/12/2017     Patient Active Problem List    Diagnosis Date Noted     Gait difficulty 11/06/2018     Priority: Medium     Sexual dysfunction 11/05/2018     Priority: Medium     DIPIKA (obstructive sleep apnea) 08/17/2018     Priority: Medium     Overview:   8-2018 CPAP  [place]       Osteoarthritis of both knees 06/25/2018     Priority: Medium     S/P deep brain stimulator placement 05/22/2018     Priority: Medium     Depressed mood 10/12/2017     Priority: Medium     RBD (REM behavioral disorder) 10/12/2017     Priority: Medium     Anxiety disorder 06/02/2017     Priority: Medium     Age-related nuclear cataract, bilateral 03/29/2017     Priority: Medium     Dry eye syndrome of bilateral lacrimal glands 03/29/2017     Priority: Medium     Vitreous degeneration, right eye 03/29/2017     Priority: Medium     Parkinson disease (H) 08/17/2015     Priority: Medium     Parkinsonian tremor (H) 05/06/2013     Priority: Medium     Restless leg syndrome 08/19/2011     Priority: Medium     Hypothyroidism 05/15/2009     Priority: Medium     Overview:   Epic   Overview:   Epic        Patient Active Problem List   Diagnosis     Anxiety disorder     Hypothyroidism     Parkinson disease (H)     Parkinsonian tremor (H)     Restless leg syndrome     Depressed mood     RBD (REM behavioral disorder)     S/P deep brain stimulator placement     Age-related nuclear cataract, bilateral     Dry eye syndrome of bilateral lacrimal  glands     Osteoarthritis of both knees     Vitreous degeneration, right eye     Gait difficulty     DIPIKA (obstructive sleep apnea)     Sexual dysfunction        Mental Health History     Prior Mental Health Diagnosis:   depression and anxiety    Current Mental Health Treatment:   primary care medication management, paroxetine    Chemical Abuse/Treatment:    None reported      Family History     Family History   Problem Relation Age of Onset     Lung Cancer Father      Parkinsonism Cousin         paternal side     Parkinsonism Maternal Aunt        Social History     Social History     Socioeconomic History     Marital status:      Spouse name: None     Number of children: None     Years of education: None     Highest education level: None   Occupational History     Occupation: Small business start consultant   Tobacco Use     Smoking status: Never Smoker     Smokeless tobacco: Never Used   Substance and Sexual Activity     Alcohol use: Yes     Alcohol/week: 1.0 - 2.0 standard drink     Types: 1 - 2 Cans of beer per week     Comment: MONTHLY     Drug use: No     Sexual activity: Yes     Partners: Female   Social History Narrative    Previous worked as [employer].    Now consulting part-time.    , lives with wife.    Non-smoker. Occasional drink.        Past surgical history that includes epidydmal mass removal, benign.        Social History:    Previous worked as [employer].    Now consulting part-time.    , lives with wife.    Non-smoker. Occasional drink.         family history includes Lung Cancer in his father; Parkinsonism in his cousin and maternal aunt.            retired     Mental Status Examination     Kwame presented as oriented X3 with speech and language intact.  The patient was cooperative throughout the evaluation with no signs of hallucinations, delusions, loosening of associations or other  thought disturbance.  Mood was normal Affect was congruent with mood. Insight and judgement were intact.  Memory appeared intact for recent and remote elements.  There was no report of suicidal ideation, intention or plan. Attention and concentration were within normal.        Arpan Mccabe PsyD, LP, [facility]  Diplomate, Behavioral Sleep Medicine  [facility]      Copy:   Renetta Hooks MD  [address] [place] [id number]    Note: This dictation was created using voice recognition software. This document may contain an error not identified before finalizing the document. If the error changes the accuracy of the document, I would appreciate it being brought to my attention.

## 2022-07-06 DIAGNOSIS — G47.52 RBD (REM BEHAVIORAL DISORDER): ICD-10-CM

## 2022-07-06 DIAGNOSIS — G20.A1 PARKINSON'S DISEASE (H): ICD-10-CM

## 2022-07-06 NOTE — TELEPHONE ENCOUNTER
Rx Authorization:  Requested Medication/ Dose clonazePAM (KLONOPIN) 0.5 MG tablet  Date last refill ordered: 6/28/22  Quantity ordered: 12/8/21  # refills: 90  Date of last clinic visit with ordering provider: 6/28/22  Date of next clinic visit with ordering provider: 9/29/22  All pertinent protocol data (lab date/result):   Include pertinent information from patients message:

## 2022-07-06 NOTE — PATIENT INSTRUCTIONS
1.  Initiate brief behavioral therapy for insomnia focusing on reducing time in bed from 11 hours to a sleep window of 8 hours and a sleep schedule of the 1 AM-9 AM.    2.  Ensure patient gets exposure to bright ambient light in the morning for 30-60 minutes    3.  Establish a relaxing bedtime routine and avoid going to bed until feeling sleepy.    4.  Prepare PAP therapy equipment at bedside and place mask on before reclining in the bed.    5.  Continue to use RBD strap for safety.    6.  Follow physician's recommendations around use of gabapentin for restless legs and clonazepam for RBD.

## 2022-07-08 RX ORDER — CLONAZEPAM 0.5 MG/1
TABLET ORAL
Qty: 90 TABLET | Refills: 1 | Status: SHIPPED | OUTPATIENT
Start: 2022-07-08 | End: 2023-01-25

## 2022-07-11 ENCOUNTER — HOSPITAL ENCOUNTER (OUTPATIENT)
Dept: PHYSICAL THERAPY | Facility: CLINIC | Age: 67
Discharge: HOME | End: 2022-07-11
Payer: COMMERCIAL

## 2022-07-11 DIAGNOSIS — Z74.09 IMPAIRED FUNCTIONAL MOBILITY, BALANCE, GAIT, AND ENDURANCE: ICD-10-CM

## 2022-07-11 DIAGNOSIS — G20.A1 PARKINSON DISEASE (H): Primary | ICD-10-CM

## 2022-07-11 PROCEDURE — 97110 THERAPEUTIC EXERCISES: CPT | Mod: GP | Performed by: PHYSICAL THERAPIST

## 2022-07-11 PROCEDURE — 97750 PHYSICAL PERFORMANCE TEST: CPT | Mod: GP | Performed by: PHYSICAL THERAPIST

## 2022-07-11 NOTE — PROGRESS NOTES
[facility]    OUTPATIENT PHYSICAL THERAPY  PLAN OF TREATMENT FOR OUTPATIENT REHABILITATION AND PROGRESS NOTE           Patient's Last Name, First Name, Kwame Figueroa Date of Birth  1955   Provider's Name  [facility] Medical Record No.  [id number]    Onset Date  5/9/22 Start of Care Date  5/17/22   Type:     _X_PT   ___OT   ___SLP Medical Diagnosis  Parkinson's Disease   PT Diagnosis  Impaired mobility Plan of Treatment  Frequency/Duration: 1x/wk to every other week  Certification date from 7/11/2022  to 9/8/2022     Goals:  Goal Identifier Gait speed   Goal Description The pt will improve self selected gait speed to >1.2 mph in order to improve community mobility and ease with crossing streets   Target Date 08/14/22   Date Met  07/11/22   Progress (detail required for progress note): Pt progressed from 1.06 m/s to 1.21 m/s for self selected gait speed     Goal Identifier FGA   Goal Description The pt will improve score to >25/30 on the FGA due to improvements in dynamic balance, thereby improving his ability to play with grandchildren   Target Date 08/14/22   Date Met  07/11/22   Progress (detail required for progress note): MET - Pt progressed from 23/30 to 27/30.  He also notes improved balance confidence     Goal Identifier 30 sec sit to stand   Goal Description The pt will improve 30 sec sit to stand to 14 reps, thereby improving ability to transfer from a variety of surfaces   Target Date 09/09/22   Date Met      Progress (detail required for progress note): PROGRESSING: Pt improved by 1 rep and still struggles with initial standing balance/stability     Goal Identifier HEP   Goal Description The pt will be independent with a HEP in order to improve ability to self manage symptoms   Target Date 09/09/22   Date Met      Progress (detail required for  "progress note):       Goal Identifier Outdoor walking   Goal Description Pt will be able to walk outside on uneven surfaces >600' without LOB   Target Date 09/09/22   Date Met      Progress (detail required for progress note):       Beginning/End Dates of Progress Note Reporting Period:  5/17/22 to 7/11/2022    Progress Toward Goals:   Progress this reporting period: Patient is making progress towards goals. His balance and gait speed have improved and he has gained more balance confidence.  Walking outside on uneven surfaces and with slopes is challenging and will be the focus of upcoming sessions    Client Self (Subjective) Report for Progress Note Reporting Period: Pt reports that his movements have improved since his recent PD med change    Objective Measurements:   Objective Measure: 30\" Chair Stand  Details: 13 reps  Objective Measure: Gait speed  Details: 1.21 m/s  Objective Measure: FGA  Details: 27/30            I CERTIFY THE NEED FOR THESE SERVICES FURNISHED UNDER        THIS PLAN OF TREATMENT AND WHILE UNDER MY CARE     (Physician co-signature of this document indicates review and certification of the therapy plan).                Referring Provider: Walter Worrell MD Jamie Engebretson, PT    "

## 2022-07-11 NOTE — PROGRESS NOTES
07/11/22 1500   Signing Clinician's Name / Credentials   Signing clinician's name / credentials Pramod De La Cruz, PT, DPT, NCS   Functional Gait Assessment (LACHELLE Mena, RENEA Bermudez, et al. (2004))   1. GAIT LEVEL SURFACE 3  (4.7 sec)   2. CHANGE IN GAIT SPEED 3   3. GAIT WITH HORIZONTAL HEAD TURNS 3   4. GAIT WITH VERTICAL HEAD TURNS 3   5. GAIT AND PIVOT TURN 3   6. STEP OVER OBSTACLE 2   7. GAIT WITH NARROW BASE OF SUPPORT 2  (8 steps)   8. GAIT WITH EYES CLOSED 2  (7.53)   9. AMBULATING BACKWARDS 3  (7.19)   10. STEPS 3   Total Functional Gait Assessment Score   TOTAL SCORE: (MAXIMUM SCORE 30) 27     Functional Gait Assessment (FGA): The FGA assesses postural stability during various walking tasks.   Gait assistive device used: None    Scores of <22 /30 have been correlated with predicting falls in community-dwelling older adults according to Rajesh & Fernie 2010.   Scores of <18 /30 have been correlated with increased risk for falls in patients with Parkinsons Disease according to Josue Espinosa Zhou et al 2014.  Minimal Detectable Change for patients with acute/chronic stroke = 4.2 according to Thihelen & Ritschel 2009  Minimal Detectable Change for patients with vestibular disorder = 8 according to Rajesh & Fernie 2010    Assessment (rationale for performing, application to patient s function & care plan): Pt improved from 23/30 to 27/30. He is more confident with his balance now. Still has imbalance with obstacles, tandem walking and backwards walking    (Minutes billed as physical performance test):  15    GAIT SPEED  Gait speed was 1.21 meters per second, indicating decreased speed and pt at risk for falls (Reference scale: > 1.2 m/sec: independent in all activities and crossing street, 0.8-1.0 m/sec: community ambulator, household activities.  May need intervention to reduce falls risk, 0.6-0.8 m/sec: performs self care, limited community ambulator. May need intervention to reduce falls risk, <0.6  m/sec: dependent in ADLs, household ambulator.  Needs intervention to reduce falls risk)    Norm 1.2 to 1.4 meters/sec for 20-70 year-old  Minimal Detectable Change for preferred gait (PD) = 0.18 meters/sec &   Minimal Detectable Change for fast gait (PD) = 0.25 meters/sec according to [place] & [place] 2008    Assessment (rationale for performing, application to patient s function & care plan): Pt improved from 1.06 to 1.21 m/s which now places him in the norm for his age and gender.    (Minutes billed as physical performance test): 5

## 2022-08-15 ENCOUNTER — HOSPITAL ENCOUNTER (OUTPATIENT)
Dept: PHYSICAL THERAPY | Facility: CLINIC | Age: 67
Discharge: HOME | End: 2022-08-15
Payer: COMMERCIAL

## 2022-08-15 DIAGNOSIS — G20.A1 PARKINSON DISEASE (H): Primary | ICD-10-CM

## 2022-08-15 DIAGNOSIS — Z74.09 IMPAIRED FUNCTIONAL MOBILITY, BALANCE, GAIT, AND ENDURANCE: ICD-10-CM

## 2022-08-15 PROCEDURE — 97112 NEUROMUSCULAR REEDUCATION: CPT | Mod: GP | Performed by: PHYSICAL THERAPIST

## 2022-08-15 PROCEDURE — 97110 THERAPEUTIC EXERCISES: CPT | Mod: GP | Performed by: PHYSICAL THERAPIST

## 2022-08-19 ENCOUNTER — VIRTUAL VISIT (OUTPATIENT)
Dept: SLEEP MEDICINE | Facility: CLINIC | Age: 67
End: 2022-08-19
Payer: COMMERCIAL

## 2022-08-19 VITALS — HEIGHT: 69 IN | BODY MASS INDEX: 29.62 KG/M2 | WEIGHT: 200 LBS

## 2022-08-19 DIAGNOSIS — F51.04 CHRONIC INSOMNIA: Primary | ICD-10-CM

## 2022-08-19 DIAGNOSIS — G47.33 OSA (OBSTRUCTIVE SLEEP APNEA): ICD-10-CM

## 2022-08-19 DIAGNOSIS — G25.81 RESTLESS LEG SYNDROME: ICD-10-CM

## 2022-08-19 PROCEDURE — 90832 PSYTX W PT 30 MINUTES: CPT | Mod: 95 | Performed by: PSYCHOLOGIST

## 2022-08-19 ASSESSMENT — SLEEP AND FATIGUE QUESTIONNAIRES
HOW LIKELY ARE YOU TO NOD OFF OR FALL ASLEEP WHILE WATCHING TV: SLIGHT CHANCE OF DOZING
HOW LIKELY ARE YOU TO NOD OFF OR FALL ASLEEP WHILE SITTING INACTIVE IN A PUBLIC PLACE: WOULD NEVER DOZE
HOW LIKELY ARE YOU TO NOD OFF OR FALL ASLEEP WHEN YOU ARE A PASSENGER IN A CAR FOR AN HOUR WITHOUT A BREAK: SLIGHT CHANCE OF DOZING
HOW LIKELY ARE YOU TO NOD OFF OR FALL ASLEEP IN A CAR, WHILE STOPPED FOR A FEW MINUTES IN TRAFFIC: WOULD NEVER DOZE
HOW LIKELY ARE YOU TO NOD OFF OR FALL ASLEEP WHILE LYING DOWN TO REST IN THE AFTERNOON WHEN CIRCUMSTANCES PERMIT: HIGH CHANCE OF DOZING
HOW LIKELY ARE YOU TO NOD OFF OR FALL ASLEEP WHILE SITTING QUIETLY AFTER LUNCH WITHOUT ALCOHOL: SLIGHT CHANCE OF DOZING
HOW LIKELY ARE YOU TO NOD OFF OR FALL ASLEEP WHILE SITTING AND TALKING TO SOMEONE: WOULD NEVER DOZE
HOW LIKELY ARE YOU TO NOD OFF OR FALL ASLEEP WHILE SITTING AND READING: SLIGHT CHANCE OF DOZING

## 2022-08-19 NOTE — PROGRESS NOTES
Kwame is a 67 year old who is being evaluated via a billable video visit.      How would you like to obtain your AVS? MyChart  If the video visit is dropped, the invitation should be resent by: Send to e-mail at: [email or web address]  Will anyone else be joining your video visit? Yes: wife Marylin. How would they like to receive their invitation? Text to cell phone: [phone]  Carlene Cochran        Video-Visit Details    Video Start Time: 10:03 AM    Type of service:  Video Visit    Video End Time:10:35 AM    Originating Location (pt. Location): Home    Distant Location (provider location):  [facility]     Platform used for Video Visit: [facility]     SLEEP [facility] VIRTUAL VIDEO FOLLOW-UP VISIT  Sleep Psychology    Patient Name: Kwame Lin  MRN:  [id number]  Date of Service: Aug 19, 2022       Subjective Report     Kwame Lin  returns for a telehealth video visit to discuss progress in implementing behavioral strategies for the management of insomnia and Managing sleep hygiene associated with REM behavior disorder and Parkinson's disease.  Patient consent for initiation of video visit was obtained and documented prior to initiation of visit.     Kwame reports that recommendations were helpful at first.  However he has become more hungrier at night and wonders if the DBS was involved.    Patient reports that his sleep schedule plan is working well and that he is able to get up by 9 AM and is spending no more than 8 hours in bed.  Patient does report that he is now taking a snack in the evening.  Discussed concerns about getting out of bed and going to the kitchen, specifically an increased risk of falling.    Discussed his desire to have side cushions in his bed and explored how to set up his bed in such a way that it functions more as a date bed with side cushions.  He feels that this will be more reassuring and comfortable, particularly when he experiences increased restless leg  "symptoms and REM behavior.  He will also continue to use his night strap.    .     .     Sleep Data:     Source of Sleep Estimates:  Verbal Self-report    Average Time in Bed: Approximately 8 hours  Average Total Sleep Time: Improved at approximately 7 hrs  Sleep Efficiency: 80-85%    EPWORTH SLEEPINESS SCALE    Sitting and reading 1   Watching TV 1   Sitting, inactive in a public place (theatre or mtg.) 0    As a passenger in a car 1   Lying down to rest in the afternoon when circumstance permit 3   Sitting and talking to someone 0   Sitting quietly after lunch without alcohol 1   In a car, while stopped for a few minutes in traffic 0   TOTAL SCORE (nl <11) 7     INSOMNIA SEVERITY INDEX     Difficulty falling asleep 3   Difficult staying asleep 0   Problems waking up to early 0   How SATISFIED/DISSATISFIED are you with your CURRENT sleep pattern? 2   How NOTICEABLE to others do you think your sleep pattern is in terms of your quality of life? 2   How WORRIED/DISTRESSED are you about your current sleep pattern? 3   To what extent do you consider your sleep problem to INTERFERE with your daily fuctioning(e.g. daytime fatigue, mood, ability to function at work/daily chores, concentration, mood,etc.) CURRENTLY? 3   INSOMNIA SEVERITY INDEX TOTAL SCORE 13    Absence of insomnia (0-7); sub-threshold insomnia (8-14); moderate insomnia (15-21); and severe insomnia (22-28)       Interventions     Strategies and recommendations including Behavioral strategies to manage REM behavior disorder and insomnia with delayed sleep phase. were discussed today.       Vital Signs     Ht 1.753 m (5' 9\")   Wt 90.7 kg (200 lb)   BMI 29.53 kg/m       Mental Status     Orientation:  X3  Mood:  normal  Affect:  Congruent with mood  Speech/Language:  Normal  Thought Process: Intact  Associations:  Normal  Thought Content: Normal  Patient does not report any suicidal ideation, intention or plan.    Diagnostic Impressions and Plan      "   Chronic insomnia  Restless leg syndrome  DIPIKA (obstructive sleep apnea)    Patient reports improved adherence to use of CPAP for obstructive sleep apnea.  He is generally adhering to his recommended sleep routine of a 1 AM-9 AM sleep schedule and is finding it helpful.  He is having challenges around preference for his sleep space and need to change up comfort l level in his bed.    Plan:  Continue with current sleep schedule of 1 AM-9 AM.  Continue to sleep with it is only bedroom and avoid sleeping on the couch.  Set up side bolsters and head bolsters on his bed for more comfortable sleep.  Continue to use his REM behavior strap at night to prevent falling out of bed.  Continue to follow with his neurologist on treatment of Parkinson's disease and restless legs.  Evening snack before bed is okay but recommended that he talk with his primary care physician around balancing out the calories so that he does not get an increase in overall daily caloric intake.    Follow-up: 6 weeks      Arpan Mccabe PsyD, DASHA, DBSM  Diplomate, Behavioral Sleep Medicine  [facility]      Note: This dictation was created using voice recognition software. This document may contain an error not identified before finalizing the document. If the error changes the accuracy of the document, I would appreciate it being brought to my attention.

## 2022-08-30 ENCOUNTER — TELEPHONE (OUTPATIENT)
Dept: NEUROLOGY | Facility: CLINIC | Age: 67
End: 2022-08-30

## 2022-08-30 NOTE — TELEPHONE ENCOUNTER
The patient was called to see if would like to reschedule his 9/29/22 appointment with the movement fellow, to see Dr. Watson on her new schedule. The patient stated yes and was rescheduled to see Dr. Watson on 10/3 at 10 AM.

## 2022-08-31 ENCOUNTER — HOSPITAL ENCOUNTER (OUTPATIENT)
Dept: PHYSICAL THERAPY | Facility: CLINIC | Age: 67
Discharge: HOME | End: 2022-08-31
Payer: COMMERCIAL

## 2022-08-31 DIAGNOSIS — Z74.09 IMPAIRED FUNCTIONAL MOBILITY, BALANCE, GAIT, AND ENDURANCE: ICD-10-CM

## 2022-08-31 DIAGNOSIS — G20.A1 PARKINSON DISEASE (H): Primary | ICD-10-CM

## 2022-08-31 PROCEDURE — 97110 THERAPEUTIC EXERCISES: CPT | Mod: GP | Performed by: PHYSICAL THERAPIST

## 2022-08-31 NOTE — PROGRESS NOTES
"[facility]    Outpatient Physical Therapy Discharge Note  Patient: Kwame Lin  : 1955    Beginning/End Dates of Reporting Period:  22 to 2022    Referring Provider: Aravind Norman MD    Therapy Diagnosis: impaired mobility     Client Self Report: Pt reports new right calf pain and may be from climbing the stairs a lot over the weekend.  Overall he feels likes    Objective Measurements:  Objective Measure: 30\" Chair Stand  Details: 18 reps; no LOB this date  Objective Measure: Gait speed  Details: 1.21 m/s  Objective Measure: FGA  Details:     Goals:  Goal Identifier Gait speed   Goal Description The pt will improve self selected gait speed to >1.2 mph in order to improve community mobility and ease with crossing streets   Target Date 22   Date Met  22   Progress (detail required for progress note): Pt progressed from 1.06 m/s to 1.21 m/s for self selected gait speed     Goal Identifier FGA   Goal Description The pt will improve score to >25/30 on the FGA due to improvements in dynamic balance, thereby improving his ability to play with grandchildren   Target Date 22   Date Met  22   Progress (detail required for progress note): MET - Pt progressed from  to .  He also notes improved balance confidence     Goal Identifier 30 sec sit to stand   Goal Description The pt will improve 30 sec sit to stand to 14 reps, thereby improving ability to transfer from a variety of surfaces   Target Date 22   Date Met  22   Progress (detail required for progress note): MET - Pt progressed from 13 reps to 18 reps and demonstrates/reports improved strength with daily functional activities.     Goal Identifier HEP   Goal Description The pt will be independent with a HEP in order to improve ability to self manage symptoms   Target Date 22   Date Met  " 08/31/22   Progress (detail required for progress note): MET - Pt is independent with his individualized HEP     Goal Identifier Outdoor walking   Goal Description Pt will be able to walk outside on uneven surfaces >600' without LOB   Target Date 09/09/22   Date Met  08/31/22   Progress (detail required for progress note): MET - Pt reports increased ease of walking with his wife outside     Plan:  Discharge from therapy.    Discharge:    Reason for Discharge: Patient has met all goals.    Equipment Issued: none    Discharge Plan: Patient to continue home program.  Recommend pt return to PT if he experiences a decline in function due to the progressive nature of PD

## 2022-09-06 DIAGNOSIS — G25.81 RESTLESS LEG SYNDROME: ICD-10-CM

## 2022-09-06 RX ORDER — GABAPENTIN 100 MG/1
CAPSULE ORAL
Qty: 90 CAPSULE | Refills: 1 | Status: SHIPPED | OUTPATIENT
Start: 2022-09-06 | End: 2024-03-19

## 2022-09-17 ENCOUNTER — HEALTH MAINTENANCE LETTER (OUTPATIENT)
Age: 67
End: 2022-09-17

## 2022-09-27 NOTE — PROGRESS NOTES
"Department of Neurology  Movement Disorders Division   DBS Follow-up Note    Patient: Kwame Lin  MRN: [id number]   : 1955   Date of Visit: 10/3/2022    Diagnosis: Parkinson disease  DBS Target(s): Bilateral STN   Date(s) of DBS Lead Placement: Left 2017, R 2018  Date(s) of IPG Placement: L chest 2017  Device: [employer] [place]      Chief Complaint:  Kwame Lin is a 67 year old male who returns to clinic for follow up of PD status post bilateral STN DBS. He was last seen 2022 at which time entacapone was added for wearing off and he was given a referral for CBT-insomnia.    Interval History:  He is doing well.  He completed PT and they have started to walk distances together.  Now doing SLP with the [employer] over Zoom.  This has also been helpful.  Entacapone has helped with his wearing off.  Doesn't get wearing off if he takes his doses on time.    Worked with the sleep psychologist.  Has worked on a stable sleep schedule.    IPG is charging well.      Parkinson Disease Motor Symptom Review:  Dyskinesia: improved  Wearing off:  Improved on entacapone  Freezing of gait: denies  Dystonia: improved  Tremor: denies  Rigidity: leg soreness if dose is late  Bradykinesia: focusing on his stride length     Parkinson's Disease Non-motor Symptom Review:  Mood - \"really good\"  Cognitive impairment - stable, doing Wordle  Sleep disturbances - improved, vivid nightmares have improved, uses a strap for dream enactment  GI symptoms - constipation managed with diet  Urinary symptoms - denies   Balance - pretty good, no falls  Pain - started a joint support pill which has been very helpful  Autonomic dysfunction - denies lightheadedness, trying to stay hydrated  Hallucinations - denies  ICDs - denies      UPDRS Part II  2.1 Speech (P) 2 (P) Mild: My speech causes people to ask me to occasionally repeat myself, but not everyday.   2.2 Saliva and drooling (P) 0 (P) " Normal: Not at all (no problems).   2.3 Chewing and swallowing (P) 0 (P) Normal: No problems.   2.4 Eating tasks (P) 0 (P) Normal: Not at all (no problems).   2.5 Dressing (P) 0 (P) Normal: Not at all (no problems).   2.6 Hygiene (P) 0 (P) Normal: Not at all (no problems).   2.7 Handwriting (P) 1 (P) Slight: My writing is slow, clumsy or uneven, but all words are clear.   2.8 Doing hobbies and other activities (P) 2 (P) Mild: I have some difficulty doing these activities.   2.9 Turning in bed (P) 0 (P) Normal: Not at all (no problems).   2.10 Tremor (P) 0 (P) Normal: Not at all (no problems).   2.11 Getting out of bed  (P) 0 (P) Normal: Not at all (no problems).   2.12 Walking and balance  (P) 1 (P) Slight: I am slightly slow or may drag a leg.  I never use a walking aid.   2.13 Freezing (P) 0 (P) Normal: Not at all (no problems).   Total (P) 6    Prior: 6 on 6/27/2022      Relevant Medications  8 am 12 pm 4 pm 8 pm 11 pm   Sinemet 25/100mg  1.5 1.5 1.5 1.5 1.5   Entacapone 200mg 1 1 1 1 1   Clonazepam 0.5mg         1   Paroxetine 10mg 1           Gabapentin 100mg          1   Last taken: 8am      Medications:  Current Outpatient Medications   Medication Sig Dispense Refill     acetaminophen (TYLENOL) 325 MG tablet Take 325-650 mg by mouth nightly as needed for mild pain       atorvastatin (LIPITOR) 20 MG tablet        carbidopa-levodopa (SINEMET)  MG tablet Take 1.5 tab every 4 hrs 5 times a day ( 8 am, noon, 4 pm, 8 pm, 11 pm). 675 tablet 3     cholecalciferol (VITAMIN D3) 1250 mcg (23204 units) capsule Take 1 capsule by mouth daily       clindamycin (CLEOCIN) 300 MG capsule TAKE 2 CAPSULES BY MOUTH FOR 1 DOSE 1 HOUR PRIOR TO DENTAL APPOINTMENTS       clonazePAM (KLONOPIN) 0.5 MG tablet TAKE 1/2 TO 1 TABLET BY MOUTH AT BEDTIME TO HELP WITH DREAM ENACTMENT 90 tablet 1     entacapone (COMTAN) 200 MG tablet Take 1 tablet (200 mg) by mouth 5 times daily 150 tablet 11     gabapentin (NEURONTIN) 100 MG capsule  Take 100 mg by mouth daily       gabapentin (NEURONTIN) 100 MG capsule Take 1 capsule by mouth before bed. 90 capsule 1     multivitamin w/minerals (THERA-VIT-M) tablet Take 1 tablet by mouth every morning       PARoxetine (PAXIL) 10 MG tablet Take 10 mg by mouth daily       sildenafil (REVATIO) 20 MG tablet Take 20 mg by mouth       thyroid (ARMOUR) 60 MG tablet Take 60 mg by mouth every morning           Review of Systems:  Other than that noted at the end of this note, the remainder of 12 systems reviewed were negative.    Allergies: is allergic to no clinical screening - see comments, bactrim, codeine, ketorolac, morphine, nalbuphine, penicillins, seasonal allergies, sulfa drugs, toradol, and nsaids.    Past Medical History:  Past Medical History:   Diagnosis Date     Anosmia 10/12/2017     Anxiety      Depressed mood 10/12/2017     Hypothyroid      Parkinson disease (H)      Parkinsons disease (H)      PONV (postoperative nausea and vomiting)      RBD (REM behavioral disorder) 10/12/2017       Past Surgical History:  Past Surgical History:   Procedure Laterality Date     ARTHROSCOPY KNEE Right     twice     ARTHROSCOPY KNEE Right     left     epidydmal mass removal, benign       IMPLANT DEEP BRAIN STIMULATION GENERATOR / BATTERY Left 12/22/2017    Procedure: IMPLANT DEEP BRAIN STIMULATION GENERATOR / BATTERY;  Deep Brain Stimulator Placement, Phase II, Placement Of Deep Brain Stimulator Generator/Battery Over The Left Chest Wall;  Surgeon: Arpan Huynh MD;  Location: [facility] OR     OPTICAL TRACKING SYSTEM INSERTION DEEP BRAIN STIMULATION Left 12/14/2017    Procedure: OPTICAL TRACKING SYSTEM INSERTION DEEP BRAIN STIMULATION;  Stealth Assisted Left Deep Brain Stimulator Placement, Phase I, Placement Of Left Side Deep Brain Stimulator Electrode, Target Left Subthalamic Nucleus With Microelectrode Recording;  Surgeon: Arpan Huynh MD;  Location: [facility] OR     OPTICAL TRACKING SYSTEM INSERTION DEEP  BRAIN STIMULATION Right 5/22/2018    Procedure: OPTICAL TRACKING SYSTEM INSERTION DEEP BRAIN STIMULATION;  Stealth Assisted Right Deep Brain Stimulator Placement, Phase I And II Combined, Placement Of Right Deep Brain Stimulator Electrode, Target Right Subthalamic Nucleus With Microelectrode Recording And Connection To Previous Implanted Generator/Battery;  Surgeon: Arpan Huynh MD;  Location: [facility] OR     ORTHOPEDIC SURGERY Left 2018    knee replacement       Social History:  Social History     Socioeconomic History     Marital status:    Occupational History     Occupation: Small business start consultant   Tobacco Use     Smoking status: Never Smoker     Smokeless tobacco: Never Used   Substance and Sexual Activity     Alcohol use: Yes     Alcohol/week: 1.0 - 2.0 standard drink     Types: 1 - 2 Cans of beer per week     Comment: MONTHLY     Drug use: No     Sexual activity: Yes     Partners: Female   Social History Narrative    Previous worked as [employer].    Now consulting part-time.    , lives with wife.    Non-smoker. Occasional drink.        Past surgical history that includes epidydmal mass removal, benign.        Social History:    Previous worked as [employer].    Now consulting part-time.    , lives with wife.    Non-smoker. Occasional drink.         family history includes Lung Cancer in his father; Parkinsonism in his cousin and maternal aunt.            Family History:  Family History   Problem Relation Age of Onset     Lung Cancer Father      Parkinsonism Cousin         paternal side     Parkinsonism Maternal Aunt          Physical Exam:  The patient's  weight is 97.1 kg (214 lb). His blood pressure is 114/72 and his pulse is 79. His oxygen saturation is 96%.       Neurological Examination:   Last medication dose: 8am  UPDRS Values 10/3/2022   Time: 10:34 AM   Medication On   R Brain  DBS: On   L Brain DBS: On   Dyskinesia (LID) Yes   Did LID interfere No   Speech 1   Facial Expression 0   Rigidity Neck 1   Rigidity RUE 1   Rigidity LUE 1   Rigidity RLE 0   Rigidity LLE 0   Finger Taps R 0   Finger Taps L 1   Hand Mvt R 1   Hand Mvt L 0   Pron-/Supinate R 0   Pron-/Supinate L 0   Toe Tap R 1   Toe Tap L 2   Leg Agility R 0   Leg Agility L 1   Arise From Chair 1   Gait 1   Gait Freezing 0   Postural Stability 0   Posture 0   Global Spont Mvt 0   Postural Tremor RUE 0   Postural Tremor LUE 1   Kinetic Tremor RUE 0   Kinetic Tremor LUE 1   Rest Tremor RUE 0   Rest Tremor LUE 0   Rest Tremor RLE 0   Rest Tremor LLE 0   Rest Tremor Lip/Jaw 0   Rest Tremor Constancy 0   Total Right 3   Total Left 7   Axial Total 4   Total 14   Prior: 14 on 6/28/2022        Procedure: DBS Interrogation & Programming  Lead(s):    Left Right   DBS Target STN STN   DBS Lead Type 8 ring contacts 8 ring contacts   Lead Implant Date 12/14/2017 5/22/2018      IPG(s):    1   IPG Vercise   IPG Implant Date 12/22/2017   Location Left chest   Battery (V) 3.65V (3.91V)     Impedance Check: No problems found. See scanned report for impedance details.    1-Moose7 Left Brain         Right Brain       Initial Final Initial Final     Inactive Inactive Inactive Inactive   Amplitude (mA) 5.0 [2-5.5] No change 3.3 [2.5-4.0] No change   Pulse width (usec) 60   60     Freq (Hz) 139   139     Contacts: C+3- (55%), 7- (45%)   C+11-(60%), 14- (40%)        3-Moose 6 Left Brain         Right Brain       Initial Final Initial Final     Active Active Active Active   Amplitude (mA) 5.0 [2-5.5] 5.0 [2-5.5] 3.3 [2.5-4.0] 3.3 [2.5-4.0]   Pulse width (usec) 60 60 60 60   Freq (Hz) 139 139 139 139   Contacts: C+3- (55%), 6- (45%) C+3- (55%), 6- (45%) C+11-(60%), 14- (40%) C+11-(60%), 14- (40%)      4-Bear Left Brain         Right Brain       Initial Final Initial Final     Inactive Inactive Inactive Inactive   Amplitude (mA) 2.8 [2-3.3] No change 3.3  [2.5-4.0] No change   Pulse width (usec) 60   60     Freq (Hz) 139   139     Contacts: C+3-   C+11-(60%), 14- (40%)               Assessment/Plan:  Kwame Lin is a 67 year old male who returns to clinic for follow up of PD status post bilateral STN DBS.  He is doing well.  No medication changes or DBS changes today.    - RTC 6 months, 1 hr DBS programming      Anaid Watson MD    Movement Disorders Division  Department of Neurology       29 minutes spent on the date of the encounter doing chart review, history and exam, documentation and further activities as noted above.     Additional time spent for separate DBS programming: 3 min DBS analyzed without reprogramming.

## 2022-10-02 ASSESSMENT — MOVEMENT DISORDERS SOCIETY - UNIFIED PARKINSONS DISEASE RATING SCALE (MDS-UPDRS)
TURNING_IN_BED: NORMAL: NOT AT ALL (NO PROBLEMS).
TOTAL_SCORE: 6
GETTING_OUT_OF_BED_CAR_DEEP_CHAIR: NORMAL: NOT AT ALL (NO PROBLEMS).
WALKING_AND_BALANCE: SLIGHT: I AM SLIGHTLY SLOW OR MAY DRAG A LEG.  I NEVER USE A WALKING AID.
EATING_TASKS: NORMAL: NOT AT ALL (NO PROBLEMS).
HYGIENE: NORMAL: NOT AT ALL (NO PROBLEMS).
CHEWING_AND_SWALLOWING: NORMAL: NO PROBLEMS.
HOBBIES_AND_OTHER_ACTIVITIES: MILD: I HAVE SOME DIFFICULTY DOING THESE ACTIVITIES.
HANDWRITING: SLIGHT: MY WRITING IS SLOW, CLUMSY OR UNEVEN, BUT ALL WORDS ARE CLEAR.
SPEECH: MILD: MY SPEECH CAUSES PEOPLE TO ASK ME TO OCCASIONALLY REPEAT MYSELF, BUT NOT EVERYDAY.
FREEZING: NORMAL: NOT AT ALL (NO PROBLEMS).
TREMOR: NORMAL: NOT AT ALL (NO PROBLEMS).
SALIVA_AND_DROOLING: NORMAL: NOT AT ALL (NO PROBLEMS).
DRESSING: NORMAL: NOT AT ALL (NO PROBLEMS).

## 2022-10-03 ENCOUNTER — OFFICE VISIT (OUTPATIENT)
Dept: NEUROLOGY | Facility: CLINIC | Age: 67
End: 2022-10-03
Payer: COMMERCIAL

## 2022-10-03 VITALS
SYSTOLIC BLOOD PRESSURE: 114 MMHG | HEART RATE: 79 BPM | BODY MASS INDEX: 31.6 KG/M2 | DIASTOLIC BLOOD PRESSURE: 72 MMHG | OXYGEN SATURATION: 96 % | WEIGHT: 214 LBS

## 2022-10-03 DIAGNOSIS — Z96.89 S/P DEEP BRAIN STIMULATOR PLACEMENT: Primary | ICD-10-CM

## 2022-10-03 DIAGNOSIS — G20.A1 PARKINSON'S DISEASE (H): ICD-10-CM

## 2022-10-03 PROCEDURE — 95970 ALYS NPGT W/O PRGRMG: CPT | Performed by: STUDENT IN AN ORGANIZED HEALTH CARE EDUCATION/TRAINING PROGRAM

## 2022-10-03 PROCEDURE — 99213 OFFICE O/P EST LOW 20 MIN: CPT | Mod: 25 | Performed by: STUDENT IN AN ORGANIZED HEALTH CARE EDUCATION/TRAINING PROGRAM

## 2022-10-03 RX ORDER — GABAPENTIN 100 MG/1
100 CAPSULE ORAL DAILY
COMMUNITY
Start: 2022-09-08 | End: 2023-04-03

## 2022-10-03 RX ORDER — CHOLECALCIFEROL (VITAMIN D3) 1250 MCG
1 CAPSULE ORAL DAILY
COMMUNITY
Start: 2022-09-21

## 2022-10-03 ASSESSMENT — UNIFIED PARKINSONS DISEASE RATING SCALE (UPDRS)
AMPLITUDE_LIP_JAW: NORMAL: NO TREMOR.
RIGIDITY_NECK: SLIGHT: RIGIDITY ONLY DETECTED WITH ACTIVATION MANEUVER.
LEG_AGILITY_RIGHT: NORMAL
GAIT: SLIGHT: INDEPENDENT WALKING WITH MINOR GAIT IMPAIRMENT.
CONSTANCY_TREMOR_ATREST: NORMAL: NO TREMOR.
FINGER_TAPPING_LEFT: SLIGHT: ANY OF THE FOLLOWING: A) THE REGULAR RHYTHM IS BROKEN WITH ONE WITH ONE OR TWO INTERRUPTIONS OR HESITATIONS OF THE MOVEMENT B) SLIGHT SLOWING C) THE AMPLITUDE DECREMENTS NEAR THE END OF THE 10 MOVEMENTS.
MOVEMENTS_INTERFERE_WITH_RATINGS: NO
RIGIDITY_RUE: SLIGHT: RIGIDITY ONLY DETECTED WITH ACTIVATION MANEUVER.
AMPLITUDE_LLE: NORMAL: NO TREMOR.
RIGIDITY_LLE: NORMAL
HANDMOVEMENTS_RIGHT: SLIGHT: ANY OF THE FOLLOWING: A) THE REGULAR RHYTHM IS BROKEN WITH ONE WITH ONE OR TWO INTERRUPTIONS OR HESITATIONS OF THE MOVEMENT B) SLIGHT SLOWING C) THE AMPLITUDE DECREMENTS NEAR THE END OF THE 10 MOVEMENTS.
PARKINSONS_MEDS: ON
LEG_AGILITY_LEFT: SLIGHT: ANY OF THE FOLLOWING: A) THE REGULAR RHYTHM IS BROKEN WITH ONE WITH ONE OR TWO INTERRUPTIONS OR HESITATIONS OF THE MOVEMENT B) SLIGHT SLOWING C) THE AMPLITUDE DECREMENTS NEAR THE END OF THE 10 MOVEMENTS.
FACIAL_EXPRESSION: NORMAL.
TOTAL_SCORE: 14
AXIAL_SCORE: 4
HANDMOVEMENTS_LEFT: NORMAL
TOTAL_SCORE: 3
TOETAPPING_RIGHT: SLIGHT: ANY OF THE FOLLOWING: A) THE REGULAR RHYTHM IS BROKEN WITH ONE WITH ONE OR TWO INTERRUPTIONS OR HESITATIONS OF THE MOVEMENT B) SLIGHT SLOWING C) THE AMPLITUDE DECREMENTS NEAR THE END OF THE 10 MOVEMENTS.
RIGIDITY_LUE: SLIGHT: RIGIDITY ONLY DETECTED WITH ACTIVATION MANEUVER.
AMPLITUDE_RLE: NORMAL: NO TREMOR.
SPEECH: SLIGHT: LOSS OF MODULATION, DICTION OR VOLUME, BUT STILL ALL WORDS EASY TO UNDERSTAND.
AMPLITUDE_RUE: NORMAL: NO TREMOR.
DYSKINESIAS_PRESENT: YES
AMPLITUDE_LUE: NORMAL: NO TREMOR.
FREEZING_GAIT: NORMAL
SPONTANEITY_OF_MOVEMENT: 0: NORMAL.  NO PROBLEMS.
POSTURE: 0 NORMAL, NO PROBLEMS
PRONATION_SUPINATION_LEFT: NORMAL
FINGER_TAPPING_RIGHT: NORMAL
RIGIDITY_RLE: NORMAL
POSTURAL_STABILITY: NORMAL:  RECOVERS WITH ONE OR TWO STEPS.
TOTAL_SCORE_LEFT: 7
PRONATION_SUPINATION_RIGHT: NORMAL
ARISING_CHAIR: SLIGHT: ARISING IS SLOWER THAN NORMAL, OR MAY NEED MORE THAN ONE ATTEMPT, OR MAY NEED TO MOVE FORWARD IN THE CHAIR TO ARISE.  NO NEED TO USE THE ARMS OF THE CHAIR.
TOETAPPING_LEFT: MILD: ANY OF THE FOLLOWING: A) 3 TO 5 INTERRUPTIONS DURING TAPPING B) MILD SLOWING C) THE AMPLITUDE DECREMENTS MIDWAY IN THE 10-MOVEMENT SEQUENCE

## 2022-10-03 NOTE — LETTER
"10/3/2022       RE: Kwame Lin  [address] [place] [facility] [address]     Dear Colleague,    Thank you for referring your patient, Kwame Lin, to the [facility]UROLOG[facility] [place] at [facility]. Please see a copy of my visit note below.    Department of Neurology  Movement Disorders Division   DBS Follow-up Note    Patient: Kwame Lin  MRN: [id number]   : 1955   Date of Visit: 10/3/2022    Diagnosis: Parkinson disease  DBS Target(s): Bilateral STN   Date(s) of DBS Lead Placement: Left 2017, R 2018  Date(s) of IPG Placement: L chest 2017  Device: Beyond Gaming      Chief Complaint:  Kwame Lin is a 67 year old male who returns to clinic for follow up of PD status post bilateral STN DBS. He was last seen 2022 at which time entacapone was added for wearing off and he was given a referral for CBT-insomnia.    Interval History:  He is doing well.  He completed PT and they have started to walk distances together.  Now doing SLP with the [employer] over Zoom.  This has also been helpful.  Entacapone has helped with his wearing off.  Doesn't get wearing off if he takes his doses on time.    Worked with the sleep psychologist.  Has worked on a stable sleep schedule.    IPG is charging well.      Parkinson Disease Motor Symptom Review:  Dyskinesia: improved  Wearing off:  Improved on entacapone  Freezing of gait: denies  Dystonia: improved  Tremor: denies  Rigidity: leg soreness if dose is late  Bradykinesia: focusing on his stride length     Parkinson's Disease Non-motor Symptom Review:  Mood - \"really good\"  Cognitive impairment - stable, doing Wordle  Sleep disturbances - improved, vivid nightmares have improved, uses a strap for dream enactment  GI symptoms - constipation managed with diet  Urinary symptoms - denies   Balance - pretty good, no falls  Pain - started a joint " support pill which has been very helpful  Autonomic dysfunction - denies lightheadedness, trying to stay hydrated  Hallucinations - denies  ICDs - denies      UPDRS Part II  2.1 Speech (P) 2 (P) Mild: My speech causes people to ask me to occasionally repeat myself, but not everyday.   2.2 Saliva and drooling (P) 0 (P) Normal: Not at all (no problems).   2.3 Chewing and swallowing (P) 0 (P) Normal: No problems.   2.4 Eating tasks (P) 0 (P) Normal: Not at all (no problems).   2.5 Dressing (P) 0 (P) Normal: Not at all (no problems).   2.6 Hygiene (P) 0 (P) Normal: Not at all (no problems).   2.7 Handwriting (P) 1 (P) Slight: My writing is slow, clumsy or uneven, but all words are clear.   2.8 Doing hobbies and other activities (P) 2 (P) Mild: I have some difficulty doing these activities.   2.9 Turning in bed (P) 0 (P) Normal: Not at all (no problems).   2.10 Tremor (P) 0 (P) Normal: Not at all (no problems).   2.11 Getting out of bed  (P) 0 (P) Normal: Not at all (no problems).   2.12 Walking and balance  (P) 1 (P) Slight: I am slightly slow or may drag a leg.  I never use a walking aid.   2.13 Freezing (P) 0 (P) Normal: Not at all (no problems).   Total (P) 6    Prior: 6 on 6/27/2022      Relevant Medications  8 am 12 pm 4 pm 8 pm 11 pm   Sinemet 25/100mg  1.5 1.5 1.5 1.5 1.5   Entacapone 200mg 1 1 1 1 1   Clonazepam 0.5mg         1   Paroxetine 10mg 1           Gabapentin 100mg          1   Last taken: 8am      Medications:  Current Outpatient Medications   Medication Sig Dispense Refill     acetaminophen (TYLENOL) 325 MG tablet Take 325-650 mg by mouth nightly as needed for mild pain       atorvastatin (LIPITOR) 20 MG tablet        carbidopa-levodopa (SINEMET)  MG tablet Take 1.5 tab every 4 hrs 5 times a day ( 8 am, noon, 4 pm, 8 pm, 11 pm). 675 tablet 3     cholecalciferol (VITAMIN D3) 1250 mcg (92592 units) capsule Take 1 capsule by mouth daily       clindamycin (CLEOCIN) 300 MG capsule TAKE 2 CAPSULES BY  MOUTH FOR 1 DOSE 1 HOUR PRIOR TO DENTAL APPOINTMENTS       clonazePAM (KLONOPIN) 0.5 MG tablet TAKE 1/2 TO 1 TABLET BY MOUTH AT BEDTIME TO HELP WITH DREAM ENACTMENT 90 tablet 1     entacapone (COMTAN) 200 MG tablet Take 1 tablet (200 mg) by mouth 5 times daily 150 tablet 11     gabapentin (NEURONTIN) 100 MG capsule Take 100 mg by mouth daily       gabapentin (NEURONTIN) 100 MG capsule Take 1 capsule by mouth before bed. 90 capsule 1     multivitamin w/minerals (THERA-VIT-M) tablet Take 1 tablet by mouth every morning       PARoxetine (PAXIL) 10 MG tablet Take 10 mg by mouth daily       sildenafil (REVATIO) 20 MG tablet Take 20 mg by mouth       thyroid (ARMOUR) 60 MG tablet Take 60 mg by mouth every morning           Review of Systems:  Other than that noted at the end of this note, the remainder of 12 systems reviewed were negative.    Allergies: is allergic to no clinical screening - see comments, bactrim, codeine, ketorolac, morphine, nalbuphine, penicillins, seasonal allergies, sulfa drugs, toradol, and nsaids.    Past Medical History:  Past Medical History:   Diagnosis Date     Anosmia 10/12/2017     Anxiety      Depressed mood 10/12/2017     Hypothyroid      Parkinson disease (H)      Parkinsons disease (H)      PONV (postoperative nausea and vomiting)      RBD (REM behavioral disorder) 10/12/2017       Past Surgical History:  Past Surgical History:   Procedure Laterality Date     ARTHROSCOPY KNEE Right     twice     ARTHROSCOPY KNEE Right     left     epidydmal mass removal, benign       IMPLANT DEEP BRAIN STIMULATION GENERATOR / BATTERY Left 12/22/2017    Procedure: IMPLANT DEEP BRAIN STIMULATION GENERATOR / BATTERY;  Deep Brain Stimulator Placement, Phase II, Placement Of Deep Brain Stimulator Generator/Battery Over The Left Chest Wall;  Surgeon: Arpan Huynh MD;  Location: [facility] OR     OPTICAL TRACKING SYSTEM INSERTION DEEP BRAIN STIMULATION Left 12/14/2017    Procedure: OPTICAL TRACKING SYSTEM  INSERTION DEEP BRAIN STIMULATION;  Stealth Assisted Left Deep Brain Stimulator Placement, Phase I, Placement Of Left Side Deep Brain Stimulator Electrode, Target Left Subthalamic Nucleus With Microelectrode Recording;  Surgeon: Arpan Huynh MD;  Location: [facility] OR     OPTICAL TRACKING SYSTEM INSERTION DEEP BRAIN STIMULATION Right 5/22/2018    Procedure: OPTICAL TRACKING SYSTEM INSERTION DEEP BRAIN STIMULATION;  Stealth Assisted Right Deep Brain Stimulator Placement, Phase I And II Combined, Placement Of Right Deep Brain Stimulator Electrode, Target Right Subthalamic Nucleus With Microelectrode Recording And Connection To Previous Implanted Generator/Battery;  Surgeon: Arpan Huynh MD;  Location:  OR     ORTHOPEDIC SURGERY Left 2018    knee replacement       Social History:  Social History     Socioeconomic History     Marital status:    Occupational History     Occupation: Small business start consultant   Tobacco Use     Smoking status: Never Smoker     Smokeless tobacco: Never Used   Substance and Sexual Activity     Alcohol use: Yes     Alcohol/week: 1.0 - 2.0 standard drink     Types: 1 - 2 Cans of beer per week     Comment: MONTHLY     Drug use: No     Sexual activity: Yes     Partners: Female   Social History Narrative    Previous worked as [employer].    Now consulting part-time.    , lives with wife.    Non-smoker. Occasional drink.        Past surgical history that includes epidydmal mass removal, benign.        Social History:    Previous worked as [employer].    Now consulting part-time.    , lives with wife.    Non-smoker. Occasional drink.         family history includes Lung Cancer in his father; Parkinsonism in his cousin and maternal aunt.            Family History:  Family History   Problem Relation Age of Onset     Lung Cancer Father      Parkinsonism Cousin          paternal side     Parkinsonism Maternal Aunt          Physical Exam:  The patient's  weight is 97.1 kg (214 lb). His blood pressure is 114/72 and his pulse is 79. His oxygen saturation is 96%.       Neurological Examination:   Last medication dose: 8am  UPDRS Values 10/3/2022   Time: 10:34 AM   Medication On   R Brain DBS: On   L Brain DBS: On   Dyskinesia (LID) Yes   Did LID interfere No   Speech 1   Facial Expression 0   Rigidity Neck 1   Rigidity RUE 1   Rigidity LUE 1   Rigidity RLE 0   Rigidity LLE 0   Finger Taps R 0   Finger Taps L 1   Hand Mvt R 1   Hand Mvt L 0   Pron-/Supinate R 0   Pron-/Supinate L 0   Toe Tap R 1   Toe Tap L 2   Leg Agility R 0   Leg Agility L 1   Arise From Chair 1   Gait 1   Gait Freezing 0   Postural Stability 0   Posture 0   Global Spont Mvt 0   Postural Tremor RUE 0   Postural Tremor LUE 1   Kinetic Tremor RUE 0   Kinetic Tremor LUE 1   Rest Tremor RUE 0   Rest Tremor LUE 0   Rest Tremor RLE 0   Rest Tremor LLE 0   Rest Tremor Lip/Jaw 0   Rest Tremor Constancy 0   Total Right 3   Total Left 7   Axial Total 4   Total 14   Prior: 14 on 6/28/2022        Procedure: DBS Interrogation & Programming  Lead(s):    Left Right   DBS Target STN STN   DBS Lead Type 8 ring contacts 8 ring contacts   Lead Implant Date 12/14/2017 5/22/2018      IPG(s):    1   IPG Vercise   IPG Implant Date 12/22/2017   Location Left chest   Battery (V) 3.65V (3.91V)     Impedance Check: No problems found. See scanned report for impedance details.    1-Moose7 Left Brain         Right Brain       Initial Final Initial Final     Inactive Inactive Inactive Inactive   Amplitude (mA) 5.0 [2-5.5] No change 3.3 [2.5-4.0] No change   Pulse width (usec) 60   60     Freq (Hz) 139   139     Contacts: C+3- (55%), 7- (45%)   C+11-(60%), 14- (40%)        3-Moose 6 Left Brain         Right Brain       Initial Final Initial Final     Active Active Active Active   Amplitude (mA) 5.0 [2-5.5] 5.0 [2-5.5] 3.3 [2.5-4.0] 3.3 [2.5-4.0]    Pulse width (usec) 60 60 60 60   Freq (Hz) 139 139 139 139   Contacts: C+3- (55%), 6- (45%) C+3- (55%), 6- (45%) C+11-(60%), 14- (40%) C+11-(60%), 14- (40%)      4-Bear Left Brain         Right Brain       Initial Final Initial Final     Inactive Inactive Inactive Inactive   Amplitude (mA) 2.8 [2-3.3] No change 3.3 [2.5-4.0] No change   Pulse width (usec) 60   60     Freq (Hz) 139   139     Contacts: C+3-   C+11-(60%), 14- (40%)               Assessment/Plan:  Kwame Lin is a 67 year old male who returns to clinic for follow up of PD status post bilateral STN DBS.  He is doing well.  No medication changes or DBS changes today.    - RTC 6 months, 1 hr DBS programming      Anaid Watson MD    Movement Disorders Division  Department of Neurology       29 minutes spent on the date of the encounter doing chart review, history and exam, documentation and further activities as noted above.     Additional time spent for separate DBS programming: 3 min DBS analyzed without reprogramming.

## 2022-10-05 ASSESSMENT — SLEEP AND FATIGUE QUESTIONNAIRES
HOW LIKELY ARE YOU TO NOD OFF OR FALL ASLEEP WHILE WATCHING TV: SLIGHT CHANCE OF DOZING
HOW LIKELY ARE YOU TO NOD OFF OR FALL ASLEEP WHILE SITTING QUIETLY AFTER LUNCH WITHOUT ALCOHOL: SLIGHT CHANCE OF DOZING
HOW LIKELY ARE YOU TO NOD OFF OR FALL ASLEEP WHEN YOU ARE A PASSENGER IN A CAR FOR AN HOUR WITHOUT A BREAK: SLIGHT CHANCE OF DOZING
HOW LIKELY ARE YOU TO NOD OFF OR FALL ASLEEP WHILE SITTING INACTIVE IN A PUBLIC PLACE: WOULD NEVER DOZE
HOW LIKELY ARE YOU TO NOD OFF OR FALL ASLEEP WHILE SITTING AND READING: MODERATE CHANCE OF DOZING
HOW LIKELY ARE YOU TO NOD OFF OR FALL ASLEEP WHILE SITTING AND TALKING TO SOMEONE: WOULD NEVER DOZE
HOW LIKELY ARE YOU TO NOD OFF OR FALL ASLEEP IN A CAR, WHILE STOPPED FOR A FEW MINUTES IN TRAFFIC: WOULD NEVER DOZE
HOW LIKELY ARE YOU TO NOD OFF OR FALL ASLEEP WHILE LYING DOWN TO REST IN THE AFTERNOON WHEN CIRCUMSTANCES PERMIT: HIGH CHANCE OF DOZING

## 2022-10-06 ENCOUNTER — VIRTUAL VISIT (OUTPATIENT)
Dept: SLEEP MEDICINE | Facility: CLINIC | Age: 67
End: 2022-10-06
Payer: COMMERCIAL

## 2022-10-06 VITALS — HEIGHT: 70 IN | BODY MASS INDEX: 30.64 KG/M2 | WEIGHT: 214 LBS

## 2022-10-06 DIAGNOSIS — G25.81 RESTLESS LEG SYNDROME: ICD-10-CM

## 2022-10-06 DIAGNOSIS — G47.33 OSA (OBSTRUCTIVE SLEEP APNEA): ICD-10-CM

## 2022-10-06 DIAGNOSIS — F51.04 CHRONIC INSOMNIA: Primary | ICD-10-CM

## 2022-10-06 PROCEDURE — 90832 PSYTX W PT 30 MINUTES: CPT | Mod: 95 | Performed by: PSYCHOLOGIST

## 2022-10-06 NOTE — PROGRESS NOTES
Kwame is a 67 year old who is being evaluated via a billable video visit.      How would you like to obtain your AVS? MyChart  If the video visit is dropped, the invitation should be resent by: Text to cell phone: [phone]  Will anyone else be joining your video visit? Yes: spouse Pat . How would they like to receive their invitation? Text to cell phone: [phone]  Carlene Dimas        Video-Visit Details    Video Start Time: 10:32 AM    Type of service:  Video Visit    Video End Time:10:55 AM    Originating Location (pt. Location): Home    Distant Location (provider location):  [facility]     Platform used for Video Visit: [facility]    SLEEP [facility] VIRTUAL VIDEO FOLLOW-UP VISIT  Sleep Psychology    Patient Name: Kwame Lin  MRN:  [id number]  Date of Service: Oct 6, 2022       Subjective Report     Kwame Lin  returns for a telehealth video visit to discuss progress in implementing behavioral strategies for the management of insomnia.  Patient consent for initiation of video visit was obtained and documented prior to initiation of visit.     Kwame reports he is doing well and has resumed use of CPAP.  He reports things are going well.  No daytime somnolence reportede.    He is keeping up with stimulus control therapy protocol     .     Sleep Data:     Source of Sleep Estimates:  Verbal Self-report    Average Time in Bed: 8 hours  Average Total Sleep Time:  7.5 hrs  Sleep Efficiency: greater than 85%%    EPWORTH SLEEPINESS SCALE    Sitting and reading 2   Watching TV 1   Sitting, inactive in a public place (theatre or mtg.) 0    As a passenger in a car 1   Lying down to rest in the afternoon when circumstance permit 3   Sitting and talking to someone 0   Sitting quietly after lunch without alcohol 1   In a car, while stopped for a few minutes in traffic 0   TOTAL SCORE (nl <11) 8     INSOMNIA SEVERITY INDEX     Difficulty falling asleep 3   Difficult staying asleep  "1   Problems waking up to early 0   How SATISFIED/DISSATISFIED are you with your CURRENT sleep pattern? 2   How NOTICEABLE to others do you think your sleep pattern is in terms of your quality of life? 0   How WORRIED/DISTRESSED are you about your current sleep pattern? 1   To what extent do you consider your sleep problem to INTERFERE with your daily fuctioning(e.g. daytime fatigue, mood, ability to function at work/daily chores, concentration, mood,etc.) CURRENTLY? 1   INSOMNIA SEVERITY INDEX TOTAL SCORE 8    Absence of insomnia (0-7); sub-threshold insomnia (8-14); moderate insomnia (15-21); and severe insomnia (22-28)       Interventions     Strategies and recommendations including Stimulus control therapy were discussed today.       Vital Signs     Ht 1.778 m (5' 10\")   Wt 97.1 kg (214 lb)   BMI 30.71 kg/m       Mental Status     Orientation:  X3  Mood:  normal  Affect:  Congruent with mood  Speech/Language:  Normal  Thought Process: Intact  Associations:  Normal  Thought Content: Normal  Patient does not report any suicidal ideation, intention or plan.    Diagnostic Impressions and Plan        Chronic insomnia  DIPIKA (obstructive sleep apnea)  Restless leg syndrome    Patient is reporting normal sleep latency, wake after sleep onset, total sleep time and sleep efficiency.  Sleep parameters are within normal limits and patient is reporting reduction in daytime tiredness and sleepiness.  He does continue to take a nap occasionally and finds it refreshing.  He waits to go to bed until he is sleepy.    Plan:  Continue current sleep schedule and plan    Follow-up: as needed if symptoms recur      Arpan Mccabe, Brandi, LP, DBSM  Diplomate, Behavioral Sleep Medicine  [facility]      Note: This dictation was created using voice recognition software. This document may contain an error not identified before finalizing the document. If the error changes the accuracy of the document, I would " appreciate it being brought to my attention.

## 2022-12-06 DIAGNOSIS — G20.A1 PARKINSON'S DISEASE (H): ICD-10-CM

## 2022-12-06 RX ORDER — CARBIDOPA AND LEVODOPA 25; 100 MG/1; MG/1
TABLET ORAL
Qty: 675 TABLET | Refills: 3 | Status: SHIPPED | OUTPATIENT
Start: 2022-12-06 | End: 2023-10-27

## 2022-12-06 NOTE — TELEPHONE ENCOUNTER
Rx Authorization:  Requested Medication/ Dose: Carbidopa/Levodopa 25-100MG   Date last refill ordered: 12/8/21  Quantity ordered: 675 tabs  # refills: 3  Date of last clinic visit with ordering provider: 10/3/22  Date of next clinic visit with ordering provider: F/U 6 months  All pertinent protocol data (lab date/result):   Include pertinent information from patients message:

## 2023-01-25 DIAGNOSIS — G20.A1 PARKINSON'S DISEASE (H): ICD-10-CM

## 2023-01-25 DIAGNOSIS — G47.52 RBD (REM BEHAVIORAL DISORDER): ICD-10-CM

## 2023-01-25 RX ORDER — CLONAZEPAM 0.5 MG/1
0.5 TABLET ORAL AT BEDTIME
Qty: 90 TABLET | Refills: 3 | Status: SHIPPED | OUTPATIENT
Start: 2023-01-25 | End: 2023-07-31

## 2023-01-25 NOTE — TELEPHONE ENCOUNTER
Rx Authorization:  Requested Medication/ Dose *clonazePAM (KLONOPIN) 0.5 MG tablet  Date last refill ordered: 7/8/22  Quantity ordered: 90  # refills: 1  Date of last clinic visit with ordering provider: 10/3/22  Date of next clinic visit with ordering provider:   All pertinent protocol data (lab date/result):   Include pertinent information from patients message:

## 2023-04-02 ASSESSMENT — MOVEMENT DISORDERS SOCIETY - UNIFIED PARKINSONS DISEASE RATING SCALE (MDS-UPDRS)
HYGIENE: (0) NORMAL: NOT AT ALL (NO PROBLEMS).
TURNING_IN_BED: (0) NORMAL: NOT AT ALL (NO PROBLEMS).
HANDWRITING: (1) SLIGHT: MY WRITING IS SLOW, CLUMSY OR UNEVEN, BUT ALL WORDS ARE CLEAR.
EATING_TASKS: (0) NORMAL: NOT AT ALL (NO PROBLEMS).
TOTAL_SCORE: 0
CHEWING_AND_SWALLOWING: (1) SLIGHT: I AM AWARE OF SLOWNESS IN MY CHEWING OR INCREASED EFFORT AT SWALLOWING, BUT I DO NOT CHOKE OR NEED TO HAVE MY FOOD SPECIALLY PREPARED.
HOBBIES_AND_OTHER_ACTIVITIES: (1) SLIGHT: I AM A BIT SLOW BUT DO THESE ACTIVITIES EASILY.
GETTING_OUT_OF_BED_CAR_DEEP_CHAIR: (0) NORMAL: NOT AT ALL (NO PROBLEMS).
WALKING_AND_BALANCE: (1) SLIGHT: I AM SLIGHTLY SLOW OR MAY DRAG A LEG.  I NEVER USE A WALKING AID.
SPEECH: (3) MODERATE: MY SPEECH IS UNCLEAR ENOUGH THAT OTHERS ASK ME TO REPEAT MYSELF EVERY DAY EVEN THOUGH MOST OF MY SPEECH IS UNDERSTOOD.
TREMOR: (0) NORMAL: NOT AT ALL (NO PROBLEMS).
SALIVA_AND_DROOLING: (0) NORMAL: NOT AT ALL (NO PROBLEMS).
DRESSING: (0) NORMAL: NOT AT ALL (NO PROBLEMS).
FREEZING: (0) NORMAL: NOT AT ALL (NO PROBLEMS).

## 2023-04-02 NOTE — PROGRESS NOTES
Department of Neurology  Movement Disorders Division   DBS Follow-up Note    Patient: Kwame Lin  MRN: [id number]   : 1955   Date of Visit: 4/3/2023    Diagnosis: Parkinson disease  DBS Target(s): Bilateral STN   Date(s) of DBS Lead Placement: Left 2017, R 2018  Date(s) of IPG Placement: L chest 2017  Device: Parachute      Chief Complaint:  Kwame Lin is a 68 year old male who returns to clinic for follow up of PD status post bilateral STN DBS.   He was last seen 10/3/2022 at which time no medication or DBS programming changes were made.    Interval History:  Overall he is doing well.  He does notice that he has to focus more on his voice and be more intentional with it. Having some wearing off with pain and anxiety at about 4 hours.  They have noticed limping while he walks.  His right knee is bone on bone but only painful if he walks a lot.  Sometimes has aspiration while drinking.  He did SLP and this improved.    He is being treated by a functional provider.  He is taking phosphatidyl-serine which helps him to get to sleep and wake up.  He is interested in getting off gabapentin.      UPDRS Part II  2.1 Speech (P) 3 (P) (3) Moderate: My speech is unclear enough that others ask me to repeat myself every day even though most of my speech is understood.   2.2 Saliva and drooling (P) 0 (P) (0) Normal: Not at all (no problems).   2.3 Chewing and swallowing (P) 1 (P) (1) Slight: I am aware of slowness in my chewing or increased effort at swallowing, but I do not choke or need to have my food specially prepared.   2.4 Eating tasks (P) 0 (P) (0) Normal: Not at all (no problems).   2.5 Dressing (P) 0 (P) (0) Normal: Not at all (no problems).   2.6 Hygiene (P) 0 (P) (0) Normal: Not at all (no problems).   2.7 Handwriting (P) 1 (P) (1) Slight: My writing is slow, clumsy or uneven, but all words are clear.   2.8 Doing hobbies and other activities (P) 1 (P) (1) Slight: I am  a bit slow but do these activities easily.   2.9 Turning in bed (P) 0 (P) (0) Normal: Not at all (no problems).   2.10 Tremor (P) 0 (P) (0) Normal: Not at all (no problems).   2.11 Getting out of bed  (P) 0 (P) (0) Normal: Not at all (no problems).   2.12 Walking and balance  (P) 1 (P) (1) Slight: I am slightly slow or may drag a leg.  I never use a walking aid.   2.13 Freezing (P) 0 (P) (0) Normal: Not at all (no problems).   Total (P) 7    Prior: 6 on 10/2/2022      Relevant Medications  8 am 12 pm 4 pm 8 pm 11 pm   Sinemet 25/100mg 1.5 1.5 1.5 1.5 1.5   Entacapone 200mg 1 1 1 1 1   Clonazepam 0.5mg         1   Paroxetine 10mg 1           Gabapentin 100mg         1   Last taken: 7:30am      Medications:  Current Outpatient Medications   Medication Sig Dispense Refill     acetaminophen (TYLENOL) 325 MG tablet Take 325-650 mg by mouth nightly as needed for mild pain       atorvastatin (LIPITOR) 20 MG tablet        carbidopa-levodopa (SINEMET)  MG tablet TAKE 1 AND 1/2 TABLETS BY MOUTH EVERY 4 HOURS FIVE TIMES DAILY(8AM, NOON, 4PM, 8PM, AND 11PM) 675 tablet 3     cholecalciferol (VITAMIN D3) 1250 mcg (09066 units) capsule Take 1 capsule by mouth daily       clonazePAM (KLONOPIN) 0.5 MG tablet Take 1 tablet (0.5 mg) by mouth At Bedtime 90 tablet 3     entacapone (COMTAN) 200 MG tablet Take 1 tablet (200 mg) by mouth 5 times daily 150 tablet 11     gabapentin (NEURONTIN) 100 MG capsule Take 1 capsule by mouth before bed. 90 capsule 1     multivitamin w/minerals (THERA-VIT-M) tablet Take 1 tablet by mouth every morning       PARoxetine (PAXIL) 10 MG tablet Take 10 mg by mouth daily       sildenafil (REVATIO) 20 MG tablet Take 20 mg by mouth       thyroid (ARMOUR) 60 MG tablet Take 60 mg by mouth every morning        clindamycin (CLEOCIN) 300 MG capsule TAKE 2 CAPSULES BY MOUTH FOR 1 DOSE 1 HOUR PRIOR TO DENTAL APPOINTMENTS (Patient not taking: Reported on 4/3/2023)          Allergies: is allergic to no clinical  screening - see comments, bactrim, codeine, ketorolac, morphine, nalbuphine, penicillins, seasonal allergies, sulfa drugs, toradol, and nsaids.    Physical Exam:  The patient's  blood pressure is 118/75 and his pulse is 88. His oxygen saturation is 97%.       Neurological Examination:   Last medication dose: 7:30am   4/3/2023   UPDRS Motor Scale    Time: 10:05 AM    Medication On    R Brain DBS: On    L Brain DBS: On    Dyskinesia (LID) Yes    Did LID interfere No    Speech 1    Facial Expression 0    Rigidity Neck 1    Rigidity RUE 1    Rigidity LUE 1    Rigidity RLE 0    Rigidity LLE 0    Finger Taps R 1    Finger Taps L 1    Hand Mvt R 0    Hand Mvt L 0    Pron-/Supinate R 0    Pron-/Supinate L 0    Toe Tap R 1    Toe Tap L 1    Leg Agility R 0    Leg Agility L 0    Arise From Chair 1    Gait 0    Gait Freezing 0    Postural Stability 0    Posture 0    Global Spont Mvt 0    Postural Tremor RUE 1    Postural Tremor LUE 1    Kinetic Tremor RUE 0    Kinetic Tremor LUE 0    Rest Tremor RUE 0    Rest Tremor LUE 0    Rest Tremor RLE 0    Rest Tremor LLE 0    Rest Tremor Lip/Jaw 0    Rest Tremor Constancy 0    Total Right 4    Total Left 4    Axial Total 3    Total 11    Prior: 14 on 10/3/2022        Procedure: DBS Interrogation & Programming  Lead(s):    Left Right   DBS Target STN STN   DBS Lead Type 8 ring contacts 8 ring contacts   Lead Implant Date 12/14/2017 5/22/2018      IPG(s):    1   IPG Vercise   IPG Implant Date 12/22/2017   Location Left chest   Battery (V) 3.64V (3.65V)     Impedance Check: No problems found.  See scanned report for impedance details.    1-Moose7 Left Brain         Right Brain       Initial Final Initial Final     Inactive Inactive Inactive Inactive   Amplitude (mA) 5.0 [2-5.5] No change 3.3 [2.5-4.0] No change   Pulse width (usec) 60   60     Freq (Hz) 139   139     Contacts: C+3- (55%), 7- (45%)   C+11-(60%), 14- (40%)        3-Moose 6 Left Brain         Right Brain       Initial Final  Initial Final     Active Active Active Active   Amplitude (mA) 5.0 [2-5.5] 5.0 [2-5.5] 3.3 [2.5-4.0] 3.3 [2.5-4.0]   Pulse width (usec) 60 60 60 60   Freq (Hz) 139 139 139 139   Contacts: C+3- (55%), 6- (45%) C+3- (55%), 6- (45%) C+11-(60%), 14- (40%) C+11-(60%), 14- (40%)      4-Bear Left Brain         Right Brain       Initial Final Initial Final     Inactive Inactive Inactive Inactive   Amplitude (mA) 2.8 [2-3.3] No change 3.3 [2.5-4.0] No change   Pulse width (usec) 60   60     Freq (Hz) 139   139     Contacts: C+3-   C+11-(60%), 14- (40%)             Assessment/Plan:  Kwame Lin is a 68 year old male with RLS and PD c/b RBD s/p bilateral GPi DBS who returns for follow-up.  He continues to do very well.  His insomnia is improved by functional medicine treatment.   He continues to take clonazepam and wear a belt to prevent himself from leaving the bed due to RBD. RLS symptoms are well controlled on gabapentin.  He is limping while walking but not having too much knee pain.  No medication or programming changes today.    - Continue Sinemet, entacapone, paroxetine, gabapentin, and clonazepam as is  - RTC 6 months, 1 hr DBS programming      Anaid Watson MD   of Neurology  Movement Disorders Division    Additional time spent for separate DBS programmin min DBS analyzed without reprogramming.

## 2023-04-03 ENCOUNTER — OFFICE VISIT (OUTPATIENT)
Dept: NEUROLOGY | Facility: CLINIC | Age: 68
End: 2023-04-03
Payer: COMMERCIAL

## 2023-04-03 VITALS — SYSTOLIC BLOOD PRESSURE: 118 MMHG | HEART RATE: 88 BPM | OXYGEN SATURATION: 97 % | DIASTOLIC BLOOD PRESSURE: 75 MMHG

## 2023-04-03 DIAGNOSIS — M25.561 CHRONIC PAIN OF RIGHT KNEE: ICD-10-CM

## 2023-04-03 DIAGNOSIS — Z96.89 S/P DEEP BRAIN STIMULATOR PLACEMENT: ICD-10-CM

## 2023-04-03 DIAGNOSIS — G89.29 CHRONIC PAIN OF RIGHT KNEE: ICD-10-CM

## 2023-04-03 DIAGNOSIS — G47.00 INSOMNIA, UNSPECIFIED TYPE: ICD-10-CM

## 2023-04-03 DIAGNOSIS — G20.A1 PARKINSON'S DISEASE (H): Primary | ICD-10-CM

## 2023-04-03 DIAGNOSIS — G25.81 RESTLESS LEGS SYNDROME (RLS): ICD-10-CM

## 2023-04-03 DIAGNOSIS — G47.52 RBD (REM BEHAVIORAL DISORDER): ICD-10-CM

## 2023-04-03 PROCEDURE — 99214 OFFICE O/P EST MOD 30 MIN: CPT | Mod: 25 | Performed by: STUDENT IN AN ORGANIZED HEALTH CARE EDUCATION/TRAINING PROGRAM

## 2023-04-03 PROCEDURE — 95970 ALYS NPGT W/O PRGRMG: CPT | Performed by: STUDENT IN AN ORGANIZED HEALTH CARE EDUCATION/TRAINING PROGRAM

## 2023-04-03 ASSESSMENT — UNIFIED PARKINSONS DISEASE RATING SCALE (UPDRS)
FACIAL_EXPRESSION: (0) NORMAL: NORMAL FACIAL EXPRESSION.
PRONATION_SUPINATION_RIGHT: (0) NORMAL: NO PROBLEMS.
AMPLITUDE_LUE: (0) NORMAL: NO TREMOR.
TOETAPPING_RIGHT: (1) SLIGHT: ANY OF THE FOLLOWING: A) THE REGULAR RHYTHM IS BROKEN WITH ONE WITH ONE OR TWO INTERRUPTIONS OR HESITATIONS OF THE MOVEMENT  B) SLIGHT SLOWING  C) THE AMPLITUDE DECREMENTS NEAR THE END OF THE 10 MOVEMENTS.
GAIT: (0) NORMAL: NO PROBLEMS.
TOTAL_SCORE: 11
POSTURAL_STABILITY: (0) NORMAL: NO PROBLEMS. RECOVERS WITH ONE OR TWO STEPS.
AMPLITUDE_LIP_JAW: (0) NORMAL: NO TREMOR.
MOVEMENTS_INTERFERE_WITH_RATINGS: NO
PRONATION_SUPINATION_LEFT: (0) NORMAL: NO PROBLEMS.
AMPLITUDE_RUE: (0) NORMAL: NO TREMOR.
RIGIDITY_LUE: (1) SLIGHT: RIGIDITY ONLY DETECTED WITH ACTIVATION MANEUVER.
AXIAL_SCORE: 3
POSTURE: (0) NORMAL: NO PROBLEMS.
CONSTANCY_TREMOR_ATREST: (0) NORMAL: NO TREMOR.
SPONTANEITY_OF_MOVEMENT: (0) NORMAL: NO PROBLEMS.
LEG_AGILITY_RIGHT: (0) NORMAL: NO PROBLEMS.
LEG_AGILITY_LEFT: (0) NORMAL: NO PROBLEMS.
AMPLITUDE_LLE: (0) NORMAL: NO TREMOR.
TOTAL_SCORE_LEFT: 4
FINGER_TAPPING_RIGHT: (1) SLIGHT: ANY OF THE FOLLOWING: A) THE REGULAR RHYTHM IS BROKEN WITH ONE WITH ONE OR TWO INTERRUPTIONS OR HESITATIONS OF THE MOVEMENT  B) SLIGHT SLOWING  C) THE AMPLITUDE DECREMENTS NEAR THE END OF THE 10 MOVEMENTS.
FREEZING_GAIT: (0) NORMAL: NO FREEZING.
HANDMOVEMENTS_RIGHT: (0) NORMAL: NO PROBLEMS.
RIGIDITY_LLE: (0) NORMAL: NO RIGIDITY.
FINGER_TAPPING_LEFT: (1) SLIGHT: ANY OF THE FOLLOWING: A) THE REGULAR RHYTHM IS BROKEN WITH ONE WITH ONE OR TWO INTERRUPTIONS OR HESITATIONS OF THE MOVEMENT  B) SLIGHT SLOWING  C) THE AMPLITUDE DECREMENTS NEAR THE END OF THE 10 MOVEMENTS.
AMPLITUDE_RLE: (0) NORMAL: NO TREMOR.
RIGIDITY_RLE: (0) NORMAL: NO RIGIDITY.
TOTAL_SCORE: 4
RIGIDITY_RUE: (1) SLIGHT: RIGIDITY ONLY DETECTED WITH ACTIVATION MANEUVER.
TOETAPPING_LEFT: (1) SLIGHT: ANY OF THE FOLLOWING: A) THE REGULAR RHYTHM IS BROKEN WITH ONE WITH ONE OR TWO INTERRUPTIONS OR HESITATIONS OF THE MOVEMENT  B) SLIGHT SLOWING  C) THE AMPLITUDE DECREMENTS NEAR THE END OF THE 10 MOVEMENTS.
HANDMOVEMENTS_LEFT: (0) NORMAL: NO PROBLEMS.
DYSKINESIAS_PRESENT: YES
SPEECH: (1) SLIGHT: LOSS OF MODULATION, DICTION OR VOLUME, BUT STILL ALL WORDS EASY TO UNDERSTAND.
RIGIDITY_NECK: (1) SLIGHT: RIGIDITY ONLY DETECTED WITH ACTIVATION MANEUVER.
ARISING_CHAIR: (1) SLIGHT: ARISING IS SLOWER THAN NORMAL, OR MAY NEED MORE THAN ONE ATTEMPT, OR MAY NEED TO MOVE FORWARD IN THE CHAIR TO ARISE. NO NEED TO USE THE ARMS OF THE CHAIR.
PARKINSONS_MEDS: ON

## 2023-04-03 ASSESSMENT — PAIN SCALES - GENERAL: PAINLEVEL: NO PAIN (0)

## 2023-04-03 NOTE — LETTER
4/3/2023       RE: Kwame Lin  [address]  [facility] [address]     Dear Colleague,    Thank you for referring your patient, Kwame Lin, to the [facility]UROLOG[facility] [place] at [facility]. Please see a copy of my visit note below.    Department of Neurology  Movement Disorders Division   DBS Follow-up Note    Patient: Kwame Lin  MRN: [id number]   : 1955   Date of Visit: 4/3/2023    Diagnosis: Parkinson disease  DBS Target(s): Bilateral STN   Date(s) of DBS Lead Placement: Left 2017, R 2018  Date(s) of IPG Placement: L chest 2017  Device: Ark      Chief Complaint:  Kwame Lin is a 68 year old male who returns to clinic for follow up of PD status post bilateral STN DBS.   He was last seen 10/3/2022 at which time no medication or DBS programming changes were made.    Interval History:  Overall he is doing well.  He does notice that he has to focus more on his voice and be more intentional with it. Having some wearing off with pain and anxiety at about 4 hours.  They have noticed limping while he walks.  His right knee is bone on bone but only painful if he walks a lot.  Sometimes has aspiration while drinking.  He did SLP and this improved.    He is being treated by a functional provider.  He is taking phosphatidyl-serine which helps him to get to sleep and wake up.  He is interested in getting off gabapentin.      UPDRS Part II  2.1 Speech (P) 3 (P) (3) Moderate: My speech is unclear enough that others ask me to repeat myself every day even though most of my speech is understood.   2.2 Saliva and drooling (P) 0 (P) (0) Normal: Not at all (no problems).   2.3 Chewing and swallowing (P) 1 (P) (1) Slight: I am aware of slowness in my chewing or increased effort at swallowing, but I do not choke or need to have my food specially prepared.   2.4 Eating tasks (P) 0 (P) (0) Normal:  Not at all (no problems).   2.5 Dressing (P) 0 (P) (0) Normal: Not at all (no problems).   2.6 Hygiene (P) 0 (P) (0) Normal: Not at all (no problems).   2.7 Handwriting (P) 1 (P) (1) Slight: My writing is slow, clumsy or uneven, but all words are clear.   2.8 Doing hobbies and other activities (P) 1 (P) (1) Slight: I am a bit slow but do these activities easily.   2.9 Turning in bed (P) 0 (P) (0) Normal: Not at all (no problems).   2.10 Tremor (P) 0 (P) (0) Normal: Not at all (no problems).   2.11 Getting out of bed  (P) 0 (P) (0) Normal: Not at all (no problems).   2.12 Walking and balance  (P) 1 (P) (1) Slight: I am slightly slow or may drag a leg.  I never use a walking aid.   2.13 Freezing (P) 0 (P) (0) Normal: Not at all (no problems).   Total (P) 7    Prior: 6 on 10/2/2022      Relevant Medications  8 am 12 pm 4 pm 8 pm 11 pm   Sinemet 25/100mg 1.5 1.5 1.5 1.5 1.5   Entacapone 200mg 1 1 1 1 1   Clonazepam 0.5mg         1   Paroxetine 10mg 1           Gabapentin 100mg         1   Last taken: 7:30am      Medications:  Current Outpatient Medications   Medication Sig Dispense Refill     acetaminophen (TYLENOL) 325 MG tablet Take 325-650 mg by mouth nightly as needed for mild pain       atorvastatin (LIPITOR) 20 MG tablet        carbidopa-levodopa (SINEMET)  MG tablet TAKE 1 AND 1/2 TABLETS BY MOUTH EVERY 4 HOURS FIVE TIMES DAILY(8AM, NOON, 4PM, 8PM, AND 11PM) 675 tablet 3     cholecalciferol (VITAMIN D3) 1250 mcg (51114 units) capsule Take 1 capsule by mouth daily       clonazePAM (KLONOPIN) 0.5 MG tablet Take 1 tablet (0.5 mg) by mouth At Bedtime 90 tablet 3     entacapone (COMTAN) 200 MG tablet Take 1 tablet (200 mg) by mouth 5 times daily 150 tablet 11     gabapentin (NEURONTIN) 100 MG capsule Take 1 capsule by mouth before bed. 90 capsule 1     multivitamin w/minerals (THERA-VIT-M) tablet Take 1 tablet by mouth every morning       PARoxetine (PAXIL) 10 MG tablet Take 10 mg by mouth daily       sildenafil  (REVATIO) 20 MG tablet Take 20 mg by mouth       thyroid (ARMOUR) 60 MG tablet Take 60 mg by mouth every morning        clindamycin (CLEOCIN) 300 MG capsule TAKE 2 CAPSULES BY MOUTH FOR 1 DOSE 1 HOUR PRIOR TO DENTAL APPOINTMENTS (Patient not taking: Reported on 4/3/2023)          Allergies: is allergic to no clinical screening - see comments, bactrim, codeine, ketorolac, morphine, nalbuphine, penicillins, seasonal allergies, sulfa drugs, toradol, and nsaids.    Physical Exam:  The patient's  blood pressure is 118/75 and his pulse is 88. His oxygen saturation is 97%.       Neurological Examination:   Last medication dose: 7:30am   4/3/2023   UPDRS Motor Scale    Time: 10:05 AM    Medication On    R Brain DBS: On    L Brain DBS: On    Dyskinesia (LID) Yes    Did LID interfere No    Speech 1    Facial Expression 0    Rigidity Neck 1    Rigidity RUE 1    Rigidity LUE 1    Rigidity RLE 0    Rigidity LLE 0    Finger Taps R 1    Finger Taps L 1    Hand Mvt R 0    Hand Mvt L 0    Pron-/Supinate R 0    Pron-/Supinate L 0    Toe Tap R 1    Toe Tap L 1    Leg Agility R 0    Leg Agility L 0    Arise From Chair 1    Gait 0    Gait Freezing 0    Postural Stability 0    Posture 0    Global Spont Mvt 0    Postural Tremor RUE 1    Postural Tremor LUE 1    Kinetic Tremor RUE 0    Kinetic Tremor LUE 0    Rest Tremor RUE 0    Rest Tremor LUE 0    Rest Tremor RLE 0    Rest Tremor LLE 0    Rest Tremor Lip/Jaw 0    Rest Tremor Constancy 0    Total Right 4    Total Left 4    Axial Total 3    Total 11    Prior: 14 on 10/3/2022        Procedure: DBS Interrogation & Programming  Lead(s):    Left Right   DBS Target STN STN   DBS Lead Type 8 ring contacts 8 ring contacts   Lead Implant Date 12/14/2017 5/22/2018      IPG(s):    1   IPG Vercise   IPG Implant Date 12/22/2017   Location Left chest   Battery (V) 3.64V (3.65V)     Impedance Check: No problems found.  See scanned report for impedance details.    1-Moose7 Left Brain         Right Brain        Initial Final Initial Final     Inactive Inactive Inactive Inactive   Amplitude (mA) 5.0 [2-5.5] No change 3.3 [2.5-4.0] No change   Pulse width (usec) 60   60     Freq (Hz) 139   139     Contacts: C+3- (55%), 7- (45%)   C+11-(60%), 14- (40%)        3-Moose 6 Left Brain         Right Brain       Initial Final Initial Final     Active Active Active Active   Amplitude (mA) 5.0 [2-5.5] 5.0 [2-5.5] 3.3 [2.5-4.0] 3.3 [2.5-4.0]   Pulse width (usec) 60 60 60 60   Freq (Hz) 139 139 139 139   Contacts: C+3- (55%), 6- (45%) C+3- (55%), 6- (45%) C+11-(60%), 14- (40%) C+11-(60%), 14- (40%)      4-Bear Left Brain         Right Brain       Initial Final Initial Final     Inactive Inactive Inactive Inactive   Amplitude (mA) 2.8 [2-3.3] No change 3.3 [2.5-4.0] No change   Pulse width (usec) 60   60     Freq (Hz) 139   139     Contacts: C+3-   C+11-(60%), 14- (40%)             Assessment/Plan:  Kwame Lin is a 68 year old male with RLS and PD c/b RBD s/p bilateral GPi DBS who returns for follow-up.  He continues to do very well.  His insomnia is improved by functional medicine treatment.   He continues to take clonazepam and wear a belt to prevent himself from leaving the bed due to RBD. RLS symptoms are well controlled on gabapentin.  He is limping while walking but not having too much knee pain.  No medication or programming changes today.    - Continue Sinemet, entacapone, paroxetine, gabapentin, and clonazepam as is  - RTC 6 months, 1 hr DBS programming      Anaid Watson MD   of Neurology  Movement Disorders Division    Additional time spent for separate DBS programmin min DBS analyzed without reprogramming.

## 2023-04-04 ENCOUNTER — TELEPHONE (OUTPATIENT)
Dept: NEUROLOGY | Facility: CLINIC | Age: 68
End: 2023-04-04
Payer: COMMERCIAL

## 2023-07-25 DIAGNOSIS — G20.A1 PARKINSON DISEASE (H): ICD-10-CM

## 2023-07-25 RX ORDER — ENTACAPONE 200 MG/1
200 TABLET ORAL
Qty: 150 TABLET | Refills: 11 | Status: SHIPPED | OUTPATIENT
Start: 2023-07-25 | End: 2024-03-27

## 2023-07-25 NOTE — TELEPHONE ENCOUNTER
Medication and strength: Entacapone 200 mg    Is this a [place] patient? Yes    Last appointment: 4/3/2023 with Dr. Watson    Next appointment: 10/4/2023    Last re-ordered: 6/28/2022 for a 30 day supply with 11 refills    Action taken: Sent to be signed by the provider

## 2023-07-25 NOTE — CONFIDENTIAL NOTE
Rx Authorization:  Requested Medication/ Dose: Entacapone 200MG  tabs  Date last refill ordered:   Quantity ordered:   # refills:   Date of last clinic visit with ordering provider:   Date of next clinic visit with ordering provider:   All pertinent protocol data (lab date/result):   Include pertinent information from patients message:

## 2023-07-31 DIAGNOSIS — G20.A1 PARKINSON'S DISEASE (H): ICD-10-CM

## 2023-07-31 DIAGNOSIS — G47.52 RBD (REM BEHAVIORAL DISORDER): ICD-10-CM

## 2023-07-31 NOTE — TELEPHONE ENCOUNTER
Rx Authorization:  Requested Medication/ Dose clonazePAM (KLONOPIN) 0.5 MG tablet   Date last refill ordered:   Quantity ordered:   # refills:   Date of last clinic visit with ordering provider:   Date of next clinic visit with ordering provider:   All pertinent protocol data (lab date/result):   Include pertinent information from patients message:

## 2023-08-01 RX ORDER — CLONAZEPAM 0.5 MG/1
0.5 TABLET ORAL AT BEDTIME
Qty: 90 TABLET | Refills: 1 | Status: SHIPPED | OUTPATIENT
Start: 2023-08-01

## 2023-09-07 NOTE — TELEPHONE ENCOUNTER
[facility] Outpatient Certification Letter received in 1619 K 66. Scanned into the chart. Spoke with pt and workup appointments on 9/27 and 9/28 will work well for him. I will get everything firmly scheduled and send an appointment letter. Pt in agreement with plan.

## 2023-09-27 ENCOUNTER — HOSPITAL ENCOUNTER (OUTPATIENT)
Facility: CLINIC | Age: 68
Setting detail: OBSERVATION
Discharge: HOME | End: 2023-09-27
Attending: EMERGENCY MEDICINE | Admitting: INTERNAL MEDICINE
Payer: COMMERCIAL

## 2023-09-27 ENCOUNTER — APPOINTMENT (OUTPATIENT)
Dept: GENERAL RADIOLOGY | Facility: CLINIC | Age: 68
End: 2023-09-27
Attending: EMERGENCY MEDICINE
Payer: COMMERCIAL

## 2023-09-27 ENCOUNTER — OFFICE VISIT (OUTPATIENT)
Dept: URGENT CARE | Facility: URGENT CARE | Age: 68
End: 2023-09-27
Payer: COMMERCIAL

## 2023-09-27 VITALS
OXYGEN SATURATION: 97 % | RESPIRATION RATE: 18 BRPM | HEART RATE: 76 BPM | SYSTOLIC BLOOD PRESSURE: 108 MMHG | DIASTOLIC BLOOD PRESSURE: 71 MMHG | TEMPERATURE: 97.6 F | WEIGHT: 200 LBS | BODY MASS INDEX: 28.7 KG/M2

## 2023-09-27 VITALS
DIASTOLIC BLOOD PRESSURE: 54 MMHG | SYSTOLIC BLOOD PRESSURE: 90 MMHG | BODY MASS INDEX: 29.62 KG/M2 | HEIGHT: 69 IN | OXYGEN SATURATION: 94 % | RESPIRATION RATE: 16 BRPM | TEMPERATURE: 97 F | WEIGHT: 200 LBS | HEART RATE: 75 BPM

## 2023-09-27 DIAGNOSIS — R94.31 ABNORMAL ELECTROCARDIOGRAM: ICD-10-CM

## 2023-09-27 DIAGNOSIS — I48.0 PAROXYSMAL ATRIAL FIBRILLATION (H): ICD-10-CM

## 2023-09-27 DIAGNOSIS — I49.9 IRREGULAR HEART RHYTHM: ICD-10-CM

## 2023-09-27 DIAGNOSIS — R42 DIZZINESS: Primary | ICD-10-CM

## 2023-09-27 PROBLEM — I48.91 NEW ONSET ATRIAL FIBRILLATION (H): Status: ACTIVE | Noted: 2023-09-27

## 2023-09-27 LAB
ANION GAP SERPL CALCULATED.3IONS-SCNC: 12 MMOL/L (ref 7–15)
ATRIAL RATE - MUSE: 76 BPM
ATRIAL RATE - MUSE: NORMAL BPM
BASOPHILS # BLD AUTO: 0 10E3/UL (ref 0–0.2)
BASOPHILS NFR BLD AUTO: 1 %
BUN SERPL-MCNC: 23.9 MG/DL (ref 8–23)
CALCIUM SERPL-MCNC: 9 MG/DL (ref 8.8–10.2)
CHLORIDE SERPL-SCNC: 105 MMOL/L (ref 98–107)
CREAT SERPL-MCNC: 0.92 MG/DL (ref 0.67–1.17)
DEPRECATED HCO3 PLAS-SCNC: 23 MMOL/L (ref 22–29)
DIASTOLIC BLOOD PRESSURE - MUSE: NORMAL MMHG
DIASTOLIC BLOOD PRESSURE - MUSE: NORMAL MMHG
EGFRCR SERPLBLD CKD-EPI 2021: 89 ML/MIN/1.73M2
EOSINOPHIL # BLD AUTO: 0.1 10E3/UL (ref 0–0.7)
EOSINOPHIL NFR BLD AUTO: 1 %
ERYTHROCYTE [DISTWIDTH] IN BLOOD BY AUTOMATED COUNT: 13.2 % (ref 10–15)
GLUCOSE SERPL-MCNC: 107 MG/DL (ref 70–99)
HCT VFR BLD AUTO: 37.7 % (ref 40–53)
HGB BLD-MCNC: 12.6 G/DL (ref 13.3–17.7)
IMM GRANULOCYTES # BLD: 0 10E3/UL
IMM GRANULOCYTES NFR BLD: 0 %
INTERPRETATION ECG - MUSE: NORMAL
INTERPRETATION ECG - MUSE: NORMAL
LYMPHOCYTES # BLD AUTO: 1.8 10E3/UL (ref 0.8–5.3)
LYMPHOCYTES NFR BLD AUTO: 32 %
MAGNESIUM SERPL-MCNC: 2.1 MG/DL (ref 1.7–2.3)
MCH RBC QN AUTO: 31.3 PG (ref 26.5–33)
MCHC RBC AUTO-ENTMCNC: 33.4 G/DL (ref 31.5–36.5)
MCV RBC AUTO: 94 FL (ref 78–100)
MONOCYTES # BLD AUTO: 0.5 10E3/UL (ref 0–1.3)
MONOCYTES NFR BLD AUTO: 9 %
NEUTROPHILS # BLD AUTO: 3.3 10E3/UL (ref 1.6–8.3)
NEUTROPHILS NFR BLD AUTO: 57 %
NRBC # BLD AUTO: 0 10E3/UL
NRBC BLD AUTO-RTO: 0 /100
P AXIS - MUSE: 55 DEGREES
P AXIS - MUSE: NORMAL DEGREES
PLATELET # BLD AUTO: 191 10E3/UL (ref 150–450)
POTASSIUM SERPL-SCNC: 3.7 MMOL/L (ref 3.4–5.3)
PR INTERVAL - MUSE: 152 MS
PR INTERVAL - MUSE: NORMAL MS
QRS DURATION - MUSE: 90 MS
QRS DURATION - MUSE: 98 MS
QT - MUSE: 308 MS
QT - MUSE: 370 MS
QTC - MUSE: 416 MS
QTC - MUSE: 456 MS
R AXIS - MUSE: -21 DEGREES
R AXIS - MUSE: -31 DEGREES
RBC # BLD AUTO: 4.03 10E6/UL (ref 4.4–5.9)
SODIUM SERPL-SCNC: 140 MMOL/L (ref 135–145)
SYSTOLIC BLOOD PRESSURE - MUSE: NORMAL MMHG
SYSTOLIC BLOOD PRESSURE - MUSE: NORMAL MMHG
T AXIS - MUSE: -7 DEGREES
T AXIS - MUSE: 14 DEGREES
TROPONIN T SERPL HS-MCNC: 12 NG/L
TSH SERPL DL<=0.005 MIU/L-ACNC: 3.31 UIU/ML (ref 0.3–4.2)
VENTRICULAR RATE- MUSE: 132 BPM
VENTRICULAR RATE- MUSE: 76 BPM
WBC # BLD AUTO: 5.7 10E3/UL (ref 4–11)

## 2023-09-27 PROCEDURE — 93005 ELECTROCARDIOGRAM TRACING: CPT

## 2023-09-27 PROCEDURE — 99285 EMERGENCY DEPT VISIT HI MDM: CPT | Mod: 25

## 2023-09-27 PROCEDURE — 85025 COMPLETE CBC W/AUTO DIFF WBC: CPT | Performed by: EMERGENCY MEDICINE

## 2023-09-27 PROCEDURE — 99205 OFFICE O/P NEW HI 60 MIN: CPT | Performed by: PHYSICIAN ASSISTANT

## 2023-09-27 PROCEDURE — 36415 COLL VENOUS BLD VENIPUNCTURE: CPT | Performed by: EMERGENCY MEDICINE

## 2023-09-27 PROCEDURE — 71046 X-RAY EXAM CHEST 2 VIEWS: CPT

## 2023-09-27 PROCEDURE — 84443 ASSAY THYROID STIM HORMONE: CPT | Performed by: EMERGENCY MEDICINE

## 2023-09-27 PROCEDURE — 250N000009 HC RX 250: Performed by: EMERGENCY MEDICINE

## 2023-09-27 PROCEDURE — 250N000013 HC RX MED GY IP 250 OP 250 PS 637: Performed by: EMERGENCY MEDICINE

## 2023-09-27 PROCEDURE — 83735 ASSAY OF MAGNESIUM: CPT | Performed by: EMERGENCY MEDICINE

## 2023-09-27 PROCEDURE — 96374 THER/PROPH/DIAG INJ IV PUSH: CPT

## 2023-09-27 PROCEDURE — 96376 TX/PRO/DX INJ SAME DRUG ADON: CPT

## 2023-09-27 PROCEDURE — 84484 ASSAY OF TROPONIN QUANT: CPT | Performed by: EMERGENCY MEDICINE

## 2023-09-27 PROCEDURE — 93000 ELECTROCARDIOGRAM COMPLETE: CPT | Performed by: PHYSICIAN ASSISTANT

## 2023-09-27 PROCEDURE — 93005 ELECTROCARDIOGRAM TRACING: CPT | Mod: 76

## 2023-09-27 PROCEDURE — G0378 HOSPITAL OBSERVATION PER HR: HCPCS

## 2023-09-27 PROCEDURE — 80048 BASIC METABOLIC PNL TOTAL CA: CPT | Performed by: EMERGENCY MEDICINE

## 2023-09-27 RX ORDER — METOPROLOL SUCCINATE 50 MG/1
50 TABLET, EXTENDED RELEASE ORAL ONCE
Status: COMPLETED | OUTPATIENT
Start: 2023-09-27 | End: 2023-09-27

## 2023-09-27 RX ORDER — METOPROLOL TARTRATE 1 MG/ML
5 INJECTION, SOLUTION INTRAVENOUS ONCE
Status: COMPLETED | OUTPATIENT
Start: 2023-09-27 | End: 2023-09-27

## 2023-09-27 RX ORDER — METOPROLOL SUCCINATE 25 MG/1
25 TABLET, EXTENDED RELEASE ORAL DAILY
Qty: 30 TABLET | Refills: 0 | Status: SHIPPED | OUTPATIENT
Start: 2023-09-27

## 2023-09-27 RX ORDER — ASPIRIN 81 MG/1
81 TABLET ORAL DAILY
Qty: 30 TABLET | Refills: 0 | Status: SHIPPED | OUTPATIENT
Start: 2023-09-27

## 2023-09-27 RX ADMIN — METOPROLOL TARTRATE 5 MG: 5 INJECTION INTRAVENOUS at 19:40

## 2023-09-27 RX ADMIN — APIXABAN 5 MG: 5 TABLET, FILM COATED ORAL at 19:52

## 2023-09-27 RX ADMIN — METOPROLOL SUCCINATE 50 MG: 50 TABLET, EXTENDED RELEASE ORAL at 20:30

## 2023-09-27 RX ADMIN — METOPROLOL TARTRATE 5 MG: 5 INJECTION INTRAVENOUS at 18:26

## 2023-09-27 ASSESSMENT — ACTIVITIES OF DAILY LIVING (ADL)
ADLS_ACUITY_SCORE: 35

## 2023-09-27 NOTE — PROGRESS NOTES
SUBJECTIVE:   Kwame Lin is a 68 year old male presenting with a chief complaint of irregular heart rate with some lightheadedness and a feeling off all morning.  Irregular rate discovered at acupuncture appointment.  Has had sleep issues, particularly last night.     Past Medical History:   Diagnosis Date    Anosmia 10/12/2017    Anxiety     Depressed mood 10/12/2017    Hypothyroid     Parkinson disease (H)     Parkinsons disease (H)     PONV (postoperative nausea and vomiting)     RBD (REM behavioral disorder) 10/12/2017     Current Outpatient Medications   Medication Sig Dispense Refill    acetaminophen (TYLENOL) 325 MG tablet Take 325-650 mg by mouth nightly as needed for mild pain      atorvastatin (LIPITOR) 20 MG tablet       carbidopa-levodopa (SINEMET)  MG tablet TAKE 1 AND 1/2 TABLETS BY MOUTH EVERY 4 HOURS FIVE TIMES DAILY(8AM, NOON, 4PM, 8PM, AND 11PM) 675 tablet 3    cholecalciferol (VITAMIN D3) 1250 mcg (16676 units) capsule Take 1 capsule by mouth daily      clonazePAM (KLONOPIN) 0.5 MG tablet Take 1 tablet (0.5 mg) by mouth At Bedtime 90 tablet 1    entacapone (COMTAN) 200 MG tablet Take 1 tablet (200 mg) by mouth 5 times daily 150 tablet 11    gabapentin (NEURONTIN) 100 MG capsule Take 1 capsule by mouth before bed. 90 capsule 1    multivitamin w/minerals (THERA-VIT-M) tablet Take 1 tablet by mouth every morning      PARoxetine (PAXIL) 10 MG tablet Take 10 mg by mouth daily      sildenafil (REVATIO) 20 MG tablet Take 20 mg by mouth      thyroid (ARMOUR) 60 MG tablet Take 60 mg by mouth every morning       clindamycin (CLEOCIN) 300 MG capsule TAKE 2 CAPSULES BY MOUTH FOR 1 DOSE 1 HOUR PRIOR TO DENTAL APPOINTMENTS (Patient not taking: Reported on 4/3/2023)       Social History     Tobacco Use    Smoking status: Never    Smokeless tobacco: Never   Substance Use Topics    Alcohol use: Yes     Alcohol/week: 1.0 - 2.0 standard drink of alcohol     Types: 1 - 2 Cans of beer per week     Comment:  MONTHLY       ROS:  Review of systems negative except as stated above.    OBJECTIVE:  /71   Pulse 76   Temp 97.6  F (36.4  C)   Resp 18   Wt 90.7 kg (200 lb)   SpO2 97%   BMI 28.70 kg/m    GENERAL APPEARANCE: healthy, alert and no distress  CV: regular rates, with irregular rhythm  NEURO: Normal strength and tone, sensory exam grossly normal,  normal speech and mentation  SKIN: no suspicious lesions or rashes    EKG: A fib    ASSESSMENT:  (R42) Dizziness  (primary encounter diagnosis)  (R94.31) Abnormal electrocardiogram  (I49.9) Irregular heart rhythm  Comment: [facility]  Plan: EKG 12-lead complete w/read - Clinics    To ED by private vehicle

## 2023-09-27 NOTE — ED TRIAGE NOTES
"Patient comes in stating that he has felt \"off\" all day with dizziness and balance issues. States that he went to  and they told him to come here for afib. States no hx of a fib. States that he has felt this way in hte past but didn't think much of it. States h\x parkinsons.      Triage Assessment       Row Name 09/27/23 1498       Triage Assessment (Adult)    Airway WDL WDL       Respiratory WDL    Respiratory WDL WDL       Skin Circulation/Temperature WDL    Skin Circulation/Temperature WDL WDL       Cardiac WDL    Cardiac WDL --  afib       Peripheral/Neurovascular WDL    Peripheral Neurovascular WDL WDL       Cognitive/Neuro/Behavioral WDL    Cognitive/Neuro/Behavioral WDL WDL                    "

## 2023-09-27 NOTE — ED PROVIDER NOTES
"  History     Chief Complaint:  Irregular Heart Beat       HPI   Kwame Lin is a 68 year old male with a history of Obstructive Sleep Apnea and hypothyroidism who presented with atrial fibrillation which started yesterday. His wife reports he was not talking much and was complaining of his symptoms which is not normal for him. He went to see his grandchildren at school when he decided to come in. He states he felt off and felt similar to being dehydrated or when he is playing golf. However, water did not help today. He states he did not sleep until 3 am this morning. Denies chest pain, shortness of breath, diabetes, or hypertension. Denies a history of atrial fibrillation. Reports being on statin for hypercholesteremia.      Independent Historian:   None - Patient Only    Review of External Notes:       Medications:    Lipitor   Sinemet   Cleocin   Klonopin   Comtan   Gabapentin   Paxil   Revatio   [place]   Lotrimin     Past Medical History:      Anxiety disorder  Hypothyroidism  Parkinson disease   Restless leg syndrome   Depressed mood  RBD (REM behavioral disorder)  Cataracts  Insomnia   Dry eye syndrome  Osteoarthritis  Vitreous degeneration  Gait difficulty  DIPIKA (obstructive sleep apnea)  Erectile dysfunction  Anosmia  PONV (postoperative nausea and vomiting)    Past Surgical History:    Arthroscopy x2  Epididymal mass removal  DBS implant x2  Knee replacement    Physical Exam   Patient Vitals for the past 24 hrs:   BP Temp Temp src Pulse Resp SpO2 Height Weight   09/27/23 1634 113/82 97  F (36.1  C) Temporal 63 16 94 % 1.753 m (5' 9\") 90.7 kg (200 lb)        Physical Exam  Vitals reviewed.   HENT:      Head: Normocephalic.      Nose: Nose normal.      Mouth/Throat:      Mouth: Mucous membranes are moist.   Eyes:      Pupils: Pupils are equal, round, and reactive to light.   Cardiovascular:      Rate and Rhythm: Tachycardia present. Rhythm irregular.   Pulmonary:      Effort: Pulmonary effort is normal. "   Abdominal:      General: Abdomen is flat.   Musculoskeletal:         General: Normal range of motion.   Skin:     General: Skin is warm.      Capillary Refill: Capillary refill takes less than 2 seconds.   Neurological:      General: No focal deficit present.      Mental Status: He is alert and oriented to person, place, and time.   Psychiatric:         Mood and Affect: Mood normal.         Emergency Department Course   ECG  ECG results from 09/27/23   EKG 12 lead     Value    Systolic Blood Pressure     Diastolic Blood Pressure     Ventricular Rate 132    Atrial Rate     KS Interval     QRS Duration 90        QTc 456    P Axis     R AXIS -21    T Axis -7    Interpretation ECG      Atrial fibrillation with rapid ventricular response with premature ventricular or aberrantly conducted complexes  Nonspecific ST abnormality  Abnormal ECG  No previous ECGs available  Confirmed by GENERATED REPORT, COMPUTER ([id number]),  Riley Torres ([id number]) on 9/27/2023 7:18:39 PM     EKG 12 lead     Value    Systolic Blood Pressure     Diastolic Blood Pressure     Ventricular Rate 76    Atrial Rate 76    KS Interval 152    QRS Duration 98        QTc 416    P Axis 55    R AXIS -31    T Axis 14    Interpretation ECG      Sinus rhythm  Left axis deviation  Abnormal ECG  When compared with ECG of 27-SEP-2023 16:41,  Sinus rhythm has replaced Atrial fibrillation  Vent. rate has decreased BY  56 BPM  ST no longer depressed in Anterior leads  Inverted T waves have replaced nonspecific T wave abnormality in Inferior leads  Confirmed by GENERATED REPORT, COMPUTER ([id number]),  Riley Torres ([id number]) on 9/27/2023 10:09:19 PM  Taken 2201, Read 2203      Imaging:  Chest XR,  PA & LAT   Final Result   IMPRESSION: No focal airspace consolidation. No pleural effusion or pneumothorax.      Cardiomediastinal silhouette is normal.      Stimulator device overlies the left chest wall.         Report per  radiology    Laboratory:  Labs Ordered and Resulted from Time of ED Arrival to Time of ED Departure   CBC WITH PLATELETS AND DIFFERENTIAL - Abnormal       Result Value    WBC Count 5.7      RBC Count 4.03 (*)     Hemoglobin 12.6 (*)     Hematocrit 37.7 (*)     MCV 94      MCH 31.3      MCHC 33.4      RDW 13.2      Platelet Count 191      % Neutrophils 57      % Lymphocytes 32      % Monocytes 9      % Eosinophils 1      % Basophils 1      % Immature Granulocytes 0      NRBCs per 100 WBC 0      Absolute Neutrophils 3.3      Absolute Lymphocytes 1.8      Absolute Monocytes 0.5      Absolute Eosinophils 0.1      Absolute Basophils 0.0      Absolute Immature Granulocytes 0.0      Absolute NRBCs 0.0     TROPONIN T, HIGH SENSITIVITY - Normal    Troponin T, High Sensitivity 12     MAGNESIUM - Normal    Magnesium 2.1     TSH WITH FREE T4 REFLEX - Normal    TSH 3.31        Emergency Department Course & Assessments:    Interventions:  Medications   metoprolol (LOPRESSOR) injection 5 mg (has no administration in time range)     Assessments:  1640 I obtained the history and examined the patient as detailed above.   1826 I rechecked patient.   1930 I rechecked patient.   2020 I spoke with his son about patient presentation.  2024 I rechecked patient.   2158 I rechecked patient. Patient converted and will be sent home instead of being admitted.     Independent Interpretation (X-rays, CTs, rhythm strip):      Consultations/Discussion of Management or Tests:  2013 I spoke with the hospitalist, Dr. Pineda, about patient admission.     Social Determinants of Health affecting care:   None    Disposition:  The patient was discharged to home.    Impression & Plan      Medical Decision Making:  Patient is a 68-year-old male with a history of Parkinson disease and deep stimulating implant in the brain for Parkinson's.  Patient presents with episode of feeling lightheaded.  EKG at presentation is diagnostic of rapid atrial fibrillation.   This would be a new diagnosis for this patient.  IV beta-blockers were given with some rate control.  Lab work was sent which was unremarkable.  Patient's had no chest pain.  Is not a drinker.  Suspect likely idiopathic A-fib.  After some rate control was achieved did consider his CHADVASC score.  Patient is 68 but has other medical problems.  Low risk but due to active A-fib a dose of Xarelto was given.  Considered an inpatient or outpatient admission.  Care was discussed with his son who is a hospitalist at [employer].  Ultimately patient felt more comfortable with observation admission and this was initially initiated talking to Dr. Andres Pineda.  [facility].  Ultimately patient converted in the emergency room to sinus rhythm while waiting for a bed upstairs.  Care was discussed with the family see no reason for admission at this point.  We will due to low [id number] score initiate only daily aspirin and daily beta-blocker in order outpatient referral for cardiology.  Patient was discharged home in stable condition.    Diagnosis:    ICD-10-CM    1. Paroxysmal atrial fibrillation (H)  I48.0            Discharge Medications:  Discharge Medication List as of 9/27/2023 10:21 PM        START taking these medications    Details   aspirin 81 MG EC tablet Take 1 tablet (81 mg) by mouth daily, Disp-30 tablet, R-0, Local Print      metoprolol succinate ER (TOPROL XL) 25 MG 24 hr tablet Take 1 tablet (25 mg) by mouth daily, Disp-30 tablet, R-0, Local Print            Scribe Disclosure:  I, Mg Gaona, am serving as a scribe at 6:27 PM on 9/27/2023 to document services personally performed by Manolo Wilson MD based on my observations and the provider's statements to me.   9/27/2023   Manolo Wilson MD Goodman, Brian Samuel, MD  10/02/23 7430

## 2023-09-28 NOTE — ED NOTES
[facility]    ED Nurse Handoff Report    ED Chief complaint: Irregular Heart Beat      ED Diagnosis:   Final diagnoses:   None       Code Status: Full Code    Allergies:   Allergies   Allergen Reactions    No Clinical Screening - See Comments Itching     ENVIRONMENTAL: Oak trees    Bactrim Hives    Codeine Nausea and Vomiting    Ketorolac Nausea and Vomiting    Ketorolac Tromethamine Nausea and Vomiting    Morphine     Nalbuphine      Other reaction(s): Unknown Reaction    Penicillins Hives    Seasonal Allergies Itching    Sulfa Antibiotics     Aspartame GI Disturbance    Nsaids Other (See Comments)     Advil, Ibuprofen are ok       Patient Story:  Pt comes in to ER with dizziness and a feeling of being off all day. Pt found to have a-fib.     Focused Assessment:    Pt is A&Ox4, independent, can make needs known. Given 2 doses of 5mg metoprolol IV both helped pt's HR average low 100s, and a few seconds of conversion to normal sinus then back to afib. Pt now given oral metoprolol and xerelto for new afib diagnosis. HRs consistantly in the low 100s, occasionally into 80s and 90s. Pt denies CP, SOB or dizziness. Pt has parkinsons with a DBT that prevents his shaking.     Labs Ordered and Resulted from Time of ED Arrival to Time of ED Departure   CBC WITH PLATELETS AND DIFFERENTIAL - Abnormal       Result Value    WBC Count 5.7      RBC Count 4.03 (*)     Hemoglobin 12.6 (*)     Hematocrit 37.7 (*)     MCV 94      MCH 31.3      MCHC 33.4      RDW 13.2      Platelet Count 191      % Neutrophils 57      % Lymphocytes 32      % Monocytes 9      % Eosinophils 1      % Basophils 1      % Immature Granulocytes 0      NRBCs per 100 WBC 0      Absolute Neutrophils 3.3      Absolute Lymphocytes 1.8      Absolute Monocytes 0.5      Absolute Eosinophils 0.1      Absolute Basophils 0.0      Absolute Immature Granulocytes 0.0      Absolute NRBCs 0.0     BASIC METABOLIC PANEL - Abnormal    Sodium 140      Potassium  3.7      Chloride 105      Carbon Dioxide (CO2) 23      Anion Gap 12      Urea Nitrogen 23.9 (*)     Creatinine 0.92      GFR Estimate 89      Calcium 9.0      Glucose 107 (*)    TROPONIN T, HIGH SENSITIVITY - Normal    Troponin T, High Sensitivity 12     MAGNESIUM - Normal    Magnesium 2.1     TSH WITH FREE T4 REFLEX - Normal    TSH 3.31         Chest XR,  PA & LAT   Final Result   IMPRESSION: No focal airspace consolidation. No pleural effusion or pneumothorax.      Cardiomediastinal silhouette is normal.      Stimulator device overlies the left chest wall.            Treatments and/or interventions provided:      Medications   metoprolol (LOPRESSOR) injection 5 mg (5 mg Intravenous $Given 9/27/23 1826)   metoprolol (LOPRESSOR) injection 5 mg (5 mg Intravenous $Given 9/27/23 1940)   apixaban ANTICOAGULANT (ELIQUIS) tablet 5 mg (5 mg Oral $Given 9/27/23 1952)   metoprolol succinate ER (TOPROL XL) 24 hr tablet 50 mg (50 mg Oral $Given 9/27/23 2030)       Patient's response to treatments and/or interventions:    Pt denies symptoms, sitting up eating in bed.     To be done/followed up on inpatient unit:   See any in-patient orders    Does this patient have any cognitive concerns?: none    Activity level - Baseline/Home:    Independent    Activity Level - Current:    Independent    Patient's Preferred language: English     Needed?: No    Isolation: None  Infection: Not Applicable  Patient tested for COVID 19 prior to admission: YES    Bariatric?: No    Vital Signs:   Vitals:    09/27/23 2022 09/27/23 2023 09/27/23 2024 09/27/23 2030   BP:       Pulse: 102 88 93 105   Resp: 13 21 10    Temp:       TempSrc:       SpO2: 98% 96% 98%    Weight:       Height:           Cardiac Rhythm:Cardiac Rhythm: Atrial fibrillation - occasionally converts to normal sinus    Was the PSS-3 completed:   Yes  What interventions are required if any?               Family Comments: wife at bedside, feels more comfortable with family  staying overnight to see cardiology vs. Going home with new Rx  OBS brochure/video discussed/provided to patient/family: N/A              Name of person given brochure if not patient:               Relationship to patient:     For the majority of the shift this patient's behavior was Green.  Behavioral interventions performed were .    ED NURSE PHONE NUMBER: *[phone]

## 2023-09-28 NOTE — DISCHARGE INSTRUCTIONS
Your atrial fibrillation self resolved after giving metoprolol.  We recommend a 25 mg metoprolol daily dose to keep heart rate under control as well as aspirin daily.  We have ordered a cardiology follow-up due to this being a new finding.  Return to the emergency room with chest pain shortness of breath or worsening symptoms.  Thanks for your patience this evening.  
Rebel

## 2023-10-03 NOTE — PROGRESS NOTES
"Department of Neurology  Movement Disorders Division   DBS Follow-up Note    Patient: Kwame Lin  MRN: [id number]   : 1955   Date of Visit: 10/4/2023    Diagnosis: Parkinson disease  DBS Target(s): Bilateral STN   Date(s) of DBS Lead Placement: Left 2017, R 2018  Date(s) of IPG Placement: L chest 2017  Device: [employer]      Chief Complaint:  Kwame Lin is a 68 year old male who returns to clinic for follow up of PD status post bilateral STN DBS.  He was last seen 4/3/2023 at which time no changes were made.    Interval History:  He was found to be in afib recently so is now wearing a ziopatch.    He describes weak speech.  Working with SLP remotely.  Also has some pain and stiffness in his right knee and calf.  Also having trouble falling asleep.  At least in part due to leg pain.  Improves with Tylenol and standing.  Not present during the day.  Begins about 10-11pm.  Same feeling as his wearing off.    Has stopped gabapentin and clonazepam which either helped the symptoms a little or did nothing.    No wearing off.  Wearing off symptom is feeling \"antsy\" and gets ?dyskinesias.    He hit the donut hole in his insurance so has been \"milking\" his pills.    Rarely acting out his dreams but is removing his CPAP mask.     Part II  2.1 Speech (P) 2 (P) (2) Mild: My speech causes people to ask me to occasionally repeat myself, but not everyday.   2.2 Saliva and drooling (P) 0 (P) (0) Normal: Not at all (no problems).   2.3 Chewing and swallowing (P) 2 (P) (2) Mild: I need to have my pills cut or my food specially prepared because of chewing or swallowing problems, but I have not choked over the past week.   2.4 Eating tasks (P) 0 (P) (0) Normal: Not at all (no problems).   2.5 Dressing (P) 0 (P) (0) Normal: Not at all (no problems).   2.6 Hygiene (P) 0 (P) (0) Normal: Not at all (no problems).   2.7 Handwriting (P) 1 (P) (1) Slight: My writing is slow, clumsy or uneven, " but all words are clear.   2.8 Doing hobbies and other activities (P) 0 (P) (0) Normal: Not at all (no problems).   2.9 Turning in bed (P) 0 (P) (0) Normal: Not at all (no problems).   2.10 Tremor (P) 0 (P) (0) Normal: Not at all (no problems).   2.11 Getting out of bed  (P) 0 (P) (0) Normal: Not at all (no problems).   2.12 Walking and balance  (P) 1 (P) (1) Slight: I am slightly slow or may drag a leg.  I never use a walking aid.   2.13 Freezing (P) 0 (P) (0) Normal: Not at all (no problems).   Total (P) 6    Prior: 7 on 4/2/2023      Relevant Medications  8 am 12 pm 4 pm 8 pm 11 pm   Sinemet 25/100mg 1.5 1.5 1.5 1.5 1.5   Entacapone 200mg 1 1 1 1 1   Paroxetine 10mg 1           Last taken: 10am      Medications:  Current Outpatient Medications   Medication Sig Dispense Refill    acetaminophen (TYLENOL) 325 MG tablet Take 325-650 mg by mouth nightly as needed for mild pain      aspirin 81 MG EC tablet Take 1 tablet (81 mg) by mouth daily 30 tablet 0    atorvastatin (LIPITOR) 20 MG tablet       B Complex CAPS as directed Orally      carbidopa-levodopa (SINEMET)  MG tablet TAKE 1 AND 1/2 TABLETS BY MOUTH EVERY 4 HOURS FIVE TIMES DAILY(8AM, NOON, 4PM, 8PM, AND 11PM) 675 tablet 3    cholecalciferol (VITAMIN D3) 1250 mcg (33175 units) capsule Take 1 capsule by mouth daily      clonazePAM (KLONOPIN) 0.5 MG tablet Take 1 tablet (0.5 mg) by mouth At Bedtime 90 tablet 1    clotrimazole (LOTRIMIN) 1 % external cream Apply topically to affected area(s) two times daily.      entacapone (COMTAN) 200 MG tablet Take 1 tablet (200 mg) by mouth 5 times daily 150 tablet 11    gabapentin (NEURONTIN) 100 MG capsule Take 1 capsule by mouth before bed. 90 capsule 1    metoprolol succinate ER (TOPROL XL) 25 MG 24 hr tablet Take 1 tablet (25 mg) by mouth daily 30 tablet 0    Misc Natural Products (JOINT SUPPORT) CAPS as directed Orally      multivitamin w/minerals (THERA-VIT-M) tablet Take 1 tablet by mouth every morning       PARoxetine (PAXIL) 10 MG tablet Take 10 mg by mouth daily      sildenafil (REVATIO) 20 MG tablet Take 20 mg by mouth      thyroid (ARMOUR) 60 MG tablet Take 60 mg by mouth every morning       clindamycin (CLEOCIN) 300 MG capsule TAKE 2 CAPSULES BY MOUTH FOR 1 DOSE 1 HOUR PRIOR TO DENTAL APPOINTMENTS (Patient not taking: Reported on 4/3/2023)          Allergies: is allergic to no clinical screening - see comments, bactrim, codeine, ketorolac, ketorolac tromethamine, morphine, nalbuphine, penicillamine, penicillins, seasonal allergies, sulfa antibiotics, aspartame, nsaids, and oxycodone-acetaminophen.    Physical Exam:  The patient's  blood pressure is 110/74 and his pulse is 61. His respiration is 16 and oxygen saturation is 95%.       Neurological Examination:   Last medication dose: 10am   4/3/2023  10:00 AM 10/4/2023  12:00 PM   UPDRS Motor Scale     Time: 10:05  12:22    Medication On  On    R Brain DBS: On  On    L Brain DBS: On  On    Dyskinesia (LID) Yes  Yes    Did LID interfere No     Speech 1  1    Facial Expression 0  0    Rigidity Neck 1  1    Rigidity RUE 1  0    Rigidity LUE 1  1    Rigidity RLE 0  0    Rigidity LLE 0  0    Finger Taps R 1  1    Finger Taps L 1  2    Hand Mvt R 0  1    Hand Mvt L 0  1    Pron-/Supinate R 0  0    Pron-/Supinate L 0  0    Toe Tap R 1  1    Toe Tap L 1  2    Leg Agility R 0  0    Leg Agility L 0  1    Arise From Chair 1  1    Gait 0  1    Gait Freezing 0  0    Postural Stability 0  0    Posture 0  0    Global Spont Mvt 0  0    Postural Tremor RUE 1  1    Postural Tremor LUE 1  1    Kinetic Tremor RUE 0  0    Kinetic Tremor LUE 0  0    Rest Tremor RUE 0  0    Rest Tremor LUE 0  0    Rest Tremor RLE 0  0    Rest Tremor LLE 0  0    Rest Tremor Lip/Jaw 0  0    Rest Tremor Constancy 0  0    Total Right 4  4    Total Left 4  8    Axial Total 3  4    Total 11  16              Procedure: DBS Interrogation & Programming  Lead(s):     Left Right   DBS Target STN STN   DBS Lead Type  8 ring contacts 8 ring contacts   Lead Implant Date 12/14/2017 5/22/2018      IPG(s):    1   IPG Vercise   IPG Implant Date 12/22/2017   Location Left chest   Battery (V) 3.73V (3.64V)      Impedance Check: No problems found.  See scanned report for impedance details.     1-Moose7 Left Brain   Right Brain       Initial Final Initial Final     Inactive Inactive Inactive Inactive   Amplitude (mA) 5.0 [2-5.5] No change 3.3 [2.5-4.0] No change   Pulse width (usec) 60   60     Freq (Hz) 139   139     Contacts: C+3- (55%), 7- (45%)   C+11-(60%), 14- (40%)        3-Moose 6 Left Brain   Right Brain       Initial Final Initial Final     Active Active Active Active   Amplitude (mA) 5.0 [2-5.5] 5.5 [2-5.5] 3.3 [2.5-4.0] 3.5 [2.5-4.0]   Pulse width (usec) 60 60 60 60   Freq (Hz) 139 139 139 139   Contacts: C+3- (55%), 6- (45%) C+3- (55%), 6- (45%) C+11-(60%), 14- (40%) C+11-(60%), 14- (40%)      4-Bear Left Brain   Right Brain       Initial Final Initial Final     Inactive Inactive Inactive Inactive   Amplitude (mA) 2.8 [2-3.3] No change 3.3 [2.5-4.0] No change   Pulse width (usec) 60   60     Freq (Hz) 139   139     Contacts: C+3-   C+11-(60%), 14- (40%)           Assessment/Plan:  Kwame Lin is a 68 year old male with PD s/p bilateral STN DBS who returns for follow-up.  He is bothered by right leg discomfort when sedentary or when wearing off so will increase LSTN.  On exam, he had more left body bradykinesia so will increase RSTN.    - LSTN increased from 5.0 to 5.5.  - RSTN increased from 3.3 to 3.5.  - Continue Sinemet, entacapone, and paroxetine as is  - RTC 6 months, 1 hr DBS programming      Anaid Watson MD   of Neurology  Movement Disorders Division         Additional time spent for separate DBS programming: 10 min DBS analyzed with reprogramming.

## 2023-10-04 ENCOUNTER — OFFICE VISIT (OUTPATIENT)
Dept: NEUROLOGY | Facility: CLINIC | Age: 68
End: 2023-10-04
Payer: COMMERCIAL

## 2023-10-04 VITALS
RESPIRATION RATE: 16 BRPM | SYSTOLIC BLOOD PRESSURE: 110 MMHG | HEART RATE: 61 BPM | OXYGEN SATURATION: 95 % | DIASTOLIC BLOOD PRESSURE: 74 MMHG

## 2023-10-04 DIAGNOSIS — Z96.89 S/P DEEP BRAIN STIMULATOR PLACEMENT: ICD-10-CM

## 2023-10-04 DIAGNOSIS — G20.A2 PARKINSON'S DISEASE WITH FLUCTUATING MANIFESTATIONS, UNSPECIFIED WHETHER DYSKINESIA PRESENT (H): Primary | ICD-10-CM

## 2023-10-04 PROCEDURE — 95983 ALYS BRN NPGT PRGRMG 15 MIN: CPT | Performed by: STUDENT IN AN ORGANIZED HEALTH CARE EDUCATION/TRAINING PROGRAM

## 2023-10-04 PROCEDURE — 99214 OFFICE O/P EST MOD 30 MIN: CPT | Mod: 25 | Performed by: STUDENT IN AN ORGANIZED HEALTH CARE EDUCATION/TRAINING PROGRAM

## 2023-10-04 RX ORDER — MAGNESIUM HYDROXIDE 400 MG/5ML
SUSPENSION, ORAL (FINAL DOSE FORM) ORAL
COMMUNITY

## 2023-10-04 RX ORDER — CLOTRIMAZOLE 1 %
CREAM (GRAM) TOPICAL
COMMUNITY
Start: 2023-06-12

## 2023-10-04 RX ORDER — VITAMIN B COMPLEX
CAPSULE ORAL
COMMUNITY
Start: 2022-09-21

## 2023-10-04 ASSESSMENT — UNIFIED PARKINSONS DISEASE RATING SCALE (UPDRS)
AXIAL_SCORE: 4
AMPLITUDE_LIP_JAW: (0) NORMAL: NO TREMOR.
POSTURE: (0) NORMAL: NO PROBLEMS.
LEG_AGILITY_RIGHT: (0) NORMAL: NO PROBLEMS.
TOETAPPING_RIGHT: (1) SLIGHT: ANY OF THE FOLLOWING: A) THE REGULAR RHYTHM IS BROKEN WITH ONE WITH ONE OR TWO INTERRUPTIONS OR HESITATIONS OF THE MOVEMENT  B) SLIGHT SLOWING  C) THE AMPLITUDE DECREMENTS NEAR THE END OF THE 10 MOVEMENTS.
SPEECH: (1) SLIGHT: LOSS OF MODULATION, DICTION OR VOLUME, BUT STILL ALL WORDS EASY TO UNDERSTAND.
TOETAPPING_LEFT: (2) MILD: ANY OF THE FOLLOWING: A) 3 TO 5 INTERRUPTIONS DURING TAPPING  B) MILD SLOWING  C) THE AMPLITUDE DECREMENTS MIDWAY IN THE 10-MOVEMENT SEQUENCE.
FINGER_TAPPING_RIGHT: (1) SLIGHT: ANY OF THE FOLLOWING: A) THE REGULAR RHYTHM IS BROKEN WITH ONE WITH ONE OR TWO INTERRUPTIONS OR HESITATIONS OF THE MOVEMENT  B) SLIGHT SLOWING  C) THE AMPLITUDE DECREMENTS NEAR THE END OF THE 10 MOVEMENTS.
GAIT: (1) SLIGHT: INDEPENDENT WALKING WITH MINOR GAIT IMPAIRMENT.
CONSTANCY_TREMOR_ATREST: (0) NORMAL: NO TREMOR.
TOTAL_SCORE: 16
RIGIDITY_LUE: (1) SLIGHT: RIGIDITY ONLY DETECTED WITH ACTIVATION MANEUVER.
AMPLITUDE_RUE: (0) NORMAL: NO TREMOR.
TOTAL_SCORE: 4
FACIAL_EXPRESSION: (0) NORMAL: NORMAL FACIAL EXPRESSION.
FREEZING_GAIT: (0) NORMAL: NO FREEZING.
HANDMOVEMENTS_LEFT: (1) SLIGHT: ANY OF THE FOLLOWING: A) THE REGULAR RHYTHM IS BROKEN WITH ONE WITH ONE OR TWO INTERRUPTIONS OR HESITATIONS OF THE MOVEMENT  B) SLIGHT SLOWING  C) THE AMPLITUDE DECREMENTS NEAR THE END OF THE 10 MOVEMENTS.
RIGIDITY_LLE: (0) NORMAL: NO RIGIDITY.
HANDMOVEMENTS_RIGHT: (1) SLIGHT: ANY OF THE FOLLOWING: A) THE REGULAR RHYTHM IS BROKEN WITH ONE WITH ONE OR TWO INTERRUPTIONS OR HESITATIONS OF THE MOVEMENT  B) SLIGHT SLOWING  C) THE AMPLITUDE DECREMENTS NEAR THE END OF THE 10 MOVEMENTS.
FINGER_TAPPING_LEFT: (2) MILD: ANY OF THE FOLLOWING: A) 3 TO 5 INTERRUPTIONS DURING TAPPING  B) MILD SLOWING  C) THE AMPLITUDE DECREMENTS MIDWAY IN THE 10-MOVEMENT SEQUENCE.
TOTAL_SCORE_LEFT: 8
AMPLITUDE_LLE: (0) NORMAL: NO TREMOR.
ARISING_CHAIR: (1) SLIGHT: ARISING IS SLOWER THAN NORMAL, OR MAY NEED MORE THAN ONE ATTEMPT, OR MAY NEED TO MOVE FORWARD IN THE CHAIR TO ARISE. NO NEED TO USE THE ARMS OF THE CHAIR.
PRONATION_SUPINATION_RIGHT: (0) NORMAL: NO PROBLEMS.
SPONTANEITY_OF_MOVEMENT: (0) NORMAL: NO PROBLEMS.
LEG_AGILITY_LEFT: (1) SLIGHT: ANY OF THE FOLLOWING: A) THE REGULAR RHYTHM IS BROKEN WITH ONE WITH ONE OR TWO INTERRUPTIONS OR HESITATIONS OF THE MOVEMENT  B) SLIGHT SLOWING  C) THE AMPLITUDE DECREMENTS NEAR THE END OF THE 10 MOVEMENTS.
POSTURAL_STABILITY: (0) NORMAL: NO PROBLEMS. RECOVERS WITH ONE OR TWO STEPS.
PARKINSONS_MEDS: ON
RIGIDITY_RUE: (0) NORMAL: NO RIGIDITY.
PRONATION_SUPINATION_LEFT: (0) NORMAL: NO PROBLEMS.
RIGIDITY_NECK: (1) SLIGHT: RIGIDITY ONLY DETECTED WITH ACTIVATION MANEUVER.
AMPLITUDE_LUE: (0) NORMAL: NO TREMOR.
AMPLITUDE_RLE: (0) NORMAL: NO TREMOR.
DYSKINESIAS_PRESENT: YES
RIGIDITY_RLE: (0) NORMAL: NO RIGIDITY.

## 2023-10-04 ASSESSMENT — PAIN SCALES - GENERAL: PAINLEVEL: NO PAIN (0)

## 2023-10-04 NOTE — PATIENT INSTRUCTIONS
You are still on the 3-Moose6 program.  Today I increased your right 5.0 to 5.5 to help with your right leg.  I also increased the left from 3.3 to 3.5 to help with your hand and leg movements.    If this causes new unpleasant symptoms, try reducing the settings back down.

## 2023-10-04 NOTE — LETTER
"10/4/2023       RE: Kwame Lin  [address] [facility] [address]     Dear Colleague,    Thank you for referring your patient, Kwame Lin, to the [facility] [place] [facility]. Please see a copy of my visit note below.    Department of Neurology  Movement Disorders Division   DBS Follow-up Note    Patient: Kwame Lin  MRN: [id number]   : 1955   Date of Visit: 10/4/2023    Diagnosis: Parkinson disease  DBS Target(s): Bilateral STN   Date(s) of DBS Lead Placement: Left 2017, R 2018  Date(s) of IPG Placement: L chest 2017  Device: [employer]      Chief Complaint:  Kwame Lin is a 68 year old male who returns to clinic for follow up of PD status post bilateral STN DBS.  He was last seen 4/3/2023 at which time no changes were made.    Interval History:  He was found to be in afib recently so is now wearing a ziopatch.    He describes weak speech.  Working with SLP remotely.  Also has some pain and stiffness in his right knee and calf.  Also having trouble falling asleep.  At least in part due to leg pain.  Improves with Tylenol and standing.  Not present during the day.  Begins about 10-11pm.  Same feeling as his wearing off.    Has stopped gabapentin and clonazepam which either helped the symptoms a little or did nothing.    No wearing off.  Wearing off symptom is feeling \"antsy\" and gets ?dyskinesias.    He hit the donut hole in his insurance so has been \"milking\" his pills.    Rarely acting out his dreams but is removing his CPAP mask.     Part II  2.1 Speech (P) 2 (P) (2) Mild: My speech causes people to ask me to occasionally repeat myself, but not everyday.   2.2 Saliva and drooling (P) 0 (P) (0) Normal: Not at all (no problems).   2.3 Chewing and swallowing (P) 2 (P) (2) Mild: I need to have my pills cut or my food specially prepared because of chewing or swallowing " problems, but I have not choked over the past week.   2.4 Eating tasks (P) 0 (P) (0) Normal: Not at all (no problems).   2.5 Dressing (P) 0 (P) (0) Normal: Not at all (no problems).   2.6 Hygiene (P) 0 (P) (0) Normal: Not at all (no problems).   2.7 Handwriting (P) 1 (P) (1) Slight: My writing is slow, clumsy or uneven, but all words are clear.   2.8 Doing hobbies and other activities (P) 0 (P) (0) Normal: Not at all (no problems).   2.9 Turning in bed (P) 0 (P) (0) Normal: Not at all (no problems).   2.10 Tremor (P) 0 (P) (0) Normal: Not at all (no problems).   2.11 Getting out of bed  (P) 0 (P) (0) Normal: Not at all (no problems).   2.12 Walking and balance  (P) 1 (P) (1) Slight: I am slightly slow or may drag a leg.  I never use a walking aid.   2.13 Freezing (P) 0 (P) (0) Normal: Not at all (no problems).   Total (P) 6    Prior: 7 on 4/2/2023      Relevant Medications  8 am 12 pm 4 pm 8 pm 11 pm   Sinemet 25/100mg 1.5 1.5 1.5 1.5 1.5   Entacapone 200mg 1 1 1 1 1   Paroxetine 10mg 1           Last taken: 10am      Medications:  Current Outpatient Medications   Medication Sig Dispense Refill    acetaminophen (TYLENOL) 325 MG tablet Take 325-650 mg by mouth nightly as needed for mild pain      aspirin 81 MG EC tablet Take 1 tablet (81 mg) by mouth daily 30 tablet 0    atorvastatin (LIPITOR) 20 MG tablet       B Complex CAPS as directed Orally      carbidopa-levodopa (SINEMET)  MG tablet TAKE 1 AND 1/2 TABLETS BY MOUTH EVERY 4 HOURS FIVE TIMES DAILY(8AM, NOON, 4PM, 8PM, AND 11PM) 675 tablet 3    cholecalciferol (VITAMIN D3) 1250 mcg (06354 units) capsule Take 1 capsule by mouth daily      clonazePAM (KLONOPIN) 0.5 MG tablet Take 1 tablet (0.5 mg) by mouth At Bedtime 90 tablet 1    clotrimazole (LOTRIMIN) 1 % external cream Apply topically to affected area(s) two times daily.      entacapone (COMTAN) 200 MG tablet Take 1 tablet (200 mg) by mouth 5 times daily 150 tablet 11    gabapentin (NEURONTIN) 100 MG  capsule Take 1 capsule by mouth before bed. 90 capsule 1    metoprolol succinate ER (TOPROL XL) 25 MG 24 hr tablet Take 1 tablet (25 mg) by mouth daily 30 tablet 0    Misc Natural Products (JOINT SUPPORT) CAPS as directed Orally      multivitamin w/minerals (THERA-VIT-M) tablet Take 1 tablet by mouth every morning      PARoxetine (PAXIL) 10 MG tablet Take 10 mg by mouth daily      sildenafil (REVATIO) 20 MG tablet Take 20 mg by mouth      thyroid (ARMOUR) 60 MG tablet Take 60 mg by mouth every morning       clindamycin (CLEOCIN) 300 MG capsule TAKE 2 CAPSULES BY MOUTH FOR 1 DOSE 1 HOUR PRIOR TO DENTAL APPOINTMENTS (Patient not taking: Reported on 4/3/2023)          Allergies: is allergic to no clinical screening - see comments, bactrim, codeine, ketorolac, ketorolac tromethamine, morphine, nalbuphine, penicillamine, penicillins, seasonal allergies, sulfa antibiotics, aspartame, nsaids, and oxycodone-acetaminophen.    Physical Exam:  The patient's  blood pressure is 110/74 and his pulse is 61. His respiration is 16 and oxygen saturation is 95%.       Neurological Examination:   Last medication dose: 10am   4/3/2023  10:00 AM 10/4/2023  12:00 PM   UPDRS Motor Scale     Time: 10:05  12:22    Medication On  On    R Brain DBS: On  On    L Brain DBS: On  On    Dyskinesia (LID) Yes  Yes    Did LID interfere No     Speech 1  1    Facial Expression 0  0    Rigidity Neck 1  1    Rigidity RUE 1  0    Rigidity LUE 1  1    Rigidity RLE 0  0    Rigidity LLE 0  0    Finger Taps R 1  1    Finger Taps L 1  2    Hand Mvt R 0  1    Hand Mvt L 0  1    Pron-/Supinate R 0  0    Pron-/Supinate L 0  0    Toe Tap R 1  1    Toe Tap L 1  2    Leg Agility R 0  0    Leg Agility L 0  1    Arise From Chair 1  1    Gait 0  1    Gait Freezing 0  0    Postural Stability 0  0    Posture 0  0    Global Spont Mvt 0  0    Postural Tremor RUE 1  1    Postural Tremor LUE 1  1    Kinetic Tremor RUE 0  0    Kinetic Tremor LUE 0  0    Rest Tremor RUE 0  0     Rest Tremor LUE 0  0    Rest Tremor RLE 0  0    Rest Tremor LLE 0  0    Rest Tremor Lip/Jaw 0  0    Rest Tremor Constancy 0  0    Total Right 4  4    Total Left 4  8    Axial Total 3  4    Total 11  16              Procedure: DBS Interrogation & Programming  Lead(s):     Left Right   DBS Target STN STN   DBS Lead Type 8 ring contacts 8 ring contacts   Lead Implant Date 12/14/2017 5/22/2018      IPG(s):    1   IPG Vercise   IPG Implant Date 12/22/2017   Location Left chest   Battery (V) 3.73V (3.64V)      Impedance Check: No problems found.  See scanned report for impedance details.     1-Moose7 Left Brain   Right Brain       Initial Final Initial Final     Inactive Inactive Inactive Inactive   Amplitude (mA) 5.0 [2-5.5] No change 3.3 [2.5-4.0] No change   Pulse width (usec) 60   60     Freq (Hz) 139   139     Contacts: C+3- (55%), 7- (45%)   C+11-(60%), 14- (40%)        3-Moose 6 Left Brain   Right Brain       Initial Final Initial Final     Active Active Active Active   Amplitude (mA) 5.0 [2-5.5] 5.5 [2-5.5] 3.3 [2.5-4.0] 3.5 [2.5-4.0]   Pulse width (usec) 60 60 60 60   Freq (Hz) 139 139 139 139   Contacts: C+3- (55%), 6- (45%) C+3- (55%), 6- (45%) C+11-(60%), 14- (40%) C+11-(60%), 14- (40%)      4-Bear Left Brain   Right Brain       Initial Final Initial Final     Inactive Inactive Inactive Inactive   Amplitude (mA) 2.8 [2-3.3] No change 3.3 [2.5-4.0] No change   Pulse width (usec) 60   60     Freq (Hz) 139   139     Contacts: C+3-   C+11-(60%), 14- (40%)           Assessment/Plan:  Kwame Lin is a 68 year old male with PD s/p bilateral STN DBS who returns for follow-up.  He is bothered by right leg discomfort when sedentary or when wearing off so will increase LSTN.  On exam, he had more left body bradykinesia so will increase RSTN.    - LSTN increased from 5.0 to 5.5.  - RSTN increased from 3.3 to 3.5.  - Continue Sinemet, entacapone, and paroxetine as is  - RTC 6 months, 1 hr DBS programming        Additional time spent for separate DBS programming: 10 min DBS analyzed with reprogramming.          Again, thank you for allowing me to participate in the care of your patient.      Sincerely,    Anaid Watson MD

## 2023-10-06 ENCOUNTER — MYC MEDICAL ADVICE (OUTPATIENT)
Dept: SLEEP MEDICINE | Facility: CLINIC | Age: 68
End: 2023-10-06
Payer: COMMERCIAL

## 2023-10-06 ENCOUNTER — DOCUMENTATION ONLY (OUTPATIENT)
Dept: NEUROLOGY | Facility: CLINIC | Age: 68
End: 2023-10-06
Payer: COMMERCIAL

## 2023-10-06 DIAGNOSIS — G47.33 OSA (OBSTRUCTIVE SLEEP APNEA): Primary | ICD-10-CM

## 2023-10-09 NOTE — TELEPHONE ENCOUNTER
Patient requesting supply order to send to [facility].  LOV was 2021.  Has multiple [employer] messages sent looking for help with medication that you have addressed with him already.  Pended supply script for consideration.

## 2023-10-16 NOTE — TELEPHONE ENCOUNTER
Supply order faxed to [facility].    Addis KEARNEY RN  [facility]

## 2023-10-17 ENCOUNTER — CARE COORDINATION (OUTPATIENT)
Dept: SLEEP MEDICINE | Facility: CLINIC | Age: 68
End: 2023-10-17
Payer: COMMERCIAL

## 2023-10-17 NOTE — PROGRESS NOTES
Received a fax from [facility] last office visit notes. Faxed last office visit note Fax# [phone].

## 2023-10-25 ENCOUNTER — HOSPITAL ENCOUNTER (EMERGENCY)
Facility: CLINIC | Age: 68
Discharge: HOME | End: 2023-10-25
Attending: EMERGENCY MEDICINE | Admitting: EMERGENCY MEDICINE
Payer: COMMERCIAL

## 2023-10-25 ENCOUNTER — APPOINTMENT (OUTPATIENT)
Dept: CT IMAGING | Facility: CLINIC | Age: 68
End: 2023-10-25
Attending: EMERGENCY MEDICINE
Payer: COMMERCIAL

## 2023-10-25 VITALS
RESPIRATION RATE: 20 BRPM | OXYGEN SATURATION: 96 % | WEIGHT: 200 LBS | DIASTOLIC BLOOD PRESSURE: 77 MMHG | BODY MASS INDEX: 29.53 KG/M2 | TEMPERATURE: 97.2 F | SYSTOLIC BLOOD PRESSURE: 125 MMHG | HEART RATE: 62 BPM

## 2023-10-25 DIAGNOSIS — J20.9 ACUTE BRONCHITIS, UNSPECIFIED ORGANISM: ICD-10-CM

## 2023-10-25 DIAGNOSIS — R79.89 ELEVATED D-DIMER: ICD-10-CM

## 2023-10-25 LAB
ANION GAP SERPL CALCULATED.3IONS-SCNC: 8 MMOL/L (ref 7–15)
BASOPHILS # BLD AUTO: 0.1 10E3/UL (ref 0–0.2)
BASOPHILS NFR BLD AUTO: 1 %
BUN SERPL-MCNC: 20.2 MG/DL (ref 8–23)
CALCIUM SERPL-MCNC: 8.9 MG/DL (ref 8.8–10.2)
CHLORIDE SERPL-SCNC: 105 MMOL/L (ref 98–107)
CREAT SERPL-MCNC: 0.88 MG/DL (ref 0.67–1.17)
DEPRECATED HCO3 PLAS-SCNC: 27 MMOL/L (ref 22–29)
EGFRCR SERPLBLD CKD-EPI 2021: >90 ML/MIN/1.73M2
EOSINOPHIL # BLD AUTO: 0.2 10E3/UL (ref 0–0.7)
EOSINOPHIL NFR BLD AUTO: 3 %
ERYTHROCYTE [DISTWIDTH] IN BLOOD BY AUTOMATED COUNT: 13.5 % (ref 10–15)
GLUCOSE SERPL-MCNC: 92 MG/DL (ref 70–99)
HCT VFR BLD AUTO: 35.9 % (ref 40–53)
HGB BLD-MCNC: 11.7 G/DL (ref 13.3–17.7)
HOLD SPECIMEN: NORMAL
IMM GRANULOCYTES # BLD: 0 10E3/UL
IMM GRANULOCYTES NFR BLD: 0 %
LYMPHOCYTES # BLD AUTO: 1.9 10E3/UL (ref 0.8–5.3)
LYMPHOCYTES NFR BLD AUTO: 34 %
MCH RBC QN AUTO: 30.5 PG (ref 26.5–33)
MCHC RBC AUTO-ENTMCNC: 32.6 G/DL (ref 31.5–36.5)
MCV RBC AUTO: 94 FL (ref 78–100)
MONOCYTES # BLD AUTO: 0.4 10E3/UL (ref 0–1.3)
MONOCYTES NFR BLD AUTO: 8 %
NEUTROPHILS # BLD AUTO: 3.1 10E3/UL (ref 1.6–8.3)
NEUTROPHILS NFR BLD AUTO: 54 %
NRBC # BLD AUTO: 0 10E3/UL
NRBC BLD AUTO-RTO: 0 /100
PLATELET # BLD AUTO: 202 10E3/UL (ref 150–450)
POTASSIUM SERPL-SCNC: 4.5 MMOL/L (ref 3.4–5.3)
RBC # BLD AUTO: 3.84 10E6/UL (ref 4.4–5.9)
SODIUM SERPL-SCNC: 140 MMOL/L (ref 135–145)
WBC # BLD AUTO: 5.6 10E3/UL (ref 4–11)

## 2023-10-25 PROCEDURE — 250N000011 HC RX IP 250 OP 636: Performed by: EMERGENCY MEDICINE

## 2023-10-25 PROCEDURE — 36415 COLL VENOUS BLD VENIPUNCTURE: CPT | Performed by: EMERGENCY MEDICINE

## 2023-10-25 PROCEDURE — 250N000009 HC RX 250: Performed by: EMERGENCY MEDICINE

## 2023-10-25 PROCEDURE — 71275 CT ANGIOGRAPHY CHEST: CPT

## 2023-10-25 PROCEDURE — 85025 COMPLETE CBC W/AUTO DIFF WBC: CPT | Performed by: EMERGENCY MEDICINE

## 2023-10-25 PROCEDURE — 80048 BASIC METABOLIC PNL TOTAL CA: CPT | Performed by: EMERGENCY MEDICINE

## 2023-10-25 PROCEDURE — 99285 EMERGENCY DEPT VISIT HI MDM: CPT | Mod: 25

## 2023-10-25 RX ORDER — IOPAMIDOL 755 MG/ML
73 INJECTION, SOLUTION INTRAVASCULAR ONCE
Status: COMPLETED | OUTPATIENT
Start: 2023-10-25 | End: 2023-10-25

## 2023-10-25 RX ADMIN — SODIUM CHLORIDE 96 ML: 9 INJECTION, SOLUTION INTRAVENOUS at 21:05

## 2023-10-25 RX ADMIN — IOPAMIDOL 73 ML: 755 INJECTION, SOLUTION INTRAVENOUS at 21:01

## 2023-10-25 ASSESSMENT — ACTIVITIES OF DAILY LIVING (ADL): ADLS_ACUITY_SCORE: 35

## 2023-10-25 NOTE — ED PROVIDER NOTES
History     Chief Complaint:  Cough       HPI   Kwame Lin is a 68 year old male who presents to the ED for an evaluation of a cough. Patient reports he has been experiencing cough for the past 3 weeks. He went to the urgent care this morning for a physical, and prescribed him prednisone, doxycycline and an albuterol inhaler because of his cough on the assumption this represents a bronchitis. He states he's been coughing up phlegm, chills at first but no fevers. He has been constantly sweating. Denies recent travel or surgeries. Kwame hasn't coughed up any blood and no swelling in the calves. He also denies chest pain, abdominal pain, rash, and vomits.  The patient did have a D-dimer checked by the PCP that he showed me the paperwork for which was elevated at 0.7.    Independent Historian:   Wife confirms this has been a troublesome cough for the past 3 weeks.    Review of External Notes:   Dr. Garcia office note from today.  This confirms he is prescribed doxycycline, prednisone and albuterol.    Medications:    Acetaminophen   Aspirin 81 mg   Carbidopa-levodopa   Clonazepam   Entacapone   Gabapentin   Metoprolol   Atorvastatin   Cholecalciferol   Paroxetine   Sildenafil  Clotrimazole   Doxycycline   Prednisone     Past Medical History  Anosmia   Depressed mood   Hypothyroid   Parkinson disease   PONV   RBD  Parkinsonian tremor   Dry eye syndrome   Gait difficulty    Past Surgical History:    Left knee replacement   Insertion removal cranial dbs generator   Twice arthroscopy knee     Physical Exam   Patient Vitals for the past 24 hrs:   BP Temp Temp src Pulse Resp SpO2 Weight   10/25/23 1806 125/77 97.2  F (36.2  C) Temporal 62 20 96 % 90.7 kg (200 lb)      Physical Exam  General:  Sitting on bed.  HENT:  No obvious trauma to head  Right Ear:  External ear normal.   Left Ear:  External ear normal.   Nose:  Nose normal.   Eyes:  Conjunctivae and EOM are normal. Pupils are equal, round, and reactive.    Neck: Normal range of motion. Neck supple. No tracheal deviation present.   CV:  Normal heart sounds. No murmur heard.  Pulm/Chest: Effort normal and breath sounds normal other than very slight wheezing throughout, but nothing significant.  M/S: Normal range of motion.   Neuro: Awake and alert.  Skin: Skin is warm and dry. No rash noted. Not diaphoretic.   Psych: Normal mood and affect. Behavior is normal.      Emergency Department Course     Imaging:  CT Chest Pulmonary Embolism w Contrast   Final Result   IMPRESSION:   1.  No pulmonary embolism.      2.  Mild bronchial wall thickening. Tree-in-bud nodularity in the right middle lobe, likely infectious or inflammatory.      3.  Questionable hydronephrosis versus parapelvic cysts in the partially visualized right kidney. Consider further evaluation with renal ultrasound.         Laboratory:  Labs Ordered and Resulted from Time of ED Arrival to Time of ED Departure   CBC WITH PLATELETS AND DIFFERENTIAL - Abnormal       Result Value    WBC Count 5.6      RBC Count 3.84 (*)     Hemoglobin 11.7 (*)     Hematocrit 35.9 (*)     MCV 94      MCH 30.5      MCHC 32.6      RDW 13.5      Platelet Count 202      % Neutrophils 54      % Lymphocytes 34      % Monocytes 8      % Eosinophils 3      % Basophils 1      % Immature Granulocytes 0      NRBCs per 100 WBC 0      Absolute Neutrophils 3.1      Absolute Lymphocytes 1.9      Absolute Monocytes 0.4      Absolute Eosinophils 0.2      Absolute Basophils 0.1      Absolute Immature Granulocytes 0.0      Absolute NRBCs 0.0     BASIC METABOLIC PANEL - Normal    Sodium 140      Potassium 4.5      Chloride 105      Carbon Dioxide (CO2) 27      Anion Gap 8      Urea Nitrogen 20.2      Creatinine 0.88      GFR Estimate >90      Calcium 8.9      Glucose 92        Procedures     Emergency Department Course & Assessments:    Interventions:  Medications   iopamidol (ISOVUE-370) solution 73 mL (73 mLs Intravenous $Given 10/25/23 2101)    Saline Flush (96 mLs Intravenous $Given 10/25/23 0196)      Assessments:  1812 I obtained history and examined the patient as noted above.     Independent Interpretation (X-rays, CTs, rhythm strip):  None    Consultations/Discussion of Management or Tests:  None     Social Determinants of Health affecting care:   None    Disposition:  The patient was discharged to home.     Impression & Plan    Medical Decision Making:  Kwame Lin is a very pleasant 68 year old year old patient who presents to the emergency department with concern of a cough for the past 3 weeks.  The patient was at his PCP today for this.  He was prescribed doxycycline, prednisone and albuterol.  He had these filled today.  He has not started them.  Patient also had a D-dimer drawn at an outpatient lab that was elevated at 0.7.  It was recommended he come to the emergency department to be assessed for pulmonary embolism.  Reviewed the risk and benefit of work-up including a CT PE study to definitively assess for this and he desired and consented for this.  No recent creatinine on file.  Labs checked and normal.  CT PE study performed that shows no pulmonary embolism, but does show the above findings.  I reviewed the incidental finding and recommended outpatient renal ultrasound to assess the right kidney.  The patient agrees to do so.  He has not had any urinary symptoms and has not had any dysuria, urgency or frequency nor has he had any hematuria to need an emergent ultrasound.  The patient will take medications that have already been prescribed for his cough.  He does not have any calf pain or swelling or symptoms to suggest DVT and lower extremity ultrasounds are not necessary at this time.  The patient agrees.  We discussed reasons to return.  He feels comfortable with this plan and desires to go home.    The treatment plan was discussed with the patient and they expressed understanding of this plan and consented to the plan.  In  addition, the patient will return to the emergency department if their symptoms persist, worsen, if new symptoms arise or if there is any concern as other pathology may be present that is not evident at this time. They also understand the importance of close follow up in the clinic and if unable to do so will return to the emergency department for a reevaluation. All questions were answered.    Diagnosis:    ICD-10-CM    1. Acute bronchitis, unspecified organism  J20.9       2. Elevated d-dimer  R79.89     and ct pe study negative         Discharge Medications:  New Prescriptions    No medications on file      Scribe Disclosure:  Bernice PARKER, am serving as a scribe at 6:17 PM on 10/25/2023 to document services personally performed by Martinez Martinez DO based on my observations and the provider's statements to me.     10/25/2023   DO Martinez Lema Robert James, DO  10/25/23 9799

## 2023-10-25 NOTE — ED TRIAGE NOTES
Cough for three weeks, had afib about four weeks ago. Went to [facility] and they sohail a d-dimer and instructed to come to ER for chest CT. Slight SOB.

## 2023-10-27 DIAGNOSIS — G20.A1 PARKINSON'S DISEASE (H): ICD-10-CM

## 2023-10-27 NOTE — CONFIDENTIAL NOTE
Rx Authorization:  Requested Medication/ Dose: Carbidopa/Levodopa 25-100MG tabs  Date last refill ordered:   Quantity ordered:   # refills:   Date of last clinic visit with ordering provider:   Date of next clinic visit with ordering provider:   All pertinent protocol data (lab date/result):   Include pertinent information from patients message:

## 2023-10-31 RX ORDER — CARBIDOPA AND LEVODOPA 25; 100 MG/1; MG/1
TABLET ORAL
Qty: 675 TABLET | Refills: 3 | Status: SHIPPED | OUTPATIENT
Start: 2023-10-31 | End: 2024-03-27

## 2023-11-04 ENCOUNTER — ANCILLARY PROCEDURE (OUTPATIENT)
Dept: CARDIOLOGY | Facility: CLINIC | Age: 68
End: 2023-11-04
Attending: INTERNAL MEDICINE
Payer: COMMERCIAL

## 2023-11-04 DIAGNOSIS — I48.91 ATRIAL FIBRILLATION (H): ICD-10-CM

## 2023-11-04 LAB — LVEF ECHO: NORMAL

## 2023-11-04 PROCEDURE — 93306 TTE W/DOPPLER COMPLETE: CPT | Performed by: INTERNAL MEDICINE

## 2023-12-21 ENCOUNTER — MYC MEDICAL ADVICE (OUTPATIENT)
Dept: SLEEP MEDICINE | Facility: CLINIC | Age: 68
End: 2023-12-21
Payer: COMMERCIAL

## 2024-01-10 NOTE — OP NOTE
DATE OF PROCEDURE:  12/22/2017     PREOPERATIVE DIAGNOSIS:    1.  Parkinson's disease.   2.  S/p left side deep brain stimulator placement, phase I, placement of deep brain stimulator electrode, target left subthalamic nucleus with microelectrode recording on 12/14/2017.    POSTOPERATIVE DIAGNOSIS:  1.  Parkinson's disease.   2.  S/p left side deep brain stimulator placement, phase I, placement of deep brain stimulator electrode, target left subthalamic nucleus with microelectrode recording on 12/14/2017.    PROCEDURES PERFORED:  1.  Deep brain stimulator placement, phase II, placement of new deep brain stimulator generator/battery over left chest wall.   2.  Placement of two extension wires and connection of the left deep brain stimulator electrode, one array, to the new generator/battery.  3.  Electrical interrogation and analysis of the left side deep brain stimulator system.    ATTENDING SURGEON:  Arpan Huynh MD, PhD.     RESIDENT SURGEON:  Jose Maria Dillon MD.    ANESTHESIA:  General endotracheal anesthesia.     ESTIMATED BLOOD LOSS:  3 mL.     COMPLICATIONS:  None.     FINDINGS:  All impedance values were within normal.  No problems were found.    IMPLANTS:  [employer] implantable pulse generator kit N55 CMA contacts extension, serial # is [id number] and serial #[id number], respectively.     INDICATIONS FOR PROCEDURE:  Mr. Lin presents for his phase II of the DBS surgery.    DESCRIPTION OF PROCEDURE:  Mr. Lin was brought to the operating room where he was intubated without difficulty by our Anesthesia colleagues.  The head of the bed was then rotated 90 degrees.  The left side of his head was palpated previously.  We then palpated along where the lead wire was tunneled.  His left chest wall was also marked out.  Approximately 7 mL of 2% lidocaine with epinephrine 1:200,000 was injected into the 2 incision sites.  A timeout was then performed to confirm patient identity and placement after  draping was performed.     A #15 blade was used to reopen the scalp incision.  Immediately we used monopolar to identify the lead.  Once the lead was able to be identified, we then began opening the left chest wall.  A #15 blade was used to create our incision and monopolar electrocautery was then used as well as a mosquito clamp to dissect through the subcutaneous layer over the left pectoralis muscle.  We continued to dissect until we were able to create a firm pocket.  We then began tunneling.  We tunneled from the patient's left scalp incision down into the left chest wall coming out to the medial portion of the incision.  Once tunneled through, we then brought our electrodes in, connected the extender electrode to the previously implanted wires and then connected this to the battery pack.  This was then tested by our vendor who confirmed good adequate placement.     Once placement was confirmed, we then placed the battery pack into the left chest wall pocket that was created.  We used 2 anchor sutures to keep the battery in place.  We then began closure.  The scalp incision was closed with a 2-0 Vicryl in an inverted fashion and 3-0 Monocryl for the skin.  The left chest wall pocket was closed in multiple layers, the capsule was closed with 3-0 Vicryl in an interrupted fashion.  The subcutaneous layer was then closed with 3-0 Vicryl in an inverted interrupted fashion and the skin was closed with a 4-0 Monocryl stitch in a subcuticular fashion.  The patient tolerated the procedure well and was then extubated by our anesthesia colleagues and transferred to the recovery room in stable condition.       SASKIA OLIVEIRA MD      As dictated by NANCY LANDERS MD       NEUROSURGERY ATTENDING ATTESTATION: Saskia PARKER M.D., Ph.D., Neurosurgery Attending, was present and scrubbed for the entire case and performed the key and critical portions of the case.      D: 12/22/2017 17:59   T: 12/24/2017 21:50   MT: SERAFIN      Name:     WILFRED MCHUGH   MRN:      [id number]        Account:        [id number]   :      1955           Procedure Date: 2017     Document: [id number]     No

## 2024-01-11 ENCOUNTER — MEDICAL CORRESPONDENCE (OUTPATIENT)
Dept: SCHEDULING | Facility: CLINIC | Age: 69
End: 2024-01-11
Payer: COMMERCIAL

## 2024-03-18 ASSESSMENT — SLEEP AND FATIGUE QUESTIONNAIRES
HOW LIKELY ARE YOU TO NOD OFF OR FALL ASLEEP WHILE LYING DOWN TO REST IN THE AFTERNOON WHEN CIRCUMSTANCES PERMIT: MODERATE CHANCE OF DOZING
HOW LIKELY ARE YOU TO NOD OFF OR FALL ASLEEP WHILE WATCHING TV: SLIGHT CHANCE OF DOZING
HOW LIKELY ARE YOU TO NOD OFF OR FALL ASLEEP WHILE SITTING AND TALKING TO SOMEONE: WOULD NEVER DOZE
HOW LIKELY ARE YOU TO NOD OFF OR FALL ASLEEP WHILE SITTING INACTIVE IN A PUBLIC PLACE: WOULD NEVER DOZE
HOW LIKELY ARE YOU TO NOD OFF OR FALL ASLEEP WHEN YOU ARE A PASSENGER IN A CAR FOR AN HOUR WITHOUT A BREAK: SLIGHT CHANCE OF DOZING
HOW LIKELY ARE YOU TO NOD OFF OR FALL ASLEEP WHILE SITTING QUIETLY AFTER LUNCH WITHOUT ALCOHOL: SLIGHT CHANCE OF DOZING
HOW LIKELY ARE YOU TO NOD OFF OR FALL ASLEEP WHILE SITTING AND READING: SLIGHT CHANCE OF DOZING
HOW LIKELY ARE YOU TO NOD OFF OR FALL ASLEEP IN A CAR, WHILE STOPPED FOR A FEW MINUTES IN TRAFFIC: WOULD NEVER DOZE

## 2024-03-18 NOTE — PROGRESS NOTES
CPAP Follow-Up Visit:    Date on this visit: 3/19/2024    Kwame Lin has a follow-up visit today to review his CPAP use for DIPIKA and for management of insomnia. He was initially seen for DIPIKA initially diagnosed at an outside sleep center.  His medical history is significant for Parkinson's disorder with RBD (s/p DBS), anxiety/depression, hypothyroidism, RLS, DIPIKA.      Previous Study Results:   HST through [facility] on 7/18/2018 showed: AHI 12.1/hr, supine AHI 20.3/hr, non-supine AHI 6.7 and 9/hr on right and left respectively. His O2 hugo was 80%, baseline was 92%. He spent 11.4 minutes below 90% SpO2, some of which appears to be artifact of the probe getting zunilda. Wt: 210#      DME is [facility] [employer] Dreamstation 2  A download from his machine shows no use in the last month. He stopped due to concerns about the recall and cancer risk (even though that was not this model).  He was ripping the mask off in his sleep when he was using it.  He had a full face mask and had some leak, although he says it generally fit pretty well. He tried a nasal mask and would remove that in his sleep too. He does not remember if he had a dry mouth. He wakes with a dry mouth without it. He has some sinus congestion issues. He tried a saline/xylitol (Xlear) spray which helped.  He has [employer] radhames recordings which showed occasional snort arousals but not a lot of significant snoring. Sleeping position was unknown on those recordings.       He has been working with Patricia Maravilla for CBT for insomnia.   He is sleeping 1:30-9:30 AM. It has helped him. He has difficulty falling asleep. The other night he did not fall asleep until 5:30 AM. He sometimes has difficulty falling asleep if he has an appointment the next day and thinks about it. That was the case on the night he did not fall asleep until 5:30 AM. He tries to avoid naps, but is sometimes very tired. If not sleeping, he gets up and reads a book. He may watch some  TV.       RBD: He was on 0.5 mg clonazepam for RBD.  He was on up to 10 mg melatonin for RBD and that was not helpful. He was also on 100 mg gabapentin at bedtime for RLS (ferritin 127 in 3/2023). He tried 25 mg trazodone and did not feel it was helping.  Lately, Kwame has been using a strap to hold him in bed.  He stopped clonazepam because he was not noticing much difference. He did not want to be on a lot of medication. He stopped that 2-3 months ago. He does not think he has had much dream enactment behavior since seeing Patricia Maravilla.  He has been talking some but not shrieking like he used to. He has also been sleeping with a maternity pillow to kind of lock him in place. The last episode of dream enactment was about 3-4 weeks ago, he had gone to the couch in the night due to insomnia. He fell asleep on the sectional and ended up on the floor.       He has not noticed restless legs much anymore. He may have some aches if he stands a lot. He stopped the gabapentin. He used to have knee pain, which resolved with DBS.        No specialty comments available.      Weight change since sleep study: 218 lbs      Past medical/surgical history, family history, social history, medications and allergies were reviewed.      Problem List:  Patient Active Problem List    Diagnosis Date Noted    New onset atrial fibrillation (H) 09/27/2023     Priority: Medium    Gait difficulty 11/06/2018     Priority: Medium    Sexual dysfunction 11/05/2018     Priority: Medium    DIPIKA (obstructive sleep apnea) 08/17/2018     Priority: Medium     Overview:   8-2018 CPAP  [facility]      Osteoarthritis of both knees 06/25/2018     Priority: Medium    S/P deep brain stimulator placement 05/22/2018     Priority: Medium    Depressed mood 10/12/2017     Priority: Medium    RBD (REM behavioral disorder) 10/12/2017     Priority: Medium    Anxiety disorder 06/02/2017     Priority: Medium    Age-related nuclear cataract, bilateral 03/29/2017      Priority: Medium    Dry eye syndrome of bilateral lacrimal glands 03/29/2017     Priority: Medium    Vitreous degeneration, right eye 03/29/2017     Priority: Medium    Parkinson disease 08/17/2015     Priority: Medium    Parkinsonian tremor 05/06/2013     Priority: Medium    Restless leg syndrome 08/19/2011     Priority: Medium    Hypothyroidism 05/15/2009     Priority: Medium     Overview:   Epic   Overview:   Epic           Impression/Plan:    (G47.33) DIPIKA (obstructive sleep apnea)  (primary encounter diagnosis)  Comment: Kwame has struggled to get used to CPAP. He removes the mask in his sleep. He feels he sleeps laterally most of the time and his prior sleep study showed his lateral AHI was 7-9/hr. His weight is about 8# higher now than at his last study.  His previous study was a home test so it is unclear if REM was obtained laterally. He is interested in repeating a study to see if positional restriction would be adequate to address his apnea.   Plan: Comprehensive Sleep Study        Get a Medcline pillow. Repeat sleep study with Medcline pillow (and body pillow) to see if that is reasonably effective at addressing the apnea.     (G47.52) RBD (REM behavioral disorder)  Comment: He has had quite dramatic dream enactment. His wife recounts seeing him flying through the air on a couple of occasions. They have not been co-sleeping, but she can still hear him from the next room. He is using a strap to keep him in bed, which seems to be quite effective. He stopped clonazepam because he did not think it was working. His wife has not noticed as much yelling or sounds of him acting out. It seemed to reduce in frequency/intensity around the time he started doing CBT with Patricia Maravilla.   Plan: Comprehensive Sleep Study       We reviewed recommendations for safety of the sleeping environment to reduce risk of harm during dream enactment. We discussed the option of trying clonazepam again if he has evidence of more  elaborate dream enactment again.      (F51.04) Chronic insomnia  Comment: Kwame has been trying to limit sleep to between 1:30 AM and 9:30 AM.  This has helped his sleep.  He still occasionally has difficulty initiating sleep if he has an appointment the next day.  He will start worrying about being able to get enough sleep.  If he is up for several hours, he will nap.  Plan: Continue to work with Patricia Maravilla. We talked about setting aside worry time. Minimize napping. Get out of bed if not sleeping. Avoid clock watching. We also talked a little about catastrophizing.      He will follow up with me in about 3 month(s) after the study.     45 minutes were spent on the date of the encounter doing chart review, history and exam, documentation and further activities as noted above.     Bennett Goltz, PA-C    CC: No ref. provider found

## 2024-03-19 ENCOUNTER — OFFICE VISIT (OUTPATIENT)
Dept: SLEEP MEDICINE | Facility: CLINIC | Age: 69
End: 2024-03-19
Payer: COMMERCIAL

## 2024-03-19 VITALS
WEIGHT: 218.8 LBS | SYSTOLIC BLOOD PRESSURE: 114 MMHG | HEART RATE: 66 BPM | BODY MASS INDEX: 32.41 KG/M2 | HEIGHT: 69 IN | DIASTOLIC BLOOD PRESSURE: 69 MMHG | OXYGEN SATURATION: 95 %

## 2024-03-19 DIAGNOSIS — G47.52 RBD (REM BEHAVIORAL DISORDER): ICD-10-CM

## 2024-03-19 DIAGNOSIS — G47.33 OSA (OBSTRUCTIVE SLEEP APNEA): Primary | ICD-10-CM

## 2024-03-19 DIAGNOSIS — F51.04 CHRONIC INSOMNIA: ICD-10-CM

## 2024-03-19 PROCEDURE — 99215 OFFICE O/P EST HI 40 MIN: CPT | Performed by: PHYSICIAN ASSISTANT

## 2024-03-19 NOTE — NURSING NOTE
"Chief Complaint   Patient presents with    CPAP Follow Up     CPAP follow up       Initial /69   Pulse 66   Ht 1.753 m (5' 9.02\")   Wt 99.2 kg (218 lb 12.8 oz)   SpO2 95%   BMI 32.30 kg/m   Estimated body mass index is 32.3 kg/m  as calculated from the following:    Height as of this encounter: 1.753 m (5' 9.02\").    Weight as of this encounter: 99.2 kg (218 lb 12.8 oz).    Medication Reconciliation: complete  ESS 6  Juliane Us MA         "

## 2024-03-19 NOTE — PATIENT INSTRUCTIONS
Tips to safeguard your sleeping environment to prevent injury from dream enactment:    Move hard or sharp objects or clutter away from the bedside (tables, etc).  Put pillows between you and a bed partner.  Use padded side rails on the bed to prevent falling out of bed, or move the mattress to the floor so there is not far to fall.  Pad the floor near the bed  Sleep in a sleeping bag to reduce how far or hard you can kick/punch.  Remove dangerous objects from the bedroom, such as sharp items and weapons  Protect bedroom windows to prevent you from being able to go out the window in your sleep.  Possibly sleep in a separate bed or room from your bed partner until symptoms are controlled     General recommendations for sleep problems (Insomnia)  Allow 2-4 weeks to see results     Establish a regular sleep schedule    Most people only need 7-8 hours of sleep.  Don't be in bed longer than you need     to sleep or you will end up spending more time awake in bed. This trains your    brain to think of the bed as a place to not sleep.  Go to bed at same time each night   Get up at same time each day - Set an alarm everyday (even weekends). This is one of    the most important tips. It prevents you from relying on your insomnia to get you    up on time for your day. That actually reinforces insomnia. It also will help your    body get into a pattern where you start feeling tired at a consistent time each    night.  The body functions best when you keep a consistent routine.  Avoid sleeping-in and napping. Anytime you sleep during the day, you will be less tired at    night. You may be tired enough to fall asleep, but you will wake more in the    middle of the night because you will have met your sleep need before the night is    done.   Cut down time in bed (if not asleep, get up)- Use your bed only for sleep and sex    Anytime you spend time in bed doing activities other than sleep (reading,    watching TV, working, playing  on the computer or phone, or even just laying in    bed trying to sleep), you are training  your brain to think of the bed as a place to    do activities other than sleep. If you are not falling asleep within 20-30 minutes,    get out of bed. While out of bed, avoid bright lights. Avoid work or chores. Being    productive in the middle of the night reinforces waking up at night. Find relaxing,    not particularly entertaining activities like reading, listening to music, or relaxation    exercises. Go back to bed if you start feeling groggy, or after about 30 minutes,    even if not feeling very tired. Sometimes, just getting out of bed stops the pattern    of getting frustrated about laying in bed not sleeping, and that can help you fall    asleep.   Avoid trying to force yourself to sleep- sleep is not like everything else. The harder you    work at most things, the more you can accomplish. The harder you work at    sleep, the less you will sleep.     Make the bedroom comfortable - quiet, dark and cool are better. Consider ear plugs    (silicon). Use dark blinds or wear an eye mask if needed     Make a relaxing routine prior to bedtime  Relaxation exercises:   Progressive muscle relaxation: Relax each muscle group individually    Begin with your feet, flex, then relax. Try to imagine your feet feeling heavy and sinking into the bed. Move to your calves, do the same thing. Work through each muscle group toward your head.    Relaxing Mental Imagery: Try to imagine a trip that you took and found relaxing, or imagine a day at the beach. Try to walk yourself through the day in your mind as if you were dreaming it. Try to imagine sensing the different experiences, such as feeling sand between your toes, the heat of the sun on your skin, seeing the waves crashing the shore, the smell of the salt water, etc.     Deal with your worries before bedtime    Set aside a worry time around dinner time for 10-15 minutes. Write down  the    things that are on your mind. Plan time in the coming days to address those    issues. Brainstorm ideas on how you will deal with them. Try to identify issues    that are out of your control, and try to let those issues go.  Listen to relaxation tapes   Classical Music or Nature sounds   Back Massage   Get regular exercise each day (at least 1-2 hours before bedtime)   Take medications only as directed   Eat a light bedtime snack or warm drink   Warm milk   Warm herbal tea (non-caffeinated)       Things to avoid   No overstimulating activities just before bed   No competitive games before bedtime   No exciting television programs before bedtime   Avoid caffeine after lunchtime   Avoid chocolate   Do not use alcohol to induce sleep (worsens Insomnia)   Do not take someone else's sleeping pills   Do not look at the clock when awakening   Do not turn on light when getting up to use bathroom, use a nightlight     Online Programs   www."[employer] (pronounced shut eye). There is a fee for this program. Enter the code  [place]  if you decide to enroll in this program.    www.sleepIO.com (pronounced sleep ee oh). There is a fee for this program. Enter the code  [place]  if you decide to enroll in this program.     Suggested Resources  Insomnia Treatment Books   Overcoming Insomnia by Timothy Camejo and Kathryn Flaherty (2008)  No More Sleepless Nights by Ricky Sigala and Heather Georges (1996)  Say Sandro to Insomnia by Hossein Cardenas (2009)  The Insomnia Workbook by Jeanette Newby and Gennaro Chaney (2009)  The Insomnia Answer by Alfredo Jc and Gustavo Finnegan (2006)      Stress Management and Relaxation Books  The Relaxation and Stress Reduction Workbook by Sharmin Lemus, Renetta Murphy and Daniel Chandler (2008)  Stress Management Workbook: Techniques and Self-Assessment Procedures by Zuleyka Diaz and Andrew Pino (1997)  A Mindfulness-Based Stress Reduction Workbook by Xavi Dunbar and  Claudine Givens (2010)  The Complete Stress Management Workbook by Kareem Gallegos, Diego Celis and Guido Diaz (1996)  Assert Yourself by Karen Rosario and Ion Rosario (1977)    Relaxation Resources for Computer Download   These websites offer resources to help you relax. This list is for information only. [facility] is not responsible for the quality of services or the actions of any person or organization.  Progressive Muscle Relaxation (PMR):   http://www."[employer]/progressive-muscle-relaxation-exercise.html   http://studentsupport.[facility]/counseling/resources/self-help/relaxation-and-stress-management/   Deep Breathing Exercises:  http://www."[employer]/breathing-awareness.html     Meditation:   www.CYBRA  www.GodTubeguidedGeneral Dynamicsmeditation-site.[employer] You may have to pay for some of these resources.    Guided Imagery:  http://www."[employer]/guided-imagery-scripts.html   http://[employer]/library/awscivmopc-rlaihg-uesxmor/   Consider phone apps such as: Calm, Headspace or Insight Timer.    Counseling / Behavioral Health  [facility]  Visit www.[email or web address].org or call [phone] to find a clinic close to you.  Or call [phone] for [facility].

## 2024-03-27 ENCOUNTER — OFFICE VISIT (OUTPATIENT)
Dept: NEUROLOGY | Facility: CLINIC | Age: 69
End: 2024-03-27
Payer: COMMERCIAL

## 2024-03-27 VITALS
SYSTOLIC BLOOD PRESSURE: 112 MMHG | RESPIRATION RATE: 16 BRPM | DIASTOLIC BLOOD PRESSURE: 72 MMHG | OXYGEN SATURATION: 97 % | HEART RATE: 65 BPM

## 2024-03-27 DIAGNOSIS — R13.10 DYSPHAGIA, UNSPECIFIED TYPE: ICD-10-CM

## 2024-03-27 DIAGNOSIS — Z96.89 S/P DEEP BRAIN STIMULATOR PLACEMENT: ICD-10-CM

## 2024-03-27 DIAGNOSIS — G20.B2 PARKINSON'S DISEASE WITH DYSKINESIA AND FLUCTUATING MANIFESTATIONS (H): Primary | ICD-10-CM

## 2024-03-27 PROCEDURE — 95984 ALYS BRN NPGT PRGRMG ADDL 15: CPT | Performed by: STUDENT IN AN ORGANIZED HEALTH CARE EDUCATION/TRAINING PROGRAM

## 2024-03-27 PROCEDURE — 99213 OFFICE O/P EST LOW 20 MIN: CPT | Mod: 25 | Performed by: STUDENT IN AN ORGANIZED HEALTH CARE EDUCATION/TRAINING PROGRAM

## 2024-03-27 PROCEDURE — 95983 ALYS BRN NPGT PRGRMG 15 MIN: CPT | Performed by: STUDENT IN AN ORGANIZED HEALTH CARE EDUCATION/TRAINING PROGRAM

## 2024-03-27 RX ORDER — CARBIDOPA AND LEVODOPA 25; 100 MG/1; MG/1
TABLET ORAL
Qty: 675 TABLET | Refills: 3 | Status: SHIPPED | OUTPATIENT
Start: 2024-03-27 | End: 2024-06-17

## 2024-03-27 RX ORDER — ENTACAPONE 200 MG/1
200 TABLET ORAL
Qty: 150 TABLET | Refills: 11 | Status: SHIPPED | OUTPATIENT
Start: 2024-03-27

## 2024-03-27 ASSESSMENT — UNIFIED PARKINSONS DISEASE RATING SCALE (UPDRS)
TOTAL_SCORE: 5
AMPLITUDE_RLE: (0) NORMAL: NO TREMOR.
AMPLITUDE_RUE: (0) NORMAL: NO TREMOR.
PRONATION_SUPINATION_RIGHT: (0) NORMAL: NO PROBLEMS.
SPEECH: (1) SLIGHT: LOSS OF MODULATION, DICTION OR VOLUME, BUT STILL ALL WORDS EASY TO UNDERSTAND.
TOETAPPING_LEFT: (3) MODERATE: ANY OF THE FOLLOWING: A) MORE THAN 5 INTERRUPTIONS OR AT LEAST ONE LONGER ARREST (FREEZE) IN ONGOING MOVEMENT  B) MODERATE SLOWING  C) THE AMPLITUDE DECREMENTS STARTING AFTER THE FIRST MOVEMENT.
TOETAPPING_RIGHT: (2) MILD: ANY OF THE FOLLOWING: A) 3 TO 5 INTERRUPTIONS DURING TAPPING  B) MILD SLOWING  C) THE AMPLITUDE DECREMENTS MIDWAY IN THE 10-MOVEMENT SEQUENCE.
RIGIDITY_LLE: (0) NORMAL: NO RIGIDITY.
POSTURAL_STABILITY: (0) NORMAL: NO PROBLEMS. RECOVERS WITH ONE OR TWO STEPS.
HANDMOVEMENTS_RIGHT: (1) SLIGHT: ANY OF THE FOLLOWING: A) THE REGULAR RHYTHM IS BROKEN WITH ONE WITH ONE OR TWO INTERRUPTIONS OR HESITATIONS OF THE MOVEMENT  B) SLIGHT SLOWING  C) THE AMPLITUDE DECREMENTS NEAR THE END OF THE 10 MOVEMENTS.
DYSKINESIAS_PRESENT: NO
POSTURE: (0) NORMAL: NO PROBLEMS.
CONSTANCY_TREMOR_ATREST: (0) NORMAL: NO TREMOR.
AMPLITUDE_LIP_JAW: (0) NORMAL: NO TREMOR.
ARISING_CHAIR: (1) SLIGHT: ARISING IS SLOWER THAN NORMAL, OR MAY NEED MORE THAN ONE ATTEMPT, OR MAY NEED TO MOVE FORWARD IN THE CHAIR TO ARISE. NO NEED TO USE THE ARMS OF THE CHAIR.
LEG_AGILITY_LEFT: (1) SLIGHT: ANY OF THE FOLLOWING: A) THE REGULAR RHYTHM IS BROKEN WITH ONE WITH ONE OR TWO INTERRUPTIONS OR HESITATIONS OF THE MOVEMENT  B) SLIGHT SLOWING  C) THE AMPLITUDE DECREMENTS NEAR THE END OF THE 10 MOVEMENTS.
AMPLITUDE_LUE: (0) NORMAL: NO TREMOR.
TOTAL_SCORE: 18
PRONATION_SUPINATION_LEFT: (0) NORMAL: NO PROBLEMS.
RIGIDITY_NECK: (2) MILD: RIGIDITY DETECTED WITHOUT THE ACTIVATION MANEUVER. FULL RANGE OF MOTION IS EASILY ACHIEVED.
AXIAL_SCORE: 5
TOTAL_SCORE_LEFT: 8
SPONTANEITY_OF_MOVEMENT: (0) NORMAL: NO PROBLEMS.
RIGIDITY_RLE: (0) NORMAL: NO RIGIDITY.
FINGER_TAPPING_LEFT: (1) SLIGHT: ANY OF THE FOLLOWING: A) THE REGULAR RHYTHM IS BROKEN WITH ONE WITH ONE OR TWO INTERRUPTIONS OR HESITATIONS OF THE MOVEMENT  B) SLIGHT SLOWING  C) THE AMPLITUDE DECREMENTS NEAR THE END OF THE 10 MOVEMENTS.
LEG_AGILITY_RIGHT: (1) SLIGHT: ANY OF THE FOLLOWING: A) THE REGULAR RHYTHM IS BROKEN WITH ONE WITH ONE OR TWO INTERRUPTIONS OR HESITATIONS OF THE MOVEMENT  B) SLIGHT SLOWING  C) THE AMPLITUDE DECREMENTS NEAR THE END OF THE 10 MOVEMENTS.
PARKINSONS_MEDS: OFF
GAIT: (1) SLIGHT: INDEPENDENT WALKING WITH MINOR GAIT IMPAIRMENT.
FREEZING_GAIT: (0) NORMAL: NO FREEZING.
HANDMOVEMENTS_LEFT: (1) SLIGHT: ANY OF THE FOLLOWING: A) THE REGULAR RHYTHM IS BROKEN WITH ONE WITH ONE OR TWO INTERRUPTIONS OR HESITATIONS OF THE MOVEMENT  B) SLIGHT SLOWING  C) THE AMPLITUDE DECREMENTS NEAR THE END OF THE 10 MOVEMENTS.
RIGIDITY_LUE: (1) SLIGHT: RIGIDITY ONLY DETECTED WITH ACTIVATION MANEUVER.
FINGER_TAPPING_RIGHT: (1) SLIGHT: ANY OF THE FOLLOWING: A) THE REGULAR RHYTHM IS BROKEN WITH ONE WITH ONE OR TWO INTERRUPTIONS OR HESITATIONS OF THE MOVEMENT  B) SLIGHT SLOWING  C) THE AMPLITUDE DECREMENTS NEAR THE END OF THE 10 MOVEMENTS.
RIGIDITY_RUE: (0) NORMAL: NO RIGIDITY.
AMPLITUDE_LLE: (0) NORMAL: NO TREMOR.
FACIAL_EXPRESSION: (0) NORMAL: NORMAL FACIAL EXPRESSION.

## 2024-03-27 ASSESSMENT — MOVEMENT DISORDERS SOCIETY - UNIFIED PARKINSONS DISEASE RATING SCALE (MDS-UPDRS)
DRESSING: (0) NORMAL: NOT AT ALL (NO PROBLEMS).
HANDWRITING: (1) SLIGHT: MY WRITING IS SLOW, CLUMSY OR UNEVEN, BUT ALL WORDS ARE CLEAR.
CHEWING_AND_SWALLOWING: (3) MODERATE: I CHOKED AT LEAST ONCE IN THE PAST WEEK.
FREEZING: (0) NORMAL: NOT AT ALL (NO PROBLEMS).
EATING_TASKS: (0) NORMAL: NOT AT ALL (NO PROBLEMS).
TURNING_IN_BED: (0) NORMAL: NOT AT ALL (NO PROBLEMS).
SALIVA_AND_DROOLING: (0) NORMAL: NOT AT ALL (NO PROBLEMS).
TREMOR: (1) SLIGHT: SHAKING OR TREMOR OCCURS BUT DOES NOT CAUSE PROBLEMS WITH ANY ACTIVITIES.
WALKING_AND_BALANCE: (1) SLIGHT: I AM SLIGHTLY SLOW OR MAY DRAG A LEG.  I NEVER USE A WALKING AID.
GETTING_OUT_OF_BED_CAR_DEEP_CHAIR: (0) NORMAL: NOT AT ALL (NO PROBLEMS).
TOTAL_SCORE: 8
HYGIENE: (0) NORMAL: NOT AT ALL (NO PROBLEMS).
SPEECH: (2) MILD: MY SPEECH CAUSES PEOPLE TO ASK ME TO OCCASIONALLY REPEAT MYSELF, BUT NOT EVERYDAY.
HOBBIES_AND_OTHER_ACTIVITIES: (0) NORMAL: NOT AT ALL (NO PROBLEMS).

## 2024-03-27 ASSESSMENT — PAIN SCALES - GENERAL: PAINLEVEL: NO PAIN (0)

## 2024-03-27 NOTE — LETTER
"3/27/2024       RE: Kwame Lin  [address] [facility] [address]     Dear Colleague,    Thank you for referring your patient, Kwame Lin, to the [facility] [place] [facility]. Please see a copy of my visit note below.    Department of Neurology  Movement Disorders Division   DBS Follow-up Note    Patient: Kwame Lin  MRN: [id number]   : 1955   Date of Visit: 3/27/2024    Diagnosis: Parkinson disease  DBS Target(s): Bilateral STN   Date(s) of DBS Lead Placement: Left 2017, R 2018  Date(s) of IPG Placement: L chest 2017  Device: Lovli      Chief Complaint:  Kwame Lin is a 69 year old male who returns to clinic for follow up of PD status post bilateral STN DBS.  He was last seen 10/4/2023 at which time bilateral current was increased.    Interval History:  No adverse effects from last programming.  Right knee pain has improved.    He was suffering from plantar fasciitis but this has improved with orthotics.    He is having difficulty with sleep onset insomnia.  He is working with sleep medicine and will be having a sleep test.  Hasn't been wearing his CPAP due to concerns for the risk of cancer.  He is snoring.    Gait and balance are not as good.  Hasn't been walking as much.    Parkinson Disease Motor Symptom Review:  Motor fluctuations: yes, predictable  Dyskinesia: when under stress  Wearing off:  yes, right leg pain after 3.5-4 hours  Freezing of gait: denies  Dystonia: minimal  Tremor: minimal  Rigidity: denies  Bradykinesia: denies     Parkinson's Disease Non-motor Symptom Review:  Mood - \"good\" on paroxetine  Cognitive impairment - reaction time slightly slowed  Sleep disturbances - insomnia, see above, dream enactment controlled with clonazepam   GI symptoms - denies constipation  Urinary symptoms - denies   Autonomic dysfunction - denies " orthostasis  Hallucinations - denies  Speech - more slurring, doing speech therapy, requires more concentration  Swallowing - choking more easily, once or twice per week  Salivation - no sialorrhea      Relevant Medications  8 am 12 pm 4 pm 8 pm 11 pm   Sinemet 25/100mg 1.5 1.5 1.5 1.5 1.5   Entacapone 200mg 1 1 1 1 1   Paroxetine 10mg 1          Clonazepam 0.5mg     1   Last taken: 8am      Medications:  Current Outpatient Medications   Medication Sig Dispense Refill    acetaminophen (TYLENOL) 325 MG tablet Take 325-650 mg by mouth nightly as needed for mild pain      aspirin 81 MG EC tablet Take 1 tablet (81 mg) by mouth daily 30 tablet 0    atorvastatin (LIPITOR) 20 MG tablet       B Complex CAPS as directed Orally      carbidopa-levodopa (SINEMET)  MG tablet TAKE 1 AND 1/2 TABLETS BY MOUTH EVERY 4 HOURS FIVE TIMES DAILY(8AM, NOON, 4PM, 8PM, AND 11PM) 675 tablet 3    cholecalciferol (VITAMIN D3) 1250 mcg (69642 units) capsule Take 1 capsule by mouth daily      clonazePAM (KLONOPIN) 0.5 MG tablet Take 1 tablet (0.5 mg) by mouth At Bedtime 90 tablet 1    clotrimazole (LOTRIMIN) 1 % external cream Apply topically to affected area(s) two times daily.      entacapone (COMTAN) 200 MG tablet Take 1 tablet (200 mg) by mouth 5 times daily 150 tablet 11    metoprolol succinate ER (TOPROL XL) 25 MG 24 hr tablet Take 1 tablet (25 mg) by mouth daily 30 tablet 0    Misc Natural Products (JOINT SUPPORT) CAPS as directed Orally      multivitamin w/minerals (THERA-VIT-M) tablet Take 1 tablet by mouth every morning      PARoxetine (PAXIL) 10 MG tablet Take 10 mg by mouth daily      sildenafil (REVATIO) 20 MG tablet Take 20 mg by mouth      thyroid (ARMOUR) 60 MG tablet Take 60 mg by mouth every morning       clindamycin (CLEOCIN) 300 MG capsule TAKE 2 CAPSULES BY MOUTH FOR 1 DOSE 1 HOUR PRIOR TO DENTAL APPOINTMENTS (Patient not taking: Reported on 4/3/2023)          Allergies: is allergic to no clinical screening - see  comments, bactrim, codeine, ketorolac, ketorolac tromethamine, morphine, nalbuphine, penicillamine, penicillins, seasonal allergies, sulfa antibiotics, aspartame, nsaids, and oxycodone-acetaminophen.    Physical Exam:  The patient's  blood pressure is 112/72 and his pulse is 65. His respiration is 16 and oxygen saturation is 97%.       Neurological Examination:   Last medication dose: 8am   10/4/2023  12:00 PM 3/27/2024  12:00 PM   UPDRS Motor Scale     Time: 12:22  12:18    Medication On  Off    R Brain DBS: On  On    L Brain DBS: On  On    Dyskinesia (LID) Yes  No    Did LID interfere     Speech 1  1    Facial Expression 0  0    Rigidity Neck 1  2    Rigidity RUE 0  0    Rigidity LUE 1  1    Rigidity RLE 0  0    Rigidity LLE 0  0    Finger Taps R 1  1    Finger Taps L 2  1    Hand Mvt R 1  1    Hand Mvt L 1  1    Pron-/Supinate R 0  0    Pron-/Supinate L 0  0    Toe Tap R 1  2    Toe Tap L 2  3    Leg Agility R 0  1    Leg Agility L 1  1    Arise From Chair 1  1    Gait 1  1    Gait Freezing 0  0    Postural Stability 0  0    Posture 0  0    Global Spont Mvt 0  0    Postural Tremor RUE 1  0    Postural Tremor LUE 1  1    Kinetic Tremor RUE 0  0    Kinetic Tremor LUE 0  0    Rest Tremor RUE 0  0    Rest Tremor LUE 0  0    Rest Tremor RLE 0  0    Rest Tremor LLE 0  0    Rest Tremor Lip/Jaw 0  0    Rest Tremor Constancy 0  0    Total Right 4  5    Total Left 8  8    Axial Total 4  5    Total 16  18            Procedure: DBS Interrogation & Programming  Lead(s):     Left Right   DBS Target STN STN   DBS Lead Type 8 ring contacts 8 ring contacts   Lead Implant Date 12/14/2017 5/22/2018      IPG(s):    1   IPG Vercise   IPG Implant Date 12/22/2017   Location Left chest   Battery (V) N/a      Impedance Check: No problems found.  See scanned report for impedance details.     1-Moose7 Left Brain   Right Brain       Initial Final Initial Final     Inactive Inactive Inactive Inactive   Amplitude (mA) 5.0 [2-5.5] No change  "3.3 [2.5-4.0] No change   Pulse width (usec) 60   60     Freq (Hz) 139   139     Contacts: C+3- (55%), 7- (45%)   C+11-(60%), 14- (40%)         New Left Brain   Right Brain       Initial Final Initial Final     None Active None Active   Amplitude (mA)  4.5 [2-4.5]  3.5 [2.5-4.0]   Pulse width (usec)  80  60   Freq (Hz)  139  139   Contacts:  C+3- (55%), 6- (45%)  C+11-(60%), 14- (40%)       3-Moose 6 Left Brain   Right Brain       Initial Final Initial Final     Active Inactive Active Inactive   Amplitude (mA) 5.5 [2-5.5] 5.5 [2-5.5] 3.5 [2.5-4.0] 3.5 [2.5-4.0]   Pulse width (usec) 60 60 60 60   Freq (Hz) 139 139 139 139   Contacts: C+3- (55%), 6- (45%) C+3- (55%), 6- (45%) C+11-(60%), 14- (40%) C+11-(60%), 14- (40%)       4-Bear Left Brain   Right Brain       Initial Final Initial Final     Inactive Inactive Inactive Inactive   Amplitude (mA) 2.8 [2-3.3] No change 3.3 [2.5-4.0] No change   Pulse width (usec) 60   60     Freq (Hz) 139   139     Contacts: C+3-   C+11-(60%), 14- (40%)           Assessment/Plan:  Kwame Lin is a 69 year old male with PD s/p bilateral STN DBS who returns for follow-up.  He is having a few episodes of choking per week so will pursue swallow evaluation.  Today I created a new program with increased PW for the left brain to target wearing off pain in his right leg.    - SLP for MBS  - Went home on program \"New\"  - Return to 2-MOOSE 6 if side effects  - Continue medications as is  - RTC 6 months, 1hr DBS programming         26 minutes spent on the date of the encounter doing chart review, history and exam, documentation and further activities as noted above.     Additional time spent for separate DBS programmin min DBS analyzed without reprogramming.         Again, thank you for allowing me to participate in the care of your patient.      Sincerely,    Anaid Watson MD    "

## 2024-03-27 NOTE — PATIENT INSTRUCTIONS
"I have ordered a swallowing testing from speech therapy.      I created a new program called \"New\" that aims to improve your right leg pain/wearing off.      If you have more dyskinesias, worsening speech, or worsening gait go back to 3-MOOSE 6.  "

## 2024-04-23 ENCOUNTER — ANCILLARY PROCEDURE (OUTPATIENT)
Dept: GENERAL RADIOLOGY | Facility: CLINIC | Age: 69
End: 2024-04-23
Attending: STUDENT IN AN ORGANIZED HEALTH CARE EDUCATION/TRAINING PROGRAM
Payer: COMMERCIAL

## 2024-04-23 ENCOUNTER — THERAPY VISIT (OUTPATIENT)
Dept: SPEECH THERAPY | Facility: CLINIC | Age: 69
End: 2024-04-23
Attending: STUDENT IN AN ORGANIZED HEALTH CARE EDUCATION/TRAINING PROGRAM
Payer: COMMERCIAL

## 2024-04-23 DIAGNOSIS — G20.B2 PARKINSON'S DISEASE WITH DYSKINESIA AND FLUCTUATING MANIFESTATIONS (H): ICD-10-CM

## 2024-04-23 DIAGNOSIS — R13.10 DYSPHAGIA, UNSPECIFIED TYPE: ICD-10-CM

## 2024-04-23 PROCEDURE — 92611 MOTION FLUOROSCOPY/SWALLOW: CPT | Mod: GN | Performed by: SPEECH-LANGUAGE PATHOLOGIST

## 2024-04-23 PROCEDURE — 74230 X-RAY XM SWLNG FUNCJ C+: CPT | Mod: GC | Performed by: STUDENT IN AN ORGANIZED HEALTH CARE EDUCATION/TRAINING PROGRAM

## 2024-04-23 PROCEDURE — 92610 EVALUATE SWALLOWING FUNCTION: CPT | Mod: GN | Performed by: SPEECH-LANGUAGE PATHOLOGIST

## 2024-04-23 RX ORDER — BARIUM SULFATE 400 MG/ML
SUSPENSION ORAL ONCE
Status: COMPLETED | OUTPATIENT
Start: 2024-04-23 | End: 2024-04-23

## 2024-04-23 RX ADMIN — BARIUM SULFATE: 400 SUSPENSION ORAL at 13:08

## 2024-04-23 NOTE — PROGRESS NOTES
"SPEECH LANGUAGE PATHOLOGY EVALUATION    See electronic medical record for Abuse and Falls Screening details.    Subjective      Presenting condition or subjective complaint:  Pt reports intermittent trouble swallowing liquids. Sometimes he can't get a sip of liquid to go down and will have to spit it out. He hasn't been able to figure out a cause for this occuring.   Date of onset:  going on for several months    Relevant medical history:   Parkinson's disease with DBS x2      Prior diagnostic imaging/testing results:     No previous video swallow study  Prior therapy history for the same diagnosis, illness or injury:    Pt reports doing speech pathology in the past for voice function to improve loudness.     Living Environment  Social support:   lives with spouse  Employment:    Retired    Patient goals for therapy:  to determine why his is having trouble swallowing foods.     Pain assessment: Pain denied     Objective     SWALLOW EVALUTION  Dysphagia history: Pt denies trouble swallowing foods. He does note trouble with liquids. Sometimes when he is taking a sip of liquid, things get stuck and he is unable to trigger another swallow. He has to spit this liquid out. Pt reports this happens less when he has done his exercises for his voice and his throat is \"warmed up\".     Current Diet/Method of Nutritional Intake: thin liquids (level 0), regular diet        CLINICAL SWALLOW EVALUATION  Oral Motor Function: Dentition: natural dentition  Secretion management: WFL  Mucosal quality: good  Mandibular function: intact  Oral labial function: WFL  Lingual function: WFL  Velar function: intact   Buccal function: intact  Laryngeal function: cough, throat clear, volitional swallow, voicing WFL     Level of assist required for feeding: no assistance needed      ADDITIONAL EVAL COMPLETED TODAY : yes; rationale: objective evaluation of pharyngeal phase indicated     VIDEOFLUOROSCOPIC SWALLOW STUDY  Radiologist: Resident  Views " Taken: left lateral, A/P   Physical location of procedure: [facility]  Patient sitting upright on chair/stool for lateral views and standing for A/P views    VFSS textures trialed:   VFSS Eval: Thin Liquids  Mode of Presentation: cup, self-fed   Order of Presentation: Series 1, 2, 3, 10, 13 (A/P)  Preparatory Phase: WFL  Oral Phase: WFL  Bolus Location When Swallow Initiated: posterior angle of ramus  Pharyngeal Phase: WFL  Rosenbeck's Penetration Aspiration Scale: 2 - contrast enters airway, remains above the vocal cords, no residue remains (penetration)  Response to Aspiration: n/a  Strategies and Compensations: not applicable  Diagnostic Statement: No aspiration noted on thin liquid trials. Flash penetration demonstrated on large consecutive sips of thin liquid which spontaneously clears the laryngeal vestibule.     VFSS Eval: Mildly Thick Liquids  Mode of Presentation: cup, self-fed   Order of Presentation: Series 4, 5  Preparatory Phase: WFL  Oral Phase: WFL  Bolus Location When Swallow Initiated: posterior angle of ramus  Pharyngeal Phase: WFL  Rosenbeck's Penetration Aspiration Scale: 1 - no aspiration, contrast does not enter airway  Response to Aspiration: n/a  Strategies and Compensations: not applicable  Diagnostic Statement: Safe functional swallow noted on mildly thick liquid trials.     VFSS Eval: Purees  Mode of Presentation: spoon, self-fed   Order of Presentation: Series 6, 7, 12 (A/P)  Preparatory Phase: WFL  Oral Phase: WFL  Bolus Location When Swallow Initiated: posterior angle of ramus  Pharyngeal Phase: WFL  Rosenbeck s Penetration Aspiration Scale: 1 - no aspiration, contrast does not enter airway  Response to Aspiration: n/a  Strategies and Compensations: not applicable  Diagnostic Statement: Safe functional swallow noted on pureed consistency trial. Pt demonstrates adequate oral manipulation.    VFSS Eval: Solids  Mode of Presentation: self-fed   Order of Presentation: Series 8,  9  Preparatory Phase: WFL  Oral Phase: WFL  Bolus Location When Swallow Initiated: posterior angle of ramus  Pharyngeal Phase: WFL   Rosenbeck s Penetration Aspiration Scale: 1 - no aspiration, contrast does not enter airway  Response to Aspiration: n/a  Strategies and Compensations: not applicable  Diagnostic Statement: Safe functional swallow noted on solid consistency trial. Adequate mastication demonstrated. No oral or pharyngeal residue after the completed swallow.     VFSS Eval: Barium Tablet  Mode of Presentation: self-fed   Order of Presentation: Series 11  Preparatory Phase: WFL  Oral Phase: WFL  Bolus Location When Swallow Initiated: posterior angle of ramus  Pharyngeal Phase: WFL  Rosenbeck s Penetration Aspiration Scale: 1 - no aspiration, contrast does not enter airway  Response to Aspiration: n/a  Strategies and Compensations: not applicable  Diagnostic Statement: Adequate transit of barium tablet through oral and pharyngeal phases of the swallow.    ESOPHAGEAL PHASE OF SWALLOW  no observed or reported concerns related to esophageal function  esophageal sweep performed during today's videofluoroscopic exam  Adequate passage of puree consistency through esophagus on screening.      SWALLOW ASSESSMENT CLINICAL IMPRESSIONS AND RATIONALE  Diet Consistency Recommendations: thin liquids (level 0), regular diet    Recommended Feeding/Eating Techniques: slow rate of intake   Medication Administration Recommendations: Whole with liquid or with puree if challenges arise  Instrumental Assessment Recommendations: repeat swallow study if there are changes in swallow function reported clinically     Assessment & Plan   CLINICAL IMPRESSIONS   Medical Diagnosis: Parkinson's disease, oropharyngeal dysphagia    Treatment Diagnosis: safe functional oropharyngeal swallow   Impression/Assessment: Pt is a 69 year old male with dysphagia complaints. The following significant findings have been identified:  safe functional  swallow , characterized by timely transit of all consistencies through oral, no aspiration on any consistency and no pharyngeal residue on all consistencies presented. Flash penetration noted on large consecutive sips of thin liquid which is within normal limits for pt's age. Adequate hyolaryngeal excursion and epiglottic retroflexion demonstrated. Pt demonstrated single instance of trouble with initiation of the swallow when taking large consecutive sips of thin liquid. He otherwise demonstrates functional swallow.     PLAN OF CARE  Evaluation only    Recommended Referrals to Other Professionals: none  Education Assessment:   Learner/Method: Patient;No Barriers to Learning    Risks and benefits of evaluation/treatment have been explained.   Patient/Family/caregiver agrees with Plan of Care.     Evaluation Time:    SLP Eval: oral/pharyngeal swallow function, clinical minutes (32534): 15  SLP Eval: VideoFluoroscopic Swallow function Minutes (30877): 25     Present: Not applicable     Signing Clinician: Addis Guerra SLP

## 2024-06-17 ENCOUNTER — MYC REFILL (OUTPATIENT)
Dept: NEUROLOGY | Facility: CLINIC | Age: 69
End: 2024-06-17
Payer: COMMERCIAL

## 2024-06-17 DIAGNOSIS — G20.B2 PARKINSON'S DISEASE WITH DYSKINESIA AND FLUCTUATING MANIFESTATIONS (H): ICD-10-CM

## 2024-06-18 RX ORDER — CARBIDOPA AND LEVODOPA 25; 100 MG/1; MG/1
TABLET ORAL
Qty: 675 TABLET | Refills: 3 | Status: SHIPPED | OUTPATIENT
Start: 2024-06-18

## 2024-07-11 ENCOUNTER — HOSPITAL ENCOUNTER (OUTPATIENT)
Dept: NUCLEAR MEDICINE | Facility: CLINIC | Age: 69
Setting detail: NUCLEAR MEDICINE
Discharge: HOME | End: 2024-07-11
Attending: UROLOGY | Admitting: UROLOGY
Payer: COMMERCIAL

## 2024-07-11 DIAGNOSIS — N13.30 UNSPECIFIED HYDRONEPHROSIS: ICD-10-CM

## 2024-07-11 PROCEDURE — 78708 K FLOW/FUNCT IMAGE W/DRUG: CPT

## 2024-07-11 PROCEDURE — 343N000001 HC RX 343: Performed by: UROLOGY

## 2024-07-11 PROCEDURE — A9562 TC99M MERTIATIDE: HCPCS | Performed by: UROLOGY

## 2024-07-11 PROCEDURE — 250N000011 HC RX IP 250 OP 636: Performed by: UROLOGY

## 2024-07-11 RX ORDER — FUROSEMIDE 10 MG/ML
40 INJECTION INTRAMUSCULAR; INTRAVENOUS ONCE
Status: COMPLETED | OUTPATIENT
Start: 2024-07-11 | End: 2024-07-11

## 2024-07-11 RX ADMIN — TECHNESCAN TC 99M MERTIATIDE 8.1 MILLICURIE: 1 INJECTION, POWDER, LYOPHILIZED, FOR SOLUTION INTRAVENOUS at 10:58

## 2024-07-11 RX ADMIN — FUROSEMIDE 40 MG: 10 INJECTION, SOLUTION INTRAMUSCULAR; INTRAVENOUS at 11:11

## 2024-07-12 ENCOUNTER — TELEPHONE (OUTPATIENT)
Dept: NEUROLOGY | Facility: CLINIC | Age: 69
End: 2024-07-12
Payer: COMMERCIAL

## 2024-07-12 NOTE — TELEPHONE ENCOUNTER
[facility] Call Center    Phone Message    May a detailed message be left on voicemail: yes     Reason for Call: Appointment Intake    Referring Provider Name: Dr. Anaid Watson   Diagnosis and/or Symptoms: DBS Programing     Please contact Kwame for scheduling in October with Provider.  Pt can be reached at: [phone]    Action Taken: Other: Neurology     Travel Screening: Not Applicable     Date of Service:

## 2024-07-16 NOTE — TELEPHONE ENCOUNTER
Writer left a message for the patient to call back and sent him a message offering 10/8 at 8 AM with Dr. Piper.

## 2024-08-02 ENCOUNTER — THERAPY VISIT (OUTPATIENT)
Dept: SLEEP MEDICINE | Facility: CLINIC | Age: 69
End: 2024-08-02
Payer: COMMERCIAL

## 2024-08-02 DIAGNOSIS — G47.52 RBD (REM BEHAVIORAL DISORDER): ICD-10-CM

## 2024-08-02 DIAGNOSIS — G47.33 OSA (OBSTRUCTIVE SLEEP APNEA): ICD-10-CM

## 2024-08-02 PROCEDURE — 95810 POLYSOM 6/> YRS 4/> PARAM: CPT | Performed by: PSYCHIATRY & NEUROLOGY

## 2024-08-02 ASSESSMENT — SLEEP AND FATIGUE QUESTIONNAIRES
HOW LIKELY ARE YOU TO NOD OFF OR FALL ASLEEP WHILE SITTING AND TALKING TO SOMEONE: WOULD NEVER DOZE
HOW LIKELY ARE YOU TO NOD OFF OR FALL ASLEEP WHILE SITTING INACTIVE IN A PUBLIC PLACE: WOULD NEVER DOZE
HOW LIKELY ARE YOU TO NOD OFF OR FALL ASLEEP WHEN YOU ARE A PASSENGER IN A CAR FOR AN HOUR WITHOUT A BREAK: WOULD NEVER DOZE
HOW LIKELY ARE YOU TO NOD OFF OR FALL ASLEEP WHILE SITTING QUIETLY AFTER LUNCH WITHOUT ALCOHOL: SLIGHT CHANCE OF DOZING
HOW LIKELY ARE YOU TO NOD OFF OR FALL ASLEEP WHILE WATCHING TV: SLIGHT CHANCE OF DOZING
HOW LIKELY ARE YOU TO NOD OFF OR FALL ASLEEP IN A CAR, WHILE STOPPED FOR A FEW MINUTES IN TRAFFIC: WOULD NEVER DOZE
HOW LIKELY ARE YOU TO NOD OFF OR FALL ASLEEP WHILE LYING DOWN TO REST IN THE AFTERNOON WHEN CIRCUMSTANCES PERMIT: MODERATE CHANCE OF DOZING
HOW LIKELY ARE YOU TO NOD OFF OR FALL ASLEEP WHILE SITTING AND READING: SLIGHT CHANCE OF DOZING

## 2024-08-07 LAB — SLPCOMP: NORMAL

## 2024-08-07 NOTE — PROCEDURES
" SLEEP STUDY INTERPRETATION  DIAGNOSTIC POLYSOMNOGRAPHY REPORT      Patient: WILFRED MCHUGH  YOB: 1955  Study Date: 8/2/2024  MRN: [id number]  Referring Provider: self  Ordering Provider: Goltz PA-C Bennett    Indications for Polysomnography: The patient is a 69 year old Male who is 5' 9\" and weighs 218.0 lbs. His BMI is 32.3, [place] sleepiness scale 06 and neck circumference is 40 cm. Relevant medical history includes PD with DBS, sleep apnea treated with positional therapy, dream enactment/RBD. A diagnostic polysomnogram was performed to evaluate for sleep apnea with positional therapy, RBD.    Polysomnogram Data: A full night polysomnogram recorded the standard physiologic parameters including EEG, EOG, EMG, ECG, nasal and oral airflow. Respiratory parameters of chest and abdominal movements were recorded with respiratory inductance plethysmography. Oxygen saturation was recorded by pulse oximetry. Hypopnea scoring rule used: 1B 4%.    Sleep Architecture: Sleep fragmentation  The total recording time of the polysomnogram was 535.5 minutes. The total sleep time was 273.0 minutes. Sleep latency was 23.5 minutes. REM latency was 375.5 minutes. Arousal index was 29.5 arousals per hour. Sleep efficiency was 51.0%. Wake after sleep onset was 239.0 minutes. The patient spent 21.1% of total sleep time in Stage N1, 31.0% in Stage N2, 25.6% in Stage N3, and 22.3% in REM. Time in REM supine was 0 minutes.    Respiration: This study did not demonstrate clinically significant sleep disordered breathing while the patient slept lateral.   Events ? The polysomnogram revealed a presence of - obstructive, 1 central, and - mixed apneas resulting in an apnea index of 0.2 events per hour. There were 5 obstructive hypopneas and - central hypopneas resulting in an obstructive hypopnea index of 1.1 and central hypopnea index of - events per hour. The combined apnea/hypopnea index was 1.3 events per hour (central " apnea/hypopnea index was 0.2 events per hour). The REM AHI was 3.9 events per hour. The supine AHI was - events per hour. The RERA index was - events per hour.  The RDI was 1.3 events per hour.  Snoring - was reported as mild-moderate.  Respiratory rate and pattern - was notable for normal respiratory rate and pattern.  Sustained Sleep Associated Hypoventilation - Transcutaneous carbon dioxide monitoring was not used.  Sleep Associated Hypoxemia - (Greater than 5 minutes O2 sat at or below 88%) was not present. Baseline oxygen saturation was 94.0%. Lowest oxygen saturation was 89.0%. Time spent less than or equal to 88% was 0 minutes. Time spent less than or equal to 89% was 0.4 minutes.    Movement Activity: Frequent motor activity throughout sleep.  PLM's during NREM sleep, the majority of which were not associated with cortical arousal; increased transient motor activity during REM sleep.    Periodic Limb Activity - There were 142 PLMs during the entire study. The PLM index was 31.2 movements per hour. The PLM Arousal Index was 8.8 per hour.  REM EMG Activity - Excessive transient muscle activity was not present.  Nocturnal Behavior - Abnormal sleep related behaviors were not clearly noted.  Bruxism - None apparent.    Cardiac Summary: Limited 1 lead EKG study with signal frequently obscured by artifact.  Regardless this investigation demonstrated narrow QRS complexes that initially were preceded by P waves suggestive of sinus rhythm but then later were not suggestive of atrial arrythmia.  Intermittent tachycardia.  The average pulse rate was 74.6 bpm. The minimum pulse rate was 44.0 bpm while the maximum pulse rate was 119.0 bpm.      Assessment:   This study did not demonstrate clinically significant sleep disordered breathing while the patient slept lateral.   Frequent motor activity throughout sleep.  PLM's during NREM sleep, the majority of which were not associated with cortical arousal; increased transient  motor activity during REM sleep.    Atrial arrythmia    Recommendations:  Patient may be reassured that, per this study they do not have clinically significant sleep disordered breathing as long as they do not sleep supine. If the patient does sleep supine more frequently at home could consider repeat polysomnography with emphasis on supine positioning.  Continued management of RBD.   Continued follow up with cardiology.  Advice regarding the risks of drowsy driving.  Suggest optimizing sleep schedule and avoiding sleep deprivation.  Weight management   Treatment of PLMs (dopaminergic agents or delta-2 ligands) should be targeted at patients who either have wakeful motor restlessness or those in whom there is a high clinical suspicion of periodic limb movement disorder and not for elevated PLMs alone    Diagnostic Codes: G47.9, G47.52      Arpan Marie MD 8-7-2024  Diplomate, Sleep Medicine, ABPN

## 2024-08-12 ENCOUNTER — MYC MEDICAL ADVICE (OUTPATIENT)
Dept: SLEEP MEDICINE | Facility: CLINIC | Age: 69
End: 2024-08-12
Payer: COMMERCIAL

## 2024-08-13 ENCOUNTER — MYC MEDICAL ADVICE (OUTPATIENT)
Dept: NEUROLOGY | Facility: CLINIC | Age: 69
End: 2024-08-13
Payer: COMMERCIAL

## 2024-08-15 NOTE — TELEPHONE ENCOUNTER
PSG did not show DIPIKA when lateral. PLM arousal index was mildly elevated at 8.8/hr. Overall arousal index was 29/hr. Sleep efficiency was 51%. Excessive muscle activity was not present. He has been on clonazepam for dream enactment behavior and gabapentin for RLS, but discontinued both due to limited benefit and preferring to limit medications.  He was working with Patricia Maravilla on CBT-I. I suggested a trial of melatonin 5-15 mg and suggested doxepin or eszopiclone if he is open to those medications.   Bennett Goltz, PA-C
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
## 2024-08-21 NOTE — CONFIDENTIAL NOTE
Kwame will attempt to continue to get on a consistent sleep and medication schedule and hold off on retrying gabapentin. Discussed home remedies such as stretching before bedtime.

## 2024-09-08 ENCOUNTER — MYC MEDICAL ADVICE (OUTPATIENT)
Dept: SLEEP MEDICINE | Facility: CLINIC | Age: 69
End: 2024-09-08
Payer: COMMERCIAL

## 2024-09-08 DIAGNOSIS — F51.04 CHRONIC INSOMNIA: Primary | ICD-10-CM

## 2024-09-10 RX ORDER — DOXEPIN HYDROCHLORIDE 10 MG/1
10 CAPSULE ORAL AT BEDTIME
Qty: 15 CAPSULE | Refills: 0 | Status: SHIPPED | OUTPATIENT
Start: 2024-09-10

## 2024-09-10 NOTE — TELEPHONE ENCOUNTER
I sent in a 15 capsule prescription to try doxepin. I also sent a message with some CBT recommendations and a few ways to help strengthen his circadian rhythms.  Bennett Goltz, PA-C

## 2024-10-01 ENCOUNTER — TELEPHONE (OUTPATIENT)
Dept: SLEEP MEDICINE | Facility: CLINIC | Age: 69
End: 2024-10-01
Payer: COMMERCIAL

## 2024-10-01 NOTE — TELEPHONE ENCOUNTER
Pt was called to reschedule pt for appt that they missed with Bennett Goltz. LVM for them to call back    Zuleyka Cutler    [facility]

## 2024-10-15 ENCOUNTER — OFFICE VISIT (OUTPATIENT)
Dept: NEUROLOGY | Facility: CLINIC | Age: 69
End: 2024-10-15
Payer: COMMERCIAL

## 2024-10-15 VITALS
HEART RATE: 73 BPM | DIASTOLIC BLOOD PRESSURE: 70 MMHG | WEIGHT: 221.6 LBS | BODY MASS INDEX: 32.71 KG/M2 | SYSTOLIC BLOOD PRESSURE: 116 MMHG | OXYGEN SATURATION: 97 %

## 2024-10-15 DIAGNOSIS — G20.B2 PARKINSON'S DISEASE WITH DYSKINESIA AND FLUCTUATING MANIFESTATIONS (H): Primary | ICD-10-CM

## 2024-10-15 DIAGNOSIS — Z96.89 S/P DEEP BRAIN STIMULATOR PLACEMENT: ICD-10-CM

## 2024-10-15 PROCEDURE — 99215 OFFICE O/P EST HI 40 MIN: CPT | Mod: GC | Performed by: PSYCHIATRY & NEUROLOGY

## 2024-10-15 PROCEDURE — 99417 PROLNG OP E/M EACH 15 MIN: CPT | Performed by: PSYCHIATRY & NEUROLOGY

## 2024-10-15 PROCEDURE — G2211 COMPLEX E/M VISIT ADD ON: HCPCS | Performed by: PSYCHIATRY & NEUROLOGY

## 2024-10-15 ASSESSMENT — UNIFIED PARKINSONS DISEASE RATING SCALE (UPDRS)
AMPLITUDE_LLE: (0) NORMAL: NO TREMOR.
FACIAL_EXPRESSION: (0) NORMAL: NORMAL FACIAL EXPRESSION.
TOETAPPING_RIGHT: (1) SLIGHT: ANY OF THE FOLLOWING: A) THE REGULAR RHYTHM IS BROKEN WITH ONE WITH ONE OR TWO INTERRUPTIONS OR HESITATIONS OF THE MOVEMENT  B) SLIGHT SLOWING  C) THE AMPLITUDE DECREMENTS NEAR THE END OF THE 10 MOVEMENTS.
SPONTANEITY_OF_MOVEMENT: (1) SLIGHT: SLIGHT GLOBAL SLOWNESS AND POVERTY OF SPONTANEOUS MOVEMENTS.
RIGIDITY_RUE: (1) SLIGHT: RIGIDITY ONLY DETECTED WITH ACTIVATION MANEUVER.
FINGER_TAPPING_RIGHT: (1) SLIGHT: ANY OF THE FOLLOWING: A) THE REGULAR RHYTHM IS BROKEN WITH ONE WITH ONE OR TWO INTERRUPTIONS OR HESITATIONS OF THE MOVEMENT  B) SLIGHT SLOWING  C) THE AMPLITUDE DECREMENTS NEAR THE END OF THE 10 MOVEMENTS.
POSTURAL_STABILITY: (0) NORMAL: NO PROBLEMS. RECOVERS WITH ONE OR TWO STEPS.
TOTAL_SCORE_LEFT: 8
HANDMOVEMENTS_RIGHT: (1) SLIGHT: ANY OF THE FOLLOWING: A) THE REGULAR RHYTHM IS BROKEN WITH ONE WITH ONE OR TWO INTERRUPTIONS OR HESITATIONS OF THE MOVEMENT  B) SLIGHT SLOWING  C) THE AMPLITUDE DECREMENTS NEAR THE END OF THE 10 MOVEMENTS.
AMPLITUDE_RLE: (0) NORMAL: NO TREMOR.
FINGER_TAPPING_LEFT: (0) NORMAL: NO PROBLEMS.
LEG_AGILITY_RIGHT: (1) SLIGHT: ANY OF THE FOLLOWING: A) THE REGULAR RHYTHM IS BROKEN WITH ONE WITH ONE OR TWO INTERRUPTIONS OR HESITATIONS OF THE MOVEMENT  B) SLIGHT SLOWING  C) THE AMPLITUDE DECREMENTS NEAR THE END OF THE 10 MOVEMENTS.
ARISING_CHAIR: (0) NORMAL: NO PROBLEMS. ABLE TO ARISE QUICKLY WITHOUT HESITATION.
MOVEMENTS_INTERFERE_WITH_RATINGS: NO
POSTURE: (0) NORMAL: NO PROBLEMS.
TOTAL_SCORE: 16
TOTAL_SCORE: 5
AMPLITUDE_LUE: (0) NORMAL: NO TREMOR.
AMPLITUDE_LIP_JAW: (0) NORMAL: NO TREMOR.
SPEECH: (1) SLIGHT: LOSS OF MODULATION, DICTION OR VOLUME, BUT STILL ALL WORDS EASY TO UNDERSTAND.
HANDMOVEMENTS_LEFT: (0) NORMAL: NO PROBLEMS.
AXIAL_SCORE: 3
RIGIDITY_RLE: (0) NORMAL: NO RIGIDITY.
PARKINSONS_MEDS: ON
TOETAPPING_LEFT: (3) MODERATE: ANY OF THE FOLLOWING: A) MORE THAN 5 INTERRUPTIONS OR AT LEAST ONE LONGER ARREST (FREEZE) IN ONGOING MOVEMENT  B) MODERATE SLOWING  C) THE AMPLITUDE DECREMENTS STARTING AFTER THE FIRST MOVEMENT.
RIGIDITY_LUE: (0) NORMAL: NO RIGIDITY.
FREEZING_GAIT: (0) NORMAL: NO FREEZING.
DYSKINESIAS_PRESENT: YES
AMPLITUDE_RUE: (0) NORMAL: NO TREMOR.
CONSTANCY_TREMOR_ATREST: (0) NORMAL: NO TREMOR.
GAIT: (1) SLIGHT: INDEPENDENT WALKING WITH MINOR GAIT IMPAIRMENT.
LEG_AGILITY_LEFT: (0) NORMAL: NO PROBLEMS.
PRONATION_SUPINATION_RIGHT: (0) NORMAL: NO PROBLEMS.
RIGIDITY_LLE: (2) MILD: RIGIDITY DETECTED WITHOUT THE ACTIVATION MANEUVER. FULL RANGE OF MOTION IS EASILY ACHIEVED.
PRONATION_SUPINATION_LEFT: (1) SLIGHT: ANY OF THE FOLLOWING: A) THE REGULAR RHYTHM IS BROKEN WITH ONE WITH ONE OR TWO INTERRUPTIONS OR HESITATIONS OF THE MOVEMENT  B) SLIGHT SLOWING  C) THE AMPLITUDE DECREMENTS NEAR THE END OF THE 10 MOVEMENTS.
RIGIDITY_NECK: (0) NORMAL: NO RIGIDITY.

## 2024-10-15 NOTE — NURSING NOTE
"Kwame Lin is a 69 year old male who presents for:  Chief Complaint   Patient presents with    Consult     PD. Establish care, former [facility] pt. [facility] pt. Pt's aware.          Initial Vitals:  /70   Pulse 73   Wt 100.5 kg (221 lb 9.6 oz)   SpO2 97%   BMI 32.71 kg/m   Estimated body mass index is 32.71 kg/m  as calculated from the following:    Height as of 3/19/24: 1.753 m (5' 9.02\").    Weight as of this encounter: 100.5 kg (221 lb 9.6 oz).. Body surface area is 2.21 meters squared. BP completed using cuff size: regular  Data Unavailable    Nursing Comments:     Rylee Mir MA   "

## 2024-10-15 NOTE — PATIENT INSTRUCTIONS
- Continue on your current medications for now until meeting with the [facility] pharmacists  Current Meds 10AM 2PM 6PM 10PM 2AM   CD/LD (25-100mg) 1.5 1.5 1.5 1.5 1.5   Entacapone (200mg) 1 1 1 1 1     - [facility] referral to work on making changes to medications   - Start with switching from CD/LD + Entacapone Q4h -> CD/LD Q3h (w/o Entacapone)   - May consider switching to Rytary (pt wants more information)   - If changes to PD medications is not sufficient, will try Gabapentin/Lyrica at night for RLS    - May consider addition of Clonazepam at night for RLS and RBD if the above changes are not sufficient  - May be interested in PT/OT/SLP eval after repeat DBS programming and medication changes  - Will make an appointment for specific DBS programming and further follow-up   - Patient prefers [place] [facility] once equipment is obtained   - Open to [facility] if [place] will take > 2mo
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
## 2024-10-15 NOTE — PROGRESS NOTES
I, Kian Oro MD, personally interviewed, examined and evaluated this patient. I personally reviewed the vital signs, medications and pertinent labs/imaging. I discussed the patient with Dr Conde and agree with the assessment, examination and plan of care documented, with the additions and/or exceptions delineated below:    Kwame comes in today to establish care after his previous neurologist, Dr. Watson, left the practice.  Unfortunately he has an old generation device from [employer], for which we do not have the equipment to interrogate his device at this location.  However, he had several clinical needs which we ended up exploring today and coming up with a very comprehensive care plan for him.    Please see prior notes for further details on his history, but briefly, he has history of advanced Parkinson disease with refractory tremors, implanted with bilateral STN DBS back in 2017.  He is experienced significant impacting his tremors, he is completely optimized, however he expressed the following issues:    1.  Speech and balance problems which seem to have been more pronounced over the years, and per his wife, he seems like certain program settings have led to more difficulties with his speech and his balance, so she suspects that there might be a stimulation induced component of that;    2.  Difficulties falling asleep;    3.  A sensation of restless legs there seem to be coming on towards 9 to 10 PM.  Interestingly, he experienced the exact same sensation during the daytime when he is getting near the end of his levodopa dose.  Some of that has been alleviated with the introduction of entacapone to the levodopa regimen, which was initially taking 1.5 tablets of the 25/100 mg every 4 hours, he was consistently wearing off, and now even with entacapone, he is getting very close to the 4-hour ava but he is experiencing some level of restlessness particularly on the right leg.    4.  Very  vivid dreams with dream enactment to the point that he went for his wife's neck once during 1 of those dreams.  He has tried over-the-counter melatonin, cannot remember how many milligrams he took, he also tried clonazepam only at 0.5 mg tablets and did not do anything so he decided to stop that because he was afraid of running to polypharmacy.  No side effects were experienced as far as he can remember.    We evaluated him by performing the UPDRS scales in the on DBS state and off DBS state, using his own patient controller to turn his system off.  There was a clear difference comparing his gait in the on state versus the off state, in the on state, he seemed to have some residual overflow dyskinesias, he was feeling fidgety, and that alleviated completely in the off state, there seems to be an on phenomenon that is being pushed by his DBS in which she was veering to the sides and he was not walking much of an a straight line, and he was able to walk pretty straight once his DBS was turned off, which may point towards an issue that might be programmable.  He understands that some of that can come at the cost of tremor control, but we will be able to tell more once we try to troubleshoot that.    We will plan on regrouping in the next DBS slot available we will try to set aside particularly because it is a troubleshooting issue at least 1-1/2 to 2 hours.  In the meantime, we will plan on making quite a few changes to his medication regimen.  Starting with his levodopa regimen we offered the option of either keeping the same amount of levodopa per dose but bring in the time intervals to every 3 hours instead, that can come at the cost of forcing dyskinesias so we may end up having to drop the entacapone up to the regimen.  Alternatively we could potentially convert to Rytary.  For the RLS feeling, I do suspect that there might be a component of him wearing off, and some of that may improve just with the medication  adjustments on the levodopa side, however we may potentially entertain the possibility of starting him on some gabapentin.  He is only tried 100 mg capsules in the past and then he gave up on it because he was afraid of having to polypharmacy.  If we really want to address this since our last if we feel comfortable that the levodopa regimen has been optimized at that point, we should probably try higher dose that and see how he behaves.  Lastly, I think we should address his RBD more aggressively, we will probably try, once we figure out his levodopa regimen, clonazepam again, potentially titrating that a little higher to try to reach clinical benefit, provided that he does not have any side effects.    Since there are so many different medication changes and we want to do this in a stepwise fashion we we will get one of our MTM pharmacist involved so they can walk him through the different steps of it and keep a close eye on how he does on each 1 of those different steps along the way.      The longitudinal plan of care for Kwame was addressed during this visit. Due to the added complexity in care, I will continue to support Kwame CHIU Marthachanel in the subsequent management of this condition(s) and with the ongoing continuity of care of this condition(s).    Attending attestation: Today a total 80 minutes were spent on this case, in face-to-face, examining, obtaining history, providing education and coordination of care. Additional time was spent in chart review, ancillary data, and documentation as delineated above.

## 2024-10-15 NOTE — Clinical Note
This is a former patient of Dr. Silva, he is someone with the old [facility] system, he came in today and unfortunately we do not have the equipment available here in [place], so we focus mostly on potential medication changes that he will need, rehab needs, etc. however, we will have to schedule an appointment, hopefully in the near future, to try to troubleshoot some stimulation induced side effects, which we could not do today due to lack of the equipment.  I actually spoke with Adolfo Winston, the [facility], who says that he is going to put together a Vercise kit to be available at this location in the future, and I told him that we would prefer to have one of them available to troubleshoot any connections next time we see Kwame in clinic.  Yovany, lets take a look at my December schedule together, as we may need to potentially pick a day or 2 to look at clinic space so we can potentially bring a couple of patients, and he would be one of them.  Thank you

## 2024-10-17 ENCOUNTER — TELEPHONE (OUTPATIENT)
Dept: NEUROLOGY | Facility: CLINIC | Age: 69
End: 2024-10-17
Payer: COMMERCIAL

## 2024-10-17 NOTE — TELEPHONE ENCOUNTER
MTM referral from: [facility] visit (referral by provider)    MTM referral outreach attempt #2 on October 17, 2024 at 2:14 PM      Outcome: Patient not reachable after several attempts, routed to Pharmacist Team/Provider as an FYI    Use HBC for the carrier/Plan on the flowsheet      CardKillt Message Sent    BRAYAN White       Patient called back and scheduled visit.

## 2024-10-24 ENCOUNTER — VIRTUAL VISIT (OUTPATIENT)
Dept: PHARMACY | Facility: CLINIC | Age: 69
End: 2024-10-24
Attending: PSYCHIATRY & NEUROLOGY
Payer: COMMERCIAL

## 2024-10-24 DIAGNOSIS — I48.91 NEW ONSET ATRIAL FIBRILLATION (H): ICD-10-CM

## 2024-10-24 DIAGNOSIS — G47.52 RBD (REM BEHAVIORAL DISORDER): ICD-10-CM

## 2024-10-24 DIAGNOSIS — B49 FUNGAL INFECTION: ICD-10-CM

## 2024-10-24 DIAGNOSIS — Z79.2 NEED FOR ANTIBIOTIC PROPHYLAXIS FOR DENTAL PROCEDURE: ICD-10-CM

## 2024-10-24 DIAGNOSIS — E03.9 HYPOTHYROIDISM, UNSPECIFIED TYPE: ICD-10-CM

## 2024-10-24 DIAGNOSIS — F41.9 ANXIETY DISORDER, UNSPECIFIED TYPE: ICD-10-CM

## 2024-10-24 DIAGNOSIS — G25.81 RESTLESS LEG SYNDROME: ICD-10-CM

## 2024-10-24 DIAGNOSIS — G20.A2 PARKINSON'S DISEASE WITH FLUCTUATING MANIFESTATIONS, UNSPECIFIED WHETHER DYSKINESIA PRESENT (H): Primary | ICD-10-CM

## 2024-10-24 DIAGNOSIS — R52 PAIN: ICD-10-CM

## 2024-10-24 DIAGNOSIS — I10 BENIGN ESSENTIAL HYPERTENSION: ICD-10-CM

## 2024-10-24 DIAGNOSIS — Z78.9 TAKES DIETARY SUPPLEMENTS: ICD-10-CM

## 2024-10-24 DIAGNOSIS — E78.2 MIXED HYPERLIPIDEMIA: ICD-10-CM

## 2024-10-24 RX ORDER — ACETAMINOPHEN 500 MG
500 TABLET ORAL EVERY 6 HOURS PRN
COMMUNITY

## 2024-10-24 RX ORDER — ATORVASTATIN CALCIUM 40 MG/1
40 TABLET, FILM COATED ORAL DAILY
COMMUNITY
Start: 2023-12-11

## 2024-10-24 RX ORDER — GABAPENTIN 300 MG/1
300 CAPSULE ORAL AT BEDTIME
Qty: 30 CAPSULE | Refills: 11 | Status: SHIPPED | OUTPATIENT
Start: 2024-10-24

## 2024-10-24 RX ORDER — ASPIRIN 325 MG
325 TABLET, DELAYED RELEASE (ENTERIC COATED) ORAL AT BEDTIME
COMMUNITY

## 2024-10-24 NOTE — TELEPHONE ENCOUNTER
MTM referral has been completed. He is scheduled to see Carmella Stokes [facility] on 11/27/2024 at 1:00 P.M.

## 2024-10-24 NOTE — PROGRESS NOTES
Medication Therapy Management (MTM) Encounter    ASSESSMENT:                            Medication Adherence/Access: No issues identified.    Parkinson's Disease/RLS/RBD:    Patient is not having much wearing off of carbidopa-levodopa during the day so I'm not sure there is a role for Rytary at this point, but patient requested getting one capsule of Rytary for electrothermal screening (which I will review with Dr. Piper). His primary concern right now is insomnia/RLS so we decided to try gabapentin before bed. He has previously taken gabapentin in low dose (100 mg) without benefit but he may benefit from trying a higher dose of gabapentin. Of note, ferritin level has been above goal of 75 as of last test in 2023.  For treatment of RBD, we may consider another trial of melatonin or clonazepam in the future.     Hypertension /Afib  Stable; patient advised to discuss with cardiologist before reducing the beta blocker      Hyperlipidemia   Stable; patient advised to discuss with cardiologist before reducing the statin      Mental Health   Stable; we discussed that selective serotonin reuptake inhibitor medications like paroxetine can exacerbate RLS and RBD     Hypothyroidism   Stable      Pain:  Stable     Supplements   Stable      Infection prevention:   Stable     Fungal infection:  Stable     PLAN:                            Start gabapentin 300 mg every evening for restless legs. I would recommend taking this about one hour before going to bed. This medication is intended to help with sleep and restless legs. If this is not effective enough, we may try a higher dosage.   I will ask Dr. Piper if he would prescribe one capsule of Rytary 95 mg to be used for electrodermal screening.   We discussed that paroxetine (Paxil) can exacerbation RLS and REM sleep behavior disorder symptoms.     Follow-up: 11/27/24 at 1 pm by phone    10/30/24 sending 1 capsule of Rytary 95 mg to patient's pharmacy per verbal order from  "Dr. Piper     SUBJECTIVE/OBJECTIVE:                          Kwame Lin is a 69 year old male seen for an initial visit. He was referred to me from Dr. Piper. Patient was accompanied by wife, Marylin.     Reason for visit: medication review.    Allergies/ADRs: Reviewed in chart  Past Medical History: Reviewed in chart  Tobacco: He reports that he has never smoked. He has never used smokeless tobacco.  Alcohol: very occasional beer   THC/CBD: none; tried medical marijuana previously but makes him sick     Medication Adherence/Access: no issues reported.    Parkinson's Disease/RLS/RBD:    - Medication regimen listed below    Patient has deep brain stimulation and reports it has been very helpful. Overall his daytime symptoms are well controlled. His primary concern is difficulty sleeping and restlessness in the evenings.   Since his last visit with Dr. Piper he did not change his carbidopa-levodopa to every 3 hours dosing because he feels the medication lasts the 4 hours and he prefers to not take more carbidopa-levodopa at this time. He is somewhat interested in trying Rytary but before trying Rytary he would like us to prescribe 1 capsule that he could bring to his biofeedback program where they would conduct electrodermal screening on the Rytary capsule.   Patient also had questions about interactions between Rytary and the other medications and food.   Patient states the feeling in his legs in the evening is \"restless or achy.\" This is an internal feeling, it cannot be seen on the outside of his body. Patient states it takes 30-45 min for a dose of medication to kick in. He usually tries to go to bed about 11 pm-12 am; he will read to relax and get to sleep. Patient states that last night he only slept 1-2 hours. It took him until 2-4 am to get to sleep. He also reports having very vivid dreams and so he is wearing of strap at night to protect himself and his wife. He previously tried clonazepam (no effect), " melatonin (no effect), and doxepin (felt like being on speed).    Meals: breakfast 11 am, lunch about 4 pm (bigger meal), dinner about 6:30-7 pm       10AM 2PM 6PM 10PM 2AM   CD/LD (25-100mg) 1.5 1.5 1.5 1.5 1.5   Entacapone (200mg) 1 1 1 1 1     Hypertension /Afib  - metoprolol ER 25 mg daily (morning)  - aspirin 325 mg daily   Patient states that his naturopath provider recommends that he reduce the metoprolol to a quarter dose (he has not made this change yet) and they have concern that the metoprolol may be interfering with his sleep.   Patient reports no current medication side effects     Hyperlipidemia   - atorvastatin 40 mg nightly (every evening around (dinnertime)   Patient states the naturopath recommends that he take 20 mg based on the electrodermal screening but he has not changed this medication yet either.   Patient reports no significant myalgias or other side effects.     Mental Health   - paroxetine 10 mg daily (morning)   Patient reports this medication is being used for mood/anxiety. He has been on it about 1-2 years.   Patient reports no current medication side effects.     Hypothyroidism   - armour thyroid 60 mg daily (morning)  Patient is having the following symptoms: none.      Pain:  - Tylenol, usually taking 500 mg daily  Uses this to help with RLS and calf pain     Supplements   - vitamin D3 cream - currently rubbing on his tailbone to help with pain  -  vitamin D liquid in water (5000 units total per day)   not agreeing with him   stopped      Infection prevention:   - clindamycin before dental appts due to history of knee replacement    Fungal infection:  - clotrimazole topically as needed, about 2-3 times yearly    Today's Vitals: There were no vitals taken for this visit.  ----------------    I spent 43 minutes with this patient today. All changes were made via collaborative practice agreement with Dr. Piper. A copy of the visit note was provided to the patient's provider(s).    A  summary of these recommendations was sent via [employer].    Carmella Stokes, Pharm.D.  Medication Therapy Management Pharmacist  [facility]    Telemedicine Visit Details  The patient's medications can be safely assessed via a telemedicine encounter.  Type of service:  Telephone visit  Originating Location (pt. Location): Home    Distant Location (provider location):  On-site  Start Time:  9:04 AM  End Time: 9:47 AM     Medication Therapy Recommendations  Restless leg syndrome   1 Rationale: Untreated condition - Needs additional medication therapy - Indication   Recommendation: Start Medication - gabapentin 300 MG capsule   Status: Accepted per CPA   Identified Date: 10/24/2024 Completed Date: 10/29/2024

## 2024-10-24 NOTE — Clinical Note
Gregory Martinez- I met with Kwame and his wife. It sounds like sleep/RLS is his primary concern right now so we're going to try gabapentin. They also asked if you would prescribe one capsule of Rytary 95 mg for eletrodermal testing with their naturopath. This sounds like a bogus test but I told them I would ask you before we order it.

## 2024-10-28 ENCOUNTER — TELEPHONE (OUTPATIENT)
Dept: NEUROLOGY | Facility: CLINIC | Age: 69
End: 2024-10-28
Payer: COMMERCIAL

## 2024-10-29 RX ORDER — CHOLECALCIFEROL (VITAMIN D3) 10(400)/ML
DROPS ORAL DAILY
COMMUNITY

## 2024-10-30 RX ORDER — LEVODOPA AND CARBIDOPA 95; 23.75 MG/1; MG/1
1 CAPSULE, EXTENDED RELEASE ORAL ONCE
Qty: 1 CAPSULE | Refills: 0 | Status: SHIPPED | OUTPATIENT
Start: 2024-10-30 | End: 2024-10-30

## 2024-10-30 NOTE — TELEPHONE ENCOUNTER
Writer left a message for the patient to call back and informed him this will be the writer's last attempt at reaching him. Sent the patient a MyChart offering 12/11 at 9 AM with Dr. Piper at the [facility], 3rd Floor.

## 2024-10-30 NOTE — PATIENT INSTRUCTIONS
"Recommendations from today's MTM visit:                                                      Start gabapentin 300 mg every evening for restless legs. I would recommend taking this about one hour before going to bed. This medication is intended to help with sleep and restless legs. If this is not effective enough, we may try a higher dosage.   I will ask Dr. Piper if he would prescribe one capsule of Rytary 95 mg to be used for electrodermal screening.   We discussed that paroxetine (Paxil) can exacerbation RLS and REM sleep behavior disorder symptoms.     Follow-up: 11/27/24 at 1 pm by phone    It was great speaking with you today.  I value your experience and would be very thankful for your time in providing feedback in our clinic survey. In the next few days, you may receive an email or text message from [employer] with a link to a survey related to your  clinical pharmacist.\"     To schedule another MTM appointment, please call the clinic directly or you may call the MTM scheduling line at [phone].    My Clinical Pharmacist's contact information:                                                      Please feel free to contact me with any questions or concerns you have.      Carmella Stokes, Pharm.D.  Medication Therapy Management Pharmacist  [facility]     "

## 2024-10-31 NOTE — TELEPHONE ENCOUNTER
Spoke to the patient about Dr. Piper wanting to see the patient for a 2-hour programming visit in December. The patient was scheduled on 12/11/24 at 9 AM with Dr. Piper. The patient was reminded to bring their patient controller with them to their upcoming appointment, with their battery fully charged if it's a rechargeable battery.

## 2024-10-31 NOTE — TELEPHONE ENCOUNTER
The patient left the writer a message on 10/30/24 stating he has an appointment already scheduled with Dr. Piper on 11/4/24 in [place] and for the writer to call him back if a change is needed.    Writer left a message for the patient to call back to discuss his upcoming appointment more since it is only a 30 minute appointment and Dr. Piper had wanted to see him for a 2-hour visit, for programming.

## 2024-11-10 ENCOUNTER — NURSE TRIAGE (OUTPATIENT)
Dept: NURSING | Facility: CLINIC | Age: 69
End: 2024-11-10
Payer: COMMERCIAL

## 2024-11-10 NOTE — TELEPHONE ENCOUNTER
"S-(situation): heartbeat concerns    Wife Pat calling, with [employer]    B-(background):   This morning, HR through Apple Watch read 132 and 113. This happened briefly and now feeling well.    BP 81/66 and HR 72, then 109/94 HR 95    Hx of A-fib. Will take metoprolol at 8 AM    A-(assessment):   Insomnia. Pt said this is common for him. Has RLS due to Parkinson's which makes  sleep sometimes difficult for him  Fatigue due to lack of sleep    BP now 112/78 HR 79    No SOB  No chest pain  No fever  Pt on thyroid meds    R-(recommendations): Be seen within 24 hours. Advised to reach out to PCP for an appointment. Call back for further concerns.    Pt and wife verbalized understanding.    Teresa Shepard RN/[facility] Nurse Advisor          Reason for Disposition   History of hyperthyroidism or taking thyroid medication   Taking a medicine that could cause weakness (e.g., blood pressure medications, diuretics)   Heart rhythm alert (e.g., \"you have irregular heartbeat\") from personal wearable device (e.g., Apple Watch)    Additional Information   Negative: Passed out (i.e., lost consciousness, collapsed and was not responding)   Negative: Shock suspected (e.g., cold/pale/clammy skin, too weak to stand, low BP, rapid pulse)   Negative: Difficult to awaken or acting confused (e.g., disoriented, slurred speech)   Negative: Visible sweat on face or sweat dripping down face   Negative: Unable to walk, or can only walk with assistance (e.g., requires support)   Negative: [1] Received SHOCK from implantable cardiac defibrillator AND [2] persisting symptoms (i.e., palpitations, lightheadedness)   Negative: [1] Dizziness, lightheadedness, or weakness AND [2] heart beating very rapidly (e.g., > 140 / minute)   Negative: [1] Dizziness, lightheadedness, or weakness AND [2] heart beating very slowly (e.g., < 50 / minute)   Negative: Sounds like a life-threatening emergency to the triager   Negative: Chest pain   Negative: Implantable Cardiac " "Defibrillator (ICD) or a pacemaker symptoms or questions   Negative: Difficulty breathing   Negative: Dizziness, lightheadedness, or weakness   Negative: [1] Heart beating very rapidly (e.g., > 140 / minute) AND [2] present now  (Exception: During exercise.)   Negative: Heart beating very slowly (e.g., < 50 / minute)  (Exception: Athlete and heart rate normal for caller.)   Negative: New or worsened shortness of breath with activity (dyspnea on exertion)   Negative: Patient sounds very sick or weak to the triager   Negative: [1] Heart beating very rapidly (e.g., > 140 / minute) AND [2] not present now  (Exception: During exercise.)   Negative: [1] Skipped or extra beat(s) AND [2] increases with exercise or exertion   Negative: [1] Skipped or extra beat(s) AND [2] occurs 4 or more times per minute   Negative: New or worsened ankle swelling   Negative: History of heart disease (i.e., heart attack, bypass surgery, angina, angioplasty, CHF)  (Exception: Brief heartbeat symptoms that went away and now feels well.)   Negative: Age > 60 years  (Exception: Brief heartbeat symptoms that went away and now feels well.)   Negative: Taking water pill (i.e., diuretic) or heart medication (e.g., digoxin)   Negative: Wearing a \"Holter monitor\" or \"cardiac event monitor\"   Negative: [1] Received SHOCK from implantable cardiac defibrillator AND [2] now feels well   Negative: SEVERE difficulty breathing (e.g., struggling for each breath, speaks in single words)   Negative: Shock suspected (e.g., cold/pale/clammy skin, too weak to stand, low BP, rapid pulse)   Negative: Difficult to awaken or acting confused (e.g., disoriented, slurred speech)   Negative: [1] Fainted > 15 minutes ago AND [2] still feels too weak or dizzy to stand   Negative: [1] SEVERE weakness (i.e., unable to walk or barely able to walk, requires support) AND [2] new-onset or worsening   Negative: Weakness of the face, arm or leg on one side of the body   Negative: " "[1] Diabetes mellitus AND [2] weakness from low blood sugar (i.e., < 60 mg/dl or 3.5 mmol/l)   Negative: Heat exhaustion suspected (i.e., dehydration from heat exposure)   Negative: Chest pain   Negative: Vomiting is main symptom   Negative: Diarrhea is main symptom   Negative: Difficulty breathing   Negative: Heart beating < 50 beats per minute OR > 140 beats per minute   Negative: Extra heartbeats, irregular heart beating, or heart is beating very fast  (i.e., \"palpitations\")   Negative: Follows large amount of bleeding (e.g., from vomiting, rectum, vagina  (Exception: Small brief weakness from sight of a small amount blood.)   Negative: Black or tarry bowel movements   Negative: [1] Drinking very little AND [2] dehydration suspected (e.g., no urine > 12 hours, very dry mouth, very lightheaded)   Negative: Patient sounds very sick or weak to the triager   Negative: [1] MODERATE weakness (i.e., interferes with work, school, normal activities) AND [2] cause unknown  (Exceptions: Weakness from acute minor illness or poor fluid intake; weakness is chronic and not worse.)   Negative: [1] MODERATE weakness or fatigue AND [2] from poor fluid intake AND [3] no improvement after 2 hours of rest and fluids   Negative: [1] Fever > 103 F (39.4 C) AND [2] not able to get the fever down using Fever Care Advice   Negative: [1] Fever > 101 F (38.3 C) AND [2] age > 60 years   Negative: [1] Fever > 100.0 F (37.8 C) AND [2] bedridden (e.g., CVA, chronic illness, recovering from surgery)   Negative: [1] Fever > 100.0 F (37.8 C) AND [2] diabetes mellitus or weak immune system (e.g., HIV positive, cancer chemo, splenectomy, organ transplant, chronic steroids)   Negative: Pale skin (pallor)   Negative: [1] MODERATE weakness (i.e., interferes with work, school, normal activities) AND [2] persists > 3 days    Protocols used: Heart Rate and Heartbeat [id number], Weakness (Generalized) and Fatigue-A-AH    "

## 2024-12-11 ENCOUNTER — OFFICE VISIT (OUTPATIENT)
Dept: NEUROLOGY | Facility: CLINIC | Age: 69
End: 2024-12-11
Payer: COMMERCIAL

## 2024-12-11 VITALS
DIASTOLIC BLOOD PRESSURE: 71 MMHG | HEART RATE: 69 BPM | SYSTOLIC BLOOD PRESSURE: 112 MMHG | OXYGEN SATURATION: 94 % | WEIGHT: 223 LBS | BODY MASS INDEX: 32.92 KG/M2

## 2024-12-11 DIAGNOSIS — G20.B2 PARKINSON'S DISEASE WITH DYSKINESIA AND FLUCTUATING MANIFESTATIONS (H): Primary | ICD-10-CM

## 2024-12-11 DIAGNOSIS — Z96.89 S/P DEEP BRAIN STIMULATOR PLACEMENT: ICD-10-CM

## 2024-12-11 NOTE — LETTER
2024       RE: Kwame Lin  [address] [place] [facility] [address]     Dear Colleague,    Thank you for referring your patient, Kwame Lin, to the [facility] [place] [facility]. Please see a copy of my visit note below.    Department of Neurology  Movement Disorders Division   Return Patient Visit    Patient: Kwame Lin   MRN: [id number]   : 1955   Date of Visit: 2024  PCP: SAMPSON IRVIN PA-C   Referring provider: Alexandro Oro    CC:  Return for Parkinson's disease  DBS Programming visit    Interval history:  Mr. Lin is a 69 year old right-handed male with history significant for tremor dominant Parkinson disease who presents to movement disorder clinic for follow-up. Last visit was about a couple of months ago, with a plan to establish care since his prior neurologist left our practice, however because he has an old version of [email or web address] and we did not have the equipment necessary to interrogate this device at our location, we had to reschedule for a subsequent visit and request the proper equipment from the . He is accompanied by his wife, who assists with collateral history.    Kwame continues to do well with a great improvement in his tremors, however he continues to endorse some difficulties with some residual rigidity and difficulties pertaining his gait when he is trying to ambulate particularly on the right leg, which he would like to troubleshoot if possible.  He has not explored different programming parameters on his own, as he is has not been instructed to do that in a long time.    Therefore, we plan to not only interrogate his device today but also to try to repeat somewhat of a monopolar review and try to reassess his programs accordingly to see if there is any other suggestion that can potentially alleviate some of the residual symptoms.    No  other changes pertaining his Parkinson's disease, no falls, no swallowing difficulties, no cognitive changes.  He is in good spirits today and he is ready to undergo his very long programming session.    Hardware data      Left Right   DBS Lead Hector Vercise 2201 Hector Vercise 2201   Target STN STN   Pal hole Hector Surtek model DB-4600-C Hector Surtek model DB-4600-C   Lead implant date 12/14/17 5/22/18   IPG # 1 1   Surgeon Dr. Amina Huynh        Left   IPG # 1   IPG Hector Vercise model  DB-1110-C   IPG Location Chest    Extensions x2 Hector [id number]   Pocket adapters None   IPG implant date 12/22/17   Surgeon Dr. Huynh        Impedances: Within normal limits    Battery level: Full charge    ROS:  All others negative except as listed above. Pertinent movement disorders-specific ROS listed above.    Past Medical History:   Diagnosis Date     Anosmia 10/12/2017     Anxiety      Depressed mood 10/12/2017     Hypothyroid      Parkinson disease (H)      Parkinsons disease (H)      PONV (postoperative nausea and vomiting)      RBD (REM behavioral disorder) 10/12/2017        Past Surgical History:   Procedure Laterality Date     ARTHROSCOPY KNEE Right     twice     ARTHROSCOPY KNEE Right     left     epidydmal mass removal, benign       IMPLANT DEEP BRAIN STIMULATION GENERATOR / BATTERY Left 12/22/2017    Procedure: IMPLANT DEEP BRAIN STIMULATION GENERATOR / BATTERY;  Deep Brain Stimulator Placement, Phase II, Placement Of Deep Brain Stimulator Generator/Battery Over The Left Chest Wall;  Surgeon: Arpan Huynh MD;  Location: [facility] OR     OPTICAL TRACKING SYSTEM INSERTION DEEP BRAIN STIMULATION Left 12/14/2017    Procedure: OPTICAL TRACKING SYSTEM INSERTION DEEP BRAIN STIMULATION;  Stealth Assisted Left Deep Brain Stimulator Placement, Phase I, Placement Of Left Side Deep Brain Stimulator Electrode, Target Left Subthalamic Nucleus With Microelectrode Recording;  Surgeon: Arpan Huynh MD;   Location: [facility] OR     OPTICAL TRACKING SYSTEM INSERTION DEEP BRAIN STIMULATION Right 5/22/2018    Procedure: OPTICAL TRACKING SYSTEM INSERTION DEEP BRAIN STIMULATION;  Stealth Assisted Right Deep Brain Stimulator Placement, Phase I And II Combined, Placement Of Right Deep Brain Stimulator Electrode, Target Right Subthalamic Nucleus With Microelectrode Recording And Connection To Previous Implanted Generator/Battery;  Surgeon: Arpan Huynh MD;  Location: [facility] OR     ORTHOPEDIC SURGERY Left 2018    knee replacement          Current Outpatient Medications:      acetaminophen (TYLENOL) 500 MG tablet, Take 500 mg by mouth every 6 hours as needed for mild pain., Disp: , Rfl:      aspirin (ASA) 325 MG EC tablet, Take 325 mg by mouth at bedtime., Disp: , Rfl:      atorvastatin (LIPITOR) 40 MG tablet, Take 40 mg by mouth daily., Disp: , Rfl:      carbidopa-levodopa (SINEMET)  MG tablet, TAKE 1 AND 1/2 TABLETS BY MOUTH EVERY 4 HOURS FIVE TIMES DAILY(8AM, NOON, 4PM, 8PM, AND 11PM), Disp: 675 tablet, Rfl: 3     Cholecalciferol (VITAMIN D3) 10 MCG/ML LIQD, Take by mouth daily. (5000 units/day), Disp: , Rfl:      clindamycin (CLEOCIN) 300 MG capsule, TAKE 2 CAPSULES BY MOUTH FOR 1 DOSE 1 HOUR PRIOR TO DENTAL APPOINTMENTS, Disp: , Rfl:      clotrimazole (LOTRIMIN) 1 % external cream, Apply topically as needed., Disp: , Rfl:      entacapone (COMTAN) 200 MG tablet, Take 1 tablet (200 mg) by mouth 5 times daily, Disp: 150 tablet, Rfl: 11     gabapentin (NEURONTIN) 300 MG capsule, Take 1 capsule (300 mg) by mouth at bedtime., Disp: 30 capsule, Rfl: 11     metoprolol succinate ER (TOPROL XL) 25 MG 24 hr tablet, Take 1 tablet (25 mg) by mouth daily, Disp: 30 tablet, Rfl: 0     NONFORMULARY, Vitamin D cream topically as needed, Disp: , Rfl:      PARoxetine (PAXIL) 10 MG tablet, Take 10 mg by mouth daily, Disp: , Rfl:      thyroid (ARMOUR) 60 MG tablet, Take 60 mg by mouth every morning , Disp: , Rfl:      Allergies    Allergen Reactions     No Clinical Screening - See Comments Itching     ENVIRONMENTAL: Oak trees     Bactrim Hives     Codeine Nausea and Vomiting     Ketorolac Nausea and Vomiting     Ketorolac Tromethamine Nausea and Vomiting     Morphine      Nalbuphine      Other reaction(s): Unknown Reaction     Penicillamine Hives     Penicillins Hives     Seasonal Allergies Itching     Sulfa Antibiotics      Aspartame GI Disturbance     Nsaids Other (See Comments)     Advil, Ibuprofen are ok     Oxycodone-Acetaminophen Nausea and Vomiting     Other Reaction(s): vomiting        Family History   Problem Relation Age of Onset     Lung Cancer Father      Parkinsonism Cousin         paternal side     Parkinsonism Maternal Aunt         Social History:  He  reports that he has never smoked. He has never used smokeless tobacco. He reports current alcohol use. He reports that he does not use drugs.     PHYSICAL EXAM:  /71 (BP Location: Right arm, Patient Position: Sitting, Cuff Size: Adult Large)   Pulse 69   Wt 101.2 kg (223 lb)   SpO2 94%   BMI 32.92 kg/m       NEURO:  No formal neurological assessment was done today in terms of a clinical scale, however his neurological examination was focused on the right hemibody while we were programming his device, monitoring for clinical benefit and signs or symptoms of side effects due to excessive stimulation.  MONOPOLAR REVIEW  NA = Not assessed  NC = No change from setting above  NN = None noted  FT = Finger taps  FTN = Finger to nose   WB = Wingbeating    LEFT Hemisphere    Contacts Amplitude PW    s Rate  hertz Assessment Adverse Effect   Case +, 1- 0.9 90 130 Reemergence of stiffness and tremor none    1.3 - - NA Cramping in hand           Case+, 2- 0.6 - - Reemergence of symptoms R leg tremor and tongue tightness     0.9 - - Speech better, tremor a little better    None    1.2 - - Speech and tremor better None    1.5 - - NC None    2.1 - - - Capsular pulling            Case+, 3- 2.1 - - Great benefit None    2.4 - - NA Blurry vision    2.7 - - NA dysarthria           Case+, 4- 2.4 - - Great benefit None    2.7 - - - dysarthria           Case+, 5- 1.8 - - Still residual tremor, tongue feels heavy None    2.1 - - Symptoms better, no SE None    2.7 - - - dysarthria    2.4 - - - Residual dysarthria           Case+, 6- 1.8 - - Reemergence of tremors none    2.4 - - Tremors improved None    2.7 - - NC None    3.0 - - NC None    5.4 - - - dysarthria    5.1 - - Maintained benefit Dysarthria improved           Case+, 7- 2.7 - - Residual tremors none    3.0 - - Tremor better none    5.1 - - NC dysarthria    4.8 - - NC Dysarthria better           Case+, 8- 3.0 - - Residual tremors none    6.3 - - Great benefit from 2.7-5.4 Weird feeling some dysarthria    6.0 - - NC Residual weird feeling, residual dysarthria    4.8 - - NC Improvement in dysarthria                     DBS groups/programs    Left Brain   Right Brain        Initial Final Initial Final     Inactive Inactive Inactive Inactive   Amplitude (mA) 5.0 [2-5.5] 2.5 [0.0-4.0] 3.3 [2.5-4.0] No change   Pulse width (usec) 60 90 60     Freq (Hz) 139 130 139     Contacts: C+3- (55%), 7- (45%) C+ 3-(15%) 6- (25%) 7-(60%) C+11-(60%), 14- (40%)           New -  Left Brain   Right Brain       Initial Final Initial Final     Active Inactive Active Inactive   Amplitude (mA)  4.5 [2-4.5] NC 3.5 [2.5-4.0] NC   Pulse width (usec)  80 NC  60 NC   Freq (Hz) 139    NC   Contacts:  C+3- (55%), 6- (45%) NC  C+11-(60%), 14- (40%) NC         3-December2 Left Brain   Right Brain       Initial Final Initial Final     Inactive Inactive Inactive Inactive   Amplitude (mA) 5.5 [2-5.5] 2.7 [0.0-4.2] 3.5 [2.5-4.0] 3.5 [0.0-4.0]   Pulse width (usec) 60  90 60 60   Freq (Hz) 139 130 139 139   Contacts: C+3- (55%), 6- (45%) C+ 3-(20%) 7-(80%) C+11-(60%), 14- (40%) C+11-(60%), 14- (40%)         4-December1 Left Brain   Right Brain       Initial Final Initial  Final     Inactive Active Inactive Active   Amplitude (mA) 2.8 [2-3.3] 2.4 [0.0-4.2] 3.3 [2.5-4.0] 3.5 [0.0-4.0]   Pulse width (usec) 60  90 60  60   Freq (Hz) 139  130 139  130   Contacts: C+3- C+ 3-(30%) 7-(70%) C+11-(60%), 14- (40%) C+11-(60%), 14- (40%)       DATA REVIEWED:  We reviewed pertinent data available in epic    ASSESSMENT:  69-year-old male with advanced Parkinson disease, status post bilateral STN DBS implantation, with some residual symptoms, here for follow-up.    Today we focused on the left STN lead, and with programming and revisiting his monopolar review, the overall pattern that we detected was slightly more lateral lead than usual given the slightly narrow capsular side effects, however he is leads appear to be very programmable.    PLAN:  -Reviewed impression and plan with patient and wife    - Will continue meds the same    - New settings as above.  We will plan on setting aside potentially 2 hours of programming for the next visit so we can take some extra time to go over his left hemibody symptom profile by potentially repeating the monopolar review on the right STN.    - We spent some time going over how to use the patient  which she has not done in a while, we also spent some time talking about the fact that he was encouraged to try each 1 of those settings for at least a couple of weeks at a time and keep track of benefit, and potential side effects.  In the office he did seem somewhat the new DBS settings were effective in managing his gait a little better so I suspect that might have been a phenomenon of overstimulation leading to her gait dysfunction, however we set expectations a little bit sometimes gait disorders can be refractory to meds and DBS settings, but nonetheless I think it is worth trying and seeing if this new settings will hold.    - Will plan on scheduling another appointment in the next couple of months.  Sooner if needed.  Encouraged to contact us back if  "any questions or concerns.    Patient Instructions   Dear Kwame,    After the visit we decided to explore the L side of the brain/R side of the body with new programs.     On your remote you will fine the following:    December 3 - New program    -new program    The other 2 programs were from the past, and the program that is labeled as \"new\" is the 1 that you came in on today so that will be a fall back and that you can always revert to self back to if the other 2 new programs fail to provide any benefit or end up leading to any specific side effects that we could not appreciate in clinic today.    As we discussed in clinic, try to give each 1 of those new groups at least 2 to 4 weeks at a time to let the full effects of stimulation settle in.        Kian Piper MD (Leo) (he/him/his)   of Neurology  [facility]  Department of Neurology      Thank you for referring Kwame Lin to our [facility], we appreciate the opportunity of being your partner in healthcare. Please feel free to reach out to us at any point if any questions or concerns about this visit.    I, Kian Oro MD, personally interviewed, examined and evaluated this patient. I personally reviewed the vital signs, medications and pertinent labs/imaging.     The longitudinal plan of care for Kwame was addressed during this visit. Due to the added complexity in care, I will continue to support Kwame Lin in the subsequent management of this condition(s) and with the ongoing continuity of care of this condition(s).    Attending attestation: Today I spent a total 45 minutes on this case, in face-to-face, examining, obtaining history, providing education and coordination of care. Additional time was spent in chart review, ancillary data, and documentation as delineated above.     Additional time spent for separate DBS programmin min DBS analyzed, device checked " for integrity, with (with/without) complex (simple/complex) programming as detailed above.       Again, thank you for allowing me to participate in the care of your patient.      Sincerely,    Kian Oro MD

## 2024-12-11 NOTE — PROGRESS NOTES
Department of Neurology  Movement Disorders Division   Return Patient Visit    Patient: Kwame Lin   MRN: [id number]   : 1955   Date of Visit: 2024  PCP: SAMPSON IRVIN PA-C   Referring provider: Alexandro Oro    CC:  Return for Parkinson's disease  DBS Programming visit    Interval history:  Mr. Lin is a 69 year old right-handed male with history significant for tremor dominant Parkinson disease who presents to movement disorder clinic for follow-up. Last visit was about a couple of months ago, with a plan to establish care since his prior neurologist left our practice, however because he has an old version of Cmxtwenty and we did not have the equipment necessary to interrogate this device at our location, we had to reschedule for a subsequent visit and request the proper equipment from the . He is accompanied by his wife, who assists with collateral history.    Kwame continues to do well with a great improvement in his tremors, however he continues to endorse some difficulties with some residual rigidity and difficulties pertaining his gait when he is trying to ambulate particularly on the right leg, which he would like to troubleshoot if possible.  He has not explored different programming parameters on his own, as he is has not been instructed to do that in a long time.    Therefore, we plan to not only interrogate his device today but also to try to repeat somewhat of a monopolar review and try to reassess his programs accordingly to see if there is any other suggestion that can potentially alleviate some of the residual symptoms.    No other changes pertaining his Parkinson's disease, no falls, no swallowing difficulties, no cognitive changes.  He is in good spirits today and he is ready to undergo his very long programming session.    Hardware data      Left Right   DBS Lead [place] [employer] [address] [place] [address] [address]   Target STN STN   [place] hole [place]  Surtek model DB-4600-C [place] Surtek model [id number]-C   Lead implant date 12/14/17 5/22/18   IPG # 1 1   Surgeon Dr. Amina Huynh        Left   IPG # 1   IPG [place] Vercise model  DB-1110-C   IPG Location Chest    Extensions x2 [place] [id number]   Pocket adapters None   IPG implant date 12/22/17   Surgeon Dr. Huynh        Impedances: Within normal limits    Battery level: Full charge    ROS:  All others negative except as listed above. Pertinent movement disorders-specific ROS listed above.    Past Medical History:   Diagnosis Date    Anosmia 10/12/2017    Anxiety     Depressed mood 10/12/2017    Hypothyroid     Parkinson disease (H)     Parkinsons disease (H)     PONV (postoperative nausea and vomiting)     RBD (REM behavioral disorder) 10/12/2017        Past Surgical History:   Procedure Laterality Date    ARTHROSCOPY KNEE Right     twice    ARTHROSCOPY KNEE Right     left    epidydmal mass removal, benign      IMPLANT DEEP BRAIN STIMULATION GENERATOR / BATTERY Left 12/22/2017    Procedure: IMPLANT DEEP BRAIN STIMULATION GENERATOR / BATTERY;  Deep Brain Stimulator Placement, Phase II, Placement Of Deep Brain Stimulator Generator/Battery Over The Left Chest Wall;  Surgeon: Arpan Huynh MD;  Location: [facility] OR    OPTICAL TRACKING SYSTEM INSERTION DEEP BRAIN STIMULATION Left 12/14/2017    Procedure: OPTICAL TRACKING SYSTEM INSERTION DEEP BRAIN STIMULATION;  Stealth Assisted Left Deep Brain Stimulator Placement, Phase I, Placement Of Left Side Deep Brain Stimulator Electrode, Target Left Subthalamic Nucleus With Microelectrode Recording;  Surgeon: Arpan Huynh MD;  Location: [facility] OR    OPTICAL TRACKING SYSTEM INSERTION DEEP BRAIN STIMULATION Right 5/22/2018    Procedure: OPTICAL TRACKING SYSTEM INSERTION DEEP BRAIN STIMULATION;  Stealth Assisted Right Deep Brain Stimulator Placement, Phase I And II Combined, Placement Of Right Deep Brain Stimulator Electrode, Target Right Subthalamic Nucleus With  Microelectrode Recording And Connection To Previous Implanted Generator/Battery;  Surgeon: Arpan Huynh MD;  Location: [facility] OR    ORTHOPEDIC SURGERY Left 2018    knee replacement          Current Outpatient Medications:     acetaminophen (TYLENOL) 500 MG tablet, Take 500 mg by mouth every 6 hours as needed for mild pain., Disp: , Rfl:     aspirin (ASA) 325 MG EC tablet, Take 325 mg by mouth at bedtime., Disp: , Rfl:     atorvastatin (LIPITOR) 40 MG tablet, Take 40 mg by mouth daily., Disp: , Rfl:     carbidopa-levodopa (SINEMET)  MG tablet, TAKE 1 AND 1/2 TABLETS BY MOUTH EVERY 4 HOURS FIVE TIMES DAILY(8AM, NOON, 4PM, 8PM, AND 11PM), Disp: 675 tablet, Rfl: 3    Cholecalciferol (VITAMIN D3) 10 MCG/ML LIQD, Take by mouth daily. (5000 units/day), Disp: , Rfl:     clindamycin (CLEOCIN) 300 MG capsule, TAKE 2 CAPSULES BY MOUTH FOR 1 DOSE 1 HOUR PRIOR TO DENTAL APPOINTMENTS, Disp: , Rfl:     clotrimazole (LOTRIMIN) 1 % external cream, Apply topically as needed., Disp: , Rfl:     entacapone (COMTAN) 200 MG tablet, Take 1 tablet (200 mg) by mouth 5 times daily, Disp: 150 tablet, Rfl: 11    gabapentin (NEURONTIN) 300 MG capsule, Take 1 capsule (300 mg) by mouth at bedtime., Disp: 30 capsule, Rfl: 11    metoprolol succinate ER (TOPROL XL) 25 MG 24 hr tablet, Take 1 tablet (25 mg) by mouth daily, Disp: 30 tablet, Rfl: 0    NONFORMULARY, Vitamin D cream topically as needed, Disp: , Rfl:     PARoxetine (PAXIL) 10 MG tablet, Take 10 mg by mouth daily, Disp: , Rfl:     thyroid (ARMOUR) 60 MG tablet, Take 60 mg by mouth every morning , Disp: , Rfl:      Allergies   Allergen Reactions    No Clinical Screening - See Comments Itching     ENVIRONMENTAL: Oak trees    Bactrim Hives    Codeine Nausea and Vomiting    Ketorolac Nausea and Vomiting    Ketorolac Tromethamine Nausea and Vomiting    Morphine     Nalbuphine      Other reaction(s): Unknown Reaction    Penicillamine Hives    Penicillins Hives    Seasonal Allergies  Itching    Sulfa Antibiotics     Aspartame GI Disturbance    Nsaids Other (See Comments)     Advil, Ibuprofen are ok    Oxycodone-Acetaminophen Nausea and Vomiting     Other Reaction(s): vomiting        Family History   Problem Relation Age of Onset    Lung Cancer Father     Parkinsonism Cousin         paternal side    Parkinsonism Maternal Aunt         Social History:  He  reports that he has never smoked. He has never used smokeless tobacco. He reports current alcohol use. He reports that he does not use drugs.     PHYSICAL EXAM:  /71 (BP Location: Right arm, Patient Position: Sitting, Cuff Size: Adult Large)   Pulse 69   Wt 101.2 kg (223 lb)   SpO2 94%   BMI 32.92 kg/m       NEURO:  No formal neurological assessment was done today in terms of a clinical scale, however his neurological examination was focused on the right hemibody while we were programming his device, monitoring for clinical benefit and signs or symptoms of side effects due to excessive stimulation.  MONOPOLAR REVIEW  NA = Not assessed  NC = No change from setting above  NN = None noted  FT = Finger taps  FTN = Finger to nose   WB = Wingbeating    LEFT Hemisphere    Contacts Amplitude PW    s Rate  hertz Assessment Adverse Effect   Case +, 1- 0.9 90 130 Reemergence of stiffness and tremor none    1.3 - - NA Cramping in hand           Case+, 2- 0.6 - - Reemergence of symptoms R leg tremor and tongue tightness     0.9 - - Speech better, tremor a little better    None    1.2 - - Speech and tremor better None    1.5 - - NC None    2.1 - - - Capsular pulling           Case+, 3- 2.1 - - Great benefit None    2.4 - - NA Blurry vision    2.7 - - NA dysarthria           Case+, 4- 2.4 - - Great benefit None    2.7 - - - dysarthria           Case+, 5- 1.8 - - Still residual tremor, tongue feels heavy None    2.1 - - Symptoms better, no SE None    2.7 - - - dysarthria    2.4 - - - Residual dysarthria           Case+, 6- 1.8 - - Reemergence of  tremors none    2.4 - - Tremors improved None    2.7 - - NC None    3.0 - - NC None    5.4 - - - dysarthria    5.1 - - Maintained benefit Dysarthria improved           Case+, 7- 2.7 - - Residual tremors none    3.0 - - Tremor better none    5.1 - - NC dysarthria    4.8 - - NC Dysarthria better           Case+, 8- 3.0 - - Residual tremors none    6.3 - - Great benefit from 2.7-5.4 Weird feeling some dysarthria    6.0 - - NC Residual weird feeling, residual dysarthria    4.8 - - NC Improvement in dysarthria                     DBS groups/programs    Left Brain   Right Brain        Initial Final Initial Final     Inactive Inactive Inactive Inactive   Amplitude (mA) 5.0 [2-5.5] 2.5 [0.0-4.0] 3.3 [2.5-4.0] No change   Pulse width (usec) 60 90 60     Freq (Hz) 139 130 139     Contacts: C+3- (55%), 7- (45%) C+ 3-(15%) 6- (25%) 7-(60%) C+11-(60%), 14- (40%)           New -  Left Brain   Right Brain       Initial Final Initial Final     Active Inactive Active Inactive   Amplitude (mA)  4.5 [2-4.5] NC 3.5 [2.5-4.0] NC   Pulse width (usec)  80 NC  60 NC   Freq (Hz) 139    NC   Contacts:  C+3- (55%), 6- (45%) NC  C+11-(60%), 14- (40%) NC         3-December2 Left Brain   Right Brain       Initial Final Initial Final     Inactive Inactive Inactive Inactive   Amplitude (mA) 5.5 [2-5.5] 2.7 [0.0-4.2] 3.5 [2.5-4.0] 3.5 [0.0-4.0]   Pulse width (usec) 60  90 60 60   Freq (Hz) 139 130 139 139   Contacts: C+3- (55%), 6- (45%) C+ 3-(20%) 7-(80%) C+11-(60%), 14- (40%) C+11-(60%), 14- (40%)         4-December1 Left Brain   Right Brain       Initial Final Initial Final     Inactive Active Inactive Active   Amplitude (mA) 2.8 [2-3.3] 2.4 [0.0-4.2] 3.3 [2.5-4.0] 3.5 [0.0-4.0]   Pulse width (usec) 60  90 60  60   Freq (Hz) 139  130 139  130   Contacts: C+3- C+ 3-(30%) 7-(70%) C+11-(60%), 14- (40%) C+11-(60%), 14- (40%)       DATA REVIEWED:  We reviewed pertinent data available in epic    ASSESSMENT:  69-year-old male with advanced  "Parkinson disease, status post bilateral STN DBS implantation, with some residual symptoms, here for follow-up.    Today we focused on the left STN lead, and with programming and revisiting his monopolar review, the overall pattern that we detected was slightly more lateral lead than usual given the slightly narrow capsular side effects, however he is leads appear to be very programmable.    PLAN:  -Reviewed impression and plan with patient and wife    - Will continue meds the same    - New settings as above.  We will plan on setting aside potentially 2 hours of programming for the next visit so we can take some extra time to go over his left hemibody symptom profile by potentially repeating the monopolar review on the right STN.    - We spent some time going over how to use the patient  which she has not done in a while, we also spent some time talking about the fact that he was encouraged to try each 1 of those settings for at least a couple of weeks at a time and keep track of benefit, and potential side effects.  In the office he did seem somewhat the new DBS settings were effective in managing his gait a little better so I suspect that might have been a phenomenon of overstimulation leading to her gait dysfunction, however we set expectations a little bit sometimes gait disorders can be refractory to meds and DBS settings, but nonetheless I think it is worth trying and seeing if this new settings will hold.    - Will plan on scheduling another appointment in the next couple of months.  Sooner if needed.  Encouraged to contact us back if any questions or concerns.    Patient Instructions   Dear Kwame,    After the visit we decided to explore the L side of the brain/R side of the body with new programs.     On your remote you will fine the following:    December 3 - New program    December 4-new program    The other 2 programs were from the past, and the program that is labeled as \"new\" is the 1 that " you came in on today so that will be a fall back and that you can always revert to self back to if the other 2 new programs fail to provide any benefit or end up leading to any specific side effects that we could not appreciate in clinic today.    As we discussed in clinic, try to give each 1 of those new groups at least 2 to 4 weeks at a time to let the full effects of stimulation settle in.        Kian Piper MD (Leo) (he/him/his)   of Neurology  [facility]  Department of Neurology      Thank you for referring Kwame Lin to our [facility] Movement Disorders Program, we appreciate the opportunity of being your partner in healthcare. Please feel free to reach out to us at any point if any questions or concerns about this visit.    I, Kian Oro MD, personally interviewed, examined and evaluated this patient. I personally reviewed the vital signs, medications and pertinent labs/imaging.     The longitudinal plan of care for Kwame was addressed during this visit. Due to the added complexity in care, I will continue to support Kwame Lin in the subsequent management of this condition(s) and with the ongoing continuity of care of this condition(s).    Attending attestation: Today I spent a total 45 minutes on this case, in face-to-face, examining, obtaining history, providing education and coordination of care. Additional time was spent in chart review, ancillary data, and documentation as delineated above.     Additional time spent for separate DBS programmin min DBS analyzed, device checked for integrity, with (with/without) complex (simple/complex) programming as detailed above.

## 2024-12-11 NOTE — Clinical Note
Hi Yovany,   I would like to see Kwame back in clinic in about a couple of months if possible.  Ideally we should try to look for a 2-hour slot, but if that is not possible we will just do 1 hour and see what we can get.  Either location would be fine.  Thank you

## 2024-12-11 NOTE — PATIENT INSTRUCTIONS
"Dear Kwame,    After the visit we decided to explore the L side of the brain/R side of the body with new programs.     On your remote you will fine the following:    December 3 - New program    December 4-new program    The other 2 programs were from the past, and the program that is labeled as \"new\" is the 1 that you came in on today so that will be a fall back and that you can always revert to self back to if the other 2 new programs fail to provide any benefit or end up leading to any specific side effects that we could not appreciate in clinic today.    As we discussed in clinic, try to give each 1 of those new groups at least 2 to 4 weeks at a time to let the full effects of stimulation settle in.    "

## 2025-01-16 ENCOUNTER — TELEPHONE (OUTPATIENT)
Dept: NEUROLOGY | Facility: CLINIC | Age: 70
End: 2025-01-16
Payer: COMMERCIAL

## 2025-01-16 NOTE — TELEPHONE ENCOUNTER
Writer left a message for the patient to call back to reschedule his appointment to another date for a 2-hour Deep Brain Stimulation programming visit with Dr. Piper.

## 2025-01-20 NOTE — TELEPHONE ENCOUNTER
Spoke to the patient and offered to reschedule him to a sooner date of 2/12/25 at 9 AM or 10 AM, to do a 2-hour programming visit from his previously 1-hour scheduled visit on 2/27/25 with Dr. Piper. The patient stated he will take this and is aware the 2/12/25 at 10 AM with be with Dr. Piper in [place]. The patient was reminded to bring their patient controller with them to their upcoming appointment, with their battery fully charged if it's a rechargeable battery.

## 2025-02-11 ENCOUNTER — TELEPHONE (OUTPATIENT)
Dept: NEUROLOGY | Facility: CLINIC | Age: 70
End: 2025-02-11
Payer: COMMERCIAL

## 2025-02-11 NOTE — TELEPHONE ENCOUNTER
Attempted to reach patient to remind them about appointment scheduled with Kian Oro MD on 2/12/25 in our [place] clinic.    A voicemail was left with a call back number if the patient has questions or would like to reschedule.

## 2025-02-12 ENCOUNTER — OFFICE VISIT (OUTPATIENT)
Dept: NEUROLOGY | Facility: CLINIC | Age: 70
End: 2025-02-12
Payer: COMMERCIAL

## 2025-02-12 VITALS
DIASTOLIC BLOOD PRESSURE: 70 MMHG | HEART RATE: 67 BPM | SYSTOLIC BLOOD PRESSURE: 115 MMHG | BODY MASS INDEX: 33.12 KG/M2 | OXYGEN SATURATION: 97 % | WEIGHT: 224.4 LBS

## 2025-02-12 DIAGNOSIS — G20.B2 PARKINSON'S DISEASE WITH DYSKINESIA AND FLUCTUATING MANIFESTATIONS (H): Primary | ICD-10-CM

## 2025-02-12 PROCEDURE — 95984 ALYS BRN NPGT PRGRMG ADDL 15: CPT | Performed by: PSYCHIATRY & NEUROLOGY

## 2025-02-12 PROCEDURE — 95983 ALYS BRN NPGT PRGRMG 15 MIN: CPT | Performed by: PSYCHIATRY & NEUROLOGY

## 2025-02-12 PROCEDURE — 99215 OFFICE O/P EST HI 40 MIN: CPT | Mod: 25 | Performed by: PSYCHIATRY & NEUROLOGY

## 2025-02-12 NOTE — LETTER
2025      Kwame Lin  [address] [facility] [address]      Dear Colleague,    Thank you for referring your patient, Kwame Lin, to the [facility]. Please see a copy of my visit note below.    Department of Neurology  Movement Disorders Division   Return Patient Visit    Patient: Kwame Lin   MRN: [id number]   : 1955   Date of Visit: 2025  PCP: SAMPSON IRVIN PA-C   Referring provider: Alexandro Oro    CC:  PD  DBS Programming visit    Interval history:  Mr. Lin is a 69 year old male with history significant for advanced Parkinson disease, status post remote bilateral STN implantation who presents to movement disorder clinic for follow-up. Last visit was a couple of months ago, with a plan to perform a monopolar review one of the 2 electrodes, try new settings and reassess. He is accompanied by his wife, who assists with collateral history.    They have tried the new settings provided last visit, and there was not much of a clear benefit in terms of speech or balance, but he feels that overall his doses of medications are connecting well, there were no breakthrough tremors, no dyskinesias.    Hardware data      Left Right   DBS Lead [place] Vercise 2201 [place] Vercise 2201   Target STN STN   Pal hole [place] Surtek model DB-4600-C [place] Surtek model DB-4600-C   Lead implant date 17   IPG # 1 1   Surgeon Dr. Amina Huynh        Left   IPG # 1   IPG [place] Vercise model  DB-1110-C   IPG Location Chest    Extensions x2 [place] [id number]   Pocket adapters None   IPG implant date 17   Surgeon Dr. Huynh        Impedances: Within normal limits    Battery level: Close to 100%    ROS:  All others negative except as listed above. Pertinent movement disorders-specific ROS listed above.    Past Medical History:   Diagnosis Date     Anosmia 10/12/2017     Anxiety      Depressed mood 10/12/2017     Hypothyroid       Parkinson disease (H)      Parkinsons disease (H)      PONV (postoperative nausea and vomiting)      RBD (REM behavioral disorder) 10/12/2017        Past Surgical History:   Procedure Laterality Date     ARTHROSCOPY KNEE Right     twice     ARTHROSCOPY KNEE Right     left     epidydmal mass removal, benign       IMPLANT DEEP BRAIN STIMULATION GENERATOR / BATTERY Left 12/22/2017    Procedure: IMPLANT DEEP BRAIN STIMULATION GENERATOR / BATTERY;  Deep Brain Stimulator Placement, Phase II, Placement Of Deep Brain Stimulator Generator/Battery Over The Left Chest Wall;  Surgeon: Arpan Huynh MD;  Location: [facility] OR     OPTICAL TRACKING SYSTEM INSERTION DEEP BRAIN STIMULATION Left 12/14/2017    Procedure: OPTICAL TRACKING SYSTEM INSERTION DEEP BRAIN STIMULATION;  Stealth Assisted Left Deep Brain Stimulator Placement, Phase I, Placement Of Left Side Deep Brain Stimulator Electrode, Target Left Subthalamic Nucleus With Microelectrode Recording;  Surgeon: Arpan Huynh MD;  Location: [facility] OR     OPTICAL TRACKING SYSTEM INSERTION DEEP BRAIN STIMULATION Right 5/22/2018    Procedure: OPTICAL TRACKING SYSTEM INSERTION DEEP BRAIN STIMULATION;  Stealth Assisted Right Deep Brain Stimulator Placement, Phase I And II Combined, Placement Of Right Deep Brain Stimulator Electrode, Target Right Subthalamic Nucleus With Microelectrode Recording And Connection To Previous Implanted Generator/Battery;  Surgeon: Arpan Huynh MD;  Location: [facility] OR     ORTHOPEDIC SURGERY Left 2018    knee replacement          Current Outpatient Medications:      acetaminophen (TYLENOL) 500 MG tablet, Take 500 mg by mouth every 6 hours as needed for mild pain., Disp: , Rfl:      aspirin (ASA) 325 MG EC tablet, Take 325 mg by mouth at bedtime., Disp: , Rfl:      atorvastatin (LIPITOR) 40 MG tablet, Take 40 mg by mouth daily., Disp: , Rfl:      carbidopa-levodopa (SINEMET)  MG tablet, TAKE 1 AND 1/2 TABLETS BY MOUTH EVERY 4 HOURS  FIVE TIMES DAILY(8AM, NOON, 4PM, 8PM, AND 11PM), Disp: 675 tablet, Rfl: 3     Cholecalciferol (VITAMIN D3) 10 MCG/ML LIQD, Take by mouth daily. (5000 units/day), Disp: , Rfl:      clindamycin (CLEOCIN) 300 MG capsule, TAKE 2 CAPSULES BY MOUTH FOR 1 DOSE 1 HOUR PRIOR TO DENTAL APPOINTMENTS, Disp: , Rfl:      clotrimazole (LOTRIMIN) 1 % external cream, Apply topically as needed., Disp: , Rfl:      entacapone (COMTAN) 200 MG tablet, Take 1 tablet (200 mg) by mouth 5 times daily, Disp: 150 tablet, Rfl: 11     gabapentin (NEURONTIN) 300 MG capsule, Take 1 capsule (300 mg) by mouth at bedtime., Disp: 30 capsule, Rfl: 11     metoprolol succinate ER (TOPROL XL) 25 MG 24 hr tablet, Take 1 tablet (25 mg) by mouth daily, Disp: 30 tablet, Rfl: 0     NONFORMULARY, Vitamin D cream topically as needed, Disp: , Rfl:      PARoxetine (PAXIL) 10 MG tablet, Take 10 mg by mouth daily, Disp: , Rfl:      thyroid (ARMOUR) 60 MG tablet, Take 60 mg by mouth every morning , Disp: , Rfl:      Allergies   Allergen Reactions     No Clinical Screening - See Comments Itching     ENVIRONMENTAL: Oak trees     Bactrim Hives     Codeine Nausea and Vomiting     Ketorolac Nausea and Vomiting     Ketorolac Tromethamine Nausea and Vomiting     Morphine      Nalbuphine      Other reaction(s): Unknown Reaction     Penicillamine Hives     Penicillins Hives     Seasonal Allergies Itching     Sulfa Antibiotics      Aspartame GI Disturbance     Nsaids Other (See Comments)     Advil, Ibuprofen are ok     Oxycodone-Acetaminophen Nausea and Vomiting     Other Reaction(s): vomiting        Family History   Problem Relation Age of Onset     Lung Cancer Father      Parkinsonism Cousin         paternal side     Parkinsonism Maternal Aunt         Social History:  He  reports that he has never smoked. He has never used smokeless tobacco. He reports current alcohol use. He reports that he does not use drugs.     PHYSICAL EXAM:  /70   Pulse 67   Wt 101.8 kg (224 lb  "6.4 oz)   SpO2 97%   BMI 33.12 kg/m       NEURO:  Please see programming notes below.  No formal scales were performed, however the different aspects of his tremor, rigidity, bradykinesia, and dyskinesias were monitored throughout the different iterations of DBS settings attempted during this monopolar review.    DBS data    MONOPOLAR REVIEW    NA = Not assessed  NC = No change from setting above  NN = None noted  FT = Finger taps  FTN = Finger to nose   WB = Wingbeating    Right Hemisphere    Contacts Amplitude PW    s Rate  hertz Assessment Adverse Effect   Case+, 9- 0.0 90 139 No change in PD symptoms, he came on meds No change    1.1 - - NC     1.4 - - NC Transient paresthesias in hand    1.7 - - - Persistent paresthesias at the fingertips    1.5 - - NC, maybe speech was a little better Improved paresthesias           Case+, 10- 0.0 - - NC none    1.2    NC    1.5 - - NC Maybe a little more dyskinesias, speech felt more difficult to articulate.     1.4 - - NC Persistent dyskinesias    1.1 - - - Persistent dyskinesias    0.4 - - NC Dyskinesias resolved, speech stayed the same           Case+, 11- 1.0 - - - No dyskinesias    1.2 - - - NC    1.5 - - - No dyskinesias, speech feels good to them    1.8 - - - NC    2.1 - - - Maybe a transient feeling that his body wanted to move, but no other changes, and no dyskinesias    2.4 - - - NC    2.7 - - - NC    3.1 - - - NC    3.3 - - - NC    3.6 - - - NC    3.9 - - - Feeling that something is trying to come on in terms of speech    3.6 - - - Sensation improved, but once we moved to a different contact he felt that \"something was lifted\" even though he wasn't feeling anything different.           Case+, 12- 1.0 - - - NC    2.1 - - - Weird sensation in hands    2.4 - - - Transient sensation in hands, felt like an itch    2.7 - - - NC    3.0 - - - NC    3.3 - - - NC    3.6 - - - NC    3.9 - - - Funny feeling in his head, feeling something in his eyes    3.6 - - - Feeling is a " little better    3.3 - - - Improvement in weird symptoms           Case+, 13- 1.2 - - Reemergence of L hand tremor None    2.1 - - Tremor still persistent none    3.6 - - NC in tremor None    4.2 - - NC Weird feeling returned    4.5 - - NC Funny feeling persisted. Feeling lightheaded    4.8 - - NC Feeling he could not control his hand, felt tight in his hand    3.9 - - - Mild, but feeling still there    3.6 - - - Unsure whether he feeling anything different, hard to focus his vision on a target and read    2.4 - - - Improvement in vision and feeling completely resolved           Case+, 14- 2.1 - - Not as good postural tremor NC    3.0 - - - Funny feeling is back    2.4 - - - Funny feeling resolved           Case+, 15- 2.1 - - Not as good tremor control, postural none    3.0 - - NC Vision is a little blurry    2.4 - - NC Improvement in vision           Case+, 16- 3.6 - - - Tight sensation in his neck    3.0 - - Still some tremors Tight sensation in neck is less    2.7 - - NC Improved sensation in neck             DBS programming notes: The electrode above does seem to have a couple of very programmable spots, although there were a couple of places were it seemed that his speech was somewhat better, it is not 100% clear to me that that was an exclusive component of stimulation induced side effect.    DBS groups/programs  1-February3   Left Brain   Right Brain        Initial Final Initial Final     Inactive Active Inactive Active   Amplitude (mA) 5.0 [2-5.5] 2.5 [0.0-4.0] 3.3 [2.5-4.0] 2.0 [0.0-3.0]   Pulse width (usec) 60 90 60  90   Freq (Hz) 139 130 139 139   Contacts: C+3- (55%), 7- (45%) C+ 3-(15%) 6- (25%) 7-(60%) C+11-(60%), 14- (40%) C+11-(45%), 12- (55%)         2-New  Left Brain   Right Brain       Initial Final Initial Final     Active Active Inactive Inactive   Amplitude (mA)  4.5 [2-4.5] NC 3.5 [2.5-4.0] NC   Pulse width (usec)  80 NC  60 NC   Freq (Hz) 139    NC   Contacts:  C+3- (55%), 6- (45%) NC   C+11-(60%), 14- (40%) NC         3-February2 Left Brain   Right Brain       Initial Final Initial Final     Inactive Inactive Inactive Inactive   Amplitude (mA) 5.5 [2-5.5] 2.7 [0.0-4.2] 3.5 [2.5-4.0] 2.0 [0.0-3.0]   Pulse width (usec) 60  90 60 90   Freq (Hz) 139 130 139 139   Contacts: C+3- (55%), 6- (45%) C+ 3-(20%) 7-(80%) C+11-(60%), 14- (40%) C+11-(40%), 12- (60%)         4-February1 Left Brain   Right Brain       Initial Final Initial Final     Inactive Inactive Inactive Inactive   Amplitude (mA) 2.8 [2-3.3] 2.4 [0.0-4.2] 3.3 [2.5-4.0] 1.5 [0.0-3.0]   Pulse width (usec) 60  90 60  90   Freq (Hz) 139  130 139  139   Contacts: C+3- C+ 3-(30%) 7-(70%) C+11-(60%), 14- (40%) C+11-(50%), 12- (50%)        DATA REVIEWED:  Reviewed pertinent data available in [facility].    ASSESSMENT:  69-year-old male with advanced Parkinson disease, status post bilateral STN DBS, here for follow-up.  Today we performed a repeated monopolar review for his contralateral STN DBS and maybe there was a pattern that whenever he felt that his speech was a little off he was on the verge of getting slightly dyskinetic, and then there was another point where maybe there were some capsular side effects there were also correlating with a different pattern of speech abnormality, but nonetheless we were able to explore a few different possibilities in terms of areas that can potentially give good clinical benefit without running into potential dysarthria.        PLAN:  -Reviewed impression and plan with patient    - Continue meds the same    - New programs as above.  He was encouraged to try each one of them at least 1 to 2 weeks at a time and keep records of how he is doing in terms of clinical benefit and side effects.    - Continue supportive care otherwise.    - Encourage exercise regularly pursue rehab evaluations periodically.    - Will plan on regrouping the next 3 to 4 months.  Sooner if needed.  Encouraged to contact me back in the meantime  if any questions or concerns.    There are no Patient Instructions on file for this visit.      Kian Piper MD (Leo) (he/him/his)   of Neurology  [facility]  Department of Neurology      Thank you for referring Kwame Lin to our [facility] Movement Disorders Program, we appreciate the opportunity of being your partner in healthcare. Please feel free to reach out to us at any point if any questions or concerns about this visit.    I, Kian Oro MD, personally interviewed, examined and evaluated this patient. I personally reviewed the vital signs, medications and pertinent labs/imaging.     The longitudinal plan of care for Kwame was addressed during this visit. Due to the added complexity in care, I will continue to support Kwame Lin in the subsequent management of this condition(s) and with the ongoing continuity of care of this condition(s).    Attending attestation: Today I spent a total 45 minutes on this case, in face-to-face, examining, obtaining history, providing education and coordination of care. Additional time was spent in chart review, ancillary data, and documentation as delineated above.     Additional time spent for separate DBS programmin min DBS analyzed, device checked for integrity, with (with/without) complex (simple/complex) programming as detailed above.       Again, thank you for allowing me to participate in the care of your patient.        Sincerely,        Kian Oro MD    Electronically signed

## 2025-02-12 NOTE — PROGRESS NOTES
Department of Neurology  Movement Disorders Division   Return Patient Visit    Patient: Kwame Lin   MRN: [id number]   : 1955   Date of Visit: 2025  PCP: SAMPSON IRVIN PA-C   Referring provider: Alexandro Oro    CC:  PD  DBS Programming visit    Interval history:  Mr. Lin is a 69 year old male with history significant for advanced Parkinson disease, status post remote bilateral STN implantation who presents to movement disorder clinic for follow-up. Last visit was a couple of months ago, with a plan to perform a monopolar review one of the 2 electrodes, try new settings and reassess. He is accompanied by his wife, who assists with collateral history.    They have tried the new settings provided last visit, and there was not much of a clear benefit in terms of speech or balance, but he feels that overall his doses of medications are connecting well, there were no breakthrough tremors, no dyskinesias.    Hardware data      Left Right   DBS Lead [place] Vercise 2201 [place] Vercise 2201   Target STN STN   Pal hole [place] Surtek model DB-4600-C [place] Surtek model DB-[id number]-C   Lead implant date 17   IPG # 1 1   Surgeon Dr. Amina Huynh        Left   IPG # 1   IPG [place] Vercise model  DB-1110-C   IPG Location Chest    Extensions x2 [place] [id number]   Pocket adapters None   IPG implant date 17   Surgeon Dr. Huynh        Impedances: Within normal limits    Battery level: Close to 100%    ROS:  All others negative except as listed above. Pertinent movement disorders-specific ROS listed above.    Past Medical History:   Diagnosis Date    Anosmia 10/12/2017    Anxiety     Depressed mood 10/12/2017    Hypothyroid     Parkinson disease (H)     Parkinsons disease (H)     PONV (postoperative nausea and vomiting)     RBD (REM behavioral disorder) 10/12/2017        Past Surgical History:   Procedure Laterality Date    ARTHROSCOPY KNEE Right     twice    ARTHROSCOPY KNEE  Right     left    epidydmal mass removal, benign      IMPLANT DEEP BRAIN STIMULATION GENERATOR / BATTERY Left 12/22/2017    Procedure: IMPLANT DEEP BRAIN STIMULATION GENERATOR / BATTERY;  Deep Brain Stimulator Placement, Phase II, Placement Of Deep Brain Stimulator Generator/Battery Over The Left Chest Wall;  Surgeon: Arpan Huynh MD;  Location: [facility] OR    OPTICAL TRACKING SYSTEM INSERTION DEEP BRAIN STIMULATION Left 12/14/2017    Procedure: OPTICAL TRACKING SYSTEM INSERTION DEEP BRAIN STIMULATION;  Stealth Assisted Left Deep Brain Stimulator Placement, Phase I, Placement Of Left Side Deep Brain Stimulator Electrode, Target Left Subthalamic Nucleus With Microelectrode Recording;  Surgeon: Arpan Huynh MD;  Location: [facility] OR    OPTICAL TRACKING SYSTEM INSERTION DEEP BRAIN STIMULATION Right 5/22/2018    Procedure: OPTICAL TRACKING SYSTEM INSERTION DEEP BRAIN STIMULATION;  Stealth Assisted Right Deep Brain Stimulator Placement, Phase I And II Combined, Placement Of Right Deep Brain Stimulator Electrode, Target Right Subthalamic Nucleus With Microelectrode Recording And Connection To Previous Implanted Generator/Battery;  Surgeon: Arpan Huynh MD;  Location: [facility] OR    ORTHOPEDIC SURGERY Left 2018    knee replacement          Current Outpatient Medications:     acetaminophen (TYLENOL) 500 MG tablet, Take 500 mg by mouth every 6 hours as needed for mild pain., Disp: , Rfl:     aspirin (ASA) 325 MG EC tablet, Take 325 mg by mouth at bedtime., Disp: , Rfl:     atorvastatin (LIPITOR) 40 MG tablet, Take 40 mg by mouth daily., Disp: , Rfl:     carbidopa-levodopa (SINEMET)  MG tablet, TAKE 1 AND 1/2 TABLETS BY MOUTH EVERY 4 HOURS FIVE TIMES DAILY(8AM, NOON, 4PM, 8PM, AND 11PM), Disp: 675 tablet, Rfl: 3    Cholecalciferol (VITAMIN D3) 10 MCG/ML LIQD, Take by mouth daily. (5000 units/day), Disp: , Rfl:     clindamycin (CLEOCIN) 300 MG capsule, TAKE 2 CAPSULES BY MOUTH FOR 1 DOSE 1 HOUR PRIOR TO  DENTAL APPOINTMENTS, Disp: , Rfl:     clotrimazole (LOTRIMIN) 1 % external cream, Apply topically as needed., Disp: , Rfl:     entacapone (COMTAN) 200 MG tablet, Take 1 tablet (200 mg) by mouth 5 times daily, Disp: 150 tablet, Rfl: 11    gabapentin (NEURONTIN) 300 MG capsule, Take 1 capsule (300 mg) by mouth at bedtime., Disp: 30 capsule, Rfl: 11    metoprolol succinate ER (TOPROL XL) 25 MG 24 hr tablet, Take 1 tablet (25 mg) by mouth daily, Disp: 30 tablet, Rfl: 0    NONFORMULARY, Vitamin D cream topically as needed, Disp: , Rfl:     PARoxetine (PAXIL) 10 MG tablet, Take 10 mg by mouth daily, Disp: , Rfl:     thyroid (ARMOUR) 60 MG tablet, Take 60 mg by mouth every morning , Disp: , Rfl:      Allergies   Allergen Reactions    No Clinical Screening - See Comments Itching     ENVIRONMENTAL: Oak trees    Bactrim Hives    Codeine Nausea and Vomiting    Ketorolac Nausea and Vomiting    Ketorolac Tromethamine Nausea and Vomiting    Morphine     Nalbuphine      Other reaction(s): Unknown Reaction    Penicillamine Hives    Penicillins Hives    Seasonal Allergies Itching    Sulfa Antibiotics     Aspartame GI Disturbance    Nsaids Other (See Comments)     Advil, Ibuprofen are ok    Oxycodone-Acetaminophen Nausea and Vomiting     Other Reaction(s): vomiting        Family History   Problem Relation Age of Onset    Lung Cancer Father     Parkinsonism Cousin         paternal side    Parkinsonism Maternal Aunt         Social History:  He  reports that he has never smoked. He has never used smokeless tobacco. He reports current alcohol use. He reports that he does not use drugs.     PHYSICAL EXAM:  /70   Pulse 67   Wt 101.8 kg (224 lb 6.4 oz)   SpO2 97%   BMI 33.12 kg/m       NEURO:  Please see programming notes below.  No formal scales were performed, however the different aspects of his tremor, rigidity, bradykinesia, and dyskinesias were monitored throughout the different iterations of DBS settings attempted during  "this monopolar review.    DBS data    MONOPOLAR REVIEW    NA = Not assessed  NC = No change from setting above  NN = None noted  FT = Finger taps  FTN = Finger to nose   WB = Wingbeating    Right Hemisphere    Contacts Amplitude PW    s Rate  hertz Assessment Adverse Effect   Case+, 9- 0.0 90 139 No change in PD symptoms, he came on meds No change    1.1 - - NC     1.4 - - NC Transient paresthesias in hand    1.7 - - - Persistent paresthesias at the fingertips    1.5 - - NC, maybe speech was a little better Improved paresthesias           Case+, 10- 0.0 - - NC none    1.2    NC    1.5 - - NC Maybe a little more dyskinesias, speech felt more difficult to articulate.     1.4 - - NC Persistent dyskinesias    1.1 - - - Persistent dyskinesias    0.4 - - NC Dyskinesias resolved, speech stayed the same           Case+, 11- 1.0 - - - No dyskinesias    1.2 - - - NC    1.5 - - - No dyskinesias, speech feels good to them    1.8 - - - NC    2.1 - - - Maybe a transient feeling that his body wanted to move, but no other changes, and no dyskinesias    2.4 - - - NC    2.7 - - - NC    3.1 - - - NC    3.3 - - - NC    3.6 - - - NC    3.9 - - - Feeling that something is trying to come on in terms of speech    3.6 - - - Sensation improved, but once we moved to a different contact he felt that \"something was lifted\" even though he wasn't feeling anything different.           Case+, 12- 1.0 - - - NC    2.1 - - - Weird sensation in hands    2.4 - - - Transient sensation in hands, felt like an itch    2.7 - - - NC    3.0 - - - NC    3.3 - - - NC    3.6 - - - NC    3.9 - - - Funny feeling in his head, feeling something in his eyes    3.6 - - - Feeling is a little better    3.3 - - - Improvement in weird symptoms           Case+, 13- 1.2 - - Reemergence of L hand tremor None    2.1 - - Tremor still persistent none    3.6 - - NC in tremor None    4.2 - - NC Weird feeling returned    4.5 - - NC Funny feeling persisted. Feeling lightheaded    " 4.8 - - NC Feeling he could not control his hand, felt tight in his hand    3.9 - - - Mild, but feeling still there    3.6 - - - Unsure whether he feeling anything different, hard to focus his vision on a target and read    2.4 - - - Improvement in vision and feeling completely resolved           Case+, 14- 2.1 - - Not as good postural tremor NC    3.0 - - - Funny feeling is back    2.4 - - - Funny feeling resolved           Case+, 15- 2.1 - - Not as good tremor control, postural none    3.0 - - NC Vision is a little blurry    2.4 - - NC Improvement in vision           Case+, 16- 3.6 - - - Tight sensation in his neck    3.0 - - Still some tremors Tight sensation in neck is less    2.7 - - NC Improved sensation in neck             DBS programming notes: The electrode above does seem to have a couple of very programmable spots, although there were a couple of places were it seemed that his speech was somewhat better, it is not 100% clear to me that that was an exclusive component of stimulation induced side effect.    DBS groups/programs  1-February3   Left Brain   Right Brain        Initial Final Initial Final     Inactive Active Inactive Active   Amplitude (mA) 5.0 [2-5.5] 2.5 [0.0-4.0] 3.3 [2.5-4.0] 2.0 [0.0-3.0]   Pulse width (usec) 60 90 60  90   Freq (Hz) 139 130 139 139   Contacts: C+3- (55%), 7- (45%) C+ 3-(15%) 6- (25%) 7-(60%) C+11-(60%), 14- (40%) C+11-(45%), 12- (55%)         2-New  Left Brain   Right Brain       Initial Final Initial Final     Active Active Inactive Inactive   Amplitude (mA)  4.5 [2-4.5] NC 3.5 [2.5-4.0] NC   Pulse width (usec)  80 NC  60 NC   Freq (Hz) 139    NC   Contacts:  C+3- (55%), 6- (45%) NC  C+11-(60%), 14- (40%) NC         3-February2 Left Brain   Right Brain       Initial Final Initial Final     Inactive Inactive Inactive Inactive   Amplitude (mA) 5.5 [2-5.5] 2.7 [0.0-4.2] 3.5 [2.5-4.0] 2.0 [0.0-3.0]   Pulse width (usec) 60  90 60 90   Freq (Hz) 139 130 139 139    Contacts: C+3- (55%), 6- (45%) C+ 3-(20%) 7-(80%) C+11-(60%), 14- (40%) C+11-(40%), 12- (60%)         4-February1 Left Brain   Right Brain       Initial Final Initial Final     Inactive Inactive Inactive Inactive   Amplitude (mA) 2.8 [2-3.3] 2.4 [0.0-4.2] 3.3 [2.5-4.0] 1.5 [0.0-3.0]   Pulse width (usec) 60  90 60  90   Freq (Hz) 139  130 139  139   Contacts: C+3- C+ 3-(30%) 7-(70%) C+11-(60%), 14- (40%) C+11-(50%), 12- (50%)        DATA REVIEWED:  Reviewed pertinent data available in epic.    ASSESSMENT:  69-year-old male with advanced Parkinson disease, status post bilateral STN DBS, here for follow-up.  Today we performed a repeated monopolar review for his contralateral STN DBS and maybe there was a pattern that whenever he felt that his speech was a little off he was on the verge of getting slightly dyskinetic, and then there was another point where maybe there were some capsular side effects there were also correlating with a different pattern of speech abnormality, but nonetheless we were able to explore a few different possibilities in terms of areas that can potentially give good clinical benefit without running into potential dysarthria.        PLAN:  -Reviewed impression and plan with patient    - Continue meds the same    - New programs as above.  He was encouraged to try each one of them at least 1 to 2 weeks at a time and keep records of how he is doing in terms of clinical benefit and side effects.    - Continue supportive care otherwise.    - Encourage exercise regularly pursue rehab evaluations periodically.    - Will plan on regrouping the next 3 to 4 months.  Sooner if needed.  Encouraged to contact me back in the meantime if any questions or concerns.    There are no Patient Instructions on file for this visit.      Kian Piper MD (Leo) (he/him/his)   of Neurology  [facility]  Department of Neurology      Thank you for referring Kwame Lin to our [facility]  Movement Disorders Program, we appreciate the opportunity of being your partner in healthcare. Please feel free to reach out to us at any point if any questions or concerns about this visit.    I, Kian Oro MD, personally interviewed, examined and evaluated this patient. I personally reviewed the vital signs, medications and pertinent labs/imaging.     The longitudinal plan of care for Kwame was addressed during this visit. Due to the added complexity in care, I will continue to support Kwame Lin in the subsequent management of this condition(s) and with the ongoing continuity of care of this condition(s).    Attending attestation: Today I spent a total 45 minutes on this case, in face-to-face, examining, obtaining history, providing education and coordination of care. Additional time was spent in chart review, ancillary data, and documentation as delineated above.     Additional time spent for separate DBS programmin min DBS analyzed, device checked for integrity, with (with/without) complex (simple/complex) programming as detailed above.

## 2025-05-05 NOTE — PROGRESS NOTES
Department of Neurology  Movement Disorders Division   Return Patient Visit    Patient: Kwame Lin   MRN: [id number]   : 1955   Date of Visit: 2025   PCP: SAMPSON IRVIN PA-C   Referring provider: Alexandro Oro    CC:  PD  DBS Programming visit    Interval history:  Mr. Lin is a 70 year old male with history significant for advanced Parkinson disease, status post remote bilateral STN implantation who presents to movement disorder clinic for follow-up. Last visit was 2025, with a plan to try new Programs 1, 3 and 4. He is accompanied by his wife, who assists with collateral history.    He felt that the new programs didn't work quite as well as the previous programs and he had some difficulties with speech issues. Has some difficultly with his legs feel restless and need to walk around. Has a diagnosis of RLS. The patient was on gabapentin and didn't help with this up to 300 mg. No issues with recharging    Walking has gotten a bit worse than the last visit. Has had some shuffling of gait. Has some knee issues that gets better with walking. No freezing. No falls. Has walking sticks but hasn't used them. Tremor is going well with the DBS. Speech has gotten slightly more slurred the last visit. Occasionally has swallowing issues but remain fairly mild. Is doing loud therapy as well.     Memory and thinking are going well. No hallucinations. Lightheadedness isn't a problem.    No side effects from the medications.     Related Medications 8 AM  12 PM 4 PM  8 PM  11 PM    Carbidopa/Levodopa 25/100 mg  1.5  1.5  1.5  1.5  1.5    Entacapone 200 mg 1 1  1  1  1                                  Hardware data      Left Right   DBS Lead [place] Vercise 2201 [place] Vercise 2201   Target STN STN   [place] hole [place] Surtek model DB-4600-C [place] Surtek model DB-[id number]-C   Lead implant date 17   IPG # 1 1   Surgeon Dr. Amina Huynh        Left   IPG # 1   IPG [place] [employer]  model  DB-1110-C   IPG Location Chest    Extensions x2 [place] [id number]   Pocket adapters None   IPG implant date 12/22/17   Surgeon Dr. Huynh        Impedances: Within normal limits     Battery level: 3.65V     ROS:  All others negative except as listed above. Pertinent movement disorders-specific ROS listed above.    Past Medical History:   Diagnosis Date    Anosmia 10/12/2017    Anxiety     Depressed mood 10/12/2017    Hypothyroid     Parkinson disease (H)     Parkinsons disease (H)     PONV (postoperative nausea and vomiting)     RBD (REM behavioral disorder) 10/12/2017        Past Surgical History:   Procedure Laterality Date    ARTHROSCOPY KNEE Right     twice    ARTHROSCOPY KNEE Right     left    epidydmal mass removal, benign      IMPLANT DEEP BRAIN STIMULATION GENERATOR / BATTERY Left 12/22/2017    Procedure: IMPLANT DEEP BRAIN STIMULATION GENERATOR / BATTERY;  Deep Brain Stimulator Placement, Phase II, Placement Of Deep Brain Stimulator Generator/Battery Over The Left Chest Wall;  Surgeon: Arpan Huynh MD;  Location: [facility] OR    OPTICAL TRACKING SYSTEM INSERTION DEEP BRAIN STIMULATION Left 12/14/2017    Procedure: OPTICAL TRACKING SYSTEM INSERTION DEEP BRAIN STIMULATION;  Stealth Assisted Left Deep Brain Stimulator Placement, Phase I, Placement Of Left Side Deep Brain Stimulator Electrode, Target Left Subthalamic Nucleus With Microelectrode Recording;  Surgeon: Arpan Huynh MD;  Location: [facility] OR    OPTICAL TRACKING SYSTEM INSERTION DEEP BRAIN STIMULATION Right 5/22/2018    Procedure: OPTICAL TRACKING SYSTEM INSERTION DEEP BRAIN STIMULATION;  Stealth Assisted Right Deep Brain Stimulator Placement, Phase I And II Combined, Placement Of Right Deep Brain Stimulator Electrode, Target Right Subthalamic Nucleus With Microelectrode Recording And Connection To Previous Implanted Generator/Battery;  Surgeon: Arpna Huynh MD;  Location: [facility] OR    ORTHOPEDIC SURGERY Left 2018    knee  replacement          Current Outpatient Medications:     acetaminophen (TYLENOL) 500 MG tablet, Take 500 mg by mouth every 6 hours as needed for mild pain., Disp: , Rfl:     aspirin (ASA) 325 MG EC tablet, Take 325 mg by mouth at bedtime., Disp: , Rfl:     atorvastatin (LIPITOR) 40 MG tablet, Take 20 mg by mouth daily., Disp: , Rfl:     carbidopa-levodopa (SINEMET)  MG tablet, TAKE 1 AND 1/2 TABLETS BY MOUTH EVERY 4 HOURS FIVE TIMES DAILY(8AM, NOON, 4PM, 8PM, AND 11PM), Disp: 675 tablet, Rfl: 3    Cholecalciferol (VITAMIN D3) 10 MCG/ML LIQD, Take by mouth daily. (5000 units/day), Disp: , Rfl:     clotrimazole (LOTRIMIN) 1 % external cream, Apply topically as needed., Disp: , Rfl:     entacapone (COMTAN) 200 MG tablet, Take 1 tablet (200 mg) by mouth 5 times daily, Disp: 150 tablet, Rfl: 11    metoprolol succinate ER (TOPROL XL) 25 MG 24 hr tablet, Take 1 tablet (25 mg) by mouth daily, Disp: 30 tablet, Rfl: 0    NONFORMULARY, Vitamin D cream topically as needed, Disp: , Rfl:     PARoxetine (PAXIL) 10 MG tablet, Take 10 mg by mouth daily, Disp: , Rfl:     thyroid (ARMOUR) 60 MG tablet, Take 90 mg by mouth every morning., Disp: , Rfl:     clindamycin (CLEOCIN) 300 MG capsule, TAKE 2 CAPSULES BY MOUTH FOR 1 DOSE 1 HOUR PRIOR TO DENTAL APPOINTMENTS (Patient not taking: Reported on 5/6/2025), Disp: , Rfl:     gabapentin (NEURONTIN) 300 MG capsule, Take 1 capsule (300 mg) by mouth at bedtime. (Patient not taking: Reported on 5/6/2025), Disp: 30 capsule, Rfl: 11     Allergies   Allergen Reactions    No Clinical Screening - See Comments Itching     ENVIRONMENTAL: Oak trees    Bactrim Hives    Codeine Nausea and Vomiting    Ketorolac Nausea and Vomiting    Ketorolac Tromethamine Nausea and Vomiting    Morphine     Nalbuphine      Other reaction(s): Unknown Reaction    Penicillamine Hives    Penicillins Hives    Seasonal Allergies Itching    Sulfa Antibiotics     Aspartame GI Disturbance    Nsaids Other (See Comments)      Advil, Ibuprofen are ok    Oxycodone-Acetaminophen Nausea and Vomiting     Other Reaction(s): vomiting        Family History   Problem Relation Age of Onset    Lung Cancer Father     Parkinsonism Cousin         paternal side    Parkinsonism Maternal Aunt         Social History:  He  reports that he has never smoked. He has never used smokeless tobacco. He reports current alcohol use. He reports that he does not use drugs.     PHYSICAL EXAM:  /84   Pulse 69   Wt 101.9 kg (224 lb 9.6 oz)   SpO2 98%   BMI 33.15 kg/m       NEURO:  No formal scales performed today. Patient had mild dyskinesias throughout the visit. Speech slightly soft. Masked facies, Some mild bradykinesia.     DBS data  DBS groups/programs  1-February3   Left Brain  Right Brain     Initial&Final Initial & Final     Inactive Inactive   Amplitude (mA) 4.5 [0.0-4.5] 2.0 [0.0-3.0]   Pulse width (usec) 80  90   Freq (Hz) 139 139   Contacts: C+ 3-(15%) 6- (25%) 7-(60%) C+11-(45%), 12- (55%)         2-New  Left Brain Right Brain     Initial & Final Initial & Final     Active Active   Amplitude (mA)  4.5 [2-4.5] 3.5 [2.5-4.0]   Pulse width (usec)  80  60   Freq (Hz) 139  139    Contacts:  C+3- (55%), 6- (45%)  C+11-(60%), 14- (40%)         3-February2  Left Brain  Right Brain     Initial & Final Initial & Final     Inactive Inactive   Amplitude (mA) 4.5 [0.0-4.4] 2.0 [0.0-3.0]   Pulse width (usec)  80 90   Freq (Hz) 139 139   Contacts: C+ 3-(20%) 7-(80%) C+11-(40%), 12- (60%)         4-February1  Left Brain  Right Brain     Initial & Final Initial & Final     Inactive Inactive   Amplitude (mA) 4.5 [0.0-4.5] 1.5 [0.0-3.0]   Pulse width (usec)  80  90   Freq (Hz)  139  139   Contacts: C+ 3-(30%) 7-(70%) C+11-(50%), 12- (50%)        DATA REVIEWED:  Reviewed pertinent data available in epic.    ASSESSMENT:  70 year old male with advanced Parkinson disease, status post bilateral STN DBS, here for follow-up.  Today we did not make any programming changes.  We discussed with the patient that Rytary may be a better option and we will order 1 pill to get his electrodermal screening to see if this would be an option. It does appear to be covered by his insurance if need be. We did not make changes to his DBS today.     PLAN:  __ We have given you a single pill of Rytary for electrodermal testing  __ You can try chewing tablets with a carbonated beverage to speed up absorption.   __ We didn't make changes to the device today  __ We could send you to see Carmella in the future to discuss switching to Rytary    RTC 6 months, 30 mins    Patient seen and discussed with Dr. Veronique Beach MD  Neuromodulation/Movement Disorders Fellow

## 2025-05-06 ENCOUNTER — OFFICE VISIT (OUTPATIENT)
Dept: NEUROLOGY | Facility: CLINIC | Age: 70
End: 2025-05-06
Payer: COMMERCIAL

## 2025-05-06 VITALS
WEIGHT: 224.6 LBS | HEART RATE: 69 BPM | BODY MASS INDEX: 33.15 KG/M2 | DIASTOLIC BLOOD PRESSURE: 84 MMHG | OXYGEN SATURATION: 98 % | SYSTOLIC BLOOD PRESSURE: 122 MMHG

## 2025-05-06 DIAGNOSIS — G20.B2 PARKINSON'S DISEASE WITH DYSKINESIA AND FLUCTUATING MANIFESTATIONS (H): Primary | ICD-10-CM

## 2025-05-06 RX ORDER — LEVODOPA AND CARBIDOPA 95; 23.75 MG/1; MG/1
1 CAPSULE, EXTENDED RELEASE ORAL ONCE
Qty: 1 CAPSULE | Refills: 0 | Status: SHIPPED | OUTPATIENT
Start: 2025-05-06 | End: 2025-05-06

## 2025-05-06 RX ORDER — ENTACAPONE 200 MG/1
200 TABLET ORAL
Qty: 150 TABLET | Refills: 11 | Status: SHIPPED | OUTPATIENT
Start: 2025-05-06

## 2025-05-06 NOTE — NURSING NOTE
"Kwame Lin is a 70 year old male who presents for:  Chief Complaint   Patient presents with    DBS Programming     2-3 mo recheck DBS          Initial Vitals:  /84   Pulse 69   Wt 101.9 kg (224 lb 9.6 oz)   SpO2 98%   BMI 33.15 kg/m   Estimated body mass index is 33.15 kg/m  as calculated from the following:    Height as of 3/19/24: 1.753 m (5' 9.02\").    Weight as of this encounter: 101.9 kg (224 lb 9.6 oz).. Body surface area is 2.23 meters squared. BP completed using cuff size: regular  Data Unavailable    Nursing Comments:     Rylee Mir MA   "

## 2025-05-06 NOTE — LETTER
2025      Kwame Lin  [address] [facility] [address]      Dear Colleague,    Thank you for referring your patient, Kwame Lin, to the [facility]. Please see a copy of my visit note below.    Department of Neurology  Movement Disorders Division   Return Patient Visit    Patient: Kwame Lin   MRN: [id number]   : 1955   Date of Visit: 2025   PCP: SAMPSON IRVIN PA-C   Referring provider: Alexandro Oro    CC:  PD  DBS Programming visit    Interval history:  Mr. Lin is a 70 year old male with history significant for advanced Parkinson disease, status post remote bilateral STN implantation who presents to movement disorder clinic for follow-up. Last visit was 2025, with a plan to try new Programs 1, 3 and 4. He is accompanied by his wife, who assists with collateral history.    He felt that the new programs didn't work quite as well as the previous programs and he had some difficulties with speech issues. Has some difficultly with his legs feel restless and need to walk around. Has a diagnosis of RLS. The patient was on gabapentin and didn't help with this up to 300 mg. No issues with recharging    Walking has gotten a bit worse than the last visit. Has had some shuffling of gait. Has some knee issues that gets better with walking. No freezing. No falls. Has walking sticks but hasn't used them. Tremor is going well with the DBS. Speech has gotten slightly more slurred the last visit. Occasionally has swallowing issues but remain fairly mild. Is doing loud therapy as well.     Memory and thinking are going well. No hallucinations. Lightheadedness isn't a problem.    No side effects from the medications.     Related Medications 8 AM  12 PM 4 PM  8 PM  11 PM    Carbidopa/Levodopa 25/100 mg  1.5  1.5  1.5  1.5  1.5    Entacapone 200 mg 1 1  1  1  1                                  Hardware data      Left Right   DBS Lead  [employer] Vercise 2201 [employer] Vercise 2201   Target STN STN   Pal hole [employer] Surtek model DB-4600-C [employer] Surtek model DB-[id number]-C   Lead implant date 12/14/17 5/22/18   IPG # 1 1   Surgeon Dr. Amina Huynh        Left   IPG # 1   IPG [employer] Vercise model  DB-1110-C   IPG Location Chest    Extensions x2 [employer] [id number]   Pocket adapters None   IPG implant date 12/22/17   Surgeon Dr. Huynh        Impedances: Within normal limits     Battery level: 3.65V     ROS:  All others negative except as listed above. Pertinent movement disorders-specific ROS listed above.    Past Medical History:   Diagnosis Date     Anosmia 10/12/2017     Anxiety      Depressed mood 10/12/2017     Hypothyroid      Parkinson disease (H)      Parkinsons disease (H)      PONV (postoperative nausea and vomiting)      RBD (REM behavioral disorder) 10/12/2017        Past Surgical History:   Procedure Laterality Date     ARTHROSCOPY KNEE Right     twice     ARTHROSCOPY KNEE Right     left     epidydmal mass removal, benign       IMPLANT DEEP BRAIN STIMULATION GENERATOR / BATTERY Left 12/22/2017    Procedure: IMPLANT DEEP BRAIN STIMULATION GENERATOR / BATTERY;  Deep Brain Stimulator Placement, Phase II, Placement Of Deep Brain Stimulator Generator/Battery Over The Left Chest Wall;  Surgeon: Arpan Huynh MD;  Location: [facility] OR     OPTICAL TRACKING SYSTEM INSERTION DEEP BRAIN STIMULATION Left 12/14/2017    Procedure: OPTICAL TRACKING SYSTEM INSERTION DEEP BRAIN STIMULATION;  Stealth Assisted Left Deep Brain Stimulator Placement, Phase I, Placement Of Left Side Deep Brain Stimulator Electrode, Target Left Subthalamic Nucleus With Microelectrode Recording;  Surgeon: Arpan Huynh MD;  Location: [facility] OR     OPTICAL TRACKING SYSTEM INSERTION DEEP BRAIN STIMULATION Right 5/22/2018    Procedure: OPTICAL TRACKING SYSTEM INSERTION DEEP BRAIN STIMULATION;  Stealth Assisted Right Deep Brain Stimulator Placement, Phase I And II Combined, Placement  Of Right Deep Brain Stimulator Electrode, Target Right Subthalamic Nucleus With Microelectrode Recording And Connection To Previous Implanted Generator/Battery;  Surgeon: Arpan Huynh MD;  Location: [facility] OR     ORTHOPEDIC SURGERY Left 2018    knee replacement          Current Outpatient Medications:      acetaminophen (TYLENOL) 500 MG tablet, Take 500 mg by mouth every 6 hours as needed for mild pain., Disp: , Rfl:      aspirin (ASA) 325 MG EC tablet, Take 325 mg by mouth at bedtime., Disp: , Rfl:      atorvastatin (LIPITOR) 40 MG tablet, Take 20 mg by mouth daily., Disp: , Rfl:      carbidopa-levodopa (SINEMET)  MG tablet, TAKE 1 AND 1/2 TABLETS BY MOUTH EVERY 4 HOURS FIVE TIMES DAILY(8AM, NOON, 4PM, 8PM, AND 11PM), Disp: 675 tablet, Rfl: 3     Cholecalciferol (VITAMIN D3) 10 MCG/ML LIQD, Take by mouth daily. (5000 units/day), Disp: , Rfl:      clotrimazole (LOTRIMIN) 1 % external cream, Apply topically as needed., Disp: , Rfl:      entacapone (COMTAN) 200 MG tablet, Take 1 tablet (200 mg) by mouth 5 times daily, Disp: 150 tablet, Rfl: 11     metoprolol succinate ER (TOPROL XL) 25 MG 24 hr tablet, Take 1 tablet (25 mg) by mouth daily, Disp: 30 tablet, Rfl: 0     NONFORMULARY, Vitamin D cream topically as needed, Disp: , Rfl:      PARoxetine (PAXIL) 10 MG tablet, Take 10 mg by mouth daily, Disp: , Rfl:      thyroid (ARMOUR) 60 MG tablet, Take 90 mg by mouth every morning., Disp: , Rfl:      clindamycin (CLEOCIN) 300 MG capsule, TAKE 2 CAPSULES BY MOUTH FOR 1 DOSE 1 HOUR PRIOR TO DENTAL APPOINTMENTS (Patient not taking: Reported on 5/6/2025), Disp: , Rfl:      gabapentin (NEURONTIN) 300 MG capsule, Take 1 capsule (300 mg) by mouth at bedtime. (Patient not taking: Reported on 5/6/2025), Disp: 30 capsule, Rfl: 11     Allergies   Allergen Reactions     No Clinical Screening - See Comments Itching     ENVIRONMENTAL: Oak trees     Bactrim Hives     Codeine Nausea and Vomiting     Ketorolac Nausea and  Vomiting     Ketorolac Tromethamine Nausea and Vomiting     Morphine      Nalbuphine      Other reaction(s): Unknown Reaction     Penicillamine Hives     Penicillins Hives     Seasonal Allergies Itching     Sulfa Antibiotics      Aspartame GI Disturbance     Nsaids Other (See Comments)     Advil, Ibuprofen are ok     Oxycodone-Acetaminophen Nausea and Vomiting     Other Reaction(s): vomiting        Family History   Problem Relation Age of Onset     Lung Cancer Father      Parkinsonism Cousin         paternal side     Parkinsonism Maternal Aunt         Social History:  He  reports that he has never smoked. He has never used smokeless tobacco. He reports current alcohol use. He reports that he does not use drugs.     PHYSICAL EXAM:  /84   Pulse 69   Wt 101.9 kg (224 lb 9.6 oz)   SpO2 98%   BMI 33.15 kg/m       NEURO:  No formal scales performed today. Patient had mild dyskinesias throughout the visit. Speech slightly soft. Masked facies, Some mild bradykinesia.     DBS data  DBS groups/programs  1-February3   Left Brain  Right Brain     Initial&Final Initial & Final     Inactive Inactive   Amplitude (mA) 4.5 [0.0-4.5] 2.0 [0.0-3.0]   Pulse width (usec) 80  90   Freq (Hz) 139 139   Contacts: C+ 3-(15%) 6- (25%) 7-(60%) C+11-(45%), 12- (55%)         2-New  Left Brain Right Brain     Initial & Final Initial & Final     Active Active   Amplitude (mA)  4.5 [2-4.5] 3.5 [2.5-4.0]   Pulse width (usec)  80  60   Freq (Hz) 139  139    Contacts:  C+3- (55%), 6- (45%)  C+11-(60%), 14- (40%)         3-February2  Left Brain  Right Brain     Initial & Final Initial & Final     Inactive Inactive   Amplitude (mA) 4.5 [0.0-4.4] 2.0 [0.0-3.0]   Pulse width (usec)  80 90   Freq (Hz) 139 139   Contacts: C+ 3-(20%) 7-(80%) C+11-(40%), 12- (60%)         4-February1  Left Brain  Right Brain     Initial & Final Initial & Final     Inactive Inactive   Amplitude (mA) 4.5 [0.0-4.5] 1.5 [0.0-3.0]   Pulse width (usec)  80  90   Freq (Hz)   139  139   Contacts: C+ 3-(30%) 7-(70%) C+11-(50%), 12- (50%)        DATA REVIEWED:  Reviewed pertinent data available in epic.    ASSESSMENT:  70 year old male with advanced Parkinson disease, status post bilateral STN DBS, here for follow-up.  Today we did not make any programming changes. We discussed with the patient that Rytary may be a better option and we will order 1 pill to get his electrodermal screening to see if this would be an option. It does appear to be covered by his insurance if need be. We did not make changes to his DBS today.     PLAN:  __ We have given you a single pill of Rytary for electrodermal testing  __ You can try chewing tablets with a carbonated beverage to speed up absorption.   __ We didn't make changes to the device today  __ We could send you to see Carmella in the future to discuss switching to Rytary    RTC 6 months, 30 mins    Patient seen and discussed with Dr. Veronique Beach MD  Neuromodulation/Movement Disorders Fellow              Attestation signed by Kian Deng MD at 5/20/2025  9:13 AM:  Attestation entered as a separate note.      I, Kian Oro MD, personally interviewed, examined and evaluated this patient. I personally reviewed the vital signs, medications and pertinent labs/imaging. I discussed the patient with Dr Beach and agree with the assessment, examination and plan of care documented, with the additions and/or exceptions delineated below:    Overall in a much better place compared to prior programming sessions, we have accomplished a successful tremor control with settings provided last visit.  However, he seems to be experiencing some level of disease progression along the lines of worsening hypokinetic dysarthria as well as increased shuffling gait, which did not seem to be largely associated with medication state, nor with different stimulation parameters that were tried.  He feels like his current program is working  "well for him and we decided that that, at this point, we have reached DBS optimization we need to work on his medications a little bit.  He has done electrothermal testing for Rytary and he was \"deemed to not be a good fit\", however we explained to them that this is the same drug that he is taking at the moment in the form of Sinemet, and we should really try to perhaps switch from Sinemet to Rytary if we want to accomplish some extra control of the residual motor fluctuations that he is experiencing at this point, because by pushing the combo of Sinemet plus entacapone there is a chance that his dyskinesias (which he is experiencing some at the moment) will potentially worsen.  It also seems that he had some issues with Rytary being covered by his insurance in the past, but it seems that it is no longer the issue and we might be able to work around that with [facility] pharmacy.    Will plan on seeing him back in the next 2 to 3 months for another assessment.  Sooner if needed.  Encouraged to contact us back if any questions or concerns.    The longitudinal plan of care for Kwame was addressed during this visit. Due to the added complexity in care, I will continue to support Kwame Lin in the subsequent management of this condition(s) and with the ongoing continuity of care of this condition(s).    Attending attestation: Today I spent a total 30 minutes on this case, in face-to-face, examining, obtaining history, providing education and coordination of care. Additional time was spent in chart review, ancillary data, and documentation as delineated above.     Additional time spent for separate DBS programming: 15 min DBS analyzed, device checked for integrity, without (with/without) programming as detailed above.     Again, thank you for allowing me to participate in the care of your patient.        Sincerely,        Kian Oro MD    Electronically signed"

## 2025-05-06 NOTE — PATIENT INSTRUCTIONS
__ We have given you a single pill of Rytary for electrodermal testing  __ You can try chewing tablets with a carbonated beverage to speed up absorption.   __ We didn't make changes to the device today  __ We could send you to see Carmella in the future to discuss switching to Rytary

## 2025-05-20 NOTE — PROGRESS NOTES
"I, Kian Oro MD, personally interviewed, examined and evaluated this patient. I personally reviewed the vital signs, medications and pertinent labs/imaging. I discussed the patient with Dr Beach and agree with the assessment, examination and plan of care documented, with the additions and/or exceptions delineated below:    Overall in a much better place compared to prior programming sessions, we have accomplished a successful tremor control with settings provided last visit.  However, he seems to be experiencing some level of disease progression along the lines of worsening hypokinetic dysarthria as well as increased shuffling gait, which did not seem to be largely associated with medication state, nor with different stimulation parameters that were tried.  He feels like his current program is working well for him and we decided that that, at this point, we have reached DBS optimization we need to work on his medications a little bit.  He has done electrothermal testing for Rytary and he was \"deemed to not be a good fit\", however we explained to them that this is the same drug that he is taking at the moment in the form of Sinemet, and we should really try to perhaps switch from Sinemet to Rytary if we want to accomplish some extra control of the residual motor fluctuations that he is experiencing at this point, because by pushing the combo of Sinemet plus entacapone there is a chance that his dyskinesias (which he is experiencing some at the moment) will potentially worsen.  It also seems that he had some issues with Rytary being covered by his insurance in the past, but it seems that it is no longer the issue and we might be able to work around that with [facility] pharmacy.    Will plan on seeing him back in the next 2 to 3 months for another assessment.  Sooner if needed.  Encouraged to contact us back if any questions or concerns.    The longitudinal plan of care for Kwame was addressed during this " visit. Due to the added complexity in care, I will continue to support Kwame Lin in the subsequent management of this condition(s) and with the ongoing continuity of care of this condition(s).    Attending attestation: Today I spent a total 30 minutes on this case, in face-to-face, examining, obtaining history, providing education and coordination of care. Additional time was spent in chart review, ancillary data, and documentation as delineated above.     Additional time spent for separate DBS programming: 15 min DBS analyzed, device checked for integrity, without (with/without) programming as detailed above.

## (undated) DEVICE — NDL 25GA 2"  8881200441

## (undated) DEVICE — PREP POVIDONE IODINE SCRUB 7.5% 120ML

## (undated) DEVICE — PREP POVIDONE IODINE SOLUTION 10% 120ML

## (undated) DEVICE — TUBE GUIDE ALPHA OMEGA SYSTEM STR-007721-00

## (undated) DEVICE — SU MONOCRYL 4-0 PS-2 27" UND Y426H

## (undated) DEVICE — PACK NEURO MINOR UMMC SNE32MNMU4

## (undated) DEVICE — LINEN TOWEL PACK X5 5464

## (undated) DEVICE — SU VICRYL 0 CT-1 27" UND J260H

## (undated) DEVICE — SU SILK 2-0 TIE 12X30" A305H

## (undated) DEVICE — SU DERMABOND ADVANCED .7ML DNX12

## (undated) DEVICE — SPONGE COTTONOID 1/2X1/2" 20-04S

## (undated) DEVICE — SOL NACL 0.9% IRRIG 1000ML BOTTLE 2F7124

## (undated) DEVICE — DRAPE SHEET REV FOLD 3/4 9349

## (undated) DEVICE — SUTURE BOOTS 051003PBX

## (undated) DEVICE — SU VICRYL 3-0 SH 8X18" UND J864D

## (undated) DEVICE — ELEC MICROPHONICS-FREE ALPHA OMEGA STR-009080-00

## (undated) DEVICE — DRSG TEGADERM 2 3/8X2 3/4" 1624W

## (undated) DEVICE — LINEN TOWEL PACK X6 WHITE 5487

## (undated) DEVICE — SPONGE SURGIFOAM 100 1974

## (undated) DEVICE — GLOVE PROTEXIS BLUE W/NEU-THERA 8.0  2D73EB80

## (undated) DEVICE — ESU GROUND PAD ADULT W/CORD E7507

## (undated) DEVICE — PAD CHUX UNDERPAD 23X24" 7136

## (undated) DEVICE — GLOVE PROTEXIS MICRO 7.5  2D73PM75

## (undated) DEVICE — SYR 10ML FINGER CONTROL W/O NDL 309695

## (undated) DEVICE — LINEN TOWEL PACK X30 5481

## (undated) DEVICE — SU VICRYL 0 CT-1 36" J346H

## (undated) DEVICE — HEADRING POINTS STEREOTACTIC DHRSL

## (undated) DEVICE — DRAPE C-ARM W/STRAPS 42X72" 07-CA104

## (undated) DEVICE — SHUNT TUNNELER SHEATH 46CM 901118

## (undated) DEVICE — SUCTION MANIFOLD DORNOCH ULTRA CART UL-CL500

## (undated) DEVICE — PREP SKIN SCRUB TRAY 4461A

## (undated) DEVICE — ESU HOLSTER PLASTIC DISP E2400

## (undated) DEVICE — REMOTE CONTROL KIT

## (undated) DEVICE — Device

## (undated) DEVICE — ESU ELEC BLADE 2.75" COATED/INSULATED E1455

## (undated) DEVICE — CABLE 5 CHANNEL INPUT  ALPHA OMEGA SYSTEM STR-000642-55

## (undated) DEVICE — LABEL MEDICATION SYSTEM 3303-P

## (undated) DEVICE — PREP CHLORAPREP 26ML TINTED ORANGE  260815

## (undated) DEVICE — DRAPE U SPLIT 74X120" 29440

## (undated) DEVICE — NDL BLUNT 18GA 1" W/O FILTER 305181

## (undated) DEVICE — BLADE CLIPPER SGL USE 9680

## (undated) DEVICE — DRSG GAUZE 4X4" TRAY 6939

## (undated) DEVICE — DRSG PRIMAPORE 02X3" 7133

## (undated) DEVICE — WIPES FOLEY CARE SURESTEP PROVON DFC100

## (undated) DEVICE — DRAPE IOBAN ISOLATION VERTICAL 320X21CM 6617

## (undated) DEVICE — PACK GOWN 3/PK DISP XL SBA32GPFCB

## (undated) DEVICE — CHARGING SYSTEM

## (undated) DEVICE — COVER CAMERA VIDEO LASER 9X96" 04-CC227

## (undated) DEVICE — CATH TRAY FOLEY SURESTEP 16FR W/URNE MTR STLK LATEX A303316A

## (undated) DEVICE — SU ETHIBOND 2-0 SHDA 30" X563H

## (undated) DEVICE — PREP CHLORAPREP CLEAR 3ML 260400

## (undated) DEVICE — PACK SET-UP STD 9102

## (undated) DEVICE — ESU PENCIL W/ROCKER SWITCH BLADE HOLSTER E2350HDB

## (undated) DEVICE — ESU GROUND PAD ADULT REM W/15' CORD E7507DB

## (undated) DEVICE — 35CM LONG TUNNELING TOOL

## (undated) DEVICE — DECANTER VIAL 2006S

## (undated) DEVICE — JELLY LUBRICATING SURGILUBE 2OZ TUBE

## (undated) DEVICE — SOL WATER IRRIG 1000ML BOTTLE 2F7114

## (undated) DEVICE — DRSG STERI STRIP 1/2X4" R1547

## (undated) RX ORDER — LIDOCAINE HYDROCHLORIDE 20 MG/ML
INJECTION, SOLUTION EPIDURAL; INFILTRATION; INTRACAUDAL; PERINEURAL
Status: DISPENSED
Start: 2018-05-22

## (undated) RX ORDER — ACETAMINOPHEN 325 MG/1
TABLET ORAL
Status: DISPENSED
Start: 2017-12-22

## (undated) RX ORDER — SCOLOPAMINE TRANSDERMAL SYSTEM 1 MG/1
PATCH, EXTENDED RELEASE TRANSDERMAL
Status: DISPENSED
Start: 2018-05-23

## (undated) RX ORDER — ONDANSETRON 2 MG/ML
INJECTION INTRAMUSCULAR; INTRAVENOUS
Status: DISPENSED
Start: 2017-12-14

## (undated) RX ORDER — LIDOCAINE HYDROCHLORIDE AND EPINEPHRINE 10; 10 MG/ML; UG/ML
INJECTION, SOLUTION INFILTRATION; PERINEURAL
Status: DISPENSED
Start: 2018-05-22

## (undated) RX ORDER — LIDOCAINE HYDROCHLORIDE 20 MG/ML
INJECTION, SOLUTION EPIDURAL; INFILTRATION; INTRACAUDAL; PERINEURAL
Status: DISPENSED
Start: 2017-12-14

## (undated) RX ORDER — FENTANYL CITRATE 50 UG/ML
INJECTION, SOLUTION INTRAMUSCULAR; INTRAVENOUS
Status: DISPENSED
Start: 2017-12-14

## (undated) RX ORDER — CLINDAMYCIN PHOSPHATE 900 MG/50ML
INJECTION, SOLUTION INTRAVENOUS
Status: DISPENSED
Start: 2017-12-14

## (undated) RX ORDER — FUROSEMIDE 10 MG/ML
INJECTION INTRAMUSCULAR; INTRAVENOUS
Status: DISPENSED
Start: 2024-07-11

## (undated) RX ORDER — BACITRACIN 50000 [IU]/1
INJECTION, POWDER, FOR SOLUTION INTRAMUSCULAR
Status: DISPENSED
Start: 2018-05-22

## (undated) RX ORDER — BACITRACIN 500 [USP'U]/G
OINTMENT OPHTHALMIC
Status: DISPENSED
Start: 2017-12-14

## (undated) RX ORDER — ACETAMINOPHEN 325 MG/1
TABLET ORAL
Status: DISPENSED
Start: 2018-05-23

## (undated) RX ORDER — PROPOFOL 10 MG/ML
INJECTION, EMULSION INTRAVENOUS
Status: DISPENSED
Start: 2017-12-14

## (undated) RX ORDER — BUPIVACAINE HYDROCHLORIDE 2.5 MG/ML
INJECTION, SOLUTION EPIDURAL; INFILTRATION; INTRACAUDAL
Status: DISPENSED
Start: 2017-12-14

## (undated) RX ORDER — PROPOFOL 10 MG/ML
INJECTION, EMULSION INTRAVENOUS
Status: DISPENSED
Start: 2018-05-22

## (undated) RX ORDER — FENTANYL CITRATE 50 UG/ML
INJECTION, SOLUTION INTRAMUSCULAR; INTRAVENOUS
Status: DISPENSED
Start: 2017-12-22

## (undated) RX ORDER — AMOXICILLIN 250 MG
CAPSULE ORAL
Status: DISPENSED
Start: 2018-05-23

## (undated) RX ORDER — ONDANSETRON 2 MG/ML
INJECTION INTRAMUSCULAR; INTRAVENOUS
Status: DISPENSED
Start: 2018-05-23

## (undated) RX ORDER — CLINDAMYCIN PHOSPHATE 900 MG/50ML
INJECTION, SOLUTION INTRAVENOUS
Status: DISPENSED
Start: 2018-05-22

## (undated) RX ORDER — GLYCOPYRROLATE 0.2 MG/ML
INJECTION, SOLUTION INTRAMUSCULAR; INTRAVENOUS
Status: DISPENSED
Start: 2018-05-22

## (undated) RX ORDER — BUPIVACAINE HYDROCHLORIDE 2.5 MG/ML
INJECTION, SOLUTION EPIDURAL; INFILTRATION; INTRACAUDAL
Status: DISPENSED
Start: 2018-05-22

## (undated) RX ORDER — CLINDAMYCIN PHOSPHATE 900 MG/50ML
INJECTION, SOLUTION INTRAVENOUS
Status: DISPENSED
Start: 2018-05-23

## (undated) RX ORDER — EPHEDRINE SULFATE 50 MG/ML
INJECTION, SOLUTION INTRAMUSCULAR; INTRAVENOUS; SUBCUTANEOUS
Status: DISPENSED
Start: 2017-12-22

## (undated) RX ORDER — SODIUM CHLORIDE 9 MG/ML
INJECTION, SOLUTION INTRAVENOUS
Status: DISPENSED
Start: 2018-05-22

## (undated) RX ORDER — BACITRACIN 500 [USP'U]/G
OINTMENT OPHTHALMIC
Status: DISPENSED
Start: 2018-05-22

## (undated) RX ORDER — DEXAMETHASONE SODIUM PHOSPHATE 4 MG/ML
INJECTION, SOLUTION INTRA-ARTICULAR; INTRALESIONAL; INTRAMUSCULAR; INTRAVENOUS; SOFT TISSUE
Status: DISPENSED
Start: 2017-12-14

## (undated) RX ORDER — DEXAMETHASONE SODIUM PHOSPHATE 4 MG/ML
INJECTION, SOLUTION INTRA-ARTICULAR; INTRALESIONAL; INTRAMUSCULAR; INTRAVENOUS; SOFT TISSUE
Status: DISPENSED
Start: 2017-12-22

## (undated) RX ORDER — CLINDAMYCIN PHOSPHATE 900 MG/50ML
INJECTION, SOLUTION INTRAVENOUS
Status: DISPENSED
Start: 2017-12-22

## (undated) RX ORDER — SODIUM CHLORIDE 9 MG/ML
INJECTION, SOLUTION INTRAVENOUS
Status: DISPENSED
Start: 2018-05-23

## (undated) RX ORDER — ESMOLOL HYDROCHLORIDE 10 MG/ML
INJECTION INTRAVENOUS
Status: DISPENSED
Start: 2017-12-14

## (undated) RX ORDER — ACETAMINOPHEN 325 MG/1
TABLET ORAL
Status: DISPENSED
Start: 2018-05-22

## (undated) RX ORDER — AMOXICILLIN 250 MG
CAPSULE ORAL
Status: DISPENSED
Start: 2018-05-22

## (undated) RX ORDER — FENTANYL CITRATE 50 UG/ML
INJECTION, SOLUTION INTRAMUSCULAR; INTRAVENOUS
Status: DISPENSED
Start: 2018-05-23

## (undated) RX ORDER — BACITRACIN 50000 [IU]/1
INJECTION, POWDER, FOR SOLUTION INTRAMUSCULAR
Status: DISPENSED
Start: 2017-12-22

## (undated) RX ORDER — SODIUM CHLORIDE 9 MG/ML
INJECTION, SOLUTION INTRAVENOUS
Status: DISPENSED
Start: 2017-12-14

## (undated) RX ORDER — FENTANYL CITRATE 50 UG/ML
INJECTION, SOLUTION INTRAMUSCULAR; INTRAVENOUS
Status: DISPENSED
Start: 2018-05-22

## (undated) RX ORDER — BACITRACIN 50000 [IU]/1
INJECTION, POWDER, FOR SOLUTION INTRAMUSCULAR
Status: DISPENSED
Start: 2017-12-14

## (undated) RX ORDER — ONDANSETRON 2 MG/ML
INJECTION INTRAMUSCULAR; INTRAVENOUS
Status: DISPENSED
Start: 2017-12-22